# Patient Record
Sex: FEMALE | Race: WHITE | NOT HISPANIC OR LATINO | Employment: OTHER | URBAN - METROPOLITAN AREA
[De-identification: names, ages, dates, MRNs, and addresses within clinical notes are randomized per-mention and may not be internally consistent; named-entity substitution may affect disease eponyms.]

---

## 2019-02-15 ENCOUNTER — TELEPHONE (OUTPATIENT)
Dept: SURGERY | Facility: CLINIC | Age: 64
End: 2019-02-15

## 2019-02-18 ENCOUNTER — TELEPHONE (OUTPATIENT)
Dept: SURGERY | Facility: CLINIC | Age: 64
End: 2019-02-18

## 2019-02-18 NOTE — TELEPHONE ENCOUNTER
----- Message from Aleida Pryor sent at 2/18/2019  9:17 AM CST -----  Contact: pt   Patient Returning Call from [facility]    Who Left Message for Patient: Alethea   Communication Preference: can you please call pt at [phone]  Additional Information: none    AMBROSIO

## 2019-02-20 ENCOUNTER — TELEPHONE (OUTPATIENT)
Dept: HEMATOLOGY/ONCOLOGY | Facility: HOSPITAL | Age: 64
End: 2019-02-20
Payer: MEDICARE

## 2019-02-20 DIAGNOSIS — R19.00 ABDOMINAL MASS, UNSPECIFIED ABDOMINAL LOCATION: Primary | ICD-10-CM

## 2019-02-20 PROCEDURE — 88341 IMHCHEM/IMCYTCHM EA ADD ANTB: CPT | Mod: 26,59,, | Performed by: PATHOLOGY

## 2019-02-20 PROCEDURE — 88342 IMHCHEM/IMCYTCHM 1ST ANTB: CPT | Mod: 26,59,, | Performed by: PATHOLOGY

## 2019-02-20 PROCEDURE — 88341 PR IHC OR ICC EACH ADD'L SINGLE ANTIBODY  STAINPR: ICD-10-PCS | Mod: 26,59,, | Performed by: PATHOLOGY

## 2019-02-20 PROCEDURE — 88342 TISSUE SPECIMEN TO PATHOLOGY: ICD-10-PCS | Mod: 26,59,, | Performed by: PATHOLOGY

## 2019-02-20 PROCEDURE — 88323 TISSUE SPECIMEN TO PATHOLOGY: ICD-10-PCS | Mod: 26,,, | Performed by: PATHOLOGY

## 2019-02-20 PROCEDURE — 88342 IMHCHEM/IMCYTCHM 1ST ANTB: CPT | Mod: 59 | Performed by: PATHOLOGY

## 2019-02-20 PROCEDURE — 88323 CONSLTJ&REPRT MATRL PREP SLD: CPT | Mod: 26,,, | Performed by: PATHOLOGY

## 2019-02-20 PROCEDURE — 88323 CONSLTJ&REPRT MATRL PREP SLD: CPT | Performed by: PATHOLOGY

## 2019-02-25 ENCOUNTER — TELEPHONE (OUTPATIENT)
Dept: SURGERY | Facility: CLINIC | Age: 64
End: 2019-02-25

## 2019-02-25 ENCOUNTER — TELEPHONE (OUTPATIENT)
Dept: HEMATOLOGY/ONCOLOGY | Facility: CLINIC | Age: 64
End: 2019-02-25

## 2019-02-25 NOTE — TELEPHONE ENCOUNTER
----- Message from Ramone Mueller sent at 2/25/2019  9:36 AM CST -----  Contact: Pt  Rescheduling Apt    Who called: Pt  Original Apt: 2/25  Contact preference: [phone]  Additional Information: N/A

## 2019-02-25 NOTE — TELEPHONE ENCOUNTER
Spoke with patient about rescheduling her appointment that she missed in December. Pt would only like to see Dr. Caruso. Explained to patient that the time she was looking for was in March. Patient agreed to date and time. Sending out reminder letter.

## 2019-03-11 ENCOUNTER — INITIAL CONSULT (OUTPATIENT)
Dept: HEMATOLOGY/ONCOLOGY | Facility: CLINIC | Age: 64
End: 2019-03-11
Payer: MEDICARE

## 2019-03-11 ENCOUNTER — INITIAL CONSULT (OUTPATIENT)
Dept: SURGERY | Facility: CLINIC | Age: 64
End: 2019-03-11
Payer: MEDICARE

## 2019-03-11 VITALS
BODY MASS INDEX: 25.82 KG/M2 | TEMPERATURE: 98 F | HEIGHT: 64 IN | DIASTOLIC BLOOD PRESSURE: 90 MMHG | SYSTOLIC BLOOD PRESSURE: 170 MMHG | RESPIRATION RATE: 20 BRPM | WEIGHT: 151.25 LBS | HEART RATE: 107 BPM

## 2019-03-11 DIAGNOSIS — L98.9 SKIN LESION OF BACK: Primary | ICD-10-CM

## 2019-03-11 DIAGNOSIS — C49.4 PRIMARY INTRA-ABDOMINAL SARCOMA: ICD-10-CM

## 2019-03-11 DIAGNOSIS — D49.89 NEOPLASM OF ABDOMEN: ICD-10-CM

## 2019-03-11 DIAGNOSIS — D89.89 OTHER SPECIFIED DISORDERS INVOLVING THE IMMUNE MECHANISM, NOT ELSEWHERE CLASSIFIED: ICD-10-CM

## 2019-03-11 DIAGNOSIS — C76.2 MALIGNANT NEOPLASM OF ABDOMEN: ICD-10-CM

## 2019-03-11 DIAGNOSIS — N18.9 CHRONIC KIDNEY DISEASE: ICD-10-CM

## 2019-03-11 DIAGNOSIS — R93.5 ABNORMAL FINDINGS ON DIAGNOSTIC IMAGING OF OTHER ABDOMINAL REGIONS, INCLUDING RETROPERITONEUM: ICD-10-CM

## 2019-03-11 PROCEDURE — 99999 PR PBB SHADOW E&M-EST. PATIENT-LVL I: CPT | Mod: PBBFAC,,, | Performed by: SURGERY

## 2019-03-11 PROCEDURE — 99999 PR PBB SHADOW E&M-EST. PATIENT-LVL I: ICD-10-PCS | Mod: PBBFAC,,, | Performed by: SURGERY

## 2019-03-11 PROCEDURE — 99205 OFFICE O/P NEW HI 60 MIN: CPT | Mod: GC,S$GLB,, | Performed by: STUDENT IN AN ORGANIZED HEALTH CARE EDUCATION/TRAINING PROGRAM

## 2019-03-11 PROCEDURE — 99203 PR OFFICE/OUTPT VISIT, NEW, LEVL III, 30-44 MIN: ICD-10-PCS | Mod: S$GLB,,, | Performed by: SURGERY

## 2019-03-11 PROCEDURE — 1101F PR PT FALLS ASSESS DOC 0-1 FALLS W/OUT INJ PAST YR: ICD-10-PCS | Mod: CPTII,S$GLB,, | Performed by: SURGERY

## 2019-03-11 PROCEDURE — 99999 PR PBB SHADOW E&M-EST. PATIENT-LVL IV: CPT | Mod: PBBFAC,GC,, | Performed by: STUDENT IN AN ORGANIZED HEALTH CARE EDUCATION/TRAINING PROGRAM

## 2019-03-11 PROCEDURE — 1101F PR PT FALLS ASSESS DOC 0-1 FALLS W/OUT INJ PAST YR: ICD-10-PCS | Mod: CPTII,GC,S$GLB, | Performed by: STUDENT IN AN ORGANIZED HEALTH CARE EDUCATION/TRAINING PROGRAM

## 2019-03-11 PROCEDURE — 3008F PR BODY MASS INDEX (BMI) DOCUMENTED: ICD-10-PCS | Mod: CPTII,GC,S$GLB, | Performed by: STUDENT IN AN ORGANIZED HEALTH CARE EDUCATION/TRAINING PROGRAM

## 2019-03-11 PROCEDURE — 99205 PR OFFICE/OUTPT VISIT, NEW, LEVL V, 60-74 MIN: ICD-10-PCS | Mod: GC,S$GLB,, | Performed by: STUDENT IN AN ORGANIZED HEALTH CARE EDUCATION/TRAINING PROGRAM

## 2019-03-11 PROCEDURE — 99999 PR PBB SHADOW E&M-EST. PATIENT-LVL IV: ICD-10-PCS | Mod: PBBFAC,GC,, | Performed by: STUDENT IN AN ORGANIZED HEALTH CARE EDUCATION/TRAINING PROGRAM

## 2019-03-11 PROCEDURE — 99203 OFFICE O/P NEW LOW 30 MIN: CPT | Mod: S$GLB,,, | Performed by: SURGERY

## 2019-03-11 PROCEDURE — 1101F PT FALLS ASSESS-DOCD LE1/YR: CPT | Mod: CPTII,S$GLB,, | Performed by: SURGERY

## 2019-03-11 PROCEDURE — 1101F PT FALLS ASSESS-DOCD LE1/YR: CPT | Mod: CPTII,GC,S$GLB, | Performed by: STUDENT IN AN ORGANIZED HEALTH CARE EDUCATION/TRAINING PROGRAM

## 2019-03-11 PROCEDURE — 3008F BODY MASS INDEX DOCD: CPT | Mod: CPTII,GC,S$GLB, | Performed by: STUDENT IN AN ORGANIZED HEALTH CARE EDUCATION/TRAINING PROGRAM

## 2019-03-11 RX ORDER — AMLODIPINE BESYLATE 5 MG/1
TABLET ORAL
COMMUNITY
Start: 2019-01-04 | End: 2019-03-27

## 2019-03-11 RX ORDER — ZOLPIDEM TARTRATE 5 MG/1
TABLET ORAL
COMMUNITY
Start: 2019-01-04 | End: 2019-11-11 | Stop reason: SDUPTHER

## 2019-03-11 RX ORDER — HYDROCODONE BITARTRATE AND ACETAMINOPHEN 10; 325 MG/1; MG/1
TABLET ORAL
COMMUNITY
Start: 2019-02-28 | End: 2019-03-12 | Stop reason: SDUPTHER

## 2019-03-11 NOTE — PROGRESS NOTES
Patient seen with Dr Norton; history obtained, patient examined, outside CT personally reviewed, and case discussed with Dr Zachariah Shelton.   She has a massive, centrally-located tumor of the lower abdomen/pelvis which encases the aortic bifurcation and the left common iliac artery and to a lesser extent the right iliac artery. It densely abuts the lower lumbar vertebral bodies. I do not think it is safely resectable. It is highly vascular on CT and debulking would also be hazardous. We probably could obtain more tissue is this would help in decisions about treatment options.   Discussed with patient and family. Also discussed with Dr Caruso. CT sent to be loaded onto PACS    Copy Dr Shelton

## 2019-03-11 NOTE — Clinical Note
Check a CBC, CMP, Mg, Phos, TSH, fT4, lipid panel early morning on day of next visit (withn 2 weeks), followed by PET-CT after this point.  Also will check an EKG and Echo later in the day if possible or the next morning, if not possible..I'm not here between 3/20-3/26 and so can ask Dr. Caruso to help follow-up or I can plan to see on 3/18 or 3/19 if rest of approval for PET-CT can be worked out by that point..Will also see if Dr. Mota's team would like to have a formal appointment or drop by during our appointment on that day.

## 2019-03-11 NOTE — PROGRESS NOTES
.  PATIENT: Tiffany Kaur  MRN: [id number]  DATE: 3/11/2019      Diagnosis:   1. Neoplasm of abdomen    2. 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 She is referred by a Dr. Rosa, who is a general surgeon in the [place] area.  She had not been able to get healthcare for a while as she was not enrolled in her 's plan through the  but has been able to get enrolled in Medicare since December. The patient notes that she started to have abdominal bloating in mid-December. Initially, she had attributed this problem to IBS and was treated at that time with senna, miralax,  Suppositories, and enema. In addition, she notes that she had gone from a weight of 174 pounds to now weighing about 149 pounds. In between, the patient states that the pain was less controlled when she would eat and so had dropped all the way down to 130 pounds two weeks ago. When she ate at that point in time, she would mostly eat jellos and broth. However, over the past two weeks, she has been able to eat bland foods and has picked up the wt to 149 pounds.  She had previously been on tramadol but due to getting somnolent on this medication, she is currently taking a Norco 10 mg pill. Currently, he abdominal pain is around a 4/10 on pain meds. Also, with current laxative regimen, the pt has had BMs every other day this week.      Her workup in mid-December started with an ultrasound which revealed cholelithiasis and large pelvic mass, therefore, follow-up of CT abdomen/pelvis was completed on 1/6/19. Origin of her mass has been unlcear but she was found to have  a 20 cm abdominal mass and a picture of chronic severe left hydronephrosis. The pt had a follow-up CT guided core biopsy on 2/6/19 of abdominal mass which revealed a low grade sarcoma, which has been verified by our pathologists as well.     In addition, the pt has a 3-4 cm x 2 cm lesion on the posterior R shoulder and R lateral neck region with some vascularity, bullous with crusting which the pt has not had follow-up for as of this visit. She notes that this lesion had been present for eight years and endorses some pruritus and periods of resolution, denies any pain or bleeding with the lesion.      Pt had cologuard completed for colorectal cancer screening on December 2018, which was negative for malignancy.     She is able to do daily activities by herself but cannot do any intense or prolonged activity with abdominal mass.       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, etc  Has a brother who lives locally and two daughters, two grand-daughters as well      Allergies:  Review of patient's allergies indicates:  Allergies not on file    Medications:  Current Outpatient Medications   Medication Sig Dispense Refill    amLODIPine (NORVASC) 5 MG tablet       HYDROcodone-acetaminophen (NORCO)  mg per tablet       zolpidem (AMBIEN) 5 MG Tab        No current facility-administered medications for this visit.        Review of Systems   Constitutional: Positive for appetite change and unexpected weight change. Negative for chills and fever.   HENT: Negative for sore throat.    Eyes: Negative for visual disturbance.   Respiratory: Negative for cough, chest tightness, shortness of breath and  "wheezing.    Cardiovascular: Negative for chest pain.   Gastrointestinal: Positive for abdominal distention, abdominal pain and constipation. Negative for blood in stool, diarrhea, nausea, rectal pain and vomiting.   Genitourinary: Negative for dysuria.   Musculoskeletal: Negative for gait problem.   Skin:        + skin lesion   Neurological: Negative for syncope and headaches.   Hematological: Negative for adenopathy. Does not bruise/bleed easily.       ECOG Performance Status: 1   Objective:      Vitals:   Vitals:    03/11/19 1358   BP: (!) 170/90   BP Location: Right arm   Patient Position: Sitting   Pulse: 107   Resp: 20   Temp: 97.9 °F (36.6 °C)   TempSrc: Oral   Weight: 68.6 kg (151 lb 3.8 oz)   Height: 5' 4" (1.626 m)     BMI: Body mass index is 25.96 kg/m².    Physical Exam   Constitutional: She is oriented to person, place, and time. She appears well-developed. No distress.   HENT:   Head: Normocephalic and atraumatic.   Mouth/Throat: No oropharyngeal exudate.   Eyes: EOM are normal. Pupils are equal, round, and reactive to light. No scleral icterus.   Neck: Normal range of motion. Neck supple.   Cardiovascular: Regular rhythm and normal heart sounds. Exam reveals no gallop and no friction rub.   No murmur heard.  tachycardic   Pulmonary/Chest: Effort normal and breath sounds normal. No respiratory distress. She has no wheezes. She has no rales.   Abdominal: She exhibits distension and mass. There is no guarding.   Firm, hypoactive bowel sounds; diffuse tenderness of abdomen; central abdominal bruit   Musculoskeletal: Normal range of motion. She exhibits no edema.   Lymphadenopathy:     She has no cervical adenopathy.   Neurological: She is alert and oriented to person, place, and time. No cranial nerve deficit.   Skin: Skin is warm and dry. She is not diaphoretic.   3-4 cm lesion in R posterior neck, R medial shoulder area; vascular and bullous   Psychiatric: She has a normal mood and affect. "       Laboratory Data:  No visits with results within 1 Week(s) from this visit.   Latest known visit with results is:   No results found for any previous visit.       Imaging:                Imaging:      Assessment:       1. Neoplasm of abdomen    2. Malignant neoplasm of abdomen            Plan:     Low Grade Sarcoma  - 20 cm mass noted in CT abdomen/pelvis; L ureter noted to be have left sided hydronephresis and noted to have some mild inguinal adneopathy  - path reviewed at our institution also consistent with sarcoma as well  - check a CBC, CMP, Mg, Phos, TSH, fT4, lipid panel on morning prior to PET-CT on same day  - Reviewed images and discussed with Dr. Mota, pt is not a surgical candidate at this time as significant amount of central vasculature (aorta, iliac arteries/veins) involved and significant amount of vascularity to mass  - per surgery, plan is to possibly repeat a biopsy of abdominal mass to clarify pathology diagnosis further; no debulking of mass at this time    HTN  - continue norvasc 5 for now  - can titrate up to 10 mg  and/or add second agent if BP continues to remain elevated    SVT  - will need to monitor for AFib but otherwise, will check EKG and Echo at next visit    IBS  - continue laxatives PRN - can use senna + miralax, while on pain meds    Pt prefers to be emailed at [email or web address]    Discussed with Dr. Caruso    Distress Screening Results: Psychosocial Distress screening score of Distress Score: 0 noted and reviewed. No intervention indicated.    Fernando Silvestre MD  Hematology and Oncology Fellow, PGY IV  [facility]

## 2019-03-11 NOTE — PROGRESS NOTES
[facility]  Surgical Oncology  History & Physical    Patient Name: Tiffany Kaur  MRN: [id number]   Attending Physician: Jose Mota MD  Primary Care Provider: Primary Doctor No    Subjective:     Chief Complaint/Reason for Admission: intra-abdominal mass    History of Present Illness: 62 yo W with a history of HTN, IBS - constipation, SVT (occurred over 10 years) and insomnia who presents with increasing abdominal bloating and post-prandial pain that began in 2018. She states that she has always had issues with being bloated and constipated and figured that her symptoms in December were related to that. However, she noticed that every time she ate food, she would get severe abdominal pain to the point where she was writhing on the floor. The pain would occur shortly after eating her meals. It got to the point where she was only able to keep down jello, clear broth, water and other clear, non-fatty liquids. Her bloating certainly worsened and now she has noticed her abdomen is enlarging and has a palpable mass. She lost approximately 40 pounds since that time and has regained ~15-20 lbs. This past week she just started being able to tolerate a regular diet. She has not had any early satiety. She was seen by an outside physician who initially US and a CT scan of the abdomen which revealed a very large intra-abdominal mass that is very vascular and involving the distal aorta at the bifurcation. Core needle biopsy of the mass was performed and revealed round cell neoplasm which favored a low-grade sarcoma.    She has a history of 3  and she had a partial hysterectomy with last . She had an episode of SVT over 10 years ago when she was very stressed and at the time she did not require further work up and has not had any other episodes. She is a non-smoker and does not drink alcohol or do any recreational drugs.    Current Outpatient Medications on File Prior to Visit    Medication Sig    amLODIPine (NORVASC) 5 MG tablet     HYDROcodone-acetaminophen (NORCO)  mg per tablet     zolpidem (AMBIEN) 5 MG Tab      No current facility-administered medications on file prior to visit.        Review of patient's allergies indicates:  No Known Allergies    Past Medical History:   Diagnosis Date    Gallstones     GERD (gastroesophageal reflux disease)     Hypertension     SVT (supraventricular tachycardia)      Past Surgical History:   Procedure Laterality Date     SECTION      X3    HYSTERECTOMY      Partial    TUBAL LIGATION       Family History     Problem Relation (Age of Onset)    Hypertension Brother    Lung disease Mother    No Known Problems Sister, Maternal Aunt, Maternal Uncle, Paternal Aunt, Paternal Uncle, Maternal Grandmother, Maternal Grandfather, Paternal Grandmother, Paternal Grandfather, Daughter, Daughter, Son    Pancreatic cancer Father        Tobacco Use    Smoking status: Never Smoker    Smokeless tobacco: Never Used   Substance and Sexual Activity    Alcohol use: No     Frequency: Never    Drug use: No    Sexual activity: No     Review of Systems   Constitutional: Positive for unexpected weight change. Negative for activity change, appetite change, diaphoresis, fatigue and fever.   HENT: Negative.    Respiratory: Negative.  Negative for apnea, wheezing and stridor.    Cardiovascular: Negative for chest pain and palpitations.   Gastrointestinal: Positive for abdominal distention, abdominal pain and constipation. Negative for anal bleeding, diarrhea, nausea, rectal pain and vomiting.   Genitourinary: Negative.  Negative for difficulty urinating and dysuria.   Musculoskeletal: Negative.  Negative for arthralgias and back pain.   Skin: Negative.  Negative for color change and pallor.   Hematological: Negative.  Negative for adenopathy. Does not bruise/bleed easily.     Objective:     Vital Signs (Most Recent):    Vital Signs (24h  Range):  [unfilled]        There is no height or weight on file to calculate BMI.    Physical Exam   Constitutional: She is oriented to person, place, and time. She appears well-developed and well-nourished. No distress.   HENT:   Head: Normocephalic and atraumatic.   Neck: Normal range of motion. Neck supple.   Cardiovascular: Normal rate and regular rhythm.   Pulmonary/Chest: Effort normal and breath sounds normal.   Abdominal: Soft. Bowel sounds are normal. She exhibits mass. There is tenderness. There is no guarding. No hernia.       Very large, palpable, firm, intra-abdominal mass that is tender to palpation beginning inferior to the umbilicus. No overlying skin changes.    Neurological: She is alert and oriented to person, place, and time.   Skin: Skin is warm and dry.   Psychiatric: She has a normal mood and affect. Her behavior is normal.       Significant Labs:  None    Significant Diagnostics:  CT abd/pelvis 1/16/19: Reviewed with Dr. Mota    Impression:  1. Large enhancing soft tissue mass within the lower abdomen and upper pelvis measuring greater than 20cm in diameter. Due to the large size, it is difficult to determine whether this mass is arising from the left ovary, the GI tract, or the retroperitoneum.  2. Severe left hydronephrosis. Left renal cortical atrophy would suggest that this is a chronic process due to the mass obstructing the left ureter.   3. Cholelithiasis    CT-guided biopsy 2/6/19:  Outside slide labeled . Core biopsy x3 of large pelvo-abdominal mass:  CORE BIOPSY OF A PELVIC/ABDOMINAL MASS SHOWS A ROUND CELL NEOPLASM IN WHICH I FAVOR A  LOW-GRADE SARCOMA. SEE OUTSIDE REPORT FOR RESULTS OF IMMUNOSTAIN PANEL. IN ADDITION  TO THOSE IMMUNOSTAINS A CD45 STAIN PERFORMED HERE IS NEGATIVE. ALSO, KI-67 STAINS LESS  THAN 1% OF TUMOR CELL NUCLEI. THE CONTROLS STAIN APPROPRIATELY.  NOTE: WE ADVISE EXCISION, IF INDICATED, FOR FURTHER ANALYSIS. THIS CASE WAS REVIEWED BY  DR. CHAVARRIA WHO  CONCURS WITH THE DIAGNOSIS.    Assessment/Plan:   64 yo W with large intra-abdominal sarcoma    -Discussed with patient and her family members that given the size, location and vascularity of the mass, it does not appear to be resectable. We also do not believe that debulking would be of benefit. There is potential to obtain more tissue if that would help with better pathologic delineation in order to assist with chemoradiation therapy options.   -We also discussed with Dr. Caruso who will obtain a PET CT scan and following that we will determine if more tissue diagnosis is needed.  -The patient will follow up after her staging PET CT scan if needed after she is seen by heme/onc.    Shyam Jovel MD  General Surgery  PGY-5  [phone] (pager)

## 2019-03-11 NOTE — Clinical Note
Can we also see if we can get a dermatology referral (for skin lesion in back) for same day or day after next appointment with me in clinic? Thanks

## 2019-03-11 NOTE — PROGRESS NOTES
"History & Physical    SUBJECTIVE:     History of Present Illness:  Patient is a 63 y.o. female presents with ***. Onset of symptoms was {desc; hpi onset:44125} with {Desc; clinical condition:17::"unchanged"} course since that time. Patient denies ***. Symptoms are aggravated by ***. Symptoms improve with ***. ***     No chief complaint on file.  j    Review of patient's allergies indicates:  No Known Allergies    Current Outpatient Medications   Medication Sig Dispense Refill    amLODIPine (NORVASC) 5 MG tablet       HYDROcodone-acetaminophen (NORCO)  mg per tablet       zolpidem (AMBIEN) 5 MG Tab        No current facility-administered medications for this visit.        Past Medical History:   Diagnosis Date    Gallstones     GERD (gastroesophageal reflux disease)     Hypertension     SVT (supraventricular tachycardia)      Past Surgical History:   Procedure Laterality Date     SECTION      X3    HYSTERECTOMY      Partial    TUBAL LIGATION       Family History   Problem Relation Age of Onset    Lung disease Mother     Pancreatic cancer Father     No Known Problems Sister     Hypertension Brother     No Known Problems Maternal Aunt     No Known Problems Maternal Uncle     No Known Problems Paternal Aunt     No Known Problems Paternal Uncle     No Known Problems Maternal Grandmother     No Known Problems Maternal Grandfather     No Known Problems Paternal Grandmother     No Known Problems Paternal Grandfather     No Known Problems Daughter     No Known Problems Daughter     No Known Problems Son      Social History     Tobacco Use    Smoking status: Never Smoker    Smokeless tobacco: Never Used   Substance Use Topics    Alcohol use: No     Frequency: Never    Drug use: No        Review of Systems:  Review of Systems    OBJECTIVE:     Vital Signs (Most Recent)              Physical Exam:  Physical Exam    Laboratory  {Labs Reviewed:95092::"***"}    Diagnostic " "Results:  {Results; Diagnostics:32608487::"***"}    ASSESSMENT/PLAN:     ***    PLAN:Plan     {Plan; Diagnostics:20505::"***"}       "

## 2019-03-11 NOTE — LETTER
March 11, 2019      Rachid Camarena MD  [address]  [address]  [facility] [address]           [facility]  [address]  [facility] [address]  Phone: [phone]          Patient: Tiffany Kaur   MR Number: [id number]   YOB: 1955   Date of Visit: 3/11/2019       Dear Rachid Camarena:    Thank you for referring Tiffany Kaur to me for evaluation. Attached you will find relevant portions of my assessment and plan of care.    If you have questions, please do not hesitate to call me. I look forward to following Tiffany Kaur along with you.    Sincerely,    Jose Mota MD    Enclosure  CC:  No Recipients    If you would like to receive this communication electronically, please contact [email or web address] or [phone] to request more information on [employer] Link access.    For providers and/or their staff who would like to refer a patient to [facility], please contact us through our one-stop-shop provider referral line, [facility], at [phone].    If you feel you have received this communication in error or would no longer like to receive these types of communications, please e-mail [email or web address]

## 2019-03-12 ENCOUNTER — TELEPHONE (OUTPATIENT)
Dept: HEMATOLOGY/ONCOLOGY | Facility: CLINIC | Age: 64
End: 2019-03-12

## 2019-03-12 DIAGNOSIS — C76.2 MALIGNANT NEOPLASM OF ABDOMEN: ICD-10-CM

## 2019-03-12 RX ORDER — HYDROCODONE BITARTRATE AND ACETAMINOPHEN 10; 325 MG/1; MG/1
1 TABLET ORAL EVERY 4 HOURS PRN
Qty: 120 TABLET | Refills: 0 | Status: SHIPPED | OUTPATIENT
Start: 2019-03-12 | End: 2019-03-13 | Stop reason: SDUPTHER

## 2019-03-12 NOTE — TELEPHONE ENCOUNTER
----- Message from Mery Joseph sent at 3/12/2019 12:09 PM CDT -----  Type:  Pharmacy Calling to Clarify an RX    Name of Caller:  Marie   Pharmacy Name: [facility] [address]   Prescription Name:    What do they need to clarify?: states that they need Dr Nirmala PAUL #  Best Call Back Number:  [phone]  Additional Information:   Thank You  HALINA Joseph

## 2019-03-12 NOTE — PROGRESS NOTES
Distress Screening Results: Psychosocial Distress screening score of Distress Score: 0 {AMB ONC DISTRESS SCORE:31517}

## 2019-03-13 RX ORDER — HYDROCODONE BITARTRATE AND ACETAMINOPHEN 10; 325 MG/1; MG/1
1 TABLET ORAL EVERY 6 HOURS PRN
Qty: 120 TABLET | Refills: 0 | Status: SHIPPED | OUTPATIENT
Start: 2019-03-13 | End: 2019-06-10 | Stop reason: SDUPTHER

## 2019-03-18 ENCOUNTER — TELEPHONE (OUTPATIENT)
Dept: HEMATOLOGY/ONCOLOGY | Facility: CLINIC | Age: 64
End: 2019-03-18

## 2019-03-18 NOTE — TELEPHONE ENCOUNTER
----- Message from Mayte Camp sent at 3/18/2019  9:38 AM CDT -----  Contact: Pt [phone]  Pt requesting to be called about appts that she expected to be scheduled for 3/25, Please call her at [phone]

## 2019-03-18 NOTE — TELEPHONE ENCOUNTER
Called patient to discuss the appointments. There was no answer or voice mail to leave a message.        ----- Message from Fernando Silvestre MD sent at 3/15/2019  3:47 PM CDT -----  Dr. York has agreed to help see her once on 3/23 if we can move all of the scanning (PET-CT, echo) to that day or day prior as per patient preference and schedules. If this does not work, I am also glad to see her on the 27th when I get back with scans closer to that time, thanks.     ----- Message -----  From: Fannie Brand  Sent: 3/15/2019   1:46 PM  To: Trudy Markham, MD Dr. Shady Kaminski can not see on 3/20 Dr. Silvestre    ----- Message -----  From: Trudy Markham  Sent: 3/15/2019   1:43 PM  To: Fernando Silvestre MD, Shady NOGUEIRA Staff    I can not get the scan on Monday morning. Don't have time on Dr Caruso on Wednesday.    ----- Message -----  From: Fernando Silvestre MD  Sent: 3/15/2019  10:29 AM  To: Aviva Caruso MD, Trudy Markham    I can see her Monday at 3 pm, if there is any way to get that PET-CT completed on Monday morning....otherwise, would move labs and Echo to Wednesday, 3/20 and have her follow-up with PRE[facility] or Dr. Caruso.     I think waiting till 4/1 would be a delay in care, since we still have to determine best biopsy site  and then plan for treatment soon after..    Dr. Mota is willing to drop by at our future visit as well to assist with biopsy, without a formal appointment, per my communication with him.     ----- Message -----  From: Trudy Markham  Sent: 3/15/2019   9:09 AM  To: Fernando Silvestre MD, Shady NOGUEIRA Staff    Dr Silvestre,    Do you want to see pt on 4/1 when you return? Or do you want me to try to get time on Dr Caruso next week?    Fannie,  Can I get time on Dr Caruso next week. Patient needs pet scan & Echo early that same day if possible. Lives in [facility].       ----- Message -----  From: Fernando Silvestre MD  Sent: 3/11/2019   8:53 PM  To: Kathi Mcelroy    Check a CBC,  CMP, Mg, Phos, TSH, fT4, lipid panel early morning on day of next visit (withn 2 weeks), followed by PET-CT after this point.  Also will check an EKG and Echo later in the day if possible or the next morning, if not possible..    I'm not here between 3/20-3/26 and so can ask Dr. Caruso to help follow-up or I can plan to see on 3/18 or 3/19 if rest of approval for PET-CT can be worked out by that point..    Will also see if Dr. Mota's team would like to have a formal appointment or drop by during our appointment on that day.

## 2019-03-18 NOTE — TELEPHONE ENCOUNTER
----- Message from Mery Joseph sent at 3/18/2019 11:31 AM CDT -----  Contact: pt   Pt called to speak with nurse desean have some questions and states can you just leave a message have some phone issues   Callback#[phone]  Thank You  HALINA Joseph

## 2019-03-18 NOTE — TELEPHONE ENCOUNTER
Spoke to patient we scheduled all the appointment for 3/27 gave her times and directions to the location of the appt. Mailed appt slip.    ----- Message from Fernando Silvestre MD sent at 3/18/2019 10:55 AM CDT -----  I guess 3/27 earliest at  3 pm.so that would give enough time for labs too and for some of those results to come back..    ----- Message -----  From: Trudy Markham  Sent: 3/18/2019   9:36 AM  To: Aviva Caruso MD, Fannie Brand, #    Dr Silvestre,    I tried calling patient there was no answer. I can not get all the test done on 3/22 before the appt with Dr Hensley before 9:00 am.(That's what he has available on 3/22).    I can get the Pet on 3/27 at 8:30 and the Echo at 1:00. What Time would you see the patient on 3/27?      ----- Message -----  From: Fernando Silvestre MD  Sent: 3/15/2019   3:47 PM  To: Aviva Caruso MD, Trudy Markham, #    Dr. York has agreed to help see her once on 3/23 if we can move all of the scanning (PET-CT, echo) to that day or day prior as per patient preference and schedules. If this does not work, I am also glad to see her on the 27th when I get back with scans closer to that time, thanks.     ----- Message -----  From: Fannie Brand  Sent: 3/15/2019   1:46 PM  To: Fernando Hewitt MD Dr. Larned can not see on 3/20 Dr. Silvestre    ----- Message -----  From: Trudy Markham  Sent: 3/15/2019   1:43 PM  To: Fernando Silvestre MD, Larned Zoe L Staff    I can not get the scan on Monday morning. Don't have time on Dr Caruso on Wednesday.    ----- Message -----  From: Fernando Silvestre MD  Sent: 3/15/2019  10:29 AM  To: Aviva Caruso MD, [facility]    I can see her Monday at 3 pm, if there is any way to get that PET-CT completed on Monday morning....otherwise, would move labs and Echo to Wednesday, 3/20 and have her follow-up with PREM or Dr. Caruso.     I think waiting till 4/1 would be a delay in care, since we still have to determine best  biopsy site  and then plan for treatment soon after..    Dr. Mota is willing to drop by at our future visit as well to assist with biopsy, without a formal appointment, per my communication with him.     ----- Message -----  From: Trudy Markham  Sent: 3/15/2019   9:09 AM  To: Fernando Silvestre MD, Shady NOGUEIRA Staff    Dr Silvestre,    Do you want to see pt on 4/1 when you return? Or do you want me to try to get time on Dr Caruso next week?    Fannie,  Can I get time on Dr Caruso next week. Patient needs pet scan & Echo early that same day if possible. Lives in [facility].       ----- Message -----  From: Fernando Silvestre MD  Sent: 3/11/2019   8:53 PM  To: Kathi Mcelroy    Check a CBC, CMP, Mg, Phos, TSH, fT4, lipid panel early morning on day of next visit (withn 2 weeks), followed by PET-CT after this point.  Also will check an EKG and Echo later in the day if possible or the next morning, if not possible..    I'm not here between 3/20-3/26 and so can ask Dr. Caruso to help follow-up or I can plan to see on 3/18 or 3/19 if rest of approval for PET-CT can be worked out by that point..    Will also see if Dr. Mota's team would like to have a formal appointment or drop by during our appointment on that day.

## 2019-03-27 ENCOUNTER — TELEPHONE (OUTPATIENT)
Dept: HEMATOLOGY/ONCOLOGY | Facility: CLINIC | Age: 64
End: 2019-03-27

## 2019-03-27 ENCOUNTER — OFFICE VISIT (OUTPATIENT)
Dept: HEMATOLOGY/ONCOLOGY | Facility: CLINIC | Age: 64
End: 2019-03-27
Payer: MEDICARE

## 2019-03-27 ENCOUNTER — HOSPITAL ENCOUNTER (OUTPATIENT)
Dept: RADIOLOGY | Facility: HOSPITAL | Age: 64
Discharge: HOME | End: 2019-03-27
Attending: STUDENT IN AN ORGANIZED HEALTH CARE EDUCATION/TRAINING PROGRAM
Payer: MEDICARE

## 2019-03-27 ENCOUNTER — INFUSION (OUTPATIENT)
Dept: INFUSION THERAPY | Facility: HOSPITAL | Age: 64
End: 2019-03-27
Attending: STUDENT IN AN ORGANIZED HEALTH CARE EDUCATION/TRAINING PROGRAM
Payer: MEDICARE

## 2019-03-27 ENCOUNTER — HOSPITAL ENCOUNTER (OUTPATIENT)
Dept: CARDIOLOGY | Facility: CLINIC | Age: 64
Discharge: HOME | End: 2019-03-27
Attending: STUDENT IN AN ORGANIZED HEALTH CARE EDUCATION/TRAINING PROGRAM
Payer: MEDICARE

## 2019-03-27 VITALS
RESPIRATION RATE: 20 BRPM | WEIGHT: 151 LBS | BODY MASS INDEX: 25.78 KG/M2 | SYSTOLIC BLOOD PRESSURE: 168 MMHG | DIASTOLIC BLOOD PRESSURE: 81 MMHG | HEART RATE: 90 BPM | BODY MASS INDEX: 26.26 KG/M2 | HEIGHT: 64 IN | WEIGHT: 153 LBS | TEMPERATURE: 98 F | HEART RATE: 100 BPM

## 2019-03-27 VITALS — RESPIRATION RATE: 20 BRPM | SYSTOLIC BLOOD PRESSURE: 167 MMHG | HEART RATE: 89 BPM | DIASTOLIC BLOOD PRESSURE: 77 MMHG

## 2019-03-27 DIAGNOSIS — R93.5 ABNORMAL FINDINGS ON DIAGNOSTIC IMAGING OF OTHER ABDOMINAL REGIONS, INCLUDING RETROPERITONEUM: ICD-10-CM

## 2019-03-27 DIAGNOSIS — C49.4 PRIMARY INTRA-ABDOMINAL SARCOMA: Primary | ICD-10-CM

## 2019-03-27 DIAGNOSIS — I47.10 SVT (SUPRAVENTRICULAR TACHYCARDIA): Primary | ICD-10-CM

## 2019-03-27 DIAGNOSIS — D49.89 NEOPLASM OF ABDOMEN: ICD-10-CM

## 2019-03-27 DIAGNOSIS — E83.52 HYPERCALCEMIA OF MALIGNANCY: ICD-10-CM

## 2019-03-27 DIAGNOSIS — I10 HYPERTENSION, UNSPECIFIED TYPE: ICD-10-CM

## 2019-03-27 DIAGNOSIS — K58.9 IRRITABLE BOWEL SYNDROME, UNSPECIFIED TYPE: ICD-10-CM

## 2019-03-27 DIAGNOSIS — G62.9 NEUROPATHY: ICD-10-CM

## 2019-03-27 DIAGNOSIS — C76.2 MALIGNANT NEOPLASM OF ABDOMEN: ICD-10-CM

## 2019-03-27 LAB
ASCENDING AORTA: 3.36 CM
AV INDEX (PROSTH): 0.67
AV MEAN GRADIENT: 6.78 MMHG
AV PEAK GRADIENT: 12.96 MMHG
AV VALVE AREA: 2.28 CM2
AV VELOCITY RATIO: 0.64
BSA FOR ECHO PROCEDURE: 1.76 M2
CV ECHO LV RWT: 0.33 CM
DOP CALC AO PEAK VEL: 1.8 M/S
DOP CALC AO VTI: 32.75 CM
DOP CALC LVOT AREA: 3.43 CM2
DOP CALC LVOT DIAMETER: 2.09 CM
DOP CALC LVOT PEAK VEL: 1.15 M/S
DOP CALC LVOT STROKE VOLUME: 74.82 CM3
DOP CALCLVOT PEAK VEL VTI: 21.82 CM
E WAVE DECELERATION TIME: 196.24 MSEC
E/A RATIO: 0.93
ECHO LV POSTERIOR WALL: 0.86 CM (ref 0.6–1.1)
FRACTIONAL SHORTENING: 30 % (ref 28–44)
INTERVENTRICULAR SEPTUM: 0.88 CM (ref 0.6–1.1)
IVRT: 0.08 MSEC
LA MAJOR: 5.5 CM
LA MINOR: 5.7 CM
LA WIDTH: 4.56 CM
LEFT ATRIUM SIZE: 4.65 CM
LEFT ATRIUM VOLUME INDEX: 57.8 ML/M2
LEFT ATRIUM VOLUME: 100.9 CM3
LEFT INTERNAL DIMENSION IN SYSTOLE: 3.66 CM (ref 2.1–4)
LEFT VENTRICLE DIASTOLIC VOLUME INDEX: 74.46 ML/M2
LEFT VENTRICLE DIASTOLIC VOLUME: 129.97 ML
LEFT VENTRICLE MASS INDEX: 92.9 G/M2
LEFT VENTRICLE SYSTOLIC VOLUME INDEX: 32.4 ML/M2
LEFT VENTRICLE SYSTOLIC VOLUME: 56.63 ML
LEFT VENTRICULAR INTERNAL DIMENSION IN DIASTOLE: 5.21 CM (ref 3.5–6)
LEFT VENTRICULAR MASS: 162.24 G
LV LATERAL E/E' RATIO: 10
MV PEAK A VEL: 0.97 M/S
MV PEAK E VEL: 0.9 M/S
PISA TR MAX VEL: 2.33 M/S
POCT GLUCOSE: 94 MG/DL (ref 70–110)
PULM VEIN S/D RATIO: 1.48
PV PEAK D VEL: 0.44 M/S
PV PEAK S VEL: 0.65 M/S
RA MAJOR: 4.63 CM
RA PRESSURE: 3 MMHG
RA WIDTH: 4.27 CM
RIGHT VENTRICULAR END-DIASTOLIC DIMENSION: 3.78 CM
RV TISSUE DOPPLER FREE WALL SYSTOLIC VELOCITY 1 (APICAL 4 CHAMBER VIEW): 18.62 M/S
SINUS: 2.91 CM
STJ: 2.75 CM
TDI LATERAL: 0.09
TR MAX PG: 21.72 MMHG
TRICUSPID ANNULAR PLANE SYSTOLIC EXCURSION: 2.91 CM
TV REST PULMONARY ARTERY PRESSURE: 25 MMHG

## 2019-03-27 PROCEDURE — 99999 PR PBB SHADOW E&M-EST. PATIENT-LVL III: CPT | Mod: PBBFAC,GC,, | Performed by: STUDENT IN AN ORGANIZED HEALTH CARE EDUCATION/TRAINING PROGRAM

## 2019-03-27 PROCEDURE — 99999 PR PBB SHADOW E&M-EST. PATIENT-LVL III: ICD-10-PCS | Mod: PBBFAC,GC,, | Performed by: STUDENT IN AN ORGANIZED HEALTH CARE EDUCATION/TRAINING PROGRAM

## 2019-03-27 PROCEDURE — 78815 NM PET CT ROUTINE: ICD-10-PCS | Mod: 26,PI,, | Performed by: RADIOLOGY

## 2019-03-27 PROCEDURE — 1101F PR PT FALLS ASSESS DOC 0-1 FALLS W/OUT INJ PAST YR: ICD-10-PCS | Mod: CPTII,GC,S$GLB, | Performed by: STUDENT IN AN ORGANIZED HEALTH CARE EDUCATION/TRAINING PROGRAM

## 2019-03-27 PROCEDURE — 1101F PT FALLS ASSESS-DOCD LE1/YR: CPT | Mod: CPTII,GC,S$GLB, | Performed by: STUDENT IN AN ORGANIZED HEALTH CARE EDUCATION/TRAINING PROGRAM

## 2019-03-27 PROCEDURE — 93306 TRANSTHORACIC ECHO (TTE) COMPLETE (CUPID ONLY): ICD-10-PCS | Mod: S$GLB,,, | Performed by: INTERNAL MEDICINE

## 2019-03-27 PROCEDURE — 25000003 PHARM REV CODE 250: Performed by: STUDENT IN AN ORGANIZED HEALTH CARE EDUCATION/TRAINING PROGRAM

## 2019-03-27 PROCEDURE — 99214 PR OFFICE/OUTPT VISIT, EST, LEVL IV, 30-39 MIN: ICD-10-PCS | Mod: GC,S$GLB,, | Performed by: STUDENT IN AN ORGANIZED HEALTH CARE EDUCATION/TRAINING PROGRAM

## 2019-03-27 PROCEDURE — 3008F BODY MASS INDEX DOCD: CPT | Mod: CPTII,GC,S$GLB, | Performed by: STUDENT IN AN ORGANIZED HEALTH CARE EDUCATION/TRAINING PROGRAM

## 2019-03-27 PROCEDURE — 99214 OFFICE O/P EST MOD 30 MIN: CPT | Mod: GC,S$GLB,, | Performed by: STUDENT IN AN ORGANIZED HEALTH CARE EDUCATION/TRAINING PROGRAM

## 2019-03-27 PROCEDURE — 3077F SYST BP >= 140 MM HG: CPT | Mod: CPTII,GC,S$GLB, | Performed by: STUDENT IN AN ORGANIZED HEALTH CARE EDUCATION/TRAINING PROGRAM

## 2019-03-27 PROCEDURE — 96361 HYDRATE IV INFUSION ADD-ON: CPT

## 2019-03-27 PROCEDURE — 3008F PR BODY MASS INDEX (BMI) DOCUMENTED: ICD-10-PCS | Mod: CPTII,GC,S$GLB, | Performed by: STUDENT IN AN ORGANIZED HEALTH CARE EDUCATION/TRAINING PROGRAM

## 2019-03-27 PROCEDURE — 96360 HYDRATION IV INFUSION INIT: CPT | Mod: 59

## 2019-03-27 PROCEDURE — 78815 PET IMAGE W/CT SKULL-THIGH: CPT | Mod: TC

## 2019-03-27 PROCEDURE — 3079F PR MOST RECENT DIASTOLIC BLOOD PRESSURE 80-89 MM HG: ICD-10-PCS | Mod: CPTII,GC,S$GLB, | Performed by: STUDENT IN AN ORGANIZED HEALTH CARE EDUCATION/TRAINING PROGRAM

## 2019-03-27 PROCEDURE — 93306 TTE W/DOPPLER COMPLETE: CPT | Mod: S$GLB,,, | Performed by: INTERNAL MEDICINE

## 2019-03-27 PROCEDURE — 78815 PET IMAGE W/CT SKULL-THIGH: CPT | Mod: 26,PI,, | Performed by: RADIOLOGY

## 2019-03-27 PROCEDURE — A9552 F18 FDG: HCPCS

## 2019-03-27 PROCEDURE — 3079F DIAST BP 80-89 MM HG: CPT | Mod: CPTII,GC,S$GLB, | Performed by: STUDENT IN AN ORGANIZED HEALTH CARE EDUCATION/TRAINING PROGRAM

## 2019-03-27 PROCEDURE — 3077F PR MOST RECENT SYSTOLIC BLOOD PRESSURE >= 140 MM HG: ICD-10-PCS | Mod: CPTII,GC,S$GLB, | Performed by: STUDENT IN AN ORGANIZED HEALTH CARE EDUCATION/TRAINING PROGRAM

## 2019-03-27 RX ORDER — GABAPENTIN 300 MG/1
300 CAPSULE ORAL NIGHTLY
Qty: 60 CAPSULE | Refills: 2 | Status: SHIPPED | OUTPATIENT
Start: 2019-03-27 | End: 2019-04-22 | Stop reason: SDUPTHER

## 2019-03-27 RX ADMIN — SODIUM CHLORIDE: 0.9 INJECTION, SOLUTION INTRAVENOUS at 05:03

## 2019-03-27 RX ADMIN — SODIUM CHLORIDE: 0.9 INJECTION, SOLUTION INTRAVENOUS at 04:03

## 2019-03-27 NOTE — PROGRESS NOTES
PATIENT: Tifafny Kaur  MRN: [id number]  DATE: 3/27/2019      Diagnosis:   1. Primary intra-abdominal sarcoma    2. Hypercalcemia of malignancy    3. Neuropathy    4. Hypertension, unspecified type    5. Irritable bowel syndrome, unspecified type        Chief Complaint: Follow-up      Oncologic History:   Pt is a 64 yo  female with PMhx of SVT (possible AFib), chronic IBS (describes a history of suffering from constipation as a child), HTN who presents to the hospital to discuss further options at treating recently found low grade sarcoma in her abdomen, workup for this which was started in [facility]. She is referred by a Dr. Rosa, who is a general surgeon in the [place] area.  She had not been able to get healthcare for a while as she was not enrolled in her 's plan through the  but has been able to get enrolled in Medicare since December. The patient notes that she started to have abdominal bloating in mid-December. Initially, she had attributed this problem to IBS and was treated at that time with senna, miralax,  Suppositories, and enema. In addition, she notes that she had gone from a weight of 174 pounds to now weighing about 149 pounds. In between, the patient states that the pain was less controlled when she would eat and so had dropped all the way down to 130 pounds two weeks ago. When she ate at that point in time, she would mostly eat jellos and broth. However, over the past two weeks, she has been able to eat bland foods and has picked up the wt to 149 pounds.  She had previously been on tramadol but due to getting somnolent on this medication, she is currently taking a Norco 10 mg pill. Currently, he abdominal pain is around a 4/10 on pain meds. Also, with current laxative regimen, the pt has had BMs every other day this week.       Her workup in mid-December started with an ultrasound which revealed cholelithiasis and large pelvic mass, therefore,  follow-up of CT abdomen/pelvis was completed on 19. Origin of her mass has been unlcear but she was found to have a 20 cm abdominal mass and a picture of chronic severe left hydronephrosis. The pt had a follow-up CT guided core biopsy on 19 of abdominal mass which revealed a low grade sarcoma, which has been verified by our pathologists as well.      In addition, the pt has a 3-4 cm x 2 cm lesion on the posterior R shoulder and R lateral neck region with some vascularity, bullous with crusting which the pt has not had follow-up for as of this visit. She notes that this lesion had been present for eight years and endorses some pruritus and periods of resolution, denies any pain or bleeding with the lesion.       Pt had cologuard completed for colorectal cancer screening on 2018, which was negative for malignancy.      She is able to do daily activities by herself but cannot do any intense or prolonged activity with abdominal mass.         Subjective:   She is taking Norco q6 hours but this is controlling her current pain. Her weight has been stable, currently has gained about a kilogram since a few weeks ago and weighs about 153 pounds. She stated that a few days ago there was increased urinary frequency but otherwise had been having regular urination prior to this, denies dysuria.    The pt notes shooting pains in L lateral thigh, which are worse at night. Other than norco, she has not tried any other medication for this pain.     Past Medical History:   Past Medical History:   Diagnosis Date    Gallstones     GERD (gastroesophageal reflux disease)     Hypertension     SVT (supraventricular tachycardia)        Past Surgical HIstory:   Past Surgical History:   Procedure Laterality Date     SECTION      X3    HYSTERECTOMY      Partial    TUBAL LIGATION         Family History:   Family History   Problem Relation Age of Onset    Lung disease Mother     Pancreatic cancer Father     No  Known Problems Sister     Hypertension Brother     No Known Problems Maternal Aunt     No Known Problems Maternal Uncle     No Known Problems Paternal Aunt     No Known Problems Paternal Uncle     No Known Problems Maternal Grandmother     No Known Problems Maternal Grandfather     No Known Problems Paternal Grandmother     No Known Problems Paternal Grandfather     No Known Problems Daughter     No Known Problems Daughter     No Known Problems Son        Social History:  reports that she has never smoked. She has never used smokeless tobacco. She reports that she does not drink alcohol or use drugs.    Allergies:  Review of patient's allergies indicates:  No Known Allergies    Medications:  Current Outpatient Medications   Medication Sig Dispense Refill    HYDROcodone-acetaminophen (NORCO)  mg per tablet Take 1 tablet by mouth every 6 (six) hours as needed for Pain. 120 tablet 0    zolpidem (AMBIEN) 5 MG Tab       gabapentin (NEURONTIN) 300 MG capsule Take 1 capsule (300 mg total) by mouth every evening. Take one pill (300 mg at night), can take up to two a night (600 mg) if tolerated. 60 capsule 2     No current facility-administered medications for this visit.        Review of Systems   Constitutional: Positive for appetite change and unexpected weight change. Negative for chills and fever.   HENT: Negative for sore throat.    Eyes: Negative for visual disturbance.   Respiratory: Negative for cough, chest tightness, shortness of breath and wheezing.    Cardiovascular: Negative for chest pain.   Gastrointestinal: Positive for abdominal distention, abdominal pain and constipation. Negative for blood in stool, diarrhea, nausea, rectal pain and vomiting.   Genitourinary: Negative for dysuria.   Musculoskeletal: Negative for gait problem.   Skin:        + skin lesion   Neurological: Negative for syncope and headaches.   Hematological: Negative for adenopathy. Does not bruise/bleed easily.        ECOG Performance Status: 1   Objective:      Vitals:   Vitals:    03/27/19 1349   BP: (!) 168/81   BP Location: Right arm   Patient Position: Sitting   Pulse: 100   Resp: 20   Temp: 97.9 °F (36.6 °C)   TempSrc: Oral   Weight: 69.4 kg (153 lb)     BMI: Body mass index is 26.26 kg/m².    Physical Exam   Constitutional: She is oriented to person, place, and time. She appears well-developed. No distress.   HENT:   Head: Normocephalic and atraumatic.   Mouth/Throat: No oropharyngeal exudate.   Eyes: Pupils are equal, round, and reactive to light. EOM are normal. No scleral icterus.   Neck: Normal range of motion. Neck supple.   Cardiovascular: Regular rhythm and normal heart sounds. Exam reveals no gallop and no friction rub.   No murmur heard.  tachycardic   Pulmonary/Chest: Effort normal and breath sounds normal. No respiratory distress. She has no wheezes. She has no rales.   Abdominal: She exhibits distension and mass. There is no guarding.   Firm, hypoactive bowel sounds; diffuse tenderness of abdomen; central abdominal bruit   Musculoskeletal: Normal range of motion. She exhibits no edema.   Lymphadenopathy:     She has no cervical adenopathy.   Neurological: She is alert and oriented to person, place, and time. No cranial nerve deficit.   Skin: Skin is warm and dry. She is not diaphoretic.   3-4 cm lesion in R posterior neck, R medial shoulder area; vascular and bullous   Psychiatric: She has a normal mood and affect.       Laboratory Data:  Lab Visit on 03/27/2019   Component Date Value Ref Range Status    WBC 03/27/2019 9.70  3.90 - 12.70 K/uL Final    RBC 03/27/2019 4.26  4.00 - 5.40 M/uL Final    Hemoglobin 03/27/2019 9.2* 12.0 - 16.0 g/dL Final    Hematocrit 03/27/2019 31.3* 37.0 - 48.5 % Final    MCV 03/27/2019 74* 82 - 98 fL Final    MCH 03/27/2019 21.6* 27.0 - 31.0 pg Final    MCHC 03/27/2019 29.4* 32.0 - 36.0 g/dL Final    RDW 03/27/2019 19.9* 11.5 - 14.5 % Final    Platelets 03/27/2019  434* 150 - 350 K/uL Final    MPV 03/27/2019 10.9  9.2 - 12.9 fL Final    Immature Granulocytes 03/27/2019 0.5  0.0 - 0.5 % Final    Gran # (ANC) 03/27/2019 7.1  1.8 - 7.7 K/uL Final    Immature Grans (Abs) 03/27/2019 0.05* 0.00 - 0.04 K/uL Final    Comment: Mild elevation in immature granulocytes is non specific and   can be seen in a variety of conditions including stress response,   acute inflammation, trauma and pregnancy. Correlation with other   laboratory and clinical findings is essential.      Lymph # 03/27/2019 1.8  1.0 - 4.8 K/uL Final    Mono # 03/27/2019 0.6  0.3 - 1.0 K/uL Final    Eos # 03/27/2019 0.1  0.0 - 0.5 K/uL Final    Baso # 03/27/2019 0.12  0.00 - 0.20 K/uL Final    nRBC 03/27/2019 0  0 /100 WBC Final    Gran% 03/27/2019 72.9  38.0 - 73.0 % Final    Lymph% 03/27/2019 18.4  18.0 - 48.0 % Final    Mono% 03/27/2019 6.2  4.0 - 15.0 % Final    Eosinophil% 03/27/2019 0.8  0.0 - 8.0 % Final    Basophil% 03/27/2019 1.2  0.0 - 1.9 % Final    Differential Method 03/27/2019 Automated   Final    Sodium 03/27/2019 136  136 - 145 mmol/L Final    Potassium 03/27/2019 4.3  3.5 - 5.1 mmol/L Final    Chloride 03/27/2019 106  95 - 110 mmol/L Final    CO2 03/27/2019 21* 23 - 29 mmol/L Final    Glucose 03/27/2019 93  70 - 110 mg/dL Final    BUN, Bld 03/27/2019 11  8 - 23 mg/dL Final    Creatinine 03/27/2019 0.7  0.5 - 1.4 mg/dL Final    Calcium 03/27/2019 10.8* 8.7 - 10.5 mg/dL Final    Total Protein 03/27/2019 7.4  6.0 - 8.4 g/dL Final    Albumin 03/27/2019 3.3* 3.5 - 5.2 g/dL Final    Total Bilirubin 03/27/2019 1.1* 0.1 - 1.0 mg/dL Final    Comment: For infants and newborns, interpretation of results should be based  on gestational age, weight and in agreement with clinical  observations.  Premature Infant recommended reference ranges:  Up to 24 hours.............<8.0 mg/dL  Up to 48 hours............<12.0 mg/dL  3-5 days..................<15.0 mg/dL  6-29 days.................<15.0  mg/dL      Alkaline Phosphatase 03/27/2019 117  55 - 135 U/L Final    AST 03/27/2019 11  10 - 40 U/L Final    ALT 03/27/2019 5* 10 - 44 U/L Final    Anion Gap 03/27/2019 9  8 - 16 mmol/L Final    eGFR if African American 03/27/2019 >60.0  >60 mL/min/1.73 m^2 Final    eGFR if non African American 03/27/2019 >60.0  >60 mL/min/1.73 m^2 Final    Comment: Calculation used to obtain the estimated glomerular filtration  rate (eGFR) is the CKD-EPI equation.       Magnesium 03/27/2019 2.1  1.6 - 2.6 mg/dL Final    Phosphorus 03/27/2019 3.3  2.7 - 4.5 mg/dL Final    TSH 03/27/2019 1.425  0.400 - 4.000 uIU/mL Final    Free T4 03/27/2019 1.03  0.71 - 1.51 ng/dL Final    Cholesterol 03/27/2019 204* 120 - 199 mg/dL Final    Comment: The National Cholesterol Education Program (NCEP) has set the  following guidelines (reference ranges) for Cholesterol:  Optimal.....................<200 mg/dL  Borderline High.............200-239 mg/dL  High........................> or = 240 mg/dL      Triglycerides 03/27/2019 130  30 - 150 mg/dL Final    Comment: The National Cholesterol Education Program (NCEP) has set the  following guidelines (reference values) for triglycerides:  Normal......................<150 mg/dL  Borderline High.............150-199 mg/dL  High........................200-499 mg/dL      HDL 03/27/2019 38* 40 - 75 mg/dL Final    Comment: The National Cholesterol Education Program (NCEP) has set the  following guidelines (reference values) for HDL Cholesterol:  Low...............<40 mg/dL  Optimal...........>60 mg/dL      LDL Cholesterol 03/27/2019 140.0  63.0 - 159.0 mg/dL Final    Comment: The National Cholesterol Education Program (NCEP) has set the  following guidelines (reference values) for LDL Cholesterol:  Optimal.......................<130 mg/dL  Borderline High...............130-159 mg/dL  High..........................160-189 mg/dL  Very High.....................>190 mg/dL      HDL/Chol Ratio  03/27/2019 18.6* 20.0 - 50.0 % Final    Total Cholesterol/HDL Ratio 03/27/2019 5.4* 2.0 - 5.0 Final    Non-HDL Cholesterol 03/27/2019 166  mg/dL Final    Comment: Risk category and Non-HDL cholesterol goals:  Coronary heart disease (CHD)or equivalent (10-year risk of CHD >20%):  Non-HDL cholesterol goal     <130 mg/dL  Two or more CHD risk factors and 10-year risk of CHD <= 20%:  Non-HDL cholesterol goal     <160 mg/dL  0 to 1 CHD risk factor:  Non-HDL cholesterol goal     <190 mg/dL     Hospital Outpatient Visit on 03/27/2019   Component Date Value Ref Range Status    Ascending aorta 03/27/2019 3.36  cm Final-Edited    STJ 03/27/2019 2.75  cm Final-Edited    AV mean gradient 03/27/2019 6.78  mmHg Final-Edited    Ao peak bhupendra 03/27/2019 1.80  m/s Final-Edited    Ao VTI 03/27/2019 32.75  cm Final-Edited    IVRT 03/27/2019 0.08  msec Final-Edited    IVS 03/27/2019 0.88  0.6 - 1.1 cm Final-Edited    LA size 03/27/2019 4.65  cm Final-Edited    Left Atrium Major Axis 03/27/2019 5.50  cm Final-Edited    Left Atrium Minor Axis 03/27/2019 5.70  cm Final-Edited    LVIDD 03/27/2019 5.21  3.5 - 6.0 cm Final-Edited    LVIDS 03/27/2019 3.66  2.1 - 4.0 cm Final-Edited    LVOT diameter 03/27/2019 2.09  cm Final-Edited    LVOT peak VTI 03/27/2019 21.82  cm Final-Edited    PW 03/27/2019 0.86  0.6 - 1.1 cm Final-Edited    MV Peak A Bhupendra 03/27/2019 0.97  m/s Final-Edited    E wave decelartion time 03/27/2019 196.24  msec Final-Edited    MV Peak E Bhupendra 03/27/2019 0.90  m/s Final-Edited    PV Peak D Bhupendra 03/27/2019 0.44  m/s Final-Edited    PV Peak S Bhupendra 03/27/2019 0.65  m/s Final-Edited    RA Major Axis 03/27/2019 4.63  cm Final-Edited    RA Width 03/27/2019 4.27  cm Final-Edited    RVDD 03/27/2019 3.78  cm Final-Edited    Sinus 03/27/2019 2.91  cm Final-Edited    TAPSE 03/27/2019 2.91  cm Final-Edited    TR Max Bhupendra 03/27/2019 2.33  m/s Final-Edited    TDI LATERAL 03/27/2019 0.09   Final-Edited    LA WIDTH  03/27/2019 4.56  cm Final-Edited    LV Diastolic Volume 03/27/2019 129.97  mL Final-Edited    LV Systolic Volume 03/27/2019 56.63  mL Final-Edited    RV S' 03/27/2019 18.62  m/s Final-Edited    LVOT peak anthony 03/27/2019 [id number]  m/s Final-Edited    LV LATERAL E/E' RATIO 03/27/2019 10.00   Final-Edited    FS 03/27/2019 30  % Final-Edited    LA volume 03/27/2019 100.90  cm3 Final-Edited    LV mass 03/27/2019 162.24  g Final-Edited    Left Ventricle Relative Wall Thick* 03/27/2019 0.33  cm Final-Edited    AV valve area 03/27/2019 2.28  cm2 Final-Edited    AV Velocity Ratio 03/27/2019 0.64   Final-Edited    AV index (prosthetic) 03/27/2019 0.67   Final-Edited    E/A ratio 03/27/2019 0.93   Final-Edited    Pulm vein S/D ratio 03/27/2019 1.48   Final-Edited    LVOT area 03/27/2019 3.43  cm2 Final-Edited    LVOT stroke volume 03/27/2019 74.82  cm3 Final-Edited    AV peak gradient 03/27/2019 12.96  mmHg Final-Edited    Triscuspid Valve Regurgitation Pea* 03/27/2019 21.72  mmHg Final-Edited    BSA 03/27/2019 1.76  m2 Final-Edited    LV Systolic Volume Index 03/27/2019 32.4  mL/m2 Corrected    LV Diastolic Volume Index 03/27/2019 74.46  mL/m2 Corrected    LA Volume Index 03/27/2019 57.8  mL/m2 Corrected    LV Mass Index 03/27/2019 92.9  g/m2 Corrected    Right Atrial Pressure (from IVC) 03/27/2019 3  mmHg Final-Edited    TV rest pulmonary artery pressure 03/27/2019 25  mmHg Final-Edited   Hospital Outpatient Visit on 03/27/2019   Component Date Value Ref Range Status    POCT Glucose 03/27/2019 94  70 - 110 mg/dL Final         Pathology:                   Imaging:     EXAMINATION:  NM PET CT ROUTINE    CLINICAL HISTORY:  Neoplasm: abdomen, other primary, staging Abnormal findings on diagnostic imaging of other abdominal regions, including retroperitoneum    TECHNIQUE:  11.2 mCi of F18-FDG was administered intravenously in the left antecubital fossa.  After an approximately 60 min distribution  time, PET/CT images were acquired from the skull base to the mid thigh. Transmission images were acquired to correct for attenuation using a whole body low-dose CT scan without contrast with the arms positioned above the head. Glycemia at the time of injection was 94 mg/dL.    COMPARISON:  No relevant prior imaging comparison.    FINDINGS:  Quality of the study: Adequate.    Mild uptake in large anterior abdominal mass.  Given this primary lesion only shows mild uptake, overall sensitivity of PET imaging is decreased.  Moderate area of focal uptake in the subcutaneous tissues of the neck, which could be seen in a sebaceous cyst.    Index lesions:    Large anterior abdominal mass: SUV max of 4.7    Physiologic uptake of the tracer is present within the brain, salivary glands, myocardium, GI and  tracts.    Incidental CT findings: Severe left renal atrophy with hydronephrosis, as well as nonobstructing right renal calculus measuring 9 mm.      Impression       Mild uptake in large anterior abdominal mass with no other concerning areas of increased uptake, noting that sensitivity of PET is decreased given mild uptake of primary lesion.    Left-sided hydronephrosis, as well as nonobstructing right renal calculus measuring 9 mm.    Electronically signed by resident: Derrick Rain  Date: 03/27/2019  Time: 10:23    Electronically signed by: Montserrat Ledezma MD  Date: 03/27/2019  Time: 14:05             Assessment:       1. Primary intra-abdominal sarcoma    2. Hypercalcemia of malignancy    3. Neuropathy    4. Hypertension, unspecified type    5. Irritable bowel syndrome, unspecified type           Plan:       Low Grade Sarcoma  - 20 cm mass noted in CT abdomen/pelvis; L ureter noted to be have left sided hydronephresis and noted to have some mild inguinal adneopathy  - path reviewed at our institution also consistent with sarcoma as well  - Reviewed images and discussed with Dr. Mota previously, pt is not a surgical  candidate at this time as significant amount of central vasculature (aorta, iliac arteries/veins) involved and significant amount of vascularity to mass  - PET-CT reveals a large anterior abdominal mass, low activity sarcoma without significant metastatic spread, SUV max of 4.7   - Echo with EF of 60%, normal LV systolic function and indeterminate diastolic function  - tumor causing pain in central abdomen as well as compression of L ureter and causing hydronephrosis  - plan to consult Urology with setup during this week for possible stent versus nephrostomy tube prior to chemotherapy  - will plan for inpatient AIM  (adrimaycin, ifosfomide, mesna) next week given symptoms above, discussed palliative intent of therapy  - went over risks/benefits/ side effects of chemotherapy and pt wishes to proceed with therapy, consent form signed and form submitted to be scanned into system  - plan to repeat PET-CT after first cycle of chemotherapy    Hypercalcemia of malignancy  -  Corrected calcium to 11.4 today  -  Will give 2 L of IV fluids today, any additional tx to be considered as inpatient     Severe L sided hydronephrosis   - from external compression of large sarcoma  - procedure while inpatient to be discussed with Urology    Neuropathy, possible L sided sciatica  - pt with lower back pain around site of tumor  - start with gabapentin 300 mg at night for pain      HTN  - continue norvasc 5 for now  - can titrate up to 10 mg  and/or add second agent if BP continues to remain elevated     SVT  - will need to monitor for AFib but otherwise  - check EKG as inpatient    IBS  - continue laxatives PRN - can use senna + miralax, while on pain meds       Follow-up:     Admit to inpatient for admission on Monday, 4/1 to initially have urological management of L sided hydronephrosis and then begin AIM chemotherapy for sarcoma given symptoms/obstruction, eventual plan may be for an oral chemotherapy such as pazopanib  Plan to  check PET-CT after first cycle of chemotherapy, message to be sent after inpatient stay depending on when she finishes her chemotherapy cycle     Will need to set her up locally with an Oncologist and also Urologist in the [facility]    Pt prefers to be emailed at [email or web address]    Discussed with Dr. Shady Silvestre MD   Hematology and Oncology Fellow, PGY IV  [facility]    Distress Screening Results: Psychosocial Distress screening score of Distress Score: 0 noted and reviewed. No intervention indicated.

## 2019-03-27 NOTE — PLAN OF CARE
Problem: Adult Inpatient Plan of Care  Goal: Plan of Care Review  Outcome: Ongoing (interventions implemented as appropriate)  Pt received 2 L of IVF's; tolerated well. VSS and NAD. Pt instructed to call MD with any concerns. Pt discharged home independently.

## 2019-04-01 ENCOUNTER — HOSPITAL ENCOUNTER (INPATIENT)
Facility: HOSPITAL | Age: 64
LOS: 4 days | Discharge: HOME | DRG: 847 | End: 2019-04-05
Attending: INTERNAL MEDICINE | Admitting: INTERNAL MEDICINE
Payer: MEDICARE

## 2019-04-01 ENCOUNTER — TELEPHONE (OUTPATIENT)
Dept: HEMATOLOGY/ONCOLOGY | Facility: CLINIC | Age: 64
End: 2019-04-01

## 2019-04-01 DIAGNOSIS — D50.9 MICROCYTIC ANEMIA: ICD-10-CM

## 2019-04-01 DIAGNOSIS — M79.605 LOW BACK PAIN RADIATING TO LEFT LEG: ICD-10-CM

## 2019-04-01 DIAGNOSIS — I15.8 OTHER SECONDARY HYPERTENSION: ICD-10-CM

## 2019-04-01 DIAGNOSIS — M54.50 LOW BACK PAIN RADIATING TO LEFT LEG: ICD-10-CM

## 2019-04-01 DIAGNOSIS — R29.90 NEUROTOXICITY: ICD-10-CM

## 2019-04-01 DIAGNOSIS — E83.52 HYPERCALCEMIA OF MALIGNANCY: ICD-10-CM

## 2019-04-01 DIAGNOSIS — I47.10 SVT (SUPRAVENTRICULAR TACHYCARDIA): ICD-10-CM

## 2019-04-01 DIAGNOSIS — R07.9 CHEST PAIN: ICD-10-CM

## 2019-04-01 DIAGNOSIS — R51.9 NONINTRACTABLE HEADACHE, UNSPECIFIED CHRONICITY PATTERN, UNSPECIFIED HEADACHE TYPE: ICD-10-CM

## 2019-04-01 DIAGNOSIS — N13.30 HYDRONEPHROSIS OF LEFT KIDNEY: ICD-10-CM

## 2019-04-01 DIAGNOSIS — C49.4 PRIMARY INTRA-ABDOMINAL SARCOMA: Primary | ICD-10-CM

## 2019-04-01 DIAGNOSIS — C49.9 SARCOMA: ICD-10-CM

## 2019-04-01 DIAGNOSIS — E83.39 HYPOPHOSPHATEMIA: ICD-10-CM

## 2019-04-01 LAB
ALBUMIN SERPL BCP-MCNC: 3.1 G/DL (ref 3.5–5.2)
ALP SERPL-CCNC: 102 U/L (ref 55–135)
ALT SERPL W/O P-5'-P-CCNC: 5 U/L (ref 10–44)
ANION GAP SERPL CALC-SCNC: 6 MMOL/L (ref 8–16)
AST SERPL-CCNC: 10 U/L (ref 10–40)
BASOPHILS # BLD AUTO: 0.12 K/UL (ref 0–0.2)
BASOPHILS NFR BLD: 1.3 % (ref 0–1.9)
BILIRUB SERPL-MCNC: 0.6 MG/DL (ref 0.1–1)
BUN SERPL-MCNC: 10 MG/DL (ref 8–23)
CALCIUM SERPL-MCNC: 10.1 MG/DL (ref 8.7–10.5)
CHLORIDE SERPL-SCNC: 109 MMOL/L (ref 95–110)
CO2 SERPL-SCNC: 21 MMOL/L (ref 23–29)
CREAT SERPL-MCNC: 0.6 MG/DL (ref 0.5–1.4)
DIFFERENTIAL METHOD: ABNORMAL
EOSINOPHIL # BLD AUTO: 0.2 K/UL (ref 0–0.5)
EOSINOPHIL NFR BLD: 2 % (ref 0–8)
ERYTHROCYTE [DISTWIDTH] IN BLOOD BY AUTOMATED COUNT: 20.1 % (ref 11.5–14.5)
EST. GFR  (AFRICAN AMERICAN): >60 ML/MIN/1.73 M^2
EST. GFR  (NON AFRICAN AMERICAN): >60 ML/MIN/1.73 M^2
FERRITIN SERPL-MCNC: 10 NG/ML (ref 20–300)
GLUCOSE SERPL-MCNC: 100 MG/DL (ref 70–110)
HCT VFR BLD AUTO: 29.7 % (ref 37–48.5)
HGB BLD-MCNC: 8.8 G/DL (ref 12–16)
IMM GRANULOCYTES # BLD AUTO: 0.05 K/UL (ref 0–0.04)
IMM GRANULOCYTES NFR BLD AUTO: 0.5 % (ref 0–0.5)
INR PPP: 1 (ref 0.8–1.2)
IRON SERPL-MCNC: 16 UG/DL (ref 30–160)
LDH SERPL L TO P-CCNC: 139 U/L (ref 110–260)
LYMPHOCYTES # BLD AUTO: 1.9 K/UL (ref 1–4.8)
LYMPHOCYTES NFR BLD: 20.3 % (ref 18–48)
MCH RBC QN AUTO: 21.9 PG (ref 27–31)
MCHC RBC AUTO-ENTMCNC: 29.6 G/DL (ref 32–36)
MCV RBC AUTO: 74 FL (ref 82–98)
MONOCYTES # BLD AUTO: 0.6 K/UL (ref 0.3–1)
MONOCYTES NFR BLD: 6.4 % (ref 4–15)
NEUTROPHILS # BLD AUTO: 6.4 K/UL (ref 1.8–7.7)
NEUTROPHILS NFR BLD: 69.5 % (ref 38–73)
NRBC BLD-RTO: 0 /100 WBC
PLATELET # BLD AUTO: 398 K/UL (ref 150–350)
PMV BLD AUTO: 11.1 FL (ref 9.2–12.9)
POTASSIUM SERPL-SCNC: 3.8 MMOL/L (ref 3.5–5.1)
PROT SERPL-MCNC: 7 G/DL (ref 6–8.4)
PROTHROMBIN TIME: 10 SEC (ref 9–12.5)
RBC # BLD AUTO: 4.01 M/UL (ref 4–5.4)
SATURATED IRON: 6 % (ref 20–50)
SODIUM SERPL-SCNC: 136 MMOL/L (ref 136–145)
TOTAL IRON BINDING CAPACITY: 284 UG/DL (ref 250–450)
TRANSFERRIN SERPL-MCNC: 192 MG/DL (ref 200–375)
URATE SERPL-MCNC: 3.2 MG/DL (ref 2.4–5.7)
WBC # BLD AUTO: 9.22 K/UL (ref 3.9–12.7)

## 2019-04-01 PROCEDURE — C1751 CATH, INF, PER/CENT/MIDLINE: HCPCS

## 2019-04-01 PROCEDURE — 85025 COMPLETE CBC W/AUTO DIFF WBC: CPT

## 2019-04-01 PROCEDURE — 99223 PR INITIAL HOSPITAL CARE,LEVL III: ICD-10-PCS | Mod: AI,GC,, | Performed by: INTERNAL MEDICINE

## 2019-04-01 PROCEDURE — 93010 EKG 12-LEAD: ICD-10-PCS | Mod: ,,, | Performed by: INTERNAL MEDICINE

## 2019-04-01 PROCEDURE — 80053 COMPREHEN METABOLIC PANEL: CPT

## 2019-04-01 PROCEDURE — 99223 1ST HOSP IP/OBS HIGH 75: CPT | Mod: AI,GC,, | Performed by: INTERNAL MEDICINE

## 2019-04-01 PROCEDURE — 76937 US GUIDE VASCULAR ACCESS: CPT

## 2019-04-01 PROCEDURE — 83540 ASSAY OF IRON: CPT

## 2019-04-01 PROCEDURE — 85610 PROTHROMBIN TIME: CPT

## 2019-04-01 PROCEDURE — 84550 ASSAY OF BLOOD/URIC ACID: CPT

## 2019-04-01 PROCEDURE — 20600001 HC STEP DOWN PRIVATE ROOM

## 2019-04-01 PROCEDURE — 82728 ASSAY OF FERRITIN: CPT

## 2019-04-01 PROCEDURE — 93005 ELECTROCARDIOGRAM TRACING: CPT

## 2019-04-01 PROCEDURE — 36569 INSJ PICC 5 YR+ W/O IMAGING: CPT

## 2019-04-01 PROCEDURE — 25000003 PHARM REV CODE 250: Performed by: STUDENT IN AN ORGANIZED HEALTH CARE EDUCATION/TRAINING PROGRAM

## 2019-04-01 PROCEDURE — 36415 COLL VENOUS BLD VENIPUNCTURE: CPT

## 2019-04-01 PROCEDURE — 83615 LACTATE (LD) (LDH) ENZYME: CPT

## 2019-04-01 PROCEDURE — 93010 ELECTROCARDIOGRAM REPORT: CPT | Mod: ,,, | Performed by: INTERNAL MEDICINE

## 2019-04-01 RX ORDER — IBUPROFEN 200 MG
24 TABLET ORAL
Status: DISCONTINUED | OUTPATIENT
Start: 2019-04-01 | End: 2019-04-05 | Stop reason: HOSPADM

## 2019-04-01 RX ORDER — OXYCODONE HYDROCHLORIDE 5 MG/1
5 TABLET ORAL EVERY 6 HOURS PRN
Status: DISCONTINUED | OUTPATIENT
Start: 2019-04-01 | End: 2019-04-05 | Stop reason: HOSPADM

## 2019-04-01 RX ORDER — GABAPENTIN 300 MG/1
300 CAPSULE ORAL NIGHTLY
Status: DISCONTINUED | OUTPATIENT
Start: 2019-04-01 | End: 2019-04-04

## 2019-04-01 RX ORDER — ACETAMINOPHEN 325 MG/1
650 TABLET ORAL 2 TIMES DAILY PRN
Status: DISCONTINUED | OUTPATIENT
Start: 2019-04-01 | End: 2019-04-03

## 2019-04-01 RX ORDER — SODIUM CHLORIDE 9 MG/ML
INJECTION, SOLUTION INTRAVENOUS CONTINUOUS
Status: DISCONTINUED | OUTPATIENT
Start: 2019-04-01 | End: 2019-04-02

## 2019-04-01 RX ORDER — SODIUM CHLORIDE 0.9 % (FLUSH) 0.9 %
10 SYRINGE (ML) INJECTION EVERY 6 HOURS
Status: DISCONTINUED | OUTPATIENT
Start: 2019-04-01 | End: 2019-04-05 | Stop reason: HOSPADM

## 2019-04-01 RX ORDER — IBUPROFEN 200 MG
16 TABLET ORAL
Status: DISCONTINUED | OUTPATIENT
Start: 2019-04-01 | End: 2019-04-05 | Stop reason: HOSPADM

## 2019-04-01 RX ORDER — ONDANSETRON 8 MG/1
8 TABLET, ORALLY DISINTEGRATING ORAL EVERY 8 HOURS PRN
Status: DISCONTINUED | OUTPATIENT
Start: 2019-04-01 | End: 2019-04-02

## 2019-04-01 RX ORDER — GLUCAGON 1 MG
1 KIT INJECTION
Status: DISCONTINUED | OUTPATIENT
Start: 2019-04-01 | End: 2019-04-05 | Stop reason: HOSPADM

## 2019-04-01 RX ORDER — OXYCODONE HYDROCHLORIDE 5 MG/1
10 TABLET ORAL EVERY 6 HOURS PRN
Status: DISCONTINUED | OUTPATIENT
Start: 2019-04-01 | End: 2019-04-05 | Stop reason: HOSPADM

## 2019-04-01 RX ORDER — SODIUM CHLORIDE 0.9 % (FLUSH) 0.9 %
10 SYRINGE (ML) INJECTION
Status: DISCONTINUED | OUTPATIENT
Start: 2019-04-01 | End: 2019-04-05 | Stop reason: HOSPADM

## 2019-04-01 RX ORDER — POLYETHYLENE GLYCOL 3350 17 G/17G
17 POWDER, FOR SOLUTION ORAL DAILY PRN
Status: DISCONTINUED | OUTPATIENT
Start: 2019-04-01 | End: 2019-04-05 | Stop reason: HOSPADM

## 2019-04-01 RX ORDER — SENNOSIDES 8.6 MG/1
8.6 TABLET ORAL DAILY PRN
Status: DISCONTINUED | OUTPATIENT
Start: 2019-04-01 | End: 2019-04-05 | Stop reason: HOSPADM

## 2019-04-01 RX ORDER — AMLODIPINE BESYLATE 5 MG/1
5 TABLET ORAL DAILY
Status: DISCONTINUED | OUTPATIENT
Start: 2019-04-01 | End: 2019-04-05 | Stop reason: HOSPADM

## 2019-04-01 RX ADMIN — POLYETHYLENE GLYCOL 3350 17 G: 17 POWDER, FOR SOLUTION ORAL at 05:04

## 2019-04-01 RX ADMIN — GABAPENTIN 300 MG: 300 CAPSULE ORAL at 08:04

## 2019-04-01 RX ADMIN — ACETAMINOPHEN 650 MG: 325 TABLET ORAL at 08:04

## 2019-04-01 RX ADMIN — SODIUM CHLORIDE 1000 ML: 0.9 INJECTION, SOLUTION INTRAVENOUS at 08:04

## 2019-04-01 RX ADMIN — SODIUM CHLORIDE: 0.9 INJECTION, SOLUTION INTRAVENOUS at 08:04

## 2019-04-01 NOTE — ASSESSMENT & PLAN NOTE
We recommend no intervention on her left kidney, she has minimal parenchyma and likely has little to no renal differential renal function in her kidney. We believe that the risks of intervention (nephrostomy tube) outweigh the possible minimal at best benefits of drainage.    I would observe her right lower pole renal stone for now, as it is non obstructing.     Please call with questions.

## 2019-04-01 NOTE — HPI
Ms. 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 She has additional PMH of arrhythmia (SVT vs Afib), HTN, and suggested history of IBS.     Oncology History:   She started to have abdominal bloating in mid-December 2018. Initially, she had attributed this problem to baseline IBS and was treated at that time with various combinations of Senna, Miralax, suppositories, and enema. Additionally, she lost ~45lbs over the course of one month (174lb to 130lb) which she reports was primarily due to diffuse prandial-associated abdominal pain described mostly as cramping sensation. During this time she could only tolerated clear liquids, such as Jello and broth.      Her workup in mid-December started with an ultrasound which revealed cholelithiasis and large pelvic mass, therefore, follow-up of CT abdomen/pelvis was completed on 01/06/19. Origin of her mass has been unlcear, but she was found to have a 20 cm abdominal mass and a picture of chronic severe left hydronephrosis. She had a follow-up CT guided core biopsy on 02/06/19 of abdominal mass which revealed a low grade sarcoma, which has been verified by our pathologists as well. *See clinic oncology note for uploaded report.*     In addition, she has a 3-4 cm x 2 cm lesion on the posterior right shoulder and right lateral neck region with some vascularity, bullous with crusting. She notes that this lesion had been present for eight years and endorses some pruritus and periods of resolution, denies any pain or bleeding with the lesion.      She presented to outpatient clinic here on 03/27/2019 as a referral from [facility] to discuss further treatment options. Plan made for in-patient admission for urology evaluation and possible initiation of chemotherapy.     She presented to [facility] 4/1/19 for planned in-patient management of her L-sided  hydronephrosis and initiation of AIM chemotherapy. Following better control of her pain, her appetite improved and she was able to increase her food intake and regain weight (up to 149lbs). She had previously been on Tramadol but due to getting somnolent on this medication, she is currently taking Norco 10 mg PRN prescribed to her as last clinic visit. Currently, her abdominal pain is around a 4/10 on pain medications. Also, with current laxative regimen, she reports bowel movements approximately every other day.She reports good urine output without issues and her last BM was this morning.

## 2019-04-01 NOTE — H&P
[facility]  Hematology/Oncology  H&P    Patient Name: Tiffany Kaur  MRN: [id number]  Admission Date: 4/1/2019  Code Status: Full Code   Attending Provider: Aviva Caruso MD  Primary Care Physician: Ethel Good MD  Principal Problem:Primary intra-abdominal sarcoma    Subjective:     HPI: Ms. 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 She has additional PMH of arrhythmia (SVT vs Afib), HTN, and suggested history of IBS.     Oncology History:   She started to have abdominal bloating in mid-December 2018. Initially, she had attributed this problem to IBS and was treated at that time with Senna, Miralax, suppositories, and enema. Additionally, she lost ~45lbs over the course of a month (174lb to 130lb) which pt reports was primarily due to prandial-associated pain. During this time she could only eat jello and broth. Following better control of her pain, she was able to increase her food intake and gain weight (up to 149lbs). She had previously been on tramadol but due to getting somnolent on this medication, she is currently taking a Norco 10 mg pill. Currently, he abdominal pain is around a 4/10 on pain meds. Also, with current laxative regimen, the pt has had BMs every other day this week.      Her workup in mid-December started with an ultrasound which revealed cholelithiasis and large pelvic mass, therefore, follow-up of CT abdomen/pelvis was completed on 1/6/19. Origin of her mass has been unlcear but she was found to have a 20 cm abdominal mass and a picture of chronic severe left hydronephrosis. The pt had a follow-up CT guided core biopsy on 2/6/19 of abdominal mass which revealed a low grade sarcoma, which has been verified by our pathologists as well.      In addition, the pt has a 3-4 cm x 2 cm lesion on the posterior R shoulder and R lateral neck region with some vascularity,  bullous with crusting which the pt has not had follow-up for as of this visit. She notes that this lesion had been present for eight years and endorses some pruritus and periods of resolution, denies any pain or bleeding with the lesion.      She presented to outpatient clinic here on 2019 as a referral from [facility] to discuss further treatment options. Plan made for in-patient admission for urology evaluation and possible initiation of chemotherapy.     Pt presented to [facility] 19 for planned in-patient management of her L-sided hydronephrosis and initiation of AIM chemotherapy. Pt reports notable symptom improvement over the last few days. Her pain is well controlled and her appetite is improved. She reports good urine output without issues and her last BM was this morning.      Oncology Treatment Plan:   IP SARCOMA DOXORUBICIN IFOSFAMIDE MESNA (4 DAYS)    Medications:  Continuous Infusions:  Scheduled Meds:  PRN Meds:dextrose 50%, dextrose 50%, glucagon (human recombinant), glucose, glucose, ondansetron, oxyCODONE, oxyCODONE, sodium chloride 0.9%     Review of patient's allergies indicates:  No Known Allergies     Past Medical History:   Diagnosis Date    Gallstones     GERD (gastroesophageal reflux disease)     Hypertension     SVT (supraventricular tachycardia)      Past Surgical History:   Procedure Laterality Date     SECTION      X3    HYSTERECTOMY      Partial    TUBAL LIGATION       Family History     Problem Relation (Age of Onset)    Hypertension Brother    Lung disease Mother    No Known Problems Sister, Maternal Aunt, Maternal Uncle, Paternal Aunt, Paternal Uncle, Maternal Grandmother, Maternal Grandfather, Paternal Grandmother, Paternal Grandfather, Daughter, Daughter, Son    Pancreatic cancer Father        Tobacco Use    Smoking status: Never Smoker    Smokeless tobacco: Never Used   Substance and Sexual Activity    Alcohol use: No     Frequency: Never     Drug use: No    Sexual activity: Never       Review of Systems   Constitutional: Positive for appetite change and unexpected weight change. Negative for chills and fever.        Interval improvement of appetite, but decreased from baseline.    HENT: Negative for congestion, nosebleeds, rhinorrhea and sore throat.    Eyes: Negative for pain and visual disturbance.   Respiratory: Negative for cough and shortness of breath.    Cardiovascular: Positive for palpitations. Negative for chest pain.        Intermittent episodes of palpitations.    Gastrointestinal: Positive for abdominal distention and abdominal pain. Negative for nausea and vomiting.        Adequate current bowel regimen. Last BM this morning. Current pain 3-4/10.    Genitourinary: Negative for difficulty urinating, dysuria and hematuria.   Musculoskeletal: Positive for back pain.        Bilateral lumbar back pain markedly improved since starting Gabapentin.    Skin: Negative for rash and wound.   Neurological: Positive for dizziness. Negative for syncope and headaches.        Intermittent episodes of dizziness while seated.      Objective:     Vital Signs (Most Recent):  Temp: 97.8 °F (36.6 °C) (04/01/19 1345)  Pulse: 83 (04/01/19 1345)  Resp: 20 (04/01/19 1345)  BP: (!) 160/74 (04/01/19 1345)  SpO2: (!) 94 % (04/01/19 1345) Vital Signs (24h Range):  Temp:  [97.8 °F (36.6 °C)] 97.8 °F (36.6 °C)  Pulse:  [83] 83  Resp:  [20] 20  SpO2:  [94 %] 94 %  BP: (160)/(74) 160/74     Weight: 69.4 kg (153 lb 1.8 oz)  Body mass index is 26.28 kg/m².  Body surface area is 1.77 meters squared.    No intake or output data in the 24 hours ending 04/01/19 1417    Physical Exam   Constitutional: She is oriented to person, place, and time. She appears well-developed and well-nourished. No distress.   HENT:   Head: Normocephalic and atraumatic.   Eyes: Pupils are equal, round, and reactive to light. Conjunctivae and EOM are normal.   Neck: Normal range of motion. Neck supple.    3-4cm lesion in R posterior neck, vascular & bullous    Cardiovascular: Normal rate, regular rhythm and normal heart sounds.   Pulmonary/Chest: Effort normal and breath sounds normal.   Abdominal: Bowel sounds are normal. She exhibits distension and mass. There is tenderness. There is no rebound.   Firm to palpation   Musculoskeletal: Normal range of motion. She exhibits no deformity.   Neurological: She is alert and oriented to person, place, and time.   Skin: Skin is warm and dry.   Psychiatric: She has a normal mood and affect.       Significant Labs:    Ref. Range 3/27/2019 13:40   WBC Latest Ref Range: 3.90 - 12.70 K/uL 9.70   RBC Latest Ref Range: 4.00 - 5.40 M/uL 4.26   Hemoglobin Latest Ref Range: 12.0 - 16.0 g/dL 9.2 (L)   Hematocrit Latest Ref Range: 37.0 - 48.5 % 31.3 (L)   MCV Latest Ref Range: 82 - 98 fL 74 (L)   MCH Latest Ref Range: 27.0 - 31.0 pg 21.6 (L)   MCHC Latest Ref Range: 32.0 - 36.0 g/dL 29.4 (L)   RDW Latest Ref Range: 11.5 - 14.5 % 19.9 (H)   Platelets Latest Ref Range: 150 - 350 K/uL 434 (H)      Ref. Range 3/27/2019 13:40   Sodium Latest Ref Range: 136 - 145 mmol/L 136   Potassium Latest Ref Range: 3.5 - 5.1 mmol/L 4.3   Chloride Latest Ref Range: 95 - 110 mmol/L 106   CO2 Latest Ref Range: 23 - 29 mmol/L 21 (L)   Anion Gap Latest Ref Range: 8 - 16 mmol/L 9   BUN, Bld Latest Ref Range: 8 - 23 mg/dL 11   Creatinine Latest Ref Range: 0.5 - 1.4 mg/dL 0.7   eGFR if non African American Latest Ref Range: >60 mL/min/1.73 m^2 >60.0   eGFR if African American Latest Ref Range: >60 mL/min/1.73 m^2 >60.0   Glucose Latest Ref Range: 70 - 110 mg/dL 93   Calcium Latest Ref Range: 8.7 - 10.5 mg/dL 10.8 (H)   Phosphorus Latest Ref Range: 2.7 - 4.5 mg/dL 3.3   Magnesium Latest Ref Range: 1.6 - 2.6 mg/dL 2.1   Alkaline Phosphatase Latest Ref Range: 55 - 135 U/L 117   Total Protein Latest Ref Range: 6.0 - 8.4 g/dL 7.4   Albumin Latest Ref Range: 3.5 - 5.2 g/dL 3.3 (L)   Total Bilirubin Latest Ref Range:  0.1 - 1.0 mg/dL 1.1 (H)   AST Latest Ref Range: 10 - 40 U/L 11   ALT Latest Ref Range: 10 - 44 U/L 5 (L)   Triglycerides Latest Ref Range: 30 - 150 mg/dL 130       Diagnostic Results:  PET Scan (3/27/19): Mild uptake in large anterior abdominal mass with no other concerning areas of increased uptake, noting that sensitivity of PET is decreased given mild uptake of primary lesion. Left-sided hydronephrosis, as well as nonobstructing right renal calculus measuring 9 mm.    Assessment/Plan:     * Primary intra-abdominal sarcoma  Presented in Dec '18 w/ chronic abdominal bloating, pain and significant weight loss. U/S and follow-up CT 1/6/19 showed a 20cm abdominal mass w/ L hydronephrosis. 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    - Plan to initiate in-patient AIM chemo regimen, following evaluation by urology.    - Repeat PET CT after cycle 1.   - Continue pain control Norco 10mg q6hr. Continue bowel regimen of miralax + senna PRN.     SVT (supraventricular tachycardia)  PMHx of SVT (w/ concern for a-fib). Pt notes intermittent palpitations.   - Inpatient EKG ordered.   - Consider telemetry if necessary.     Microcytic anemia  - Repeat CBC with iron studies.     Hydronephrosis of left kidney  CT revealed left-sided hydronephrosis, as well as nonobstructing right renal calculus measuring 9 mm. Continues to make good urine without dysruria or hematuria. No CVA tenderness. Cr/BUN normal on 3/27.  - Consult urology. Consideration for stent vs nephrostomy tube.     Hypertension   - Continue home amlodipine 5mg. Consider titrating to 10mg or adding second medication if necessary.     Neuropathy  - Continue home gabapentin 300mg nightly.     Hypercalcemia of malignancy  Corrected Ca 11.4 on 3/27. Given 2L IVF.   - Repeat labs. Administer IVF if needed.         Kalpesh Lewis MD  Hematology/Oncology  [facility]

## 2019-04-01 NOTE — ASSESSMENT & PLAN NOTE
- Continue home amlodipine 5mg. Consider titrating to 10mg or adding second medication if necessary.

## 2019-04-01 NOTE — SUBJECTIVE & OBJECTIVE
Past Medical History:   Diagnosis Date    Cancer     Gallstones     GERD (gastroesophageal reflux disease)     Hypertension     SVT (supraventricular tachycardia)        Past Surgical History:   Procedure Laterality Date     SECTION      X3    HYSTERECTOMY      Partial    TUBAL LIGATION         Review of patient's allergies indicates:  No Known Allergies    Family History     Problem Relation (Age of Onset)    Hypertension Brother    Lung disease Mother    No Known Problems Sister, Maternal Aunt, Maternal Uncle, Paternal Aunt, Paternal Uncle, Maternal Grandmother, Maternal Grandfather, Paternal Grandmother, Paternal Grandfather, Daughter, Daughter, Son    Pancreatic cancer Father          Tobacco Use    Smoking status: Never Smoker    Smokeless tobacco: Never Used   Substance and Sexual Activity    Alcohol use: No     Frequency: Never    Drug use: No    Sexual activity: Never       Review of Systems   Constitutional: Positive for appetite change. Negative for chills and fever.   HENT: Negative for facial swelling.    Eyes: Negative for discharge.   Respiratory: Negative for apnea and chest tightness.    Cardiovascular: Negative for chest pain.   Gastrointestinal: Positive for abdominal distention and abdominal pain.   Genitourinary: Negative for decreased urine volume, difficulty urinating, dysuria, flank pain, hematuria and urgency.   Neurological: Negative for dizziness.   Hematological: Negative for adenopathy.   Psychiatric/Behavioral: Negative for agitation.       Objective:     Temp:  [97.8 °F (36.6 °C)] 97.8 °F (36.6 °C)  Pulse:  [83] 83  Resp:  [20] 20  SpO2:  [94 %] 94 %  BP: (160)/(74) 160/74     Body mass index is 26.28 kg/m².           Drains          None          Physical Exam   Nursing note and vitals reviewed.  Constitutional: She is oriented to person, place, and time. She appears well-developed and well-nourished. No distress.   HENT:   Head: Normocephalic and atraumatic.    Eyes: Pupils are equal, round, and reactive to light. Right eye exhibits no discharge. Left eye exhibits no discharge.   Neck: Normal range of motion.   Cardiovascular: Normal rate and intact distal pulses.    Pulmonary/Chest: Effort normal. No respiratory distress.   Abdominal: Soft. She exhibits distension and mass. There is tenderness. There is no rebound and no guarding.   Large hard palpable mass in lower abdomen  No CVA tenderness   Musculoskeletal: Normal range of motion.   Neurological: She is alert and oriented to person, place, and time. No cranial nerve deficit.   Skin: Skin is warm and dry. She is not diaphoretic. No erythema.     Psychiatric: She has a normal mood and affect. Her behavior is normal. Judgment and thought content normal.       Significant Labs:    BMP:  Recent Labs   Lab 03/27/19  1340      K 4.3      CO2 21*   BUN 11   CREATININE 0.7   CALCIUM 10.8*       CBC:  Recent Labs   Lab 03/27/19  1340   WBC 9.70   HGB 9.2*   HCT 31.3*   *       Urine Culture: No results for input(s): LABURIN in the last 168 hours.  Urine Studies: No results for input(s): COLORU, APPEARANCEUA, PHUR, SPECGRAV, PROTEINUA, GLUCUA, KETONESU, BILIRUBINUA, OCCULTUA, NITRITE, UROBILINOGEN, LEUKOCYTESUR, RBCUA, WBCUA, BACTERIA, SQUAMEPITHEL, HYALINECASTS in the last 168 hours.    Invalid input(s): WRIGHTSUR  All pertinent labs results from the past 24 hours have been reviewed.    Significant Imaging:  All pertinent imaging results/findings from the past 24 hours have been reviewed.

## 2019-04-01 NOTE — PLAN OF CARE
Problem: Adult Inpatient Plan of Care  Goal: Plan of Care Review  Outcome: Ongoing (interventions implemented as appropriate)  Plan of care reviewed with patient and family.  FAll precautions maintained, side rails upx2, call light in reach, bed in low positon and locked, nonskid socks on,   Urology consult, picc consult.  Had ekg done, and labs done.  Ambulating, voiding and awaiting a diet.  No complaints voiced.

## 2019-04-01 NOTE — CONSULTS
[facility]  Urology  Consult Note    Patient Name: Tiffany Kaur  MRN: [id number]  Admission Date: 2019  Hospital Length of Stay: 0   Code Status: Full Code   Attending Provider: Aviva Caruso MD   Consulting Provider: Jorge Kemp MD  Primary Care Physician: Ethel Good MD  Principal Problem:Primary intra-abdominal sarcoma    Inpatient consult to Urology  Consult performed by: Jorge Kemp MD  Consult ordered by: Fernando Silvestre MD          Subjective:     HPI:  62 yo F who presents with a recently discovered large pelvic mass, guided core biopsy on 19 the mass  revealed a low grade sarcoma. PET CT also revealed significant left sided hydronephrosis, and an atrophic left kidney with minimal parenchyma. Of note her right kidney had no hydronephrosis and a lower pole non obstructing renal stone.      She presented to [facility] 19 for initiation of AIM chemotherapy. Pt reports notable symptom improvement over the last few days. Her pain is well controlled and her appetite is improved. She reports good urine output without issues and her last BM was this morning. She denies left flank pain or recent UTIs.    Her most recent Cr from several days ago is 0.7    Past Medical History:   Diagnosis Date    Cancer     Gallstones     GERD (gastroesophageal reflux disease)     Hypertension     SVT (supraventricular tachycardia)        Past Surgical History:   Procedure Laterality Date     SECTION      X3    HYSTERECTOMY      Partial    TUBAL LIGATION         Review of patient's allergies indicates:  No Known Allergies    Family History     Problem Relation (Age of Onset)    Hypertension Brother    Lung disease Mother    No Known Problems Sister, Maternal Aunt, Maternal Uncle, Paternal Aunt, Paternal Uncle, Maternal Grandmother, Maternal Grandfather, Paternal Grandmother, Paternal Grandfather, Daughter, Daughter, Son    Pancreatic cancer Father          Tobacco Use     Smoking status: Never Smoker    Smokeless tobacco: Never Used   Substance and Sexual Activity    Alcohol use: No     Frequency: Never    Drug use: No    Sexual activity: Never       Review of Systems   Constitutional: Positive for appetite change. Negative for chills and fever.   HENT: Negative for facial swelling.    Eyes: Negative for discharge.   Respiratory: Negative for apnea and chest tightness.    Cardiovascular: Negative for chest pain.   Gastrointestinal: Positive for abdominal distention and abdominal pain.   Genitourinary: Negative for decreased urine volume, difficulty urinating, dysuria, flank pain, hematuria and urgency.   Neurological: Negative for dizziness.   Hematological: Negative for adenopathy.   Psychiatric/Behavioral: Negative for agitation.       Objective:     Temp:  [97.8 °F (36.6 °C)] 97.8 °F (36.6 °C)  Pulse:  [83] 83  Resp:  [20] 20  SpO2:  [94 %] 94 %  BP: (160)/(74) 160/74     Body mass index is 26.28 kg/m².           Drains          None          Physical Exam   Nursing note and vitals reviewed.  Constitutional: She is oriented to person, place, and time. She appears well-developed and well-nourished. No distress.   HENT:   Head: Normocephalic and atraumatic.   Eyes: Pupils are equal, round, and reactive to light. Right eye exhibits no discharge. Left eye exhibits no discharge.   Neck: Normal range of motion.   Cardiovascular: Normal rate and intact distal pulses.    Pulmonary/Chest: Effort normal. No respiratory distress.   Abdominal: Soft. She exhibits distension and mass. There is tenderness. There is no rebound and no guarding.   Large hard palpable mass in lower abdomen  No CVA tenderness   Musculoskeletal: Normal range of motion.   Neurological: She is alert and oriented to person, place, and time. No cranial nerve deficit.   Skin: Skin is warm and dry. She is not diaphoretic. No erythema.     Psychiatric: She has a normal mood and affect. Her behavior is normal. Judgment  and thought content normal.       Significant Labs:    BMP:  Recent Labs   Lab 03/27/19  1340      K 4.3      CO2 21*   BUN 11   CREATININE 0.7   CALCIUM 10.8*       CBC:  Recent Labs   Lab 03/27/19  1340   WBC 9.70   HGB 9.2*   HCT 31.3*   *       Urine Culture: No results for input(s): LABURIN in the last 168 hours.  Urine Studies: No results for input(s): COLORU, APPEARANCEUA, PHUR, SPECGRAV, PROTEINUA, GLUCUA, KETONESU, BILIRUBINUA, OCCULTUA, NITRITE, UROBILINOGEN, LEUKOCYTESUR, RBCUA, WBCUA, BACTERIA, SQUAMEPITHEL, HYALINECASTS in the last 168 hours.    Invalid input(s): WRIGHTSUR  All pertinent labs results from the past 24 hours have been reviewed.    Significant Imaging:  All pertinent imaging results/findings from the past 24 hours have been reviewed.                    Assessment and Plan:     Hydronephrosis of left kidney  We recommend no intervention on her left kidney, she has minimal parenchyma and likely has little to no renal differential renal function in her kidney. We believe that the risks of intervention (nephrostomy tube) outweigh the possible minimal at best benefits of drainage.    I would observe her right lower pole renal stone for now, as it is non obstructing.     Please call with questions.         VTE Risk Mitigation (From admission, onward)        Ordered     IP VTE LOW RISK PATIENT  Once      04/01/19 1415     Place sequential compression device  Until discontinued      04/01/19 1415          Thank you for your consult. I will sign off. Please contact us if you have any additional questions.    Jorge Kemp MD  Urology  [facility]

## 2019-04-01 NOTE — ASSESSMENT & PLAN NOTE
Presented in Dec '18 w/ chronic abdominal bloating, pain and significant weight loss. U/S and follow-up CT 1/6/19 showed a 20cm abdominal mass w/ L hydronephrosis. 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    - Plan to initiate in-patient AIM chemo regimen, following evaluation by urology.    - Repeat PET CT after cycle 1.   - Continue pain control Norco 10mg q6hr. Continue bowel regimen of miralax + senna PRN.

## 2019-04-01 NOTE — SUBJECTIVE & OBJECTIVE
Oncology Treatment Plan:   IP SARCOMA DOXORUBICIN IFOSFAMIDE MESNA (4 DAYS)    Medications:  Continuous Infusions:  Scheduled Meds:  PRN Meds:dextrose 50%, dextrose 50%, glucagon (human recombinant), glucose, glucose, ondansetron, oxyCODONE, oxyCODONE, sodium chloride 0.9%     Review of patient's allergies indicates:  No Known Allergies     Past Medical History:   Diagnosis Date    Gallstones     GERD (gastroesophageal reflux disease)     Hypertension     SVT (supraventricular tachycardia)      Past Surgical History:   Procedure Laterality Date     SECTION      X3    HYSTERECTOMY      Partial    TUBAL LIGATION       Family History     Problem Relation (Age of Onset)    Hypertension Brother    Lung disease Mother    No Known Problems Sister, Maternal Aunt, Maternal Uncle, Paternal Aunt, Paternal Uncle, Maternal Grandmother, Maternal Grandfather, Paternal Grandmother, Paternal Grandfather, Daughter, Daughter, Son    Pancreatic cancer Father        Tobacco Use    Smoking status: Never Smoker    Smokeless tobacco: Never Used   Substance and Sexual Activity    Alcohol use: No     Frequency: Never    Drug use: No    Sexual activity: Never       Review of Systems   Constitutional: Positive for appetite change and unexpected weight change. Negative for chills and fever.        Interval improvement of appetite, but decreased from baseline.    HENT: Negative for congestion, nosebleeds, rhinorrhea and sore throat.    Eyes: Negative for pain and visual disturbance.   Respiratory: Negative for cough and shortness of breath.    Cardiovascular: Positive for palpitations. Negative for chest pain.        Intermittent episodes of palpitations.    Gastrointestinal: Positive for abdominal distention and abdominal pain. Negative for nausea and vomiting.        Adequate current bowel regimen. Last BM this morning. Current pain 3-4/10.    Genitourinary: Negative for difficulty urinating, dysuria and hematuria.    Musculoskeletal: Positive for back pain.        Bilateral lumbar back pain markedly improved since starting Gabapentin.    Skin: Negative for rash and wound.   Neurological: Positive for dizziness. Negative for syncope and headaches.        Intermittent episodes of dizziness while seated.      Objective:     Vital Signs (Most Recent):  Temp: 97.8 °F (36.6 °C) (04/01/19 1345)  Pulse: 83 (04/01/19 1345)  Resp: 20 (04/01/19 1345)  BP: (!) 160/74 (04/01/19 1345)  SpO2: (!) 94 % (04/01/19 1345) Vital Signs (24h Range):  Temp:  [97.8 °F (36.6 °C)] 97.8 °F (36.6 °C)  Pulse:  [83] 83  Resp:  [20] 20  SpO2:  [94 %] 94 %  BP: (160)/(74) 160/74     Weight: 69.4 kg (153 lb 1.8 oz)  Body mass index is 26.28 kg/m².  Body surface area is 1.77 meters squared.    No intake or output data in the 24 hours ending 04/01/19 1417    Physical Exam   Constitutional: She is oriented to person, place, and time. She appears well-developed and well-nourished. No distress.   HENT:   Head: Normocephalic and atraumatic.   Eyes: Pupils are equal, round, and reactive to light. Conjunctivae and EOM are normal.   Neck: Normal range of motion. Neck supple.   3-4cm lesion in R posterior neck, vascular & bullous    Cardiovascular: Normal rate, regular rhythm and normal heart sounds.   Pulmonary/Chest: Effort normal and breath sounds normal.   Abdominal: Bowel sounds are normal. She exhibits distension and mass. There is tenderness. There is no rebound.   Firm to palpation   Musculoskeletal: Normal range of motion. She exhibits no deformity.   Neurological: She is alert and oriented to person, place, and time.   Skin: Skin is warm and dry.   Psychiatric: She has a normal mood and affect.       Significant Labs:    Ref. Range 3/27/2019 13:40   WBC Latest Ref Range: 3.90 - 12.70 K/uL 9.70   RBC Latest Ref Range: 4.00 - 5.40 M/uL 4.26   Hemoglobin Latest Ref Range: 12.0 - 16.0 g/dL 9.2 (L)   Hematocrit Latest Ref Range: 37.0 - 48.5 % 31.3 (L)   MCV Latest  Ref Range: 82 - 98 fL 74 (L)   MCH Latest Ref Range: 27.0 - 31.0 pg 21.6 (L)   MCHC Latest Ref Range: 32.0 - 36.0 g/dL 29.4 (L)   RDW Latest Ref Range: 11.5 - 14.5 % 19.9 (H)   Platelets Latest Ref Range: 150 - 350 K/uL 434 (H)      Ref. Range 3/27/2019 13:40   Sodium Latest Ref Range: 136 - 145 mmol/L 136   Potassium Latest Ref Range: 3.5 - 5.1 mmol/L 4.3   Chloride Latest Ref Range: 95 - 110 mmol/L 106   CO2 Latest Ref Range: 23 - 29 mmol/L 21 (L)   Anion Gap Latest Ref Range: 8 - 16 mmol/L 9   BUN, Bld Latest Ref Range: 8 - 23 mg/dL 11   Creatinine Latest Ref Range: 0.5 - 1.4 mg/dL 0.7   eGFR if non African American Latest Ref Range: >60 mL/min/1.73 m^2 >60.0   eGFR if African American Latest Ref Range: >60 mL/min/1.73 m^2 >60.0   Glucose Latest Ref Range: 70 - 110 mg/dL 93   Calcium Latest Ref Range: 8.7 - 10.5 mg/dL 10.8 (H)   Phosphorus Latest Ref Range: 2.7 - 4.5 mg/dL 3.3   Magnesium Latest Ref Range: 1.6 - 2.6 mg/dL 2.1   Alkaline Phosphatase Latest Ref Range: 55 - 135 U/L 117   Total Protein Latest Ref Range: 6.0 - 8.4 g/dL 7.4   Albumin Latest Ref Range: 3.5 - 5.2 g/dL 3.3 (L)   Total Bilirubin Latest Ref Range: 0.1 - 1.0 mg/dL 1.1 (H)   AST Latest Ref Range: 10 - 40 U/L 11   ALT Latest Ref Range: 10 - 44 U/L 5 (L)   Triglycerides Latest Ref Range: 30 - 150 mg/dL 130       Diagnostic Results:  PET Scan (3/27/19): Mild uptake in large anterior abdominal mass with no other concerning areas of increased uptake, noting that sensitivity of PET is decreased given mild uptake of primary lesion. Left-sided hydronephrosis, as well as nonobstructing right renal calculus measuring 9 mm.

## 2019-04-01 NOTE — ASSESSMENT & PLAN NOTE
PMHx of SVT (w/ concern for a-fib). Pt notes intermittent palpitations.   - Inpatient EKG ordered.   - Consider telemetry if necessary.

## 2019-04-01 NOTE — ASSESSMENT & PLAN NOTE
CT revealed left-sided hydronephrosis, as well as nonobstructing right renal calculus measuring 9 mm. Continues to make good urine without dysruria or hematuria. No CVA tenderness. Cr/BUN normal on 3/27.  - Consult urology. Consideration for stent vs nephrostomy tube.

## 2019-04-01 NOTE — PROCEDURES
"Tifafny Kaur is a 63 y.o. female patient.    Temp: 97.8 °F (36.6 °C) (04/01/19 1345)  Pulse: 83 (04/01/19 1345)  Resp: 20 (04/01/19 1345)  BP: (!) 160/74 (04/01/19 1345)  SpO2: (!) 94 % (04/01/19 1345)  Weight: 69.4 kg (153 lb 1.8 oz) (04/01/19 1345)  Height: 5' 4" (162.6 cm) (04/01/19 1345)    PICC  Date/Time: 4/1/2019 3:55 PM  Performed by: Jovanny Roque RN  Consent Done: Yes  Time out: Immediately prior to procedure a time out was called to verify the correct patient, procedure, equipment, support staff and site/side marked as required  Indications: med administration and vascular access  Anesthesia: local infiltration  Local anesthetic: lidocaine 1% without epinephrine  Anesthetic Total (mL): 3  Preparation: skin prepped with ChloraPrep  Skin prep agent dried: skin prep agent completely dried prior to procedure  Sterile barriers: all five maximum sterile barriers used - cap, mask, sterile gown, sterile gloves, and large sterile sheet  Hand hygiene: hand hygiene performed prior to central venous catheter insertion  Location details: right basilic  Catheter type: double lumen  Catheter size: 5 Fr  Catheter Length: 37cm    Ultrasound guidance: yes  Vessel Caliber: medium and patent, compressibility normal  Vascular Doppler: not done  Needle advanced into vessel with real time Ultrasound guidance.  Guidewire confirmed in vessel.  Image recorded and saved.  Sterile sheath used.  Number of attempts: 1  Post-procedure: blood return through all ports and chlorhexidine patch  Technical procedures used: 3cg  Specimens: No  Implants: No  Assessment: placement verified by x-ray          Tawana Barnett  4/1/2019  "

## 2019-04-01 NOTE — HPI
64 yo F who presents with a recently discovered large pelvic mass, guided core biopsy on 2/6/19 the mass  revealed a low grade sarcoma. PET CT also revealed significant left sided hydronephrosis, and an atrophic left kidney with minimal parenchyma. Of note her right kidney had no hydronephrosis and a lower pole non obstructing renal stone.      She presented to [facility] 4/1/19 for initiation of AIM chemotherapy. Pt reports notable symptom improvement over the last few days. Her pain is well controlled and her appetite is improved. She reports good urine output without issues and her last BM was this morning. She denies left flank pain or recent UTIs.    Her most recent Cr from several days ago is 0.7

## 2019-04-01 NOTE — CONSULTS
Double lumen PICC placed in right basilic vein of RUE, 37cm in length with 0cm exposed and 28cm arm circumference. Lot#[id number].

## 2019-04-02 PROBLEM — E83.39 HYPOPHOSPHATEMIA: Status: ACTIVE | Noted: 2019-04-02

## 2019-04-02 LAB
ALBUMIN SERPL BCP-MCNC: 2.6 G/DL (ref 3.5–5.2)
ALP SERPL-CCNC: 89 U/L (ref 55–135)
ALT SERPL W/O P-5'-P-CCNC: 5 U/L (ref 10–44)
ANION GAP SERPL CALC-SCNC: 4 MMOL/L (ref 8–16)
AST SERPL-CCNC: 9 U/L (ref 10–40)
BASOPHILS # BLD AUTO: 0.11 K/UL (ref 0–0.2)
BASOPHILS NFR BLD: 1.6 % (ref 0–1.9)
BILIRUB SERPL-MCNC: 0.5 MG/DL (ref 0.1–1)
BUN SERPL-MCNC: 9 MG/DL (ref 8–23)
CALCIUM SERPL-MCNC: 9.7 MG/DL (ref 8.7–10.5)
CHLORIDE SERPL-SCNC: 111 MMOL/L (ref 95–110)
CO2 SERPL-SCNC: 22 MMOL/L (ref 23–29)
CREAT SERPL-MCNC: 0.6 MG/DL (ref 0.5–1.4)
DIFFERENTIAL METHOD: ABNORMAL
EOSINOPHIL # BLD AUTO: 0.2 K/UL (ref 0–0.5)
EOSINOPHIL NFR BLD: 2.8 % (ref 0–8)
ERYTHROCYTE [DISTWIDTH] IN BLOOD BY AUTOMATED COUNT: 20.1 % (ref 11.5–14.5)
EST. GFR  (AFRICAN AMERICAN): >60 ML/MIN/1.73 M^2
EST. GFR  (NON AFRICAN AMERICAN): >60 ML/MIN/1.73 M^2
GLUCOSE SERPL-MCNC: 80 MG/DL (ref 70–110)
HCT VFR BLD AUTO: 27.4 % (ref 37–48.5)
HGB BLD-MCNC: 8 G/DL (ref 12–16)
IMM GRANULOCYTES # BLD AUTO: 0.03 K/UL (ref 0–0.04)
IMM GRANULOCYTES NFR BLD AUTO: 0.4 % (ref 0–0.5)
LYMPHOCYTES # BLD AUTO: 1.8 K/UL (ref 1–4.8)
LYMPHOCYTES NFR BLD: 25.7 % (ref 18–48)
MAGNESIUM SERPL-MCNC: 1.9 MG/DL (ref 1.6–2.6)
MCH RBC QN AUTO: 21.7 PG (ref 27–31)
MCHC RBC AUTO-ENTMCNC: 29.2 G/DL (ref 32–36)
MCV RBC AUTO: 74 FL (ref 82–98)
MONOCYTES # BLD AUTO: 0.7 K/UL (ref 0.3–1)
MONOCYTES NFR BLD: 9.5 % (ref 4–15)
NEUTROPHILS # BLD AUTO: 4.1 K/UL (ref 1.8–7.7)
NEUTROPHILS NFR BLD: 60 % (ref 38–73)
NRBC BLD-RTO: 0 /100 WBC
PHOSPHATE SERPL-MCNC: 2.5 MG/DL (ref 2.7–4.5)
PLATELET # BLD AUTO: 355 K/UL (ref 150–350)
PMV BLD AUTO: 11.2 FL (ref 9.2–12.9)
POTASSIUM SERPL-SCNC: 4.1 MMOL/L (ref 3.5–5.1)
PROT SERPL-MCNC: 6 G/DL (ref 6–8.4)
RBC # BLD AUTO: 3.69 M/UL (ref 4–5.4)
SODIUM SERPL-SCNC: 137 MMOL/L (ref 136–145)
WBC # BLD AUTO: 6.84 K/UL (ref 3.9–12.7)

## 2019-04-02 PROCEDURE — 20600001 HC STEP DOWN PRIVATE ROOM

## 2019-04-02 PROCEDURE — 25000003 PHARM REV CODE 250: Performed by: INTERNAL MEDICINE

## 2019-04-02 PROCEDURE — 25000003 PHARM REV CODE 250: Performed by: STUDENT IN AN ORGANIZED HEALTH CARE EDUCATION/TRAINING PROGRAM

## 2019-04-02 PROCEDURE — 85025 COMPLETE CBC W/AUTO DIFF WBC: CPT

## 2019-04-02 PROCEDURE — 99233 PR SUBSEQUENT HOSPITAL CARE,LEVL III: ICD-10-PCS | Mod: GC,,, | Performed by: INTERNAL MEDICINE

## 2019-04-02 PROCEDURE — 36415 COLL VENOUS BLD VENIPUNCTURE: CPT

## 2019-04-02 PROCEDURE — A4216 STERILE WATER/SALINE, 10 ML: HCPCS | Performed by: INTERNAL MEDICINE

## 2019-04-02 PROCEDURE — 63600175 PHARM REV CODE 636 W HCPCS: Performed by: INTERNAL MEDICINE

## 2019-04-02 PROCEDURE — 83735 ASSAY OF MAGNESIUM: CPT

## 2019-04-02 PROCEDURE — 84100 ASSAY OF PHOSPHORUS: CPT

## 2019-04-02 PROCEDURE — 99233 SBSQ HOSP IP/OBS HIGH 50: CPT | Mod: GC,,, | Performed by: INTERNAL MEDICINE

## 2019-04-02 PROCEDURE — 80053 COMPREHEN METABOLIC PANEL: CPT

## 2019-04-02 RX ORDER — ONDANSETRON 2 MG/ML
8 INJECTION INTRAMUSCULAR; INTRAVENOUS
Status: DISCONTINUED | OUTPATIENT
Start: 2019-04-02 | End: 2019-04-04

## 2019-04-02 RX ORDER — ENOXAPARIN SODIUM 100 MG/ML
40 INJECTION SUBCUTANEOUS EVERY 24 HOURS
Status: DISCONTINUED | OUTPATIENT
Start: 2019-04-02 | End: 2019-04-05 | Stop reason: HOSPADM

## 2019-04-02 RX ORDER — HEPARIN 100 UNIT/ML
300 SYRINGE INTRAVENOUS
Status: DISCONTINUED | OUTPATIENT
Start: 2019-04-02 | End: 2019-04-05 | Stop reason: HOSPADM

## 2019-04-02 RX ORDER — SODIUM CHLORIDE 0.9 % (FLUSH) 0.9 %
10 SYRINGE (ML) INJECTION
Status: DISCONTINUED | OUTPATIENT
Start: 2019-04-02 | End: 2019-04-05 | Stop reason: HOSPADM

## 2019-04-02 RX ORDER — SODIUM,POTASSIUM PHOSPHATES 280-250MG
2 POWDER IN PACKET (EA) ORAL EVERY 4 HOURS
Status: COMPLETED | OUTPATIENT
Start: 2019-04-02 | End: 2019-04-02

## 2019-04-02 RX ORDER — PROMETHAZINE HYDROCHLORIDE 25 MG/1
25 TABLET ORAL EVERY 6 HOURS PRN
Status: DISCONTINUED | OUTPATIENT
Start: 2019-04-02 | End: 2019-04-05 | Stop reason: HOSPADM

## 2019-04-02 RX ORDER — SODIUM CHLORIDE 9 MG/ML
INJECTION, SOLUTION INTRAVENOUS CONTINUOUS
Status: DISCONTINUED | OUTPATIENT
Start: 2019-04-02 | End: 2019-04-04

## 2019-04-02 RX ADMIN — DOXORUBICIN HYDROCHLORIDE 44 MG: 2 INJECTION, SOLUTION INTRAVENOUS at 02:04

## 2019-04-02 RX ADMIN — SODIUM CHLORIDE: 0.9 INJECTION, SOLUTION INTRAVENOUS at 01:04

## 2019-04-02 RX ADMIN — ACETAMINOPHEN 650 MG: 325 TABLET ORAL at 07:04

## 2019-04-02 RX ADMIN — ONDANSETRON 8 MG: 2 INJECTION INTRAMUSCULAR; INTRAVENOUS at 12:04

## 2019-04-02 RX ADMIN — Medication 10 ML: at 12:04

## 2019-04-02 RX ADMIN — OXYCODONE HYDROCHLORIDE 10 MG: 5 TABLET ORAL at 07:04

## 2019-04-02 RX ADMIN — GABAPENTIN 300 MG: 300 CAPSULE ORAL at 08:04

## 2019-04-02 RX ADMIN — OXYCODONE HYDROCHLORIDE 5 MG: 5 TABLET ORAL at 01:04

## 2019-04-02 RX ADMIN — MESNA 885 MG: 100 INJECTION, SOLUTION INTRAVENOUS at 11:04

## 2019-04-02 RX ADMIN — IFOSFAMIDE 4450 MG: 1 INJECTION, POWDER, LYOPHILIZED, FOR SOLUTION INTRAVENOUS at 03:04

## 2019-04-02 RX ADMIN — APREPITANT 130 MG: 130 INJECTION, EMULSION INTRAVENOUS at 01:04

## 2019-04-02 RX ADMIN — MESNA 885 MG: 100 INJECTION, SOLUTION INTRAVENOUS at 07:04

## 2019-04-02 RX ADMIN — Medication: at 12:04

## 2019-04-02 RX ADMIN — MESNA 885 MG: 100 INJECTION, SOLUTION INTRAVENOUS at 02:04

## 2019-04-02 RX ADMIN — POTASSIUM & SODIUM PHOSPHATES POWDER PACK 280-160-250 MG 2 PACKET: 280-160-250 PACK at 11:04

## 2019-04-02 RX ADMIN — AMLODIPINE BESYLATE 5 MG: 5 TABLET ORAL at 08:04

## 2019-04-02 RX ADMIN — POTASSIUM & SODIUM PHOSPHATES POWDER PACK 280-160-250 MG 2 PACKET: 280-160-250 PACK at 02:04

## 2019-04-02 RX ADMIN — DEXAMETHASONE SODIUM PHOSPHATE 12 MG: 4 INJECTION, SOLUTION INTRA-ARTICULAR; INTRALESIONAL; INTRAMUSCULAR; INTRAVENOUS; SOFT TISSUE at 12:04

## 2019-04-02 RX ADMIN — ENOXAPARIN SODIUM 40 MG: 100 INJECTION SUBCUTANEOUS at 04:04

## 2019-04-02 NOTE — PLAN OF CARE
Problem: Adult Inpatient Plan of Care  Goal: Plan of Care Review  Outcome: Ongoing (interventions implemented as appropriate)  Pt involved in plan of care and communicating needs throughout shift.  Up in room independently and ambulates with steady gait.  Pt c/o abdominal pain after eating; prn oxycodone admin with good effect.  Tolerating regular diet, voiding without difficulty.  Started day #1 of AIM chemo regimen; doxorubicin and ifosfamide infusing as ordered this afternoon; IVF/prehydration fluids infusing as ordered.  Pt tolerating well.  All VSS; no acute events so far this shift.  Pt remaining free from falls or injury throughout shift; bed in lowest position; call light within reach.  Pt instructed to call for assistance as needed.  Q1H rounding done on pt.

## 2019-04-02 NOTE — PROGRESS NOTES
Doxorubicin infusing as ordered at this time. Chemo consent and CAR in chart; dose and BSA independently verified by 2 chemo-certified RNs per protocol.  Pt premedicated with Cinvanti, zofran and dexamethasone prior to start of chemo infusion.  IVF and pre-hydration infusing as ordered.  SARITHA PICC line flushed with normal saline prior to start of chemo infusion with good blood return noted.  Doxorubicin checked against order and patient at bedside by 2 RNs per protocol.  Pt tolerating well; no distress noted.  Will continue to monitor; chemo precautions maintained.

## 2019-04-02 NOTE — PROGRESS NOTES
[facility]  Hematology/Oncology  Progress Note    Patient Name: Tiffany Kaur  Admission Date: 4/1/2019  Hospital Length of Stay: 1 days  Code Status: Full Code     Subjective:     HPI:  Ms. 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 She has additional PMH of arrhythmia (SVT vs Afib), HTN, and suggested history of IBS.     Oncology History:   She started to have abdominal bloating in mid-December 2018. Initially, she had attributed this problem to IBS and was treated at that time with Senna, Miralax, suppositories, and enema. Additionally, she lost ~45lbs over the course of a month (174lb to 130lb) which pt reports was primarily due to prandial-associated pain. During this time she could only eat jello and broth. Following better control of her pain, she was able to increase her food intake and gain weight (up to 149lbs). She had previously been on tramadol but due to getting somnolent on this medication, she is currently taking a Norco 10 mg pill. Currently, he abdominal pain is around a 4/10 on pain meds. Also, with current laxative regimen, the pt has had BMs every other day this week.      Her workup in mid-December started with an ultrasound which revealed cholelithiasis and large pelvic mass, therefore, follow-up of CT abdomen/pelvis was completed on 1/6/19. Origin of her mass has been unlcear but she was found to have a 20 cm abdominal mass and a picture of chronic severe left hydronephrosis. The pt had a follow-up CT guided core biopsy on 2/6/19 of abdominal mass which revealed a low grade sarcoma, which has been verified by our pathologists as well.      In addition, the pt has a 3-4 cm x 2 cm lesion on the posterior R shoulder and R lateral neck region with some vascularity, bullous with crusting which the pt has not had follow-up for as of this visit. She notes that this lesion had been  present for eight years and endorses some pruritus and periods of resolution, denies any pain or bleeding with the lesion.      She presented to outpatient clinic here on 03/27/2019 as a referral from [facility] to discuss further treatment options. Plan made for in-patient admission for urology evaluation and possible initiation of chemotherapy.     Pt presented to [facility] 4/1/19 for planned in-patient management of her L-sided hydronephrosis and initiation of AIM chemotherapy. Pt reports notable symptom improvement over the last few days. Her pain is well controlled and her appetite is improved. She reports good urine output without issues and her last BM was this morning.     Interval History:   Pt is feeling well. She had a headache yesterday and BP were around 160s - 170s systolic. Abdominal pain was well controlled. She is looking to start AIM chemotherapy today.    Oncology Treatment Plan:   IP SARCOMA DOXORUBICIN IFOSFAMIDE MESNA (4 DAYS)    Medications:  Continuous Infusions:   sodium chloride 0.9%       Scheduled Meds:   amLODIPine  5 mg Oral Daily    aprepitant  130 mg Intravenous Q24H    dexamethasone (DECADRON) IVPB  12 mg Intravenous Q24H    DOXOrubicin (ADRIAMYCIN) chemo infusion  25 mg/m2 (Treatment Plan Recorded) Intravenous Q24H    enoxaparin  40 mg Subcutaneous Daily    gabapentin  300 mg Oral QHS    ifosfamide (IFEX) chemo infusion  2,500 mg/m2 (Treatment Plan Recorded) Intravenous Q24H    mesna (MESNEX) infusion  500 mg/m2 (Treatment Plan Recorded) Intravenous Q24H    mesna (MESNEX) infusion  500 mg/m2 (Treatment Plan Recorded) Intravenous Q24H    mesna (MESNEX) infusion  500 mg/m2 (Treatment Plan Recorded) Intravenous Q24H    ondansetron  8 mg Intravenous Q12H    potassium, sodium phosphates  2 packet Oral Q4H    sodium chloride 0.45 % + Additives   Intravenous Q24H    sodium chloride 0.9%  10 mL Intravenous Q6H     PRN Meds:acetaminophen, alteplase, dextrose 50%,  dextrose 50%, glucagon (human recombinant), glucose, glucose, heparin, porcine (PF), oxyCODONE, oxyCODONE, polyethylene glycol, senna, sodium chloride 0.9%, Flushing PICC Protocol **AND** sodium chloride 0.9% **AND** sodium chloride 0.9%, sodium chloride 0.9%     Review of Systems   Constitutional: Positive for unexpected weight change. Negative for appetite change, chills and fever.   HENT: Negative for congestion, nosebleeds, rhinorrhea and sore throat.    Eyes: Negative for pain and visual disturbance.   Respiratory: Negative for cough and shortness of breath.    Cardiovascular: Positive for palpitations. Negative for chest pain.        Intermittent episodes of palpitations.    Gastrointestinal: Positive for abdominal pain. Negative for abdominal distention, nausea and vomiting.   Genitourinary: Negative for difficulty urinating, dysuria and hematuria.   Musculoskeletal: Positive for back pain.        Bilateral lumbar back pain markedly improved since starting Gabapentin.    Skin: Negative for rash and wound.   Neurological: Negative for dizziness, syncope and headaches.     Objective:     Vital Signs (Most Recent):  Temp: 98 °F (36.7 °C) (04/02/19 1145)  Pulse: 83 (04/02/19 1145)  Resp: 17 (04/02/19 1145)  BP: (!) 153/81 (04/02/19 1145)  SpO2: 96 % (04/02/19 1145) Vital Signs (24h Range):  Temp:  [97.8 °F (36.6 °C)-98.1 °F (36.7 °C)] 98 °F (36.7 °C)  Pulse:  [82-86] 83  Resp:  [17-20] 17  SpO2:  [94 %-98 %] 96 %  BP: (153-174)/(74-84) 153/81     Weight: 69.4 kg (153 lb 1.8 oz)  Body mass index is 26.28 kg/m².  Body surface area is 1.77 meters squared.      Intake/Output Summary (Last 24 hours) at 4/2/2019 1251  Last data filed at 4/2/2019 1100  Gross per 24 hour   Intake 4386.67 ml   Output --   Net 4386.67 ml       Physical Exam   Constitutional: She is oriented to person, place, and time. She appears well-developed and well-nourished. No distress.   HENT:   Head: Normocephalic and atraumatic.   Eyes: Pupils  are equal, round, and reactive to light. EOM are normal. No scleral icterus.   Neck: Normal range of motion.   Cardiovascular: Normal rate and normal heart sounds. Exam reveals no gallop and no friction rub.   No murmur heard.  Pulmonary/Chest: Effort normal and breath sounds normal. No respiratory distress. She has no wheezes. She has no rales.   Abdominal: Soft. Bowel sounds are normal. She exhibits distension and mass. There is tenderness. There is no guarding.   Musculoskeletal: Normal range of motion. She exhibits no edema.   Neurological: She is alert and oriented to person, place, and time. No cranial nerve deficit or sensory deficit.   Skin: Skin is warm and dry. No rash noted.       Significant Labs:   BMP:   Recent Labs   Lab 04/01/19 1834 04/02/19 0457    80    137   K 3.8 4.1    111*   CO2 21* 22*   BUN 10 9   CREATININE 0.6 0.6   CALCIUM 10.1 9.7   MG  --  1.9   , CBC:   Recent Labs   Lab 04/01/19 1834 04/02/19 0457   WBC 9.22 6.84   HGB 8.8* 8.0*   HCT 29.7* 27.4*   * 355*   , CMP:   Recent Labs   Lab 04/01/19 1834 04/02/19 0457    137   K 3.8 4.1    111*   CO2 21* 22*    80   BUN 10 9   CREATININE 0.6 0.6   CALCIUM 10.1 9.7   PROT 7.0 6.0   ALBUMIN 3.1* 2.6*   BILITOT 0.6 0.5   ALKPHOS 102 89   AST 10 9*   ALT 5* 5*   ANIONGAP 6* 4*   EGFRNONAA >60.0 >60.0   , Coagulation:   Recent Labs   Lab 04/01/19  1513   INR 1.0   , Haptoglobin: No results for input(s): HAPTOGLOBIN in the last 48 hours., Immunology: No results for input(s): SPEP, JOJO, LINDSEY, FREELAMBDALI in the last 48 hours., LDH: No results for input(s): LDHCSF, BFSOURCE in the last 48 hours., LFTs:   Recent Labs   Lab 04/01/19 1834 04/02/19 0457   ALT 5* 5*   AST 10 9*   ALKPHOS 102 89   BILITOT 0.6 0.5   PROT 7.0 6.0   ALBUMIN 3.1* 2.6*   , Reticulocytes: No results for input(s): RETIC in the last 48 hours., Tumor Markers: No results for input(s): PSA, CEA, , AFPTM, [id number],  in the  last 48 hours.    Invalid input(s): ALGTM, Uric Acid   Recent Labs   Lab 04/01/19  1513   URICACID 3.2   , Urine Studies: No results for input(s): COLORU, APPEARANCEUA, PHUR, SPECGRAV, PROTEINUA, GLUCUA, KETONESU, BILIRUBINUA, OCCULTUA, NITRITE, UROBILINOGEN, LEUKOCYTESUR, RBCUA, WBCUA, BACTERIA, SQUAMEPITHEL, HYALINECASTS in the last 48 hours.    Invalid input(s): WRIGHTSUR,   Recent Lab Results       04/02/19  0457   04/01/19  1834   04/01/19  1513        Immature Grans (Abs) 0.03  Comment:  Mild elevation in immature granulocytes is non specific and   can be seen in a variety of conditions including stress response,   acute inflammation, trauma and pregnancy. Correlation with other   laboratory and clinical findings is essential.   0.05  Comment:  Mild elevation in immature granulocytes is non specific and   can be seen in a variety of conditions including stress response,   acute inflammation, trauma and pregnancy. Correlation with other   laboratory and clinical findings is essential.         Albumin 2.6 3.1       Alkaline Phosphatase 89 102       ALT 5 5       Anion Gap 4 6       AST 9 10       Baso # 0.11 0.12       Basophil% 1.6 1.3       Total Bilirubin 0.5  Comment:  For infants and newborns, interpretation of results should be based  on gestational age, weight and in agreement with clinical  observations.  Premature Infant recommended reference ranges:  Up to 24 hours.............<8.0 mg/dL  Up to 48 hours............<12.0 mg/dL  3-5 days..................<15.0 mg/dL  6-29 days.................<15.0 mg/dL   0.6  Comment:  For infants and newborns, interpretation of results should be based  on gestational age, weight and in agreement with clinical  observations.  Premature Infant recommended reference ranges:  Up to 24 hours.............<8.0 mg/dL  Up to 48 hours............<12.0 mg/dL  3-5 days..................<15.0 mg/dL  6-29 days.................<15.0 mg/dL         BUN, Bld 9 10       Calcium 9.7 10.1        Chloride 111 109       CO2 22 21       Creatinine 0.6 0.6       Differential Method Automated Automated       eGFR if  >60.0 >60.0       eGFR if non  >60.0  Comment:  Calculation used to obtain the estimated glomerular filtration  rate (eGFR) is the CKD-EPI equation.    >60.0  Comment:  Calculation used to obtain the estimated glomerular filtration  rate (eGFR) is the CKD-EPI equation.          Eos # 0.2 0.2       Eosinophil% 2.8 2.0       Ferritin     10     Glucose 80 100       Gran # (ANC) 4.1 6.4       Gran% 60.0 69.5       Hematocrit 27.4 29.7       Hemoglobin 8.0 8.8       Immature Granulocytes 0.4 0.5       Coumadin Monitoring INR     1.0  Comment:  Coumadin Therapy:  2.0 - 3.0 for INR for all indicators except mechanical heart valves  and antiphospholipid syndromes which should use 2.5 - 3.5.       Iron     16     LD     139  Comment:  Results are increased in hemolyzed samples.     Lymph # 1.8 1.9       Lymph% 25.7 20.3       Magnesium 1.9         MCH 21.7 21.9       MCHC 29.2 29.6       MCV 74 74       Mono # 0.7 0.6       Mono% 9.5 6.4       MPV 11.2 11.1       nRBC 0 0       Phosphorus 2.5         Platelets 355 398       Potassium 4.1 3.8       Total Protein 6.0 7.0       Protime     10.0     RBC 3.69 4.01       RDW 20.1 20.1       Saturated Iron     6     Sodium 137 136       TIBC     284     Transferrin     192     Uric Acid     3.2     WBC 6.84 9.22          and All pertinent labs from the last 24 hours have been reviewed.    Diagnostic Results:  I have reviewed all pertinent imaging results/findings within the past 24 hours.    Assessment/Plan:     * Primary intra-abdominal sarcoma  Presented in Dec '18 w/ chronic abdominal bloating, pain and significant weight loss. U/S and follow-up CT 1/6/19 showed a 20cm abdominal mass w/ L hydronephrosis. 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    - Plan to initiate in-patient AIM chemo regimen, following  evaluation by urology.    - Repeat PET CT after cycle 1.   - Continue pain control [id number] q6hr. Continue bowel regimen of miralax + senna PRN.     Hypophosphatemia  - phos of 2.5  - will do 2 phos packets QID today    SVT (supraventricular tachycardia)  PMHx of SVT (w/ concern for a-fib). Pt notes intermittent palpitations.   - Inpatient EKG ordered, no afib noted currently  - consider further telemetry monitoring if needed    Microcytic anemia  - Repeat CBC with iron studies.   - ferritin of 10, iron sat of 6%  - will have to discuss giving IV iron eventually     Hydronephrosis of left kidney  CT revealed left-sided hydronephrosis, as well as nonobstructing right renal calculus measuring 9 mm. Continues to make good urine without dysruria or hematuria. No CVA tenderness. Cr/BUN normal on 3/27.  - Urology recommended no intervention due to severe atrophy of left kidney, therefore no intervention at this time    Hypertension   - Continue home amlodipine 5mg. Consider titrating to 10mg or adding second medication if necessary.    Neuropathy  - Continue home gabapentin 300mg nightly.     Hypercalcemia of malignancy  Corrected Ca 11.4 on 3/27. Given 2L IVF.   - Repeat labs. Administer IVF if needed. Current calcium of 10.8 today          Discussed with Dr. Shady Silvestre MD  Hematology/Oncology  [facility]    Attending Note  I have personally taken the history and examined this patient and agree with the resident's note as stated above.  Proceed with chemotherapy

## 2019-04-02 NOTE — ASSESSMENT & PLAN NOTE
Corrected Ca 11.4 on 3/27. Given 2L IVF.   - Repeat labs. Administer IVF if needed. Current calcium of 10.8 today

## 2019-04-02 NOTE — SUBJECTIVE & OBJECTIVE
Interval History:   Pt is feeling well. She had a headache yesterday and BP were around 160s - 170s systolic. Abdominal pain was well controlled. She is looking to start AIM chemotherapy today.    Oncology Treatment Plan:   IP SARCOMA DOXORUBICIN IFOSFAMIDE MESNA (4 DAYS)    Medications:  Continuous Infusions:   sodium chloride 0.9%       Scheduled Meds:   amLODIPine  5 mg Oral Daily    aprepitant  130 mg Intravenous Q24H    dexamethasone (DECADRON) IVPB  12 mg Intravenous Q24H    DOXOrubicin (ADRIAMYCIN) chemo infusion  25 mg/m2 (Treatment Plan Recorded) Intravenous Q24H    enoxaparin  40 mg Subcutaneous Daily    gabapentin  300 mg Oral QHS    ifosfamide (IFEX) chemo infusion  2,500 mg/m2 (Treatment Plan Recorded) Intravenous Q24H    mesna (MESNEX) infusion  500 mg/m2 (Treatment Plan Recorded) Intravenous Q24H    mesna (MESNEX) infusion  500 mg/m2 (Treatment Plan Recorded) Intravenous Q24H    mesna (MESNEX) infusion  500 mg/m2 (Treatment Plan Recorded) Intravenous Q24H    ondansetron  8 mg Intravenous Q12H    potassium, sodium phosphates  2 packet Oral Q4H    sodium chloride 0.45 % + Additives   Intravenous Q24H    sodium chloride 0.9%  10 mL Intravenous Q6H     PRN Meds:acetaminophen, alteplase, dextrose 50%, dextrose 50%, glucagon (human recombinant), glucose, glucose, heparin, porcine (PF), oxyCODONE, oxyCODONE, polyethylene glycol, senna, sodium chloride 0.9%, Flushing PICC Protocol **AND** sodium chloride 0.9% **AND** sodium chloride 0.9%, sodium chloride 0.9%     Review of Systems   Constitutional: Positive for unexpected weight change. Negative for appetite change, chills and fever.   HENT: Negative for congestion, nosebleeds, rhinorrhea and sore throat.    Eyes: Negative for pain and visual disturbance.   Respiratory: Negative for cough and shortness of breath.    Cardiovascular: Positive for palpitations. Negative for chest pain.        Intermittent episodes of palpitations.     Gastrointestinal: Positive for abdominal pain. Negative for abdominal distention, nausea and vomiting.   Genitourinary: Negative for difficulty urinating, dysuria and hematuria.   Musculoskeletal: Positive for back pain.        Bilateral lumbar back pain markedly improved since starting Gabapentin.    Skin: Negative for rash and wound.   Neurological: Negative for dizziness, syncope and headaches.     Objective:     Vital Signs (Most Recent):  Temp: 98 °F (36.7 °C) (04/02/19 1145)  Pulse: 83 (04/02/19 1145)  Resp: 17 (04/02/19 1145)  BP: (!) 153/81 (04/02/19 1145)  SpO2: 96 % (04/02/19 1145) Vital Signs (24h Range):  Temp:  [97.8 °F (36.6 °C)-98.1 °F (36.7 °C)] 98 °F (36.7 °C)  Pulse:  [82-86] 83  Resp:  [17-20] 17  SpO2:  [94 %-98 %] 96 %  BP: (153-174)/(74-84) 153/81     Weight: 69.4 kg (153 lb 1.8 oz)  Body mass index is 26.28 kg/m².  Body surface area is 1.77 meters squared.      Intake/Output Summary (Last 24 hours) at 4/2/2019 1251  Last data filed at 4/2/2019 1100  Gross per 24 hour   Intake 4386.67 ml   Output --   Net 4386.67 ml       Physical Exam   Constitutional: She is oriented to person, place, and time. She appears well-developed and well-nourished. No distress.   HENT:   Head: Normocephalic and atraumatic.   Eyes: Pupils are equal, round, and reactive to light. EOM are normal. No scleral icterus.   Neck: Normal range of motion.   Cardiovascular: Normal rate and normal heart sounds. Exam reveals no gallop and no friction rub.   No murmur heard.  Pulmonary/Chest: Effort normal and breath sounds normal. No respiratory distress. She has no wheezes. She has no rales.   Abdominal: Soft. Bowel sounds are normal. She exhibits distension and mass. There is tenderness. There is no guarding.   Musculoskeletal: Normal range of motion. She exhibits no edema.   Neurological: She is alert and oriented to person, place, and time. No cranial nerve deficit or sensory deficit.   Skin: Skin is warm and dry. No rash  noted.       Significant Labs:   BMP:   Recent Labs   Lab 04/01/19 1834 04/02/19 0457    80    137   K 3.8 4.1    111*   CO2 21* 22*   BUN 10 9   CREATININE 0.6 0.6   CALCIUM 10.1 9.7   MG  --  1.9   , CBC:   Recent Labs   Lab 04/01/19 1834 04/02/19 0457   WBC 9.22 6.84   HGB 8.8* 8.0*   HCT 29.7* 27.4*   * 355*   , CMP:   Recent Labs   Lab 04/01/19 1834 04/02/19 0457    137   K 3.8 4.1    111*   CO2 21* 22*    80   BUN 10 9   CREATININE 0.6 0.6   CALCIUM 10.1 9.7   PROT 7.0 6.0   ALBUMIN 3.1* 2.6*   BILITOT 0.6 0.5   ALKPHOS 102 89   AST 10 9*   ALT 5* 5*   ANIONGAP 6* 4*   EGFRNONAA >60.0 >60.0   , Coagulation:   Recent Labs   Lab 04/01/19  1513   INR 1.0   , Haptoglobin: No results for input(s): HAPTOGLOBIN in the last 48 hours., Immunology: No results for input(s): SPEP, JOJO, LINDSEY, FREELAMBDALI in the last 48 hours., LDH: No results for input(s): LDHCSF, BFSOURCE in the last 48 hours., LFTs:   Recent Labs   Lab 04/01/19 1834 04/02/19 0457   ALT 5* 5*   AST 10 9*   ALKPHOS 102 89   BILITOT 0.6 0.5   PROT 7.0 6.0   ALBUMIN 3.1* 2.6*   , Reticulocytes: No results for input(s): RETIC in the last 48 hours., Tumor Markers: No results for input(s): PSA, CEA, , AFPTM, [id number],  in the last 48 hours.    Invalid input(s): ALGTM, Uric Acid   Recent Labs   Lab 04/01/19  1513   URICACID 3.2   , Urine Studies: No results for input(s): COLORU, APPEARANCEUA, PHUR, SPECGRAV, PROTEINUA, GLUCUA, KETONESU, BILIRUBINUA, OCCULTUA, NITRITE, UROBILINOGEN, LEUKOCYTESUR, RBCUA, WBCUA, BACTERIA, SQUAMEPITHEL, HYALINECASTS in the last 48 hours.    Invalid input(s): KARLIE,   Recent Lab Results       04/02/19  0457   04/01/19  1834   04/01/19  1513        Immature Grans (Abs) 0.03  Comment:  Mild elevation in immature granulocytes is non specific and   can be seen in a variety of conditions including stress response,   acute inflammation, trauma and pregnancy. Correlation with  other   laboratory and clinical findings is essential.   0.05  Comment:  Mild elevation in immature granulocytes is non specific and   can be seen in a variety of conditions including stress response,   acute inflammation, trauma and pregnancy. Correlation with other   laboratory and clinical findings is essential.         Albumin 2.6 3.1       Alkaline Phosphatase 89 102       ALT 5 5       Anion Gap 4 6       AST 9 10       Baso # 0.11 0.12       Basophil% 1.6 1.3       Total Bilirubin 0.5  Comment:  For infants and newborns, interpretation of results should be based  on gestational age, weight and in agreement with clinical  observations.  Premature Infant recommended reference ranges:  Up to 24 hours.............<8.0 mg/dL  Up to 48 hours............<12.0 mg/dL  3-5 days..................<15.0 mg/dL  6-29 days.................<15.0 mg/dL   0.6  Comment:  For infants and newborns, interpretation of results should be based  on gestational age, weight and in agreement with clinical  observations.  Premature Infant recommended reference ranges:  Up to 24 hours.............<8.0 mg/dL  Up to 48 hours............<12.0 mg/dL  3-5 days..................<15.0 mg/dL  6-29 days.................<15.0 mg/dL         BUN, Bld 9 10       Calcium 9.7 10.1       Chloride 111 109       CO2 22 21       Creatinine 0.6 0.6       Differential Method Automated Automated       eGFR if  >60.0 >60.0       eGFR if non  >60.0  Comment:  Calculation used to obtain the estimated glomerular filtration  rate (eGFR) is the CKD-EPI equation.    >60.0  Comment:  Calculation used to obtain the estimated glomerular filtration  rate (eGFR) is the CKD-EPI equation.          Eos # 0.2 0.2       Eosinophil% 2.8 2.0       Ferritin     10     Glucose 80 100       Gran # (ANC) 4.1 6.4       Gran% 60.0 69.5       Hematocrit 27.4 29.7       Hemoglobin 8.0 8.8       Immature Granulocytes 0.4 0.5       Coumadin Monitoring INR      1.0  Comment:  Coumadin Therapy:  2.0 - 3.0 for INR for all indicators except mechanical heart valves  and antiphospholipid syndromes which should use 2.5 - 3.5.       Iron     16     LD     139  Comment:  Results are increased in hemolyzed samples.     Lymph # 1.8 1.9       Lymph% 25.7 20.3       Magnesium 1.9         MCH 21.7 21.9       MCHC 29.2 29.6       MCV 74 74       Mono # 0.7 0.6       Mono% 9.5 6.4       MPV 11.2 11.1       nRBC 0 0       Phosphorus 2.5         Platelets 355 398       Potassium 4.1 3.8       Total Protein 6.0 7.0       Protime     10.0     RBC 3.69 4.01       RDW 20.1 20.1       Saturated Iron     6     Sodium 137 136       TIBC     284     Transferrin     192     Uric Acid     3.2     WBC 6.84 9.22          and All pertinent labs from the last 24 hours have been reviewed.    Diagnostic Results:  I have reviewed all pertinent imaging results/findings within the past 24 hours.

## 2019-04-02 NOTE — PLAN OF CARE
Problem: Adult Inpatient Plan of Care  Goal: Plan of Care Review  Outcome: Ongoing (interventions implemented as appropriate)  Plan of care reviewed with patient and family.  FAll precautions maintained, side rails upx2, call light in reach, bed in low positon and locked, nonskid socks on.PICC line placed yesterday ok to use. IVF infusing..  Ambulating, voiding Reported HA pain x 1 resolved w acetamenophen. Possibly to begin AIM chemotherapy today.

## 2019-04-02 NOTE — ASSESSMENT & PLAN NOTE
CT revealed left-sided hydronephrosis, as well as nonobstructing right renal calculus measuring 9 mm. Continues to make good urine without dysruria or hematuria. No CVA tenderness. Cr/BUN normal on 3/27.  - Urology recommended no intervention due to severe atrophy of left kidney, therefore no intervention at this time

## 2019-04-02 NOTE — ASSESSMENT & PLAN NOTE
PMHx of SVT (w/ concern for a-fib). Pt notes intermittent palpitations.   - Inpatient EKG ordered, no afib noted currently  - consider further telemetry monitoring if needed

## 2019-04-02 NOTE — ASSESSMENT & PLAN NOTE
- Repeat CBC with iron studies.   - ferritin of 10, iron sat of 6%  - will have to discuss giving IV iron eventually

## 2019-04-02 NOTE — ASSESSMENT & PLAN NOTE
Presented in Dec '18 w/ chronic abdominal bloating, pain and significant weight loss. U/S and follow-up CT 1/6/19 showed a 20cm abdominal mass w/ L hydronephrosis. 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    - Plan to initiate in-patient AIM chemo regimen, following evaluation by urology.    - Repeat PET CT after cycle 1.   - Continue pain control [id number] q6hr. Continue bowel regimen of miralax + senna PRN.

## 2019-04-02 NOTE — PLAN OF CARE
MDRs completed with Dr. Caruso and the team. Patient of Dr. Caruso and Dr. Silvestre with a new diagnosis of intra-abdominal sarcoma. Patient admitted for management of left hydronephrosis secondary to intra-abdominal sarcoma. Plans for patient to start in-patient AIM chemo regimen this admission. Urology consulted and following. VSS. Patient is afebrile. Labs stable. Plans for patient to start AIM therapy today. MD discussed plans with the patient and the patient's family, both verbalized understanding. CM to continue to follow with the team.    Anais Becerra, RN, BSN, CM  [facility]  Nurse - Med Onc/Gyn Onc  [phone]

## 2019-04-03 ENCOUNTER — TELEPHONE (OUTPATIENT)
Dept: HEMATOLOGY/ONCOLOGY | Facility: CLINIC | Age: 64
End: 2019-04-03

## 2019-04-03 PROBLEM — E83.39 HYPOPHOSPHATEMIA: Status: RESOLVED | Noted: 2019-04-02 | Resolved: 2019-04-03

## 2019-04-03 PROBLEM — E87.20 NORMAL ANION GAP METABOLIC ACIDOSIS: Status: ACTIVE | Noted: 2019-04-03

## 2019-04-03 LAB
ALBUMIN SERPL BCP-MCNC: 2.7 G/DL (ref 3.5–5.2)
ALP SERPL-CCNC: 88 U/L (ref 55–135)
ALT SERPL W/O P-5'-P-CCNC: <5 U/L (ref 10–44)
ANION GAP SERPL CALC-SCNC: 7 MMOL/L (ref 8–16)
APTT BLDCRRT: 27.1 SEC (ref 21–32)
AST SERPL-CCNC: 7 U/L (ref 10–40)
BASOPHILS # BLD AUTO: 0.02 K/UL (ref 0–0.2)
BASOPHILS NFR BLD: 0.3 % (ref 0–1.9)
BILIRUB SERPL-MCNC: 0.5 MG/DL (ref 0.1–1)
BUN SERPL-MCNC: 11 MG/DL (ref 8–23)
CALCIUM SERPL-MCNC: 10 MG/DL (ref 8.7–10.5)
CHLORIDE SERPL-SCNC: 111 MMOL/L (ref 95–110)
CO2 SERPL-SCNC: 18 MMOL/L (ref 23–29)
CREAT SERPL-MCNC: 0.7 MG/DL (ref 0.5–1.4)
DIFFERENTIAL METHOD: ABNORMAL
EOSINOPHIL # BLD AUTO: 0 K/UL (ref 0–0.5)
EOSINOPHIL NFR BLD: 0 % (ref 0–8)
ERYTHROCYTE [DISTWIDTH] IN BLOOD BY AUTOMATED COUNT: 20.1 % (ref 11.5–14.5)
EST. GFR  (AFRICAN AMERICAN): >60 ML/MIN/1.73 M^2
EST. GFR  (NON AFRICAN AMERICAN): >60 ML/MIN/1.73 M^2
GLUCOSE SERPL-MCNC: 137 MG/DL (ref 70–110)
HCT VFR BLD AUTO: 28.3 % (ref 37–48.5)
HGB BLD-MCNC: 8.3 G/DL (ref 12–16)
IMM GRANULOCYTES # BLD AUTO: 0.09 K/UL (ref 0–0.04)
IMM GRANULOCYTES NFR BLD AUTO: 1.1 % (ref 0–0.5)
INR PPP: 1 (ref 0.8–1.2)
LYMPHOCYTES # BLD AUTO: 1.1 K/UL (ref 1–4.8)
LYMPHOCYTES NFR BLD: 13.5 % (ref 18–48)
MCH RBC QN AUTO: 21.7 PG (ref 27–31)
MCHC RBC AUTO-ENTMCNC: 29.3 G/DL (ref 32–36)
MCV RBC AUTO: 74 FL (ref 82–98)
MONOCYTES # BLD AUTO: 0.2 K/UL (ref 0.3–1)
MONOCYTES NFR BLD: 2.4 % (ref 4–15)
NEUTROPHILS # BLD AUTO: 6.5 K/UL (ref 1.8–7.7)
NEUTROPHILS NFR BLD: 82.7 % (ref 38–73)
NRBC BLD-RTO: 0 /100 WBC
PHOSPHATE SERPL-MCNC: 3.2 MG/DL (ref 2.7–4.5)
PLATELET # BLD AUTO: 399 K/UL (ref 150–350)
PMV BLD AUTO: 11 FL (ref 9.2–12.9)
POTASSIUM SERPL-SCNC: 4.6 MMOL/L (ref 3.5–5.1)
PROT SERPL-MCNC: 6.3 G/DL (ref 6–8.4)
PROTHROMBIN TIME: 10 SEC (ref 9–12.5)
RBC # BLD AUTO: 3.82 M/UL (ref 4–5.4)
SODIUM SERPL-SCNC: 136 MMOL/L (ref 136–145)
WBC # BLD AUTO: 7.87 K/UL (ref 3.9–12.7)

## 2019-04-03 PROCEDURE — 97161 PT EVAL LOW COMPLEX 20 MIN: CPT

## 2019-04-03 PROCEDURE — 20600001 HC STEP DOWN PRIVATE ROOM

## 2019-04-03 PROCEDURE — 25000003 PHARM REV CODE 250: Performed by: INTERNAL MEDICINE

## 2019-04-03 PROCEDURE — 84100 ASSAY OF PHOSPHORUS: CPT

## 2019-04-03 PROCEDURE — 85730 THROMBOPLASTIN TIME PARTIAL: CPT

## 2019-04-03 PROCEDURE — 99233 SBSQ HOSP IP/OBS HIGH 50: CPT | Mod: GC,,, | Performed by: INTERNAL MEDICINE

## 2019-04-03 PROCEDURE — 63600175 PHARM REV CODE 636 W HCPCS: Performed by: INTERNAL MEDICINE

## 2019-04-03 PROCEDURE — 25000003 PHARM REV CODE 250: Performed by: STUDENT IN AN ORGANIZED HEALTH CARE EDUCATION/TRAINING PROGRAM

## 2019-04-03 PROCEDURE — A4216 STERILE WATER/SALINE, 10 ML: HCPCS | Performed by: INTERNAL MEDICINE

## 2019-04-03 PROCEDURE — 80053 COMPREHEN METABOLIC PANEL: CPT

## 2019-04-03 PROCEDURE — 85025 COMPLETE CBC W/AUTO DIFF WBC: CPT

## 2019-04-03 PROCEDURE — 99233 PR SUBSEQUENT HOSPITAL CARE,LEVL III: ICD-10-PCS | Mod: GC,,, | Performed by: INTERNAL MEDICINE

## 2019-04-03 PROCEDURE — 97165 OT EVAL LOW COMPLEX 30 MIN: CPT

## 2019-04-03 PROCEDURE — 85610 PROTHROMBIN TIME: CPT

## 2019-04-03 RX ORDER — ACETAMINOPHEN 325 MG/1
650 TABLET ORAL EVERY 8 HOURS PRN
Status: DISCONTINUED | OUTPATIENT
Start: 2019-04-03 | End: 2019-04-05 | Stop reason: HOSPADM

## 2019-04-03 RX ADMIN — AMLODIPINE BESYLATE 5 MG: 5 TABLET ORAL at 08:04

## 2019-04-03 RX ADMIN — OXYCODONE HYDROCHLORIDE 10 MG: 5 TABLET ORAL at 10:04

## 2019-04-03 RX ADMIN — Medication: at 11:04

## 2019-04-03 RX ADMIN — DOXORUBICIN HYDROCHLORIDE 44 MG: 2 INJECTION, SOLUTION INTRAVENOUS at 03:04

## 2019-04-03 RX ADMIN — OXYCODONE HYDROCHLORIDE 10 MG: 5 TABLET ORAL at 06:04

## 2019-04-03 RX ADMIN — IFOSFAMIDE 4450 MG: 1 INJECTION, POWDER, LYOPHILIZED, FOR SOLUTION INTRAVENOUS at 02:04

## 2019-04-03 RX ADMIN — MESNA 885 MG: 100 INJECTION, SOLUTION INTRAVENOUS at 11:04

## 2019-04-03 RX ADMIN — ONDANSETRON 8 MG: 2 INJECTION INTRAMUSCULAR; INTRAVENOUS at 01:04

## 2019-04-03 RX ADMIN — ACETAMINOPHEN 650 MG: 325 TABLET ORAL at 12:04

## 2019-04-03 RX ADMIN — GABAPENTIN 300 MG: 300 CAPSULE ORAL at 08:04

## 2019-04-03 RX ADMIN — Medication 10 ML: at 06:04

## 2019-04-03 RX ADMIN — OXYCODONE HYDROCHLORIDE 10 MG: 5 TABLET ORAL at 02:04

## 2019-04-03 RX ADMIN — PROMETHAZINE HYDROCHLORIDE 25 MG: 25 TABLET ORAL at 02:04

## 2019-04-03 RX ADMIN — MESNA 885 MG: 100 INJECTION, SOLUTION INTRAVENOUS at 06:04

## 2019-04-03 RX ADMIN — Medication 10 ML: at 12:04

## 2019-04-03 RX ADMIN — ENOXAPARIN SODIUM 40 MG: 100 INJECTION SUBCUTANEOUS at 04:04

## 2019-04-03 RX ADMIN — Medication 10 ML: at 11:04

## 2019-04-03 RX ADMIN — MESNA 885 MG: 100 INJECTION, SOLUTION INTRAVENOUS at 02:04

## 2019-04-03 RX ADMIN — PROMETHAZINE HYDROCHLORIDE 25 MG: 25 TABLET ORAL at 08:04

## 2019-04-03 RX ADMIN — DEXAMETHASONE SODIUM PHOSPHATE 12 MG: 4 INJECTION, SOLUTION INTRA-ARTICULAR; INTRALESIONAL; INTRAMUSCULAR; INTRAVENOUS; SOFT TISSUE at 01:04

## 2019-04-03 RX ADMIN — SODIUM CHLORIDE: 0.9 INJECTION, SOLUTION INTRAVENOUS at 04:04

## 2019-04-03 NOTE — ASSESSMENT & PLAN NOTE
-Previously had corrected calcium of 11.4 on 3/27, S/P 2L IVF.   -Corrected calcium down to 10.8 on admission, but trending up to 11.7 today with bicarbonate down to 18 with normal anion gap.   -Asymptomatic.     Plan:   - Continue IVF at maintenance.   - Trend CMP daily.

## 2019-04-03 NOTE — ASSESSMENT & PLAN NOTE
-Secondary effect of mass compression from abdominal sarcoma.   -CT revealed left-sided hydronephrosis, as well as nonobstructing right renal calculus measuring 9 mm. -Continues to make good urine without dysruria or hematuria. No CVA tenderness. Renal function normal.   -Evaluated by urology on admission. Recommended no intervention due to severe atrophy of left kidney, therefore no intervention at this time.

## 2019-04-03 NOTE — PT/OT/SLP EVAL
Occupational Therapy   Co-Evaluation and Discharge Note    Name: Tiffany Kaur  MRN: [id number]  Admitting Diagnosis:  Primary intra-abdominal sarcoma      Recommendations:     Discharge Recommendations: home  Discharge Equipment Recommendations:  none  Barriers to discharge:  None    Assessment:     Tiffany Kaur is a 63 y.o. female with a medical diagnosis of Primary intra-abdominal sarcoma. At this time, patient is functioning at their prior level of function and does not require further acute OT services.     Plan:     During this hospitalization, patient does not require further acute OT services.  Please re-consult if situation changes.    · Plan of Care Reviewed with: patient    Subjective     Chief Complaint: none   Patient/Family Comments/goals: return home     Occupational Profile:  Living Environment: Pt lives alone in a SSH with 4STE and R handrails. Bathroom has a tub/shower combo.  Previous level of function: PTA, pt was independent with functional mobility and ADLs. She was driving and is retired from the .   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   Equipment Used at home:  none  Assistance upon Discharge: Pt reports she has friends that are able to assist at d/c.     Pain/Comfort:  · Pain Rating 1: 0/10  · Pain Rating Post-Intervention 1: 0/10    Patients cultural, spiritual, Hoahaoism conflicts given the current situation: no    Objective:     Communicated with: RN prior to session.  Patient found supine with PICC line upon OT entry to room.    General Precautions: Standard, fall   Orthopedic Precautions:N/A   Braces: N/A     Occupational Performance:    Bed Mobility:   · Supine>Sit: independent    · Scooting to EOB: independent     · Sit>Supine: independent     · Scooting to HOB: independent       Transfers:   · Sit<>Stand Transfer: independent from/onto EOB without any AD      Functional Mobility: Pt engaging in functional mobility to simulate community  distances approx 150ft with independence and utilizing no AD in order to assess functional activity tolerance and standing balance required for engagement in occupations of choice.   · No instability or LOB noted      Activities of Daily Living:  · LB Dressing: independence    · Via anterior trunk flexion and 4 point position to access BLE's to don shoes    · No assist for standing balance    Cognitive/Visual Perceptual:  · Pt alert and oriented x4  · Pt with goodinsight into condition and with good motivation to engage in therapy session  · Pt able to follow multi-step commands 100% of the time  · Effective verbal communication    · Intact short and long term memory  · Intact safety awareness  · No visual deficits present    Physical Exam:  Balance:  · Sitting: independence    · Standing: independence   Postural Examination: no deviations noted   Skin Integrity: intact   Edema:  None noted   Sensation: intact to light touch in BUE's  Dominant Hand: R handed   UE ROM:  · Right: WFL  · Left: WFL  UE Strength:  · Right: WFL  · Left: WFL   Strength:  · Right: WFL  · Left: WFL  Fine Motor Control: WFL b/l finger opposition   Gross Motor Control: WFL    Therapeutic Intervention & Education:  · Communicated OT POC  · Updated communication board with level of assist required (independence  x 0 person assistance & no AD) & educated RN/patient that pt is appropriate for all mobility and ADLs with RN/PCT   · Educated on importance of EOB/OOB mobility, maintaining routine, sitting up in chair, and maximizing independence with ADLs during admission   · No family  present during session    AMPAC 6 Click ADL:  AMPAC Total Score: 24    Patient left HOB elevated with all lines intact, call button in reach and RN notified    GOALS:   Multidisciplinary Problems     Occupational Therapy Goals     Not on file          Multidisciplinary Problems (Resolved)        Problem: Occupational Therapy Goal    Goal Priority Disciplines  Outcome Interventions   Occupational Therapy Goal   (Resolved)     OT, PT/OT Outcome(s) achieved                    History:     Past Medical History:   Diagnosis Date    Cancer     Gallstones     GERD (gastroesophageal reflux disease)     Hypertension     SVT (supraventricular tachycardia)        Past Surgical History:   Procedure Laterality Date     SECTION      X3    HYSTERECTOMY      Partial    TUBAL LIGATION         Time Tracking:     OT Date of Treatment: 19  OT Start Time: 912  OT Stop Time: 921  OT Total Time (min): 9 min    Billable Minutes:Evaluation 9 minutes    Cherry Nelson OT  4/3/2019

## 2019-04-03 NOTE — HOSPITAL COURSE
Patient admitted to medical oncology. Admission labs notable for microcytic anemia with low iron studies and slight hypercalcemia (10.8 corrected). Evaluated by urology on day of admission. Based on assessment of prior imaging, it was determined that, based on hydronephrosis and degree of atrophy, the function of the left kidney was not salvageable and that risk of nephrostomy tube or stent outweighed benefit. She had a PICC line placed on day of admission. Chemotherapy was initiated on 04/02/19 with Doxorubucin, Ifosfamide, and Mesnex. Symptoms during chemotherapy initiation included intermittent abdominal pain and nausea that were controlled with PRN Zofran and Oxycodone. Patient noted to develop new onset issues with fine motor movement of fingers on 04/04; out of concern for neurological toxicity, Ifosfamide was discontinued with symptom resolution. On 04/05, she developed increased urinary frequency with lower back pain with radiation around the left hip; UA ordered, but negative for infection. Pain resolved after dose of Gabapentin and applying heating pads, suspect could be secondary to radiculopathy from tumor burden. Will be discharged after completion of chemotherapy with follow-up tomorrow to obtain Neulasta injection. Has appointment pending for clinic follow-up.

## 2019-04-03 NOTE — PROGRESS NOTES
[facility]  Hematology/Oncology  Progress Note    Patient Name: Tiffany Kaur  Admission Date: 4/1/2019  Hospital Length of Stay: 2 days  Code Status: Full Code     Subjective:     HPI:  Ms. 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 She has additional PMH of arrhythmia (SVT vs Afib), HTN, and suggested history of IBS.     Oncology History:   She started to have abdominal bloating in mid-December 2018. Initially, she had attributed this problem to baseline IBS and was treated at that time with various combinations of Senna, Miralax, suppositories, and enema. Additionally, she lost ~45lbs over the course of one month (174lb to 130lb) which she reports was primarily due to diffuse prandial-associated abdominal pain described mostly as cramping sensation. During this time she could only tolerated clear liquids, such as Jello and broth.      Her workup in mid-December started with an ultrasound which revealed cholelithiasis and large pelvic mass, therefore, follow-up of CT abdomen/pelvis was completed on 01/06/19. Origin of her mass has been unlcear, but she was found to have a 20 cm abdominal mass and a picture of chronic severe left hydronephrosis. She had a follow-up CT guided core biopsy on 02/06/19 of abdominal mass which revealed a low grade sarcoma, which has been verified by our pathologists as well. *See clinic oncology note for uploaded report.*     In addition, she has a 3-4 cm x 2 cm lesion on the posterior right shoulder and right lateral neck region with some vascularity, bullous with crusting. She notes that this lesion had been present for eight years and endorses some pruritus and periods of resolution, denies any pain or bleeding with the lesion.      She presented to outpatient clinic here on 03/27/2019 as a referral from [facility] to discuss  further treatment options. Plan made for in-patient admission for urology evaluation and possible initiation of chemotherapy.     She presented to [facility] 4/1/19 for planned in-patient management of her L-sided hydronephrosis and initiation of AIM chemotherapy. Following better control of her pain, her appetite improved and she was able to increase her food intake and regain weight (up to 149lbs). She had previously been on Tramadol but due to getting somnolent on this medication, she is currently taking Norco 10 mg PRN prescribed to her as last clinic visit. Currently, her abdominal pain is around a 4/10 on pain medications. Also, with current laxative regimen, she reports bowel movements approximately every other day.She reports good urine output without issues and her last BM was this morning.     Interval History: Patient seen at bedside this AM. Since started chemotherapy yesterday, she reports intermittent nausea that is relieved with PRN anti-emetics given thus far. However, she is tolerating PO and continues to report improved abdominal pain with Oxycodone PRN. She is urinating well. Ambulating without difficulty. She has no other complaints.     Oncology Treatment Plan:   IP SARCOMA DOXORUBICIN IFOSFAMIDE MESNA (4 DAYS)    Medications:  Continuous Infusions:   sodium chloride 0.9% 50 mL/hr at 04/02/19 1308     Scheduled Meds:   amLODIPine  5 mg Oral Daily    dexamethasone (DECADRON) IVPB  12 mg Intravenous Q24H    DOXOrubicin (ADRIAMYCIN) chemo infusion  25 mg/m2 (Treatment Plan Recorded) Intravenous Q24H    enoxaparin  40 mg Subcutaneous Daily    gabapentin  300 mg Oral QHS    ifosfamide (IFEX) chemo infusion  2,500 mg/m2 (Treatment Plan Recorded) Intravenous Q24H    mesna (MESNEX) infusion  500 mg/m2 (Treatment Plan Recorded) Intravenous Q24H    mesna (MESNEX) infusion  500 mg/m2 (Treatment Plan Recorded) Intravenous Q24H    mesna (MESNEX) infusion  500 mg/m2 (Treatment Plan Recorded) Intravenous  Q24H    ondansetron  8 mg Intravenous Q12H    sodium chloride 0.45 % + Additives   Intravenous Q24H    sodium chloride 0.9%  10 mL Intravenous Q6H     PRN Meds:acetaminophen, alteplase, dextrose 50%, dextrose 50%, glucagon (human recombinant), glucose, glucose, heparin, porcine (PF), oxyCODONE, oxyCODONE, polyethylene glycol, promethazine, senna, sodium chloride 0.9%, Flushing PICC Protocol **AND** sodium chloride 0.9% **AND** sodium chloride 0.9%, sodium chloride 0.9%     Review of Systems   Constitutional: Positive for unexpected weight change. Negative for appetite change, chills, diaphoresis, fatigue and fever.   HENT: Negative for congestion, nosebleeds, rhinorrhea and sore throat.    Eyes: Negative for pain and visual disturbance.   Respiratory: Negative for cough, chest tightness and shortness of breath.    Cardiovascular: Negative for chest pain, palpitations and leg swelling.   Gastrointestinal: Positive for abdominal pain and nausea. Negative for abdominal distention, constipation, diarrhea and vomiting.   Genitourinary: Negative for difficulty urinating, dysuria and hematuria.   Musculoskeletal: Positive for back pain.   Skin: Negative for pallor, rash and wound.   Neurological: Negative for dizziness, syncope, light-headedness, numbness and headaches.     Objective:     Vital Signs (Most Recent):  Temp: 98.3 °F (36.8 °C) (04/03/19 0815)  Pulse: 81 (04/03/19 0815)  Resp: 18 (04/03/19 0815)  BP: 138/63 (04/03/19 0815)  SpO2: (!) 92 % (04/03/19 0815) Vital Signs (24h Range):  Temp:  [97.9 °F (36.6 °C)-98.3 °F (36.8 °C)] 98.3 °F (36.8 °C)  Pulse:  [70-94] 81  Resp:  [16-18] 18  SpO2:  [92 %-96 %] 92 %  BP: (120-153)/(61-81) 138/63     Weight: 69.4 kg (153 lb 1.8 oz)  Body mass index is 26.28 kg/m².  Body surface area is 1.77 meters squared.      Intake/Output Summary (Last 24 hours) at 4/3/2019 1124  Last data filed at 4/3/2019 0548  Gross per 24 hour   Intake 2809.12 ml   Output 1300 ml   Net 1509.12 ml        Physical Exam   Constitutional: She is oriented to person, place, and time. She appears well-developed and well-nourished. No distress.   HENT:   Head: Normocephalic and atraumatic.   Eyes: Pupils are equal, round, and reactive to light. EOM are normal. No scleral icterus.   Neck: Normal range of motion.   Cardiovascular: Normal rate and normal heart sounds. Exam reveals no gallop and no friction rub.   No murmur heard.  Pulmonary/Chest: Effort normal and breath sounds normal. No respiratory distress. She has no wheezes. She has no rales.   Abdominal: Soft. Bowel sounds are normal. She exhibits distension and mass. There is tenderness. There is no guarding.   Musculoskeletal: Normal range of motion. She exhibits no edema.   Neurological: She is alert and oriented to person, place, and time. No cranial nerve deficit or sensory deficit.   Skin: Skin is warm and dry. No rash noted. No pallor.       Significant Labs:   BMP:   Recent Labs   Lab 04/01/19 1834 04/02/19 0457 04/03/19  0400    80 137*    137 136   K 3.8 4.1 4.6    111* 111*   CO2 21* 22* 18*   BUN 10 9 11   CREATININE 0.6 0.6 0.7   CALCIUM 10.1 9.7 10.0   MG  --  1.9  --      CBC:   Recent Labs   Lab 04/01/19 1834 04/02/19 0457 04/03/19  0400   WBC 9.22 6.84 7.87   HGB 8.8* 8.0* 8.3*   HCT 29.7* 27.4* 28.3*   * 355* 399*     CMP:   Recent Labs   Lab 04/01/19 1834 04/02/19 0457 04/03/19  0400    137 136   K 3.8 4.1 4.6    111* 111*   CO2 21* 22* 18*    80 137*   BUN 10 9 11   CREATININE 0.6 0.6 0.7   CALCIUM 10.1 9.7 10.0   PROT 7.0 6.0 6.3   ALBUMIN 3.1* 2.6* 2.7*   BILITOT 0.6 0.5 0.5   ALKPHOS 102 89 88   AST 10 9* 7*   ALT 5* 5* <5*   ANIONGAP 6* 4* 7*   EGFRNONAA >60.0 >60.0 >60.0     Coagulation:   Recent Labs   Lab 04/01/19  1513 04/03/19  0400   INR 1.0 1.0   APTT  --  27.1     Haptoglobin: No results for input(s): HAPTOGLOBIN in the last 48 hours., Immunology: No results for input(s): SPEP,  JOJO, LINDSEY, FREELAMBDALI in the last 48 hours., LDH: No results for input(s): LDHCSF, BFSOURCE in the last 48 hours., LFTs:   Recent Labs   Lab 04/01/19  1834 04/02/19  0457 04/03/19  0400   ALT 5* 5* <5*   AST 10 9* 7*   ALKPHOS 102 89 88   BILITOT 0.6 0.5 0.5   PROT 7.0 6.0 6.3   ALBUMIN 3.1* 2.6* 2.7*   , Reticulocytes: No results for input(s): RETIC in the last 48 hours., Tumor Markers: No results for input(s): PSA, CEA, , AFPTM, [id number],  in the last 48 hours.    Invalid input(s): ALGTM, Uric Acid   Recent Labs   Lab 04/01/19  1513   URICACID 3.2   , Urine Studies: No results for input(s): COLORU, APPEARANCEUA, PHUR, SPECGRAV, PROTEINUA, GLUCUA, KETONESU, BILIRUBINUA, OCCULTUA, NITRITE, UROBILINOGEN, LEUKOCYTESUR, RBCUA, WBCUA, BACTERIA, SQUAMEPITHEL, HYALINECASTS in the last 48 hours.    Invalid input(s): KARLIE,   Recent Lab Results       04/03/19  0400        Albumin 2.7     Alkaline Phosphatase 88     ALT <5     Anion Gap 7     aPTT 27.1  Comment:  aPTT therapeutic range = 39-69 seconds     AST 7     Baso # 0.02     Basophil% 0.3     Total Bilirubin 0.5  Comment:  For infants and newborns, interpretation of results should be based  on gestational age, weight and in agreement with clinical  observations.  Premature Infant recommended reference ranges:  Up to 24 hours.............<8.0 mg/dL  Up to 48 hours............<12.0 mg/dL  3-5 days..................<15.0 mg/dL  6-29 days.................<15.0 mg/dL       BUN, Bld 11     Calcium 10.0     Chloride 111     CO2 18     Creatinine 0.7     Differential Method Automated     eGFR if African American >60.0     eGFR if non  >60.0  Comment:  Calculation used to obtain the estimated glomerular filtration  rate (eGFR) is the CKD-EPI equation.        Eos # 0.0     Eosinophil% 0.0     Glucose 137     Gran # (ANC) 6.5     Gran% 82.7     Hematocrit 28.3     Hemoglobin 8.3     Immature Grans (Abs) 0.09  Comment:  Mild elevation in immature  granulocytes is non specific and   can be seen in a variety of conditions including stress response,   acute inflammation, trauma and pregnancy. Correlation with other   laboratory and clinical findings is essential.       Immature Granulocytes 1.1     Coumadin Monitoring INR 1.0  Comment:  Coumadin Therapy:  2.0 - 3.0 for INR for all indicators except mechanical heart valves  and antiphospholipid syndromes which should use 2.5 - 3.5.       Lymph # 1.1     Lymph% 13.5     MCH 21.7     MCHC 29.3     MCV 74     Mono # 0.2     Mono% 2.4     MPV 11.0     nRBC 0     Phosphorus 3.2     Platelets 399     Potassium 4.6     Total Protein 6.3     Protime 10.0     RBC 3.82     RDW 20.1     Sodium 136     WBC 7.87        and All pertinent labs from the last 24 hours have been reviewed.    Diagnostic Results:  I have reviewed all pertinent imaging results/findings within the past 24 hours.    Assessment/Plan:     * Primary intra-abdominal sarcoma  -Initially presented in 12/2018 with chronic abdominal bloating, pain and significant weight loss. U/S and follow-up CT in 01/2019 showed a 20 cm abdominal mass with resultant left hydronephrosis. 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 See uploaded pathology in outpatient clinic note.   -S/P initiation with chemotherapy with Doxorubicin, Ifosfamide, and Mesna on 04/02; tolerating well.     Plan:   -Completion of chemotherapy by 04/05 with outpatient follow-up for Neulasta injection.   -Eventually repeat PET CT after completion of cycle 1.  -Continue pain control [id number] mg Q6H PRN.   -Continue bowel regimen of Miralax, Senna PRN.     Normal anion gap metabolic acidosis  See assessment for hypercalcemia of malignancy.     SVT (supraventricular tachycardia)  -Mentioned PMH of SVT vs Afib with intermittent episodes of palpitations.   -EKG on admission NSR. Asymptomatic currently.     Plan:   -Will consider telemetry if re-occurring with chemotherapy initiation.        Iron deficiency anemia  -Likely secondary to dietary deficiency from post-prandial abdominal pain related to mass.   -Iron studies on admission with low iron, transferrin, and ferritin.   -No signs of bleeding.   -Hemodynamically stable.     Plan:   -Will consider Fe supplementation eventually.   -Trending CBC with chemotherapy initiation.       Hydronephrosis of left kidney  -Secondary effect of mass compression from abdominal sarcoma.   -CT revealed left-sided hydronephrosis, as well as nonobstructing right renal calculus measuring 9 mm. -Continues to make good urine without dysruria or hematuria. No CVA tenderness. Renal function normal.   -Evaluated by urology on admission. Recommended no intervention due to severe atrophy of left kidney, therefore no intervention at this time.     Hypertension  -Continue home Amlodipine; will consider titrating to 10 mg or adding second medication if necessary.    Neuropathy  Continue home Gabapentin.     Hypercalcemia of malignancy  -Previously had corrected calcium of 11.4 on 3/27, S/P 2L IVF.   -Corrected calcium down to 10.8 on admission, but trending up to 11.7 today with bicarbonate down to 18 with normal anion gap.   -Asymptomatic.     Plan:   - Continue IVF at maintenance.   - Trend CMP daily.              Kalpesh Lewis MD  Hematology/Oncology  [facility]    Attending Note  I have personally taken the history and examined this patient and agree with the resident's note as stated above.  Proceed with chemotherapy

## 2019-04-03 NOTE — ASSESSMENT & PLAN NOTE
-Likely secondary to dietary deficiency from post-prandial abdominal pain related to mass.   -Iron studies on admission with low iron, transferrin, and ferritin.   -No signs of bleeding.   -Hemodynamically stable.     Plan:   -Will consider Fe supplementation eventually.   -Trending CBC with chemotherapy initiation.

## 2019-04-03 NOTE — ASSESSMENT & PLAN NOTE
-Initially presented in 12/2018 with chronic abdominal bloating, pain and significant weight loss. U/S and follow-up CT in 01/2019 showed a 20 cm abdominal mass with resultant left hydronephrosis. 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 See uploaded pathology in outpatient clinic note.   -S/P initiation with chemotherapy with Doxorubicin, Ifosfamide, and Mesna on 04/02; tolerating well.     Plan:   -Completion of chemotherapy by 04/05 with outpatient follow-up for Neulasta injection.   -Eventually repeat PET CT after completion of cycle 1.  -Continue pain control [id number] mg Q6H PRN.   -Continue bowel regimen of Miralax, Senna PRN.

## 2019-04-03 NOTE — ASSESSMENT & PLAN NOTE
-Mentioned PMH of SVT vs Afib with intermittent episodes of palpitations.   -EKG on admission NSR. Asymptomatic currently.     Plan:   -Will consider telemetry if re-occurring with chemotherapy initiation.

## 2019-04-03 NOTE — PLAN OF CARE
Problem: Adult Inpatient Plan of Care  Goal: Plan of Care Review  Outcome: Ongoing (interventions implemented as appropriate)  POC discussed at beginning of shift and PRN. Involved in plan of care and communicating needs throughout shift.  Up in room independently and ambulates with steady gait. Reports abdominal pain; prn oxycodone given x2 with good effect.  Nausea w/o vomiting x1 resolved w PO phenergan. Monitored day #1 of AIM chemo regimen; doxorubicin  infusing as ordered ; IVF fluids infusing as ordered.  Pt tolerating well.  All VSS; no acute events so far this shift.  Remains free from falls or injury throughout shift; bed in lowest position; call light within reach.  Pt instructed to call for assistance as needed.

## 2019-04-03 NOTE — ASSESSMENT & PLAN NOTE
-Continue home Amlodipine; will consider titrating to 10 mg or adding second medication if necessary.

## 2019-04-03 NOTE — PLAN OF CARE
Problem: Physical Therapy Goal  Goal: Physical Therapy Goal  Outcome: Outcome(s) achieved Date Met: 04/03/19  Patient at this time is at their functional baseline and does not require skilled acute PT services at this time. Please re consult PT if pt has change in functional status.   Ming Proctor PT, DPT  4/3/2019  Pager: [phone]

## 2019-04-03 NOTE — SUBJECTIVE & OBJECTIVE
Interval History: Patient seen at bedside this AM. Since started chemotherapy yesterday, she reports intermittent nausea that is relieved with PRN anti-emetics given thus far. However, she is tolerating PO and continues to report improved abdominal pain with Oxycodone PRN. She is urinating well. Ambulating without difficulty. She has no other complaints.     Oncology Treatment Plan:   IP SARCOMA DOXORUBICIN IFOSFAMIDE MESNA (4 DAYS)    Medications:  Continuous Infusions:   sodium chloride 0.9% 50 mL/hr at 04/02/19 1308     Scheduled Meds:   amLODIPine  5 mg Oral Daily    dexamethasone (DECADRON) IVPB  12 mg Intravenous Q24H    DOXOrubicin (ADRIAMYCIN) chemo infusion  25 mg/m2 (Treatment Plan Recorded) Intravenous Q24H    enoxaparin  40 mg Subcutaneous Daily    gabapentin  300 mg Oral QHS    ifosfamide (IFEX) chemo infusion  2,500 mg/m2 (Treatment Plan Recorded) Intravenous Q24H    mesna (MESNEX) infusion  500 mg/m2 (Treatment Plan Recorded) Intravenous Q24H    mesna (MESNEX) infusion  500 mg/m2 (Treatment Plan Recorded) Intravenous Q24H    mesna (MESNEX) infusion  500 mg/m2 (Treatment Plan Recorded) Intravenous Q24H    ondansetron  8 mg Intravenous Q12H    sodium chloride 0.45 % + Additives   Intravenous Q24H    sodium chloride 0.9%  10 mL Intravenous Q6H     PRN Meds:acetaminophen, alteplase, dextrose 50%, dextrose 50%, glucagon (human recombinant), glucose, glucose, heparin, porcine (PF), oxyCODONE, oxyCODONE, polyethylene glycol, promethazine, senna, sodium chloride 0.9%, Flushing PICC Protocol **AND** sodium chloride 0.9% **AND** sodium chloride 0.9%, sodium chloride 0.9%     Review of Systems   Constitutional: Positive for unexpected weight change. Negative for appetite change, chills, diaphoresis, fatigue and fever.   HENT: Negative for congestion, nosebleeds, rhinorrhea and sore throat.    Eyes: Negative for pain and visual disturbance.   Respiratory: Negative for cough, chest tightness and  shortness of breath.    Cardiovascular: Negative for chest pain, palpitations and leg swelling.   Gastrointestinal: Positive for abdominal pain and nausea. Negative for abdominal distention, constipation, diarrhea and vomiting.   Genitourinary: Negative for difficulty urinating, dysuria and hematuria.   Musculoskeletal: Positive for back pain.   Skin: Negative for pallor, rash and wound.   Neurological: Negative for dizziness, syncope, light-headedness, numbness and headaches.     Objective:     Vital Signs (Most Recent):  Temp: 98.3 °F (36.8 °C) (04/03/19 0815)  Pulse: 81 (04/03/19 0815)  Resp: 18 (04/03/19 0815)  BP: 138/63 (04/03/19 0815)  SpO2: (!) 92 % (04/03/19 0815) Vital Signs (24h Range):  Temp:  [97.9 °F (36.6 °C)-98.3 °F (36.8 °C)] 98.3 °F (36.8 °C)  Pulse:  [70-94] 81  Resp:  [16-18] 18  SpO2:  [92 %-96 %] 92 %  BP: (120-153)/(61-81) 138/63     Weight: 69.4 kg (153 lb 1.8 oz)  Body mass index is 26.28 kg/m².  Body surface area is 1.77 meters squared.      Intake/Output Summary (Last 24 hours) at 4/3/2019 1124  Last data filed at 4/3/2019 0548  Gross per 24 hour   Intake 2809.12 ml   Output 1300 ml   Net 1509.12 ml       Physical Exam   Constitutional: She is oriented to person, place, and time. She appears well-developed and well-nourished. No distress.   HENT:   Head: Normocephalic and atraumatic.   Eyes: Pupils are equal, round, and reactive to light. EOM are normal. No scleral icterus.   Neck: Normal range of motion.   Cardiovascular: Normal rate and normal heart sounds. Exam reveals no gallop and no friction rub.   No murmur heard.  Pulmonary/Chest: Effort normal and breath sounds normal. No respiratory distress. She has no wheezes. She has no rales.   Abdominal: Soft. Bowel sounds are normal. She exhibits distension and mass. There is tenderness. There is no guarding.   Musculoskeletal: Normal range of motion. She exhibits no edema.   Neurological: She is alert and oriented to person, place, and  time. No cranial nerve deficit or sensory deficit.   Skin: Skin is warm and dry. No rash noted. No pallor.       Significant Labs:   BMP:   Recent Labs   Lab 04/01/19 1834 04/02/19 0457 04/03/19  0400    80 137*    137 136   K 3.8 4.1 4.6    111* 111*   CO2 21* 22* 18*   BUN 10 9 11   CREATININE 0.6 0.6 0.7   CALCIUM 10.1 9.7 10.0   MG  --  1.9  --      CBC:   Recent Labs   Lab 04/01/19 1834 04/02/19 0457 04/03/19  0400   WBC 9.22 6.84 7.87   HGB 8.8* 8.0* 8.3*   HCT 29.7* 27.4* 28.3*   * 355* 399*     CMP:   Recent Labs   Lab 04/01/19 1834 04/02/19 0457 04/03/19  0400    137 136   K 3.8 4.1 4.6    111* 111*   CO2 21* 22* 18*    80 137*   BUN 10 9 11   CREATININE 0.6 0.6 0.7   CALCIUM 10.1 9.7 10.0   PROT 7.0 6.0 6.3   ALBUMIN 3.1* 2.6* 2.7*   BILITOT 0.6 0.5 0.5   ALKPHOS 102 89 88   AST 10 9* 7*   ALT 5* 5* <5*   ANIONGAP 6* 4* 7*   EGFRNONAA >60.0 >60.0 >60.0     Coagulation:   Recent Labs   Lab 04/01/19  1513 04/03/19  0400   INR 1.0 1.0   APTT  --  27.1     Haptoglobin: No results for input(s): HAPTOGLOBIN in the last 48 hours., Immunology: No results for input(s): SPEP, JOJO, LINDSEY, FREELAMBDALI in the last 48 hours., LDH: No results for input(s): LDHCSF, BFSOURCE in the last 48 hours., LFTs:   Recent Labs   Lab 04/01/19 1834 04/02/19 0457 04/03/19  0400   ALT 5* 5* <5*   AST 10 9* 7*   ALKPHOS 102 89 88   BILITOT 0.6 0.5 0.5   PROT 7.0 6.0 6.3   ALBUMIN 3.1* 2.6* 2.7*   , Reticulocytes: No results for input(s): RETIC in the last 48 hours., Tumor Markers: No results for input(s): PSA, CEA, , AFPTM, [id number],  in the last 48 hours.    Invalid input(s): ALGTM, Uric Acid   Recent Labs   Lab 04/01/19  1513   URICACID 3.2   , Urine Studies: No results for input(s): COLORU, APPEARANCEUA, PHUR, SPECGRAV, PROTEINUA, GLUCUA, KETONESU, BILIRUBINUA, OCCULTUA, NITRITE, UROBILINOGEN, LEUKOCYTESUR, RBCUA, WBCUA, BACTERIA, SQUAMEPITHEL, HYALINECASTS in the last 48  hours.    Invalid input(s): KARLIE,   Recent Lab Results       04/03/19  0400        Albumin 2.7     Alkaline Phosphatase 88     ALT <5     Anion Gap 7     aPTT 27.1  Comment:  aPTT therapeutic range = 39-69 seconds     AST 7     Baso # 0.02     Basophil% 0.3     Total Bilirubin 0.5  Comment:  For infants and newborns, interpretation of results should be based  on gestational age, weight and in agreement with clinical  observations.  Premature Infant recommended reference ranges:  Up to 24 hours.............<8.0 mg/dL  Up to 48 hours............<12.0 mg/dL  3-5 days..................<15.0 mg/dL  6-29 days.................<15.0 mg/dL       BUN, Bld 11     Calcium 10.0     Chloride 111     CO2 18     Creatinine 0.7     Differential Method Automated     eGFR if African American >60.0     eGFR if non  >60.0  Comment:  Calculation used to obtain the estimated glomerular filtration  rate (eGFR) is the CKD-EPI equation.        Eos # 0.0     Eosinophil% 0.0     Glucose 137     Gran # (ANC) 6.5     Gran% 82.7     Hematocrit 28.3     Hemoglobin 8.3     Immature Grans (Abs) 0.09  Comment:  Mild elevation in immature granulocytes is non specific and   can be seen in a variety of conditions including stress response,   acute inflammation, trauma and pregnancy. Correlation with other   laboratory and clinical findings is essential.       Immature Granulocytes 1.1     Coumadin Monitoring INR 1.0  Comment:  Coumadin Therapy:  2.0 - 3.0 for INR for all indicators except mechanical heart valves  and antiphospholipid syndromes which should use 2.5 - 3.5.       Lymph # 1.1     Lymph% 13.5     MCH 21.7     MCHC 29.3     MCV 74     Mono # 0.2     Mono% 2.4     MPV 11.0     nRBC 0     Phosphorus 3.2     Platelets 399     Potassium 4.6     Total Protein 6.3     Protime 10.0     RBC 3.82     RDW 20.1     Sodium 136     WBC 7.87        and All pertinent labs from the last 24 hours have been reviewed.    Diagnostic  Results:  I have reviewed all pertinent imaging results/findings within the past 24 hours.

## 2019-04-03 NOTE — PT/OT/SLP EVAL
Physical Therapy Evaluation and Discharge Note    Patient Name:  Tiffany Kaur   MRN:  [id number]    Recommendations:     Discharge Recommendations:  home   Discharge Equipment Recommendations: none   Barriers to discharge: None    Assessment:     Tiffany Kaur is a 63 y.o. female admitted with a medical diagnosis of Primary intra-abdominal sarcoma. .  At this time, patient is functioning at their prior level of function and does not require further acute PT services.     Recent Surgery: * No surgery found *      Plan:     During this hospitalization, patient does not require further acute PT services.  Please re-consult if situation changes.      Subjective     Chief Complaint: none. Pt very pleasant   Patient/Family Comments/goals: to return home. Pt states she no longer runs marathons and that 5k runs are more safe.   Pain/Comfort:  · Pain Rating 1: 0/10  · Pain Rating Post-Intervention 1: 0/10    Patients cultural, spiritual, Bahai conflicts given the current situation: no    Living Environment:  Pt lives in a [facility] with 3 PAULA with R HR.   Prior to admission, patients level of function was ( I) with all gait and ADLs. Pt was driving and a community ambulator.  Equipment used at home: none.  DME owned (not currently used): none.  Upon discharge, patient will have assistance from her family.    Objective:     Communicated with RN prior to session.  Patient found HOB elevated with PICC line upon PT entry to room.    General Precautions: Standard,     Orthopedic Precautions:N/A   Braces: N/A     Exams:  · Cognitive Exam:  Patient is oriented to Person, Place, Time and Situation  · Fine Motor Coordination: -       Intact  · Gross Motor Coordination:  WFL  · Postural Exam:  Patient presented with the following abnormalities: -       Rounded shoulders  · -       Forward head  · Sensation: -       Intact  · Skin Integrity/Edema:  -       Skin integrity: Visible skin intact  · RLE ROM: WFL  · RLE Strength:  WFL  · LLE ROM: WFL  · LLE Strength: WFL    Functional Mobility:  · Bed Mobility:  Rolling Right: independence  · Supine to Sit: independence  · Sit to Supine: independence  · Transfers:  Sit to Stand:  modified independence with no AD  · Gait: x ~ 160 feet with ( I) while pushing IV pole  · Balance: ( I) for gait  · Stairs:  Pt ascended/descended 3 stair(s) with No Assistive Device with right handrail with Supervision or Set-up Assistance.     AM-PAC 6 CLICK MOBILITY  Total Score:24       Therapeutic Activities and Exercises:     Patient education  · Patient educated on the role of PT and POC  · Patient educated on importance  activity while in the hosptial per tolerance for improved endurance and to limit deconditioning   · Patient educated on safe transfers with nursing as appropriate  · Patient educated on proper transfer mechanics and safety  · All of patients questions were answered within the scope of PT        AM-PAC 6 CLICK MOBILITY  Total Score:24     Patient left HOB elevated with all lines intact, call button in reach and RN  notified.    GOALS:   Multidisciplinary Problems     Physical Therapy Goals     Not on file          Multidisciplinary Problems (Resolved)        Problem: Physical Therapy Goal    Goal Priority Disciplines Outcome Goal Variances Interventions   Physical Therapy Goal   (Resolved)     PT, PT/OT Outcome(s) achieved                     History:     Past Medical History:   Diagnosis Date    Cancer     Gallstones     GERD (gastroesophageal reflux disease)     Hypertension     SVT (supraventricular tachycardia)        Past Surgical History:   Procedure Laterality Date     SECTION      X3    HYSTERECTOMY      Partial    TUBAL LIGATION         Time Tracking:     PT Received On: 19  PT Start Time: 912     PT Stop Time: 921  PT Total Time (min): 9 min     Billable Minutes: Evaluation 9 min      Ming Proctor, PT  2019

## 2019-04-04 LAB
ALBUMIN SERPL BCP-MCNC: 2.8 G/DL (ref 3.5–5.2)
ALP SERPL-CCNC: 84 U/L (ref 55–135)
ALT SERPL W/O P-5'-P-CCNC: 5 U/L (ref 10–44)
ANION GAP SERPL CALC-SCNC: 6 MMOL/L (ref 8–16)
AST SERPL-CCNC: 9 U/L (ref 10–40)
BASOPHILS # BLD AUTO: 0.02 K/UL (ref 0–0.2)
BASOPHILS NFR BLD: 0.2 % (ref 0–1.9)
BILIRUB SERPL-MCNC: 0.4 MG/DL (ref 0.1–1)
BUN SERPL-MCNC: 16 MG/DL (ref 8–23)
CALCIUM SERPL-MCNC: 9.9 MG/DL (ref 8.7–10.5)
CHLORIDE SERPL-SCNC: 110 MMOL/L (ref 95–110)
CO2 SERPL-SCNC: 20 MMOL/L (ref 23–29)
CREAT SERPL-MCNC: 0.6 MG/DL (ref 0.5–1.4)
DIFFERENTIAL METHOD: ABNORMAL
EOSINOPHIL # BLD AUTO: 0 K/UL (ref 0–0.5)
EOSINOPHIL NFR BLD: 0 % (ref 0–8)
ERYTHROCYTE [DISTWIDTH] IN BLOOD BY AUTOMATED COUNT: 20.4 % (ref 11.5–14.5)
EST. GFR  (AFRICAN AMERICAN): >60 ML/MIN/1.73 M^2
EST. GFR  (NON AFRICAN AMERICAN): >60 ML/MIN/1.73 M^2
GLUCOSE SERPL-MCNC: 97 MG/DL (ref 70–110)
HCT VFR BLD AUTO: 26.7 % (ref 37–48.5)
HGB BLD-MCNC: 7.9 G/DL (ref 12–16)
IMM GRANULOCYTES # BLD AUTO: 0.06 K/UL (ref 0–0.04)
IMM GRANULOCYTES NFR BLD AUTO: 0.6 % (ref 0–0.5)
LYMPHOCYTES # BLD AUTO: 1.4 K/UL (ref 1–4.8)
LYMPHOCYTES NFR BLD: 14.6 % (ref 18–48)
MAGNESIUM SERPL-MCNC: 2.5 MG/DL (ref 1.6–2.6)
MCH RBC QN AUTO: 21.6 PG (ref 27–31)
MCHC RBC AUTO-ENTMCNC: 29.6 G/DL (ref 32–36)
MCV RBC AUTO: 73 FL (ref 82–98)
MONOCYTES # BLD AUTO: 0.7 K/UL (ref 0.3–1)
MONOCYTES NFR BLD: 7.2 % (ref 4–15)
NEUTROPHILS # BLD AUTO: 7.2 K/UL (ref 1.8–7.7)
NEUTROPHILS NFR BLD: 77.4 % (ref 38–73)
NRBC BLD-RTO: 0 /100 WBC
PHOSPHATE SERPL-MCNC: 2.7 MG/DL (ref 2.7–4.5)
PLATELET # BLD AUTO: 382 K/UL (ref 150–350)
PMV BLD AUTO: 11.4 FL (ref 9.2–12.9)
POTASSIUM SERPL-SCNC: 4.2 MMOL/L (ref 3.5–5.1)
PROT SERPL-MCNC: 6.3 G/DL (ref 6–8.4)
RBC # BLD AUTO: 3.65 M/UL (ref 4–5.4)
SODIUM SERPL-SCNC: 136 MMOL/L (ref 136–145)
WBC # BLD AUTO: 9.29 K/UL (ref 3.9–12.7)

## 2019-04-04 PROCEDURE — 63600175 PHARM REV CODE 636 W HCPCS: Performed by: INTERNAL MEDICINE

## 2019-04-04 PROCEDURE — 80053 COMPREHEN METABOLIC PANEL: CPT

## 2019-04-04 PROCEDURE — 99233 SBSQ HOSP IP/OBS HIGH 50: CPT | Mod: GC,,, | Performed by: INTERNAL MEDICINE

## 2019-04-04 PROCEDURE — A4216 STERILE WATER/SALINE, 10 ML: HCPCS | Performed by: INTERNAL MEDICINE

## 2019-04-04 PROCEDURE — 83735 ASSAY OF MAGNESIUM: CPT

## 2019-04-04 PROCEDURE — 84100 ASSAY OF PHOSPHORUS: CPT

## 2019-04-04 PROCEDURE — 25000003 PHARM REV CODE 250: Performed by: INTERNAL MEDICINE

## 2019-04-04 PROCEDURE — 85025 COMPLETE CBC W/AUTO DIFF WBC: CPT

## 2019-04-04 PROCEDURE — 25000003 PHARM REV CODE 250: Performed by: STUDENT IN AN ORGANIZED HEALTH CARE EDUCATION/TRAINING PROGRAM

## 2019-04-04 PROCEDURE — 99233 PR SUBSEQUENT HOSPITAL CARE,LEVL III: ICD-10-PCS | Mod: GC,,, | Performed by: INTERNAL MEDICINE

## 2019-04-04 PROCEDURE — 20600001 HC STEP DOWN PRIVATE ROOM

## 2019-04-04 RX ORDER — FAMOTIDINE 20 MG/1
20 TABLET, FILM COATED ORAL 2 TIMES DAILY
Status: DISCONTINUED | OUTPATIENT
Start: 2019-04-04 | End: 2019-04-05 | Stop reason: HOSPADM

## 2019-04-04 RX ORDER — GABAPENTIN 300 MG/1
300 CAPSULE ORAL 2 TIMES DAILY
Status: DISCONTINUED | OUTPATIENT
Start: 2019-04-04 | End: 2019-04-05

## 2019-04-04 RX ORDER — ONDANSETRON 2 MG/ML
8 INJECTION INTRAMUSCULAR; INTRAVENOUS
Status: COMPLETED | OUTPATIENT
Start: 2019-04-04 | End: 2019-04-05

## 2019-04-04 RX ADMIN — ENOXAPARIN SODIUM 40 MG: 100 INJECTION SUBCUTANEOUS at 06:04

## 2019-04-04 RX ADMIN — Medication: at 11:04

## 2019-04-04 RX ADMIN — OXYCODONE HYDROCHLORIDE 10 MG: 5 TABLET ORAL at 12:04

## 2019-04-04 RX ADMIN — OXYCODONE HYDROCHLORIDE 10 MG: 5 TABLET ORAL at 06:04

## 2019-04-04 RX ADMIN — GABAPENTIN 300 MG: 300 CAPSULE ORAL at 11:04

## 2019-04-04 RX ADMIN — DOXORUBICIN HYDROCHLORIDE 44 MG: 2 INJECTION, SOLUTION INTRAVENOUS at 04:04

## 2019-04-04 RX ADMIN — Medication 10 ML: at 12:04

## 2019-04-04 RX ADMIN — GABAPENTIN 300 MG: 300 CAPSULE ORAL at 09:04

## 2019-04-04 RX ADMIN — OXYCODONE HYDROCHLORIDE 10 MG: 5 TABLET ORAL at 11:04

## 2019-04-04 RX ADMIN — MESNA 885 MG: 100 INJECTION, SOLUTION INTRAVENOUS at 01:04

## 2019-04-04 RX ADMIN — MESNA 885 MG: 100 INJECTION, SOLUTION INTRAVENOUS at 11:04

## 2019-04-04 RX ADMIN — ONDANSETRON 8 MG: 2 INJECTION INTRAMUSCULAR; INTRAVENOUS at 01:04

## 2019-04-04 RX ADMIN — ACETAMINOPHEN 650 MG: 325 TABLET ORAL at 11:04

## 2019-04-04 RX ADMIN — Medication 10 ML: at 11:04

## 2019-04-04 RX ADMIN — AMLODIPINE BESYLATE 5 MG: 5 TABLET ORAL at 09:04

## 2019-04-04 RX ADMIN — OXYCODONE HYDROCHLORIDE 10 MG: 5 TABLET ORAL at 02:04

## 2019-04-04 RX ADMIN — FAMOTIDINE 20 MG: 20 TABLET, FILM COATED ORAL at 11:04

## 2019-04-04 RX ADMIN — ONDANSETRON 8 MG: 2 INJECTION INTRAMUSCULAR; INTRAVENOUS at 12:04

## 2019-04-04 RX ADMIN — FAMOTIDINE 20 MG: 20 TABLET, FILM COATED ORAL at 09:04

## 2019-04-04 RX ADMIN — ACETAMINOPHEN 650 MG: 325 TABLET ORAL at 10:04

## 2019-04-04 RX ADMIN — ACETAMINOPHEN 650 MG: 325 TABLET ORAL at 03:04

## 2019-04-04 RX ADMIN — MESNA 885 MG: 100 INJECTION, SOLUTION INTRAVENOUS at 06:04

## 2019-04-04 RX ADMIN — Medication 10 ML: at 06:04

## 2019-04-04 NOTE — SUBJECTIVE & OBJECTIVE
Interval History: No events overnight. Patient reported onset of rash in malar distribution of face yesterday evening that resolved without intervention. She also reports new onset of difficulty with finger dexterity bilaterally, most noticeable when using phone and remote. She denies numbness of extremities, pins and needles sensation distally, tremors, difficulty ambulating, dizziness or lightheadedness, blurry vision, difficulty swallowing, changes in speech. She is tolerating PO. Intermittent nausea and abdominal pain remain controlled. She is urinating well and having BM daily. Ambulating without assistance or changes in gait.     Oncology Treatment Plan:   IP SARCOMA DOXORUBICIN IFOSFAMIDE MESNA (4 DAYS)    Medications:  Continuous Infusions:    Scheduled Meds:   amLODIPine  5 mg Oral Daily    DOXOrubicin (ADRIAMYCIN) chemo infusion  25 mg/m2 (Treatment Plan Recorded) Intravenous Q24H    enoxaparin  40 mg Subcutaneous Daily    famotidine  20 mg Oral BID    gabapentin  300 mg Oral BID    mesna (MESNEX) infusion  500 mg/m2 (Treatment Plan Recorded) Intravenous Q24H    mesna (MESNEX) infusion  500 mg/m2 (Treatment Plan Recorded) Intravenous Q24H    mesna (MESNEX) infusion  500 mg/m2 (Treatment Plan Recorded) Intravenous Q24H    ondansetron  8 mg Intravenous Q12H    sodium chloride 0.45 % + Additives   Intravenous Q24H    sodium chloride 0.9%  10 mL Intravenous Q6H     PRN Meds:acetaminophen, alteplase, dextrose 50%, dextrose 50%, glucagon (human recombinant), glucose, glucose, heparin, porcine (PF), oxyCODONE, oxyCODONE, polyethylene glycol, promethazine, senna, sodium chloride 0.9%, Flushing PICC Protocol **AND** sodium chloride 0.9% **AND** sodium chloride 0.9%, sodium chloride 0.9%     Review of Systems   Constitutional: Positive for unexpected weight change. Negative for appetite change, chills, diaphoresis, fatigue and fever.   HENT: Negative for congestion, nosebleeds, rhinorrhea and sore throat.     Eyes: Negative for pain and visual disturbance.   Respiratory: Negative for cough, chest tightness and shortness of breath.    Cardiovascular: Negative for chest pain, palpitations and leg swelling.   Gastrointestinal: Positive for abdominal pain and nausea. Negative for abdominal distention, constipation, diarrhea and vomiting.   Genitourinary: Negative for difficulty urinating, dysuria and hematuria.   Musculoskeletal: Positive for back pain.   Skin: Negative for pallor, rash and wound.   Neurological: Negative for dizziness, tremors, syncope, facial asymmetry, speech difficulty, weakness, light-headedness, numbness and headaches.     Objective:     Vital Signs (Most Recent):  Temp: 98.1 °F (36.7 °C) (04/04/19 0904)  Pulse: 80 (04/04/19 1045)  Resp: 18 (04/04/19 1045)  BP: (!) 147/73 (04/04/19 1045)  SpO2: 95 % (04/04/19 1045) Vital Signs (24h Range):  Temp:  [97.3 °F (36.3 °C)-98.1 °F (36.7 °C)] 98.1 °F (36.7 °C)  Pulse:  [80-89] 80  Resp:  [16-18] 18  SpO2:  [94 %-97 %] 95 %  BP: (120-163)/(55-77) 147/73     Weight: 69.4 kg (153 lb 1.8 oz)  Body mass index is 26.28 kg/m².  Body surface area is 1.77 meters squared.      Intake/Output Summary (Last 24 hours) at 4/4/2019 1105  Last data filed at 4/4/2019 0328  Gross per 24 hour   Intake 5293.92 ml   Output 2400 ml   Net 2893.92 ml       Physical Exam   Constitutional: She is oriented to person, place, and time. She appears well-developed and well-nourished. No distress.   HENT:   Head: Normocephalic and atraumatic.   Eyes: Pupils are equal, round, and reactive to light. EOM are normal. No scleral icterus.   Neck: Normal range of motion. No JVD present.   Cardiovascular: Normal rate and normal heart sounds. Exam reveals no gallop and no friction rub.   No murmur heard.  Pulmonary/Chest: Effort normal and breath sounds normal. No respiratory distress. She has no wheezes. She has no rales.   Abdominal: Soft. Bowel sounds are normal. She exhibits distension and  mass. There is tenderness. There is no guarding.   Musculoskeletal: Normal range of motion. She exhibits no edema.   There is minimal swelling of the bilateral lower extremities.    Neurological: She is alert and oriented to person, place, and time. She has normal strength. She displays no tremor. No cranial nerve deficit or sensory deficit. She exhibits normal muscle tone. Gait normal. She displays no Babinski's sign on the right side. She displays no Babinski's sign on the left side.   Skin: Skin is warm and dry. No rash noted. No pallor.       Significant Labs:   BMP:   Recent Labs   Lab 04/03/19 0400 04/04/19 0340   * 97    136   K 4.6 4.2   * 110   CO2 18* 20*   BUN 11 16   CREATININE 0.7 0.6   CALCIUM 10.0 9.9   MG  --  2.5     CBC:   Recent Labs   Lab 04/03/19 0400 04/04/19 0340   WBC 7.87 9.29   HGB 8.3* 7.9*   HCT 28.3* 26.7*   * 382*     CMP:   Recent Labs   Lab 04/03/19 0400 04/04/19 0340    136   K 4.6 4.2   * 110   CO2 18* 20*   * 97   BUN 11 16   CREATININE 0.7 0.6   CALCIUM 10.0 9.9   PROT 6.3 6.3   ALBUMIN 2.7* 2.8*   BILITOT 0.5 0.4   ALKPHOS 88 84   AST 7* 9*   ALT <5* 5*   ANIONGAP 7* 6*   EGFRNONAA >60.0 >60.0     Coagulation:   Recent Labs   Lab 04/03/19 0400   INR 1.0   APTT 27.1     Haptoglobin: No results for input(s): HAPTOGLOBIN in the last 48 hours., Immunology: No results for input(s): SPEP, JOJO, LINDSEY, FREELAMBDALI in the last 48 hours., LDH: No results for input(s): LDHCSF, BFSOURCE in the last 48 hours., LFTs:   Recent Labs   Lab 04/03/19 0400 04/04/19 0340   ALT <5* 5*   AST 7* 9*   ALKPHOS 88 84   BILITOT 0.5 0.4   PROT 6.3 6.3   ALBUMIN 2.7* 2.8*   , Reticulocytes: No results for input(s): RETIC in the last 48 hours., Tumor Markers: No results for input(s): PSA, CEA, , AFPTM, CG1988,  in the last 48 hours.    Invalid input(s): ALGTM, Uric Acid   No results for input(s): URICACID in the last 48 hours., Urine Studies: No  results for input(s): COLORU, APPEARANCEUA, PHUR, SPECGRAV, PROTEINUA, GLUCUA, KETONESU, BILIRUBINUA, OCCULTUA, NITRITE, UROBILINOGEN, LEUKOCYTESUR, RBCUA, WBCUA, BACTERIA, SQUAMEPITHEL, HYALINECASTS in the last 48 hours.    Invalid input(s): KARLIE,   Recent Lab Results       04/04/19  0340        Albumin 2.8     Alkaline Phosphatase 84     ALT 5     Anion Gap 6     AST 9     Baso # 0.02     Basophil% 0.2     Total Bilirubin 0.4  Comment:  For infants and newborns, interpretation of results should be based  on gestational age, weight and in agreement with clinical  observations.  Premature Infant recommended reference ranges:  Up to 24 hours.............<8.0 mg/dL  Up to 48 hours............<12.0 mg/dL  3-5 days..................<15.0 mg/dL  6-29 days.................<15.0 mg/dL       BUN, Bld 16     Calcium 9.9     Chloride 110     CO2 20     Creatinine 0.6     Differential Method Automated     eGFR if African American >60.0     eGFR if non  >60.0  Comment:  Calculation used to obtain the estimated glomerular filtration  rate (eGFR) is the CKD-EPI equation.        Eos # 0.0     Eosinophil% 0.0     Glucose 97     Gran # (ANC) 7.2     Gran% 77.4     Hematocrit 26.7     Hemoglobin 7.9     Immature Grans (Abs) 0.06  Comment:  Mild elevation in immature granulocytes is non specific and   can be seen in a variety of conditions including stress response,   acute inflammation, trauma and pregnancy. Correlation with other   laboratory and clinical findings is essential.       Immature Granulocytes 0.6     Lymph # 1.4     Lymph% 14.6     Magnesium 2.5     MCH 21.6     MCHC 29.6     MCV 73     Mono # 0.7     Mono% 7.2     MPV 11.4     nRBC 0     Phosphorus 2.7     Platelets 382     Potassium 4.2     Total Protein 6.3     RBC 3.65     RDW 20.4     Sodium 136     WBC 9.29        and All pertinent labs from the last 24 hours have been reviewed.    Diagnostic Results:  I have reviewed all pertinent imaging  results/findings within the past 24 hours.

## 2019-04-04 NOTE — PLAN OF CARE
Problem: Adult Inpatient Plan of Care  Goal: Plan of Care Review  Outcome: Ongoing (interventions implemented as appropriate)  Plan of care reviewed with the patient at the beginning of the shift. Pt admitted for chemotherapy. She is day 3 of AIM regimen today. IVF infusing. She has complaints of abdominal pain and nausea. No emesis this shift. Nausea managed with Zofran. Abdominal pain managed with Oxycodone. Abdomen is rounded and distended. BM this morning. Pt reports a fair appetite. PO intake encouraged. Tylenol given for headache. Fall precautions maintained. Pt remained free from falls and injury this shift. Bed locked in lowest position, side rails up x2, call light within reach. Instructed pt to call for assistance as needed. Pt verbalized understanding. Vitals stable. Pt afebrile overnight. No acute issues overnight. Will continue to monitor.

## 2019-04-04 NOTE — ASSESSMENT & PLAN NOTE
-Initially presented in 12/2018 with chronic abdominal bloating, pain and significant weight loss. U/S and follow-up CT in 01/2019 showed a 20 cm abdominal mass with resultant left hydronephrosis. 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 See uploaded pathology in outpatient clinic note.   -S/P initiation with chemotherapy with Doxorubicin, Ifosfamide, and Mesna on 04/02.   -Noted to develop new issues with fine motor movement of fingers particular noticeable with intentional movements. Exam without neurological deficits.     Plan:   -Out of concern for neurological toxicity, will discontinue Ifosfamide and schedule neuro checks Q4H.   -If worsening signs of neurological toxicity, will consider trial of methylene blue.   -Continue with Doxorubicin and Mesna only.   -Eventually repeat PET CT after completion of cycle 1.  -Continue pain control [id number] mg Q6H PRN.   -Continue bowel regimen of Miralax, Senna PRN.

## 2019-04-04 NOTE — ASSESSMENT & PLAN NOTE
-Previously had corrected calcium of 11.4 on 3/27, S/P 2L IVF.   -Corrected calcium down to 10.9 today with bicarbonate down to 20 with normal anion gap.   -Exam notable for minimal B/L lower extremity swelling. No JVD or pulmonary edema.   -Asymptomatic.     Plan:   - Will hold off on continued IVF for now with concern for volume overload.   - Trend CMP daily.

## 2019-04-04 NOTE — PROGRESS NOTES
[facility]  Hematology/Oncology  Progress Note    Patient Name: Tiffany Kaur  Admission Date: 4/1/2019  Hospital Length of Stay: 3 days  Code Status: Full Code     Subjective:     HPI:  Ms. 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 She has additional PMH of arrhythmia (SVT vs Afib), HTN, and suggested history of IBS.     Oncology History:   She started to have abdominal bloating in mid-December 2018. Initially, she had attributed this problem to baseline IBS and was treated at that time with various combinations of Senna, Miralax, suppositories, and enema. Additionally, she lost ~45lbs over the course of one month (174lb to 130lb) which she reports was primarily due to diffuse prandial-associated abdominal pain described mostly as cramping sensation. During this time she could only tolerated clear liquids, such as Jello and broth.      Her workup in mid-December started with an ultrasound which revealed cholelithiasis and large pelvic mass, therefore, follow-up of CT abdomen/pelvis was completed on 01/06/19. Origin of her mass has been unlcear, but she was found to have a 20 cm abdominal mass and a picture of chronic severe left hydronephrosis. She had a follow-up CT guided core biopsy on 02/06/19 of abdominal mass which revealed a low grade sarcoma, which has been verified by our pathologists as well. *See clinic oncology note for uploaded report.*     In addition, she has a 3-4 cm x 2 cm lesion on the posterior right shoulder and right lateral neck region with some vascularity, bullous with crusting. She notes that this lesion had been present for eight years and endorses some pruritus and periods of resolution, denies any pain or bleeding with the lesion.      She presented to outpatient clinic here on 03/27/2019 as a referral from [facility] to discuss  further treatment options. Plan made for in-patient admission for urology evaluation and possible initiation of chemotherapy.     She presented to [facility]/1/19 for planned in-patient management of her L-sided hydronephrosis and initiation of AIM chemotherapy. Following better control of her pain, her appetite improved and she was able to increase her food intake and regain weight (up to 149lbs). She had previously been on Tramadol but due to getting somnolent on this medication, she is currently taking Norco 10 mg PRN prescribed to her as last clinic visit. Currently, her abdominal pain is around a 4/10 on pain medications. Also, with current laxative regimen, she reports bowel movements approximately every other day.She reports good urine output without issues and her last BM was this morning.     Interval History: No events overnight. Patient reported onset of rash in malar distribution of face yesterday evening that resolved without intervention. She also reports new onset of difficulty with finger dexterity bilaterally, most noticeable when using phone and remote. She denies numbness of extremities, pins and needles sensation distally, tremors, difficulty ambulating, dizziness or lightheadedness, blurry vision, difficulty swallowing, changes in speech. She is tolerating PO. Intermittent nausea and abdominal pain remain controlled. She is urinating well and having BM daily. Ambulating without assistance or changes in gait.     Oncology Treatment Plan:   IP SARCOMA DOXORUBICIN IFOSFAMIDE MESNA (4 DAYS)    Medications:  Continuous Infusions:    Scheduled Meds:   amLODIPine  5 mg Oral Daily    DOXOrubicin (ADRIAMYCIN) chemo infusion  25 mg/m2 (Treatment Plan Recorded) Intravenous Q24H    enoxaparin  40 mg Subcutaneous Daily    famotidine  20 mg Oral BID    gabapentin  300 mg Oral BID    mesna (MESNEX) infusion  500 mg/m2 (Treatment Plan Recorded) Intravenous Q24H    mesna (MESNEX) infusion  500 mg/m2 (Treatment  Plan Recorded) Intravenous Q24H    mesna (MESNEX) infusion  500 mg/m2 (Treatment Plan Recorded) Intravenous Q24H    ondansetron  8 mg Intravenous Q12H    sodium chloride 0.45 % + Additives   Intravenous Q24H    sodium chloride 0.9%  10 mL Intravenous Q6H     PRN Meds:acetaminophen, alteplase, dextrose 50%, dextrose 50%, glucagon (human recombinant), glucose, glucose, heparin, porcine (PF), oxyCODONE, oxyCODONE, polyethylene glycol, promethazine, senna, sodium chloride 0.9%, Flushing PICC Protocol **AND** sodium chloride 0.9% **AND** sodium chloride 0.9%, sodium chloride 0.9%     Review of Systems   Constitutional: Positive for unexpected weight change. Negative for appetite change, chills, diaphoresis, fatigue and fever.   HENT: Negative for congestion, nosebleeds, rhinorrhea and sore throat.    Eyes: Negative for pain and visual disturbance.   Respiratory: Negative for cough, chest tightness and shortness of breath.    Cardiovascular: Negative for chest pain, palpitations and leg swelling.   Gastrointestinal: Positive for abdominal pain and nausea. Negative for abdominal distention, constipation, diarrhea and vomiting.   Genitourinary: Negative for difficulty urinating, dysuria and hematuria.   Musculoskeletal: Positive for back pain.   Skin: Negative for pallor, rash and wound.   Neurological: Negative for dizziness, tremors, syncope, facial asymmetry, speech difficulty, weakness, light-headedness, numbness and headaches.     Objective:     Vital Signs (Most Recent):  Temp: 98.1 °F (36.7 °C) (04/04/19 0904)  Pulse: 80 (04/04/19 1045)  Resp: 18 (04/04/19 1045)  BP: (!) 147/73 (04/04/19 1045)  SpO2: 95 % (04/04/19 1045) Vital Signs (24h Range):  Temp:  [97.3 °F (36.3 °C)-98.1 °F (36.7 °C)] 98.1 °F (36.7 °C)  Pulse:  [80-89] 80  Resp:  [16-18] 18  SpO2:  [94 %-97 %] 95 %  BP: (120-163)/(55-77) 147/73     Weight: 69.4 kg (153 lb 1.8 oz)  Body mass index is 26.28 kg/m².  Body surface area is 1.77 meters  squared.      Intake/Output Summary (Last 24 hours) at 4/4/2019 1105  Last data filed at 4/4/2019 0328  Gross per 24 hour   Intake 5293.92 ml   Output 2400 ml   Net 2893.92 ml       Physical Exam   Constitutional: She is oriented to person, place, and time. She appears well-developed and well-nourished. No distress.   HENT:   Head: Normocephalic and atraumatic.   Eyes: Pupils are equal, round, and reactive to light. EOM are normal. No scleral icterus.   Neck: Normal range of motion. No JVD present.   Cardiovascular: Normal rate and normal heart sounds. Exam reveals no gallop and no friction rub.   No murmur heard.  Pulmonary/Chest: Effort normal and breath sounds normal. No respiratory distress. She has no wheezes. She has no rales.   Abdominal: Soft. Bowel sounds are normal. She exhibits distension and mass. There is tenderness. There is no guarding.   Musculoskeletal: Normal range of motion. She exhibits no edema.   There is minimal swelling of the bilateral lower extremities.    Neurological: She is alert and oriented to person, place, and time. She has normal strength. She displays no tremor. No cranial nerve deficit or sensory deficit. She exhibits normal muscle tone. Gait normal. She displays no Babinski's sign on the right side. She displays no Babinski's sign on the left side.   Skin: Skin is warm and dry. No rash noted. No pallor.       Significant Labs:   BMP:   Recent Labs   Lab 04/03/19  0400 04/04/19  0340   * 97    136   K 4.6 4.2   * 110   CO2 18* 20*   BUN 11 16   CREATININE 0.7 0.6   CALCIUM 10.0 9.9   MG  --  2.5     CBC:   Recent Labs   Lab 04/03/19  0400 04/04/19  0340   WBC 7.87 9.29   HGB 8.3* 7.9*   HCT 28.3* 26.7*   * 382*     CMP:   Recent Labs   Lab 04/03/19  0400 04/04/19  0340    136   K 4.6 4.2   * 110   CO2 18* 20*   * 97   BUN 11 16   CREATININE 0.7 0.6   CALCIUM 10.0 9.9   PROT 6.3 6.3   ALBUMIN 2.7* 2.8*   BILITOT 0.5 0.4   ALKPHOS 88 84    AST 7* 9*   ALT <5* 5*   ANIONGAP 7* 6*   EGFRNONAA >60.0 >60.0     Coagulation:   Recent Labs   Lab 04/03/19 0400   INR 1.0   APTT 27.1     Haptoglobin: No results for input(s): HAPTOGLOBIN in the last 48 hours., Immunology: No results for input(s): SPEP, JOJO, LINDSEY, FREELAMBDALI in the last 48 hours., LDH: No results for input(s): LDHCSF, BFSOURCE in the last 48 hours., LFTs:   Recent Labs   Lab 04/03/19 0400 04/04/19  0340   ALT <5* 5*   AST 7* 9*   ALKPHOS 88 84   BILITOT 0.5 0.4   PROT 6.3 6.3   ALBUMIN 2.7* 2.8*   , Reticulocytes: No results for input(s): RETIC in the last 48 hours., Tumor Markers: No results for input(s): PSA, CEA, , AFPTM, [id number],  in the last 48 hours.    Invalid input(s): ALGTM, Uric Acid   No results for input(s): URICACID in the last 48 hours., Urine Studies: No results for input(s): COLORU, APPEARANCEUA, PHUR, SPECGRAV, PROTEINUA, GLUCUA, KETONESU, BILIRUBINUA, OCCULTUA, NITRITE, UROBILINOGEN, LEUKOCYTESUR, RBCUA, WBCUA, BACTERIA, SQUAMEPITHEL, HYALINECASTS in the last 48 hours.    Invalid input(s): WRIGHTUMA,   Recent Lab Results       04/04/19 0340        Albumin 2.8     Alkaline Phosphatase 84     ALT 5     Anion Gap 6     AST 9     Baso # 0.02     Basophil% 0.2     Total Bilirubin 0.4  Comment:  For infants and newborns, interpretation of results should be based  on gestational age, weight and in agreement with clinical  observations.  Premature Infant recommended reference ranges:  Up to 24 hours.............<8.0 mg/dL  Up to 48 hours............<12.0 mg/dL  3-5 days..................<15.0 mg/dL  6-29 days.................<15.0 mg/dL       BUN, Bld 16     Calcium 9.9     Chloride 110     CO2 20     Creatinine 0.6     Differential Method Automated     eGFR if African American >60.0     eGFR if non  >60.0  Comment:  Calculation used to obtain the estimated glomerular filtration  rate (eGFR) is the CKD-EPI equation.        Eos # 0.0     Eosinophil% 0.0      Glucose 97     Gran # (ANC) 7.2     Gran% 77.4     Hematocrit 26.7     Hemoglobin 7.9     Immature Grans (Abs) 0.06  Comment:  Mild elevation in immature granulocytes is non specific and   can be seen in a variety of conditions including stress response,   acute inflammation, trauma and pregnancy. Correlation with other   laboratory and clinical findings is essential.       Immature Granulocytes 0.6     Lymph # 1.4     Lymph% 14.6     Magnesium 2.5     MCH 21.6     MCHC 29.6     MCV 73     Mono # 0.7     Mono% 7.2     MPV 11.4     nRBC 0     Phosphorus 2.7     Platelets 382     Potassium 4.2     Total Protein 6.3     RBC 3.65     RDW 20.4     Sodium 136     WBC 9.29        and All pertinent labs from the last 24 hours have been reviewed.    Diagnostic Results:  I have reviewed all pertinent imaging results/findings within the past 24 hours.    Assessment/Plan:     * Primary intra-abdominal sarcoma  -Initially presented in 12/2018 with chronic abdominal bloating, pain and significant weight loss. U/S and follow-up CT in 01/2019 showed a 20 cm abdominal mass with resultant left hydronephrosis. 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 See uploaded pathology in outpatient clinic note.   -S/P initiation with chemotherapy with Doxorubicin, Ifosfamide, and Mesna on 04/02.   -Noted to develop new issues with fine motor movement of fingers particular noticeable with intentional movements. Exam without neurological deficits.     Plan:   -Out of concern for neurological toxicity, will discontinue Ifosfamide and schedule neuro checks Q4H.   -If worsening signs of neurological toxicity, will consider trial of methylene blue.   -Continue with Doxorubicin and Mesna only.   -Eventually repeat PET CT after completion of cycle 1.  -Continue pain control [id number] mg Q6H PRN.   -Continue bowel regimen of Miralax, Senna PRN.     Normal anion gap metabolic acidosis  See assessment for hypercalcemia of malignancy.      SVT (supraventricular tachycardia)  -Mentioned PMH of SVT vs Afib with intermittent episodes of palpitations.   -EKG on admission NSR. Asymptomatic currently.     Plan:   -Will consider telemetry if re-occurring with chemotherapy initiation.       Iron deficiency anemia  -Likely secondary to dietary deficiency from post-prandial abdominal pain related to mass.   -Iron studies on admission with low iron, transferrin, and ferritin.   -No signs of bleeding.   -Hemodynamically stable.     Plan:   -Will consider Fe supplementation eventually.   -Trending CBC with chemotherapy initiation.       Hydronephrosis of left kidney  -Secondary effect of mass compression from abdominal sarcoma.   -CT revealed left-sided hydronephrosis, as well as nonobstructing right renal calculus measuring 9 mm. -Continues to make good urine without dysruria or hematuria. No CVA tenderness. Renal function normal.   -Evaluated by urology on admission. Recommended no intervention due to severe atrophy of left kidney, therefore no intervention at this time.     Hypertension  -Continue home Amlodipine; will consider titrating to 10 mg or adding second medication if necessary.    Neuropathy  Continue home Gabapentin.     Hypercalcemia of malignancy  -Previously had corrected calcium of 11.4 on 3/27, S/P 2L IVF.   -Corrected calcium down to 10.9 today with bicarbonate down to 20 with normal anion gap.   -Exam notable for minimal B/L lower extremity swelling. No JVD or pulmonary edema.   -Asymptomatic.     Plan:   - Will hold off on continued IVF for now with concern for volume overload.   - Trend CMP daily.       Kalpesh Lewis MD  Hematology/Oncology  [facility]    Attending Note  I have personally taken the history and examined this patient and agree with the resident's note as stated above.  Concern for neurotoxicity with complaints of visual changes and hand tremors and fine motor hand changes  Stop Ifosfamide and  continue Mesna x 3 doses  Neuro checks  Methylene blue if needed

## 2019-04-04 NOTE — PLAN OF CARE
Problem: Adult Inpatient Plan of Care  Goal: Plan of Care Review  Outcome: Ongoing (interventions implemented as appropriate)  Side rails up x2; call bell in place; bed in lowest, locked position; skid proof socks on; no evidence of skin breakdown; care plan explained to patient; pt remains free of injury. Pt tolerated po, void, ambulates to bathroom, no BM today, pt with c/o HA tylenol given and abdominal pain after eating, oxy IR given x 2. Premeds zofran and decadron ivpb given, mesna given before and after ifosfamide given. Doxorubicin continuous infusion at 21 cc/hr, NS 50cc/hr. Na bicarb with mg and k infusing at 125 cc/hr x 1 Liter. VSS and afebrile. Chemo precaution maintained. picc line with good blood return with all connections secured with chemo tape.

## 2019-04-05 VITALS
TEMPERATURE: 99 F | SYSTOLIC BLOOD PRESSURE: 154 MMHG | WEIGHT: 153.13 LBS | DIASTOLIC BLOOD PRESSURE: 76 MMHG | BODY MASS INDEX: 26.14 KG/M2 | HEIGHT: 64 IN | HEART RATE: 93 BPM | OXYGEN SATURATION: 99 % | RESPIRATION RATE: 18 BRPM

## 2019-04-05 PROBLEM — R30.0 DYSURIA: Status: ACTIVE | Noted: 2019-04-05

## 2019-04-05 PROBLEM — R35.0 INCREASED URINARY FREQUENCY: Status: ACTIVE | Noted: 2019-04-05

## 2019-04-05 LAB
ALBUMIN SERPL BCP-MCNC: 2.8 G/DL (ref 3.5–5.2)
ALP SERPL-CCNC: 84 U/L (ref 55–135)
ALT SERPL W/O P-5'-P-CCNC: 20 U/L (ref 10–44)
ANION GAP SERPL CALC-SCNC: 7 MMOL/L (ref 8–16)
AST SERPL-CCNC: 47 U/L (ref 10–40)
BASOPHILS # BLD AUTO: 0.04 K/UL (ref 0–0.2)
BASOPHILS NFR BLD: 0.5 % (ref 0–1.9)
BILIRUB SERPL-MCNC: 0.7 MG/DL (ref 0.1–1)
BILIRUB UR QL STRIP: NEGATIVE
BUN SERPL-MCNC: 12 MG/DL (ref 8–23)
CALCIUM SERPL-MCNC: 9.7 MG/DL (ref 8.7–10.5)
CHLORIDE SERPL-SCNC: 110 MMOL/L (ref 95–110)
CLARITY UR REFRACT.AUTO: CLEAR
CO2 SERPL-SCNC: 19 MMOL/L (ref 23–29)
COLOR UR AUTO: ABNORMAL
CREAT SERPL-MCNC: 0.6 MG/DL (ref 0.5–1.4)
DIFFERENTIAL METHOD: ABNORMAL
EOSINOPHIL # BLD AUTO: 0.1 K/UL (ref 0–0.5)
EOSINOPHIL NFR BLD: 1.3 % (ref 0–8)
ERYTHROCYTE [DISTWIDTH] IN BLOOD BY AUTOMATED COUNT: 20.4 % (ref 11.5–14.5)
EST. GFR  (AFRICAN AMERICAN): >60 ML/MIN/1.73 M^2
EST. GFR  (NON AFRICAN AMERICAN): >60 ML/MIN/1.73 M^2
GLUCOSE SERPL-MCNC: 70 MG/DL (ref 70–110)
GLUCOSE UR QL STRIP: NEGATIVE
HCT VFR BLD AUTO: 27.3 % (ref 37–48.5)
HGB BLD-MCNC: 8.2 G/DL (ref 12–16)
HGB UR QL STRIP: NEGATIVE
IMM GRANULOCYTES # BLD AUTO: 0.03 K/UL (ref 0–0.04)
IMM GRANULOCYTES NFR BLD AUTO: 0.4 % (ref 0–0.5)
KETONES UR QL STRIP: ABNORMAL
LEUKOCYTE ESTERASE UR QL STRIP: NEGATIVE
LYMPHOCYTES # BLD AUTO: 1.2 K/UL (ref 1–4.8)
LYMPHOCYTES NFR BLD: 16.7 % (ref 18–48)
MAGNESIUM SERPL-MCNC: 2.4 MG/DL (ref 1.6–2.6)
MCH RBC QN AUTO: 21.9 PG (ref 27–31)
MCHC RBC AUTO-ENTMCNC: 30 G/DL (ref 32–36)
MCV RBC AUTO: 73 FL (ref 82–98)
MONOCYTES # BLD AUTO: 0.5 K/UL (ref 0.3–1)
MONOCYTES NFR BLD: 7.1 % (ref 4–15)
NEUTROPHILS # BLD AUTO: 5.5 K/UL (ref 1.8–7.7)
NEUTROPHILS NFR BLD: 74 % (ref 38–73)
NITRITE UR QL STRIP: NEGATIVE
NRBC BLD-RTO: 0 /100 WBC
PH UR STRIP: 8 [PH] (ref 5–8)
PHOSPHATE SERPL-MCNC: 2.5 MG/DL (ref 2.7–4.5)
PLATELET # BLD AUTO: 369 K/UL (ref 150–350)
PMV BLD AUTO: 11.2 FL (ref 9.2–12.9)
POTASSIUM SERPL-SCNC: 4 MMOL/L (ref 3.5–5.1)
PROT SERPL-MCNC: 6.2 G/DL (ref 6–8.4)
PROT UR QL STRIP: NEGATIVE
RBC # BLD AUTO: 3.74 M/UL (ref 4–5.4)
SODIUM SERPL-SCNC: 136 MMOL/L (ref 136–145)
SP GR UR STRIP: 1 (ref 1–1.03)
URN SPEC COLLECT METH UR: ABNORMAL
WBC # BLD AUTO: 7.42 K/UL (ref 3.9–12.7)

## 2019-04-05 PROCEDURE — 99232 PR SUBSEQUENT HOSPITAL CARE,LEVL II: ICD-10-PCS | Mod: GC,,, | Performed by: INTERNAL MEDICINE

## 2019-04-05 PROCEDURE — 25000003 PHARM REV CODE 250: Performed by: STUDENT IN AN ORGANIZED HEALTH CARE EDUCATION/TRAINING PROGRAM

## 2019-04-05 PROCEDURE — 99232 SBSQ HOSP IP/OBS MODERATE 35: CPT | Mod: GC,,, | Performed by: INTERNAL MEDICINE

## 2019-04-05 PROCEDURE — 81003 URINALYSIS AUTO W/O SCOPE: CPT

## 2019-04-05 PROCEDURE — 25000003 PHARM REV CODE 250: Performed by: INTERNAL MEDICINE

## 2019-04-05 PROCEDURE — 85025 COMPLETE CBC W/AUTO DIFF WBC: CPT

## 2019-04-05 PROCEDURE — 80053 COMPREHEN METABOLIC PANEL: CPT

## 2019-04-05 PROCEDURE — 63600175 PHARM REV CODE 636 W HCPCS: Performed by: STUDENT IN AN ORGANIZED HEALTH CARE EDUCATION/TRAINING PROGRAM

## 2019-04-05 PROCEDURE — 84100 ASSAY OF PHOSPHORUS: CPT

## 2019-04-05 PROCEDURE — 63600175 PHARM REV CODE 636 W HCPCS: Performed by: INTERNAL MEDICINE

## 2019-04-05 PROCEDURE — 83735 ASSAY OF MAGNESIUM: CPT

## 2019-04-05 RX ORDER — MORPHINE SULFATE 30 MG/1
30 TABLET, FILM COATED, EXTENDED RELEASE ORAL EVERY 8 HOURS PRN
Qty: 60 TABLET | Refills: 0 | Status: ON HOLD | OUTPATIENT
Start: 2019-04-05 | End: 2019-05-10 | Stop reason: HOSPADM

## 2019-04-05 RX ORDER — AMLODIPINE BESYLATE 5 MG/1
5 TABLET ORAL DAILY
Qty: 30 TABLET | Refills: 11 | Status: SHIPPED | OUTPATIENT
Start: 2019-04-06 | End: 2019-11-15 | Stop reason: SDUPTHER

## 2019-04-05 RX ORDER — MORPHINE SULFATE 2 MG/ML
2 INJECTION, SOLUTION INTRAMUSCULAR; INTRAVENOUS ONCE
Status: COMPLETED | OUTPATIENT
Start: 2019-04-05 | End: 2019-04-05

## 2019-04-05 RX ORDER — ONDANSETRON 4 MG/1
8 TABLET, ORALLY DISINTEGRATING ORAL EVERY 6 HOURS PRN
Qty: 30 TABLET | Refills: 0 | Status: SHIPPED | OUTPATIENT
Start: 2019-04-05 | End: 2019-07-31 | Stop reason: SDUPTHER

## 2019-04-05 RX ORDER — SODIUM,POTASSIUM PHOSPHATES 280-250MG
2 POWDER IN PACKET (EA) ORAL EVERY 4 HOURS
Status: COMPLETED | OUTPATIENT
Start: 2019-04-05 | End: 2019-04-05

## 2019-04-05 RX ORDER — MORPHINE SULFATE 2 MG/ML
5 INJECTION, SOLUTION INTRAMUSCULAR; INTRAVENOUS ONCE
Status: COMPLETED | OUTPATIENT
Start: 2019-04-05 | End: 2019-04-05

## 2019-04-05 RX ORDER — MORPHINE SULFATE 30 MG/1
30 TABLET, FILM COATED, EXTENDED RELEASE ORAL EVERY 8 HOURS PRN
Qty: 60 TABLET | Refills: 0 | Status: SHIPPED | OUTPATIENT
Start: 2019-04-05 | End: 2019-04-05 | Stop reason: SDUPTHER

## 2019-04-05 RX ORDER — GABAPENTIN 300 MG/1
300 CAPSULE ORAL 3 TIMES DAILY
Status: DISCONTINUED | OUTPATIENT
Start: 2019-04-05 | End: 2019-04-05 | Stop reason: HOSPADM

## 2019-04-05 RX ORDER — POLYETHYLENE GLYCOL 3350 17 G/17G
17 POWDER, FOR SOLUTION ORAL DAILY PRN
Qty: 510 G | Refills: 0 | Status: SHIPPED | OUTPATIENT
Start: 2019-04-05

## 2019-04-05 RX ORDER — MORPHINE SULFATE 4 MG/ML
4 INJECTION, SOLUTION INTRAMUSCULAR; INTRAVENOUS ONCE
Status: DISCONTINUED | OUTPATIENT
Start: 2019-04-05 | End: 2019-04-05

## 2019-04-05 RX ADMIN — POTASSIUM & SODIUM PHOSPHATES POWDER PACK 280-160-250 MG 2 PACKET: 280-160-250 PACK at 01:04

## 2019-04-05 RX ADMIN — GABAPENTIN 300 MG: 300 CAPSULE ORAL at 01:04

## 2019-04-05 RX ADMIN — MORPHINE SULFATE 2 MG: 2 INJECTION, SOLUTION INTRAMUSCULAR; INTRAVENOUS at 08:04

## 2019-04-05 RX ADMIN — OXYCODONE HYDROCHLORIDE 10 MG: 5 TABLET ORAL at 05:04

## 2019-04-05 RX ADMIN — POTASSIUM & SODIUM PHOSPHATES POWDER PACK 280-160-250 MG 2 PACKET: 280-160-250 PACK at 10:04

## 2019-04-05 RX ADMIN — FAMOTIDINE 20 MG: 20 TABLET, FILM COATED ORAL at 08:04

## 2019-04-05 RX ADMIN — ONDANSETRON 8 MG: 2 INJECTION INTRAMUSCULAR; INTRAVENOUS at 01:04

## 2019-04-05 RX ADMIN — MORPHINE SULFATE 5 MG: 2 INJECTION, SOLUTION INTRAMUSCULAR; INTRAVENOUS at 10:04

## 2019-04-05 RX ADMIN — AMLODIPINE BESYLATE 5 MG: 5 TABLET ORAL at 08:04

## 2019-04-05 RX ADMIN — ACETAMINOPHEN 650 MG: 325 TABLET ORAL at 07:04

## 2019-04-05 RX ADMIN — GABAPENTIN 300 MG: 300 CAPSULE ORAL at 08:04

## 2019-04-05 RX ADMIN — Medication: at 11:04

## 2019-04-05 RX ADMIN — ENOXAPARIN SODIUM 40 MG: 100 INJECTION SUBCUTANEOUS at 04:04

## 2019-04-05 RX ADMIN — OXYCODONE HYDROCHLORIDE 10 MG: 5 TABLET ORAL at 12:04

## 2019-04-05 NOTE — ASSESSMENT & PLAN NOTE
-Patient overnight developing new onset increased urinary frequency, and lower back pain with radiation around the left hip. Denies dysuria. Patient reporting pain similar to previous UTI's.   -Afebrile. No leukocytosis.   -No suprapubic, spinal tenderness, or CVA on exam.     Plan:   -UA ordered.   -Will give additional dose of IV pain control.

## 2019-04-05 NOTE — PROGRESS NOTES
[facility]  Hematology/Oncology  Progress Note    Patient Name: Tiffany Kaur  Admission Date: 4/1/2019  Hospital Length of Stay: 4 days  Code Status: Full Code     Subjective:     HPI:  Ms. 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 She has additional PMH of arrhythmia (SVT vs Afib), HTN, and suggested history of IBS.     Oncology History:   She started to have abdominal bloating in mid-December 2018. Initially, she had attributed this problem to baseline IBS and was treated at that time with various combinations of Senna, Miralax, suppositories, and enema. Additionally, she lost ~45lbs over the course of one month (174lb to 130lb) which she reports was primarily due to diffuse prandial-associated abdominal pain described mostly as cramping sensation. During this time she could only tolerated clear liquids, such as Jello and broth.      Her workup in mid-December started with an ultrasound which revealed cholelithiasis and large pelvic mass, therefore, follow-up of CT abdomen/pelvis was completed on 01/06/19. Origin of her mass has been unlcear, but she was found to have a 20 cm abdominal mass and a picture of chronic severe left hydronephrosis. She had a follow-up CT guided core biopsy on 02/06/19 of abdominal mass which revealed a low grade sarcoma, which has been verified by our pathologists as well. *See clinic oncology note for uploaded report.*     In addition, she has a 3-4 cm x 2 cm lesion on the posterior right shoulder and right lateral neck region with some vascularity, bullous with crusting. She notes that this lesion had been present for eight years and endorses some pruritus and periods of resolution, denies any pain or bleeding with the lesion.      She presented to outpatient clinic here on 03/27/2019 as a referral from [facility] to discuss  further treatment options. Plan made for in-patient admission for urology evaluation and possible initiation of chemotherapy.     She presented to [facility] 4/1/19 for planned in-patient management of her L-sided hydronephrosis and initiation of AIM chemotherapy. Following better control of her pain, her appetite improved and she was able to increase her food intake and regain weight (up to 149lbs). She had previously been on Tramadol but due to getting somnolent on this medication, she is currently taking Norco 10 mg PRN prescribed to her as last clinic visit. Currently, her abdominal pain is around a 4/10 on pain medications. Also, with current laxative regimen, she reports bowel movements approximately every other day.She reports good urine output without issues and her last BM was this morning.     Interval History: Yesterday, out of concern for neurotoxicity, Ifosfamide was discontinued with resolution of blurry vision and difficulty with fine motor movement of fingers. However, patient reports new onset of increased urinary frequency with lower back pain radiating around her left hip. Pain non-responsive to PRN NORCO. Denies dysuria, hematuria, flank pain, fevers, chills. Ambulating without difficulty. Tolerating PO and having BM. Remains on Doxorubicin and Mesna only.     Oncology Treatment Plan:   IP SARCOMA DOXORUBICIN IFOSFAMIDE MESNA (4 DAYS)    Medications:  Continuous Infusions:    Scheduled Meds:   amLODIPine  5 mg Oral Daily    DOXOrubicin (ADRIAMYCIN) chemo infusion  25 mg/m2 (Treatment Plan Recorded) Intravenous Q24H    enoxaparin  40 mg Subcutaneous Daily    famotidine  20 mg Oral BID    gabapentin  300 mg Oral BID    mesna (MESNEX) infusion  500 mg/m2 (Treatment Plan Recorded) Intravenous Q24H    mesna (MESNEX) infusion  500 mg/m2 (Treatment Plan Recorded) Intravenous Q24H    mesna (MESNEX) infusion  500 mg/m2 (Treatment Plan Recorded) Intravenous Q24H    morphine  5 mg Intravenous Once     potassium, sodium phosphates  2 packet Oral Q4H    sodium chloride 0.45 % + Additives   Intravenous Q24H    sodium chloride 0.9%  10 mL Intravenous Q6H     PRN Meds:acetaminophen, alteplase, dextrose 50%, dextrose 50%, glucagon (human recombinant), glucose, glucose, heparin, porcine (PF), oxyCODONE, oxyCODONE, polyethylene glycol, promethazine, senna, sodium chloride 0.9%, Flushing PICC Protocol **AND** sodium chloride 0.9% **AND** sodium chloride 0.9%, sodium chloride 0.9%     Review of Systems   Constitutional: Positive for unexpected weight change. Negative for appetite change, chills, diaphoresis, fatigue and fever.   HENT: Negative for congestion, nosebleeds, rhinorrhea and sore throat.    Eyes: Negative for pain and visual disturbance.   Respiratory: Negative for cough, chest tightness and shortness of breath.    Cardiovascular: Negative for chest pain, palpitations and leg swelling.   Gastrointestinal: Positive for nausea. Negative for abdominal distention, abdominal pain, constipation, diarrhea and vomiting.   Genitourinary: Positive for dysuria and frequency. Negative for decreased urine volume, difficulty urinating, hematuria, urgency and vaginal bleeding.   Musculoskeletal: Positive for back pain.   Skin: Negative for pallor, rash and wound.   Neurological: Negative for dizziness, tremors, syncope, facial asymmetry, speech difficulty, weakness, light-headedness, numbness and headaches.     Objective:     Vital Signs (Most Recent):  Temp: 97.9 °F (36.6 °C) (04/05/19 0722)  Pulse: 91 (04/05/19 0722)  Resp: 19 (04/05/19 0722)  BP: (!) 152/84 (04/05/19 0722)  SpO2: 98 % (04/05/19 0722) Vital Signs (24h Range):  Temp:  [97.7 °F (36.5 °C)-98 °F (36.7 °C)] 97.9 °F (36.6 °C)  Pulse:  [72-91] 91  Resp:  [18-19] 19  SpO2:  [95 %-98 %] 98 %  BP: (137-164)/(65-84) 152/84     Weight: 69.4 kg (153 lb 1.8 oz)  Body mass index is 26.28 kg/m².  Body surface area is 1.77 meters squared.      Intake/Output Summary (Last  24 hours) at 4/5/2019 1006  Last data filed at 4/5/2019 0934  Gross per 24 hour   Intake 4047.04 ml   Output 6400 ml   Net -2352.96 ml       Physical Exam   Constitutional: She is oriented to person, place, and time. She appears well-developed and well-nourished. No distress.   HENT:   Head: Normocephalic and atraumatic.   Eyes: Pupils are equal, round, and reactive to light. EOM are normal. No scleral icterus.   Neck: Normal range of motion. No JVD present.   Cardiovascular: Normal rate and normal heart sounds. Exam reveals no gallop and no friction rub.   No murmur heard.  Pulmonary/Chest: Effort normal and breath sounds normal. No respiratory distress. She has no wheezes. She has no rales.   Abdominal: Soft. Bowel sounds are normal. She exhibits distension and mass. There is tenderness. There is no guarding and no CVA tenderness.   Musculoskeletal: Normal range of motion. She exhibits no edema.   There is no spinal tenderness. There is minimal swelling of the bilateral lower extremities.    Neurological: She is alert and oriented to person, place, and time. She has normal strength. She displays no tremor. No cranial nerve deficit or sensory deficit. She exhibits normal muscle tone. Gait normal. She displays no Babinski's sign on the right side. She displays no Babinski's sign on the left side.   Skin: Skin is warm and dry. No rash noted. No pallor.       Significant Labs:   BMP:   Recent Labs   Lab 04/04/19  0340 04/05/19  0400   GLU 97 70    136   K 4.2 4.0    110   CO2 20* 19*   BUN 16 12   CREATININE 0.6 0.6   CALCIUM 9.9 9.7   MG 2.5 2.4     CBC:   Recent Labs   Lab 04/04/19  0340 04/05/19  0400   WBC 9.29 7.42   HGB 7.9* 8.2*   HCT 26.7* 27.3*   * 369*     CMP:   Recent Labs   Lab 04/04/19  0340 04/05/19  0400    136   K 4.2 4.0    110   CO2 20* 19*   GLU 97 70   BUN 16 12   CREATININE 0.6 0.6   CALCIUM 9.9 9.7   PROT 6.3 6.2   ALBUMIN 2.8* 2.8*   BILITOT 0.4 0.7   ALKPHOS 84  84   AST 9* 47*   ALT 5* 20   ANIONGAP 6* 7*   EGFRNONAA >60.0 >60.0     Coagulation:   No results for input(s): PT, INR, APTT in the last 48 hours.  Haptoglobin: No results for input(s): HAPTOGLOBIN in the last 48 hours., Immunology: No results for input(s): SPEP, JOJO, LINDSEY, FREELAMBDALI in the last 48 hours., LDH: No results for input(s): LDHCSF, BFSOURCE in the last 48 hours., LFTs:   Recent Labs   Lab 04/04/19  0340 04/05/19  0400   ALT 5* 20   AST 9* 47*   ALKPHOS 84 84   BILITOT 0.4 0.7   PROT 6.3 6.2   ALBUMIN 2.8* 2.8*   , Reticulocytes: No results for input(s): RETIC in the last 48 hours., Tumor Markers: No results for input(s): PSA, CEA, , AFPTM, [id number],  in the last 48 hours.    Invalid input(s): ALGTM, Uric Acid   No results for input(s): URICACID in the last 48 hours., Urine Studies: No results for input(s): COLORU, APPEARANCEUA, PHUR, SPECGRAV, PROTEINUA, GLUCUA, KETONESU, BILIRUBINUA, OCCULTUA, NITRITE, UROBILINOGEN, LEUKOCYTESUR, RBCUA, WBCUA, BACTERIA, SQUAMEPITHEL, HYALINECASTS in the last 48 hours.    Invalid input(s): KARLIE,   Recent Lab Results       04/05/19  0400        Albumin 2.8     Alkaline Phosphatase 84     ALT 20     Anion Gap 7     AST 47     Baso # 0.04     Basophil% 0.5     Total Bilirubin 0.7  Comment:  For infants and newborns, interpretation of results should be based  on gestational age, weight and in agreement with clinical  observations.  Premature Infant recommended reference ranges:  Up to 24 hours.............<8.0 mg/dL  Up to 48 hours............<12.0 mg/dL  3-5 days..................<15.0 mg/dL  6-29 days.................<15.0 mg/dL       BUN, Bld 12     Calcium 9.7     Chloride 110     CO2 19     Creatinine 0.6     Differential Method Automated     eGFR if African American >60.0     eGFR if non  >60.0  Comment:  Calculation used to obtain the estimated glomerular filtration  rate (eGFR) is the CKD-EPI equation.        Eos # 0.1     Eosinophil%  1.3     Glucose 70     Gran # (ANC) 5.5     Gran% 74.0     Hematocrit 27.3     Hemoglobin 8.2     Immature Grans (Abs) 0.03  Comment:  Mild elevation in immature granulocytes is non specific and   can be seen in a variety of conditions including stress response,   acute inflammation, trauma and pregnancy. Correlation with other   laboratory and clinical findings is essential.       Immature Granulocytes 0.4     Lymph # 1.2     Lymph% 16.7     Magnesium 2.4     MCH 21.9     MCHC 30.0     MCV 73     Mono # 0.5     Mono% 7.1     MPV 11.2     nRBC 0     Phosphorus 2.5     Platelets 369     Potassium 4.0     Total Protein 6.2     RBC 3.74     RDW 20.4     Sodium 136     WBC 7.42        and All pertinent labs from the last 24 hours have been reviewed.    Diagnostic Results:  I have reviewed all pertinent imaging results/findings within the past 24 hours.    Assessment/Plan:     * Primary intra-abdominal sarcoma  -Initially presented in 12/2018 with chronic abdominal bloating, pain and significant weight loss. U/S and follow-up CT in 01/2019 showed a 20 cm abdominal mass with resultant left hydronephrosis. 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 See uploaded pathology in outpatient clinic note.   -S/P initiation with chemotherapy with Doxorubicin, Ifosfamide, and Mesna on 04/02.   -On 04/04; noted to develop new issues with fine motor movement of fingers particular noticeable with intentional movements. Exam without neurological deficits. Out of concern for neurological toxicity, Ifosfamide discontinued with symptom resolution.     Plan:   -Continue neuro checks; if worsening signs of neurological toxicity, will consider trial of methylene blue.   -Continue with Doxorubicin and Mesna only with anticipated completion later this evening and possible discharge without outpatient follow-up tomorrow for Neulasta.   -Eventually repeat PET CT after completion of cycle 1.  -Continue pain control [id number] mg Q6H  PRN and will given one time dose of IV Morphine for pain exacerbation likely from UTI.   -Continue bowel regimen of Miralax, Senna PRN.     Increased urinary frequency  -Patient overnight developing new onset increased urinary frequency, and lower back pain with radiation around the left hip. Denies dysuria. Patient reporting pain similar to previous UTI's.   -Afebrile. No leukocytosis.   -No suprapubic, spinal tenderness, or CVA on exam.     Plan:   -UA ordered.   -Will give additional dose of IV pain control.     Normal anion gap metabolic acidosis  See assessment for hypercalcemia of malignancy.     SVT (supraventricular tachycardia)  -Mentioned PMH of SVT vs Afib with intermittent episodes of palpitations.   -EKG on admission NSR. Asymptomatic currently.     Plan:   -Will consider telemetry if re-occurring with chemotherapy initiation.       Iron deficiency anemia  -Likely secondary to dietary deficiency from post-prandial abdominal pain related to mass.   -Iron studies on admission with low iron, transferrin, and ferritin.   -No signs of bleeding.   -Hemodynamically stable.     Plan:   -Will consider Fe supplementation eventually.   -Trending CBC with chemotherapy initiation.       Hydronephrosis of left kidney  -Secondary effect of mass compression from abdominal sarcoma.   -CT revealed left-sided hydronephrosis, as well as nonobstructing right renal calculus measuring 9 mm.  -Continues to make good urine without dysruria or hematuria. No CVA tenderness. Renal function normal.   -Evaluated by urology on admission. Recommended no intervention due to severe atrophy of left kidney, therefore no intervention at this time.     Hypertension  -Continue home Amlodipine; will consider titrating to 10 mg or adding second medication if necessary.    Neuropathy  Continue home Gabapentin.     Hypercalcemia of malignancy  -Previously had corrected calcium of 11.4 on 3/27, S/P 2L IVF.   -Corrected calcium down to 10.7 today  with bicarbonate down to 19 with normal anion gap.   -Exam notable for minimal B/L lower extremity swelling. No JVD or pulmonary edema.   -Asymptomatic.     Plan:   - Will hold off on continued IVF with concern for volume overload.   - Trend CMP daily.              Kalpesh Lewis MD  Hematology/Oncology  [facility]

## 2019-04-05 NOTE — DISCHARGE SUMMARY
[facility]  Hematology/Oncology  Discharge Summary      Patient Name: Tiffany Kaur  MRN: [id number]  Admission Date: 4/1/2019  Hospital Length of Stay: 4 days  Discharge Date and Time:  04/05/2019 3:18 PM  Attending Physician: Aviva Caruso MD   Discharging Provider: Kalpesh Lewis MD  Primary Care Provider: Ethel Good MD    HPI: Ms. 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 She has additional PMH of arrhythmia (SVT vs Afib), HTN, and suggested history of IBS.     Oncology History:   She started to have abdominal bloating in mid-December 2018. Initially, she had attributed this problem to baseline IBS and was treated at that time with various combinations of Senna, Miralax, suppositories, and enema. Additionally, she lost ~45lbs over the course of one month (174lb to 130lb) which she reports was primarily due to diffuse prandial-associated abdominal pain described mostly as cramping sensation. During this time she could only tolerated clear liquids, such as Jello and broth.      Her workup in mid-December started with an ultrasound which revealed cholelithiasis and large pelvic mass, therefore, follow-up of CT abdomen/pelvis was completed on 01/06/19. Origin of her mass has been unlcear, but she was found to have a 20 cm abdominal mass and a picture of chronic severe left hydronephrosis. She had a follow-up CT guided core biopsy on 02/06/19 of abdominal mass which revealed a low grade sarcoma, which has been verified by our pathologists as well. *See clinic oncology note for uploaded report.*     In addition, she has a 3-4 cm x 2 cm lesion on the posterior right shoulder and right lateral neck region with some vascularity, bullous with crusting. She notes that this lesion had been present for eight years and endorses some pruritus and periods of resolution, denies  any pain or bleeding with the lesion.      She presented to outpatient clinic here on 03/27/2019 as a referral from [facility] to discuss further treatment options. Plan made for in-patient admission for urology evaluation and possible initiation of chemotherapy.     She presented to [facility] 4/1/19 for planned in-patient management of her L-sided hydronephrosis and initiation of AIM chemotherapy. Following better control of her pain, her appetite improved and she was able to increase her food intake and regain weight (up to 149lbs). She had previously been on Tramadol but due to getting somnolent on this medication, she is currently taking Norco 10 mg PRN prescribed to her as last clinic visit. Currently, her abdominal pain is around a 4/10 on pain medications. Also, with current laxative regimen, she reports bowel movements approximately every other day.She reports good urine output without issues and her last BM was this morning.     * No surgery found *     Hospital Course: Patient admitted to medical oncology. Admission labs notable for microcytic anemia with low iron studies and slight hypercalcemia (10.8 corrected). Evaluated by urology on day of admission. Based on assessment of prior imaging, it was determined that, based on hydronephrosis and degree of atrophy, the function of the left kidney was not salvageable and that risk of nephrostomy tube or stent outweighed benefit. She had a PICC line placed on day of admission. Chemotherapy was initiated on 04/02/19 with Doxorubucin, Ifosfamide, and Mesnex. Symptoms during chemotherapy initiation included intermittent abdominal pain and nausea that were controlled with PRN Zofran and Oxycodone. Patient noted to develop new onset issues with fine motor movement of fingers on 04/04; out of concern for neurological toxicity, Ifosfamide was discontinued with symptom resolution. On 04/05, she developed increased urinary frequency with lower back pain with  radiation around the left hip; UA ordered, but negative for infection. Pain resolved after dose of Gabapentin and applying heating pads, suspect could be secondary to radiculopathy from tumor burden. Will be discharged after completion of chemotherapy with follow-up tomorrow to obtain Neulasta injection. Has appointment pending for clinic follow-up.     Consults:   Consults (From admission, onward)        Status Ordering Provider     Inpatient consult to PICC team (DENISE)  Once     Provider:  (Not yet assigned)    Completed SONIYA MEEKS     Inpatient consult to Urology  Once     Provider:  (Not yet assigned)    Completed SONIYA MEEKS          Significant Diagnostic Studies: Labs:   CMP   Recent Labs   Lab 04/04/19  0340 04/05/19  0400    136   K 4.2 4.0    110   CO2 20* 19*   GLU 97 70   BUN 16 12   CREATININE 0.6 0.6   CALCIUM 9.9 9.7   PROT 6.3 6.2   ALBUMIN 2.8* 2.8*   BILITOT 0.4 0.7   ALKPHOS 84 84   AST 9* 47*   ALT 5* 20   ANIONGAP 6* 7*   ESTGFRAFRICA >60.0 >60.0   EGFRNONAA >60.0 >60.0     CBC   Recent Labs   Lab 04/04/19  0340 04/05/19  0400   WBC 9.29 7.42   HGB 7.9* 8.2*   HCT 26.7* 27.3*   * 369*       Pending Diagnostic Studies:     None        Final Active Diagnoses:    Diagnosis Date Noted POA    PRINCIPAL PROBLEM:  Primary intra-abdominal sarcoma [C49.4] 03/11/2019 Yes    Increased urinary frequency [R35.0] 04/05/2019 No    Low back pain radiating to left leg [M54.5, M79.605]  No    Neurotoxicity [R29.90]  No    Normal anion gap metabolic acidosis [E87.2] 04/03/2019 Yes    Hydronephrosis of left kidney [N13.30] 04/01/2019 Yes    Iron deficiency anemia [D50.9] 04/01/2019 Yes    SVT (supraventricular tachycardia) [I47.1] 04/01/2019 Yes    Hypercalcemia of malignancy [E83.52] 03/27/2019 Yes    Neuropathy [G62.9] 03/27/2019 Yes    Hypertension [I10] 03/27/2019 Yes      Problems Resolved During this Admission:      Discharged Condition: good    Disposition: Home or  Self Care    Follow Up:  Follow-up Information     Fernando Silvestre MD.    Specialty:  Hematology and Oncology  Why:  As scheduled by the clinic  Contact information:  [address]  [place] [address]  [phone]             INJECTION, [facility] INF[facility]ION. Go on 4/6/2019.    Why:  Neulasta Injection at 3:00 pm               Patient Instructions:      Notify your health care provider if you experience any of the following:  temperature >100.4     Notify your health care provider if you experience any of the following:  persistent nausea and vomiting or diarrhea     Notify your health care provider if you experience any of the following:  severe uncontrolled pain     Notify your health care provider if you experience any of the following:  persistent dizziness, light-headedness, or visual disturbances     Activity as tolerated     Medications:  Reconciled Home Medications:      Medication List      START taking these medications    amLODIPine 5 MG tablet  Commonly known as:  NORVASC  Take 1 tablet (5 mg total) by mouth once daily.  Start taking on:  4/6/2019     ondansetron 4 MG Tbdl  Commonly known as:  ZOFRAN-ODT  Dissolve 2 tablets (8 mg total) by mouth every 6 (six) hours as needed.     polyethylene glycol 17 gram/dose powder  Commonly known as:  GLYCOLAX  Mix 1 capful (17 g) with liquid and take by mouth daily as needed.        CONTINUE taking these medications    gabapentin 300 MG capsule  Commonly known as:  NEURONTIN  Take 1 capsule (300 mg total) by mouth every evening. Take one pill (300 mg at night), can take up to two a night (600 mg) if tolerated.     HYDROcodone-acetaminophen  mg per tablet  Commonly known as:  NORCO  Take 1 tablet by mouth every 6 (six) hours as needed for Pain.     zolpidem 5 MG Tab  Commonly known as:  BRAD Lewis MD  Hematology/Oncology  [facility]    I approve this discharge summary and plan.

## 2019-04-05 NOTE — PLAN OF CARE
Plans for patient to discharge to the [facility] after completing chemotherapy later today. Neulasta injection scheduled for tomorrow 4/6/19 at 3:00 pm. CM added appt to patient's AVS. CM requested follow-up appt with the clinic; appt pending. Discharge and follow-up instructions to be completed by the bedside nurse.    Future Appointments   Date Time Provider Department Center   4/6/2019  1:30 PM INJECTION, [facility] Moon Eulalia      04/05/19 1322   Final Note   Assessment Type Final Discharge Note   Anticipated Discharge Disposition Home   What phone number can be called within the next 1-3 days to see how you are doing after discharge?   ([phone])   Hospital Follow Up  Appt(s) scheduled? Yes   Discharge plans and expectations educations in teach back method with documentation complete? Yes  (per bedside nurse)

## 2019-04-05 NOTE — ASSESSMENT & PLAN NOTE
-Previously had corrected calcium of 11.4 on 3/27, S/P 2L IVF.   -Corrected calcium down to 10.7 today with bicarbonate down to 19 with normal anion gap.   -Exam notable for minimal B/L lower extremity swelling. No JVD or pulmonary edema.   -Asymptomatic.     Plan:   - Will hold off on continued IVF with concern for volume overload.   - Trend CMP daily.

## 2019-04-05 NOTE — PLAN OF CARE
Future Appointments   Date Time Provider Department Center   4/6/2019  3:00 PM INJECTION, [facility] Moon Eulalia Becerra, RN, BSN, CM  [facility]  Nurse - Med Onc/Gyn Onc

## 2019-04-05 NOTE — PROGRESS NOTES
Pt stated her L hip and thigh had increased pain and that initially neurotin was prescribed. Dr Lewis notified of pt's complaints MD stated he would consult team to have dose increased. Pt also with c/o acid reflux and gas, MD stated he would consult team.

## 2019-04-05 NOTE — SUBJECTIVE & OBJECTIVE
Interval History: Yesterday, out of concern for neurotoxicity, Ifosfamide was discontinued with resolution of blurry vision and difficulty with fine motor movement of fingers. However, patient reports new onset of increased urinary frequency with lower back pain radiating around her left hip. Pain non-responsive to PRN NORCO. Denies dysuria, hematuria, flank pain, fevers, chills. Ambulating without difficulty. Tolerating PO and having BM. Remains on Doxorubicin and Mesna only.     Oncology Treatment Plan:   IP SARCOMA DOXORUBICIN IFOSFAMIDE MESNA (4 DAYS)    Medications:  Continuous Infusions:    Scheduled Meds:   amLODIPine  5 mg Oral Daily    DOXOrubicin (ADRIAMYCIN) chemo infusion  25 mg/m2 (Treatment Plan Recorded) Intravenous Q24H    enoxaparin  40 mg Subcutaneous Daily    famotidine  20 mg Oral BID    gabapentin  300 mg Oral BID    mesna (MESNEX) infusion  500 mg/m2 (Treatment Plan Recorded) Intravenous Q24H    mesna (MESNEX) infusion  500 mg/m2 (Treatment Plan Recorded) Intravenous Q24H    mesna (MESNEX) infusion  500 mg/m2 (Treatment Plan Recorded) Intravenous Q24H    morphine  5 mg Intravenous Once    potassium, sodium phosphates  2 packet Oral Q4H    sodium chloride 0.45 % + Additives   Intravenous Q24H    sodium chloride 0.9%  10 mL Intravenous Q6H     PRN Meds:acetaminophen, alteplase, dextrose 50%, dextrose 50%, glucagon (human recombinant), glucose, glucose, heparin, porcine (PF), oxyCODONE, oxyCODONE, polyethylene glycol, promethazine, senna, sodium chloride 0.9%, Flushing PICC Protocol **AND** sodium chloride 0.9% **AND** sodium chloride 0.9%, sodium chloride 0.9%     Review of Systems   Constitutional: Positive for unexpected weight change. Negative for appetite change, chills, diaphoresis, fatigue and fever.   HENT: Negative for congestion, nosebleeds, rhinorrhea and sore throat.    Eyes: Negative for pain and visual disturbance.   Respiratory: Negative for cough, chest tightness and  shortness of breath.    Cardiovascular: Negative for chest pain, palpitations and leg swelling.   Gastrointestinal: Positive for nausea. Negative for abdominal distention, abdominal pain, constipation, diarrhea and vomiting.   Genitourinary: Positive for dysuria and frequency. Negative for decreased urine volume, difficulty urinating, hematuria, urgency and vaginal bleeding.   Musculoskeletal: Positive for back pain.   Skin: Negative for pallor, rash and wound.   Neurological: Negative for dizziness, tremors, syncope, facial asymmetry, speech difficulty, weakness, light-headedness, numbness and headaches.     Objective:     Vital Signs (Most Recent):  Temp: 97.9 °F (36.6 °C) (04/05/19 0722)  Pulse: 91 (04/05/19 0722)  Resp: 19 (04/05/19 0722)  BP: (!) 152/84 (04/05/19 0722)  SpO2: 98 % (04/05/19 0722) Vital Signs (24h Range):  Temp:  [97.7 °F (36.5 °C)-98 °F (36.7 °C)] 97.9 °F (36.6 °C)  Pulse:  [72-91] 91  Resp:  [18-19] 19  SpO2:  [95 %-98 %] 98 %  BP: (137-164)/(65-84) 152/84     Weight: 69.4 kg (153 lb 1.8 oz)  Body mass index is 26.28 kg/m².  Body surface area is 1.77 meters squared.      Intake/Output Summary (Last 24 hours) at 4/5/2019 1006  Last data filed at 4/5/2019 0934  Gross per 24 hour   Intake 4047.04 ml   Output 6400 ml   Net -2352.96 ml       Physical Exam   Constitutional: She is oriented to person, place, and time. She appears well-developed and well-nourished. No distress.   HENT:   Head: Normocephalic and atraumatic.   Eyes: Pupils are equal, round, and reactive to light. EOM are normal. No scleral icterus.   Neck: Normal range of motion. No JVD present.   Cardiovascular: Normal rate and normal heart sounds. Exam reveals no gallop and no friction rub.   No murmur heard.  Pulmonary/Chest: Effort normal and breath sounds normal. No respiratory distress. She has no wheezes. She has no rales.   Abdominal: Soft. Bowel sounds are normal. She exhibits distension and mass. There is tenderness. There is  no guarding and no CVA tenderness.   Musculoskeletal: Normal range of motion. She exhibits no edema.   There is no spinal tenderness. There is minimal swelling of the bilateral lower extremities.    Neurological: She is alert and oriented to person, place, and time. She has normal strength. She displays no tremor. No cranial nerve deficit or sensory deficit. She exhibits normal muscle tone. Gait normal. She displays no Babinski's sign on the right side. She displays no Babinski's sign on the left side.   Skin: Skin is warm and dry. No rash noted. No pallor.       Significant Labs:   BMP:   Recent Labs   Lab 04/04/19 0340 04/05/19  0400   GLU 97 70    136   K 4.2 4.0    110   CO2 20* 19*   BUN 16 12   CREATININE 0.6 0.6   CALCIUM 9.9 9.7   MG 2.5 2.4     CBC:   Recent Labs   Lab 04/04/19 0340 04/05/19  0400   WBC 9.29 7.42   HGB 7.9* 8.2*   HCT 26.7* 27.3*   * 369*     CMP:   Recent Labs   Lab 04/04/19 0340 04/05/19  0400    136   K 4.2 4.0    110   CO2 20* 19*   GLU 97 70   BUN 16 12   CREATININE 0.6 0.6   CALCIUM 9.9 9.7   PROT 6.3 6.2   ALBUMIN 2.8* 2.8*   BILITOT 0.4 0.7   ALKPHOS 84 84   AST 9* 47*   ALT 5* 20   ANIONGAP 6* 7*   EGFRNONAA >60.0 >60.0     Coagulation:   No results for input(s): PT, INR, APTT in the last 48 hours.  Haptoglobin: No results for input(s): HAPTOGLOBIN in the last 48 hours., Immunology: No results for input(s): SPEP, JOJO, LINDSEY, FREELAMBDALI in the last 48 hours., LDH: No results for input(s): LDHCSF, BFSOURCE in the last 48 hours., LFTs:   Recent Labs   Lab 04/04/19 0340 04/05/19  0400   ALT 5* 20   AST 9* 47*   ALKPHOS 84 84   BILITOT 0.4 0.7   PROT 6.3 6.2   ALBUMIN 2.8* 2.8*   , Reticulocytes: No results for input(s): RETIC in the last 48 hours., Tumor Markers: No results for input(s): PSA, CEA, , AFPTM, QL5014,  in the last 48 hours.    Invalid input(s): ALGTM, Uric Acid   No results for input(s): URICACID in the last 48 hours., Urine  Studies: No results for input(s): COLORU, APPEARANCEUA, PHUR, SPECGRAV, PROTEINUA, GLUCUA, KETONESU, BILIRUBINUA, OCCULTUA, NITRITE, UROBILINOGEN, LEUKOCYTESUR, RBCUA, WBCUA, BACTERIA, SQUAMEPITHEL, HYALINECASTS in the last 48 hours.    Invalid input(s): BETHELGABRIELLEDRAKE,   Recent Lab Results       04/05/19  0400        Albumin 2.8     Alkaline Phosphatase 84     ALT 20     Anion Gap 7     AST 47     Baso # 0.04     Basophil% 0.5     Total Bilirubin 0.7  Comment:  For infants and newborns, interpretation of results should be based  on gestational age, weight and in agreement with clinical  observations.  Premature Infant recommended reference ranges:  Up to 24 hours.............<8.0 mg/dL  Up to 48 hours............<12.0 mg/dL  3-5 days..................<15.0 mg/dL  6-29 days.................<15.0 mg/dL       BUN, Bld 12     Calcium 9.7     Chloride 110     CO2 19     Creatinine 0.6     Differential Method Automated     eGFR if African American >60.0     eGFR if non  >60.0  Comment:  Calculation used to obtain the estimated glomerular filtration  rate (eGFR) is the CKD-EPI equation.        Eos # 0.1     Eosinophil% 1.3     Glucose 70     Gran # (ANC) 5.5     Gran% 74.0     Hematocrit 27.3     Hemoglobin 8.2     Immature Grans (Abs) 0.03  Comment:  Mild elevation in immature granulocytes is non specific and   can be seen in a variety of conditions including stress response,   acute inflammation, trauma and pregnancy. Correlation with other   laboratory and clinical findings is essential.       Immature Granulocytes 0.4     Lymph # 1.2     Lymph% 16.7     Magnesium 2.4     MCH 21.9     MCHC 30.0     MCV 73     Mono # 0.5     Mono% 7.1     MPV 11.2     nRBC 0     Phosphorus 2.5     Platelets 369     Potassium 4.0     Total Protein 6.2     RBC 3.74     RDW 20.4     Sodium 136     WBC 7.42        and All pertinent labs from the last 24 hours have been reviewed.    Diagnostic Results:  I have reviewed all  pertinent imaging results/findings within the past 24 hours.

## 2019-04-05 NOTE — ASSESSMENT & PLAN NOTE
-Initially presented in 12/2018 with chronic abdominal bloating, pain and significant weight loss. U/S and follow-up CT in 01/2019 showed a 20 cm abdominal mass with resultant left hydronephrosis. 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 See uploaded pathology in outpatient clinic note.   -S/P initiation with chemotherapy with Doxorubicin, Ifosfamide, and Mesna on 04/02.   -On 04/04; noted to develop new issues with fine motor movement of fingers particular noticeable with intentional movements. Exam without neurological deficits. Out of concern for neurological toxicity, Ifosfamide discontinued with symptom resolution.     Plan:   -Continue neuro checks; if worsening signs of neurological toxicity, will consider trial of methylene blue.   -Continue with Doxorubicin and Mesna only with anticipated completion later this evening and possible discharge without outpatient follow-up tomorrow for Neulasta.   -Eventually repeat PET CT after completion of cycle 1.  -Continue pain control [id number] mg Q6H PRN and will given one time dose of IV Morphine for pain exacerbation likely from UTI.   -Continue bowel regimen of Miralax, Senna PRN.

## 2019-04-05 NOTE — PLAN OF CARE
CM requested a hospital follow-up appt with Dr. Caruso/Dr. Silvestre's clinic. CM also requested the time for the Neulasta injection tomorrow to add to patient's AVS. CM to continue to follow.    Anais Becerra, RN, BSN, CM [facility]  Nurse - Med Onc/Gyn Onc

## 2019-04-05 NOTE — PLAN OF CARE
Problem: Adult Inpatient Plan of Care  Goal: Plan of Care Review  Outcome: Ongoing (interventions implemented as appropriate)  Plan of care reviewed with the patient at the beginning of the shift. Pt admitted for chemotherapy. She is day 4 of AIM regimen today. IVF completed yesterday. Doxorubicin infusing. Ifosfamide was discontinued yesterday for concern of ifosfamide toxicity. However Mesna was still given. Neuro checks q 4 and she has remained neurologically intact. She has complaints of abdominal pain and intermittent nausea. No emesis this shift. Nausea managed with Zofran and pt reports it is improving. Abdominal pain managed with Oxycodone. Abdomen is rounded and distended. Lower back and sciatic pain to the left side. BM yesterday. Pt reports a fair appetite. PO intake encouraged. Tylenol given for headache. Fall precautions maintained. Pt remained free from falls and injury this shift. Bedside commode in use. Pt has gotten up to the commode and back in bed independently without difficulty. Bed locked in lowest position, side rails up x2, call light within reach. Instructed pt to call for assistance as needed. Pt verbalized understanding. Vitals stable. Pt afebrile overnight. No acute issues overnight. Will continue to monitor.

## 2019-04-05 NOTE — PLAN OF CARE
MDRs completed with Dr. Fink and the team. Plans for urinalysis today to rule out UTI and increase pain medication regimen due to patient complaints of increased pain. Patient to finish chemotherapy later this evening and then discharge.    CM spoke with Flakitarhianna Wilder on 4/4/19 regarding Neulasta approval and scheduling for 4/6/19 (day after discharge). Appt still pending.     Plans for patient to discharge later this evening (after 8pm) with plans for the patient to stay at the [facility] and receive her Neulasta injection tomorrow at [facility].     CM discussed above with the patient at the bedside after rounds. Patient verbalized understanding.    Anais Becerra, RN, BSN, CM  [facility]  Nurse - Med Onc/Gyn Onc

## 2019-04-05 NOTE — PLAN OF CARE
Problem: Adult Inpatient Plan of Care  Goal: Plan of Care Review  Outcome: Ongoing (interventions implemented as appropriate)  Side rails up x2; call bell in place; bed in lowest, locked position; skid proof socks on; no evidence of skin breakdown; care plan explained to patient; pt remains free of injury. Pt tolerated po, void, ambulates to bathroom with stand by assist, no BM today, pt with c/o HA tylenol given and L thigh and hip pain oxy IR given x 2. Premeds zofran ivpb given, mesna given x 2 doses. Pt with c/o mild difficulty texting and using phone, pt also with c/o a faint cloudiness to vision. Ifosfamide held, neuro checks completed with no changes noted.  Doxorubicin day 3  infusion at 21 cc/hr, NS 50cc/hr. Na bicarb with mg and k infusing at 125 cc/hr x 1 Liter. VSS and afebrile. Chemo precaution maintained. picc line with good blood return with all connections secured with chemo tape.

## 2019-04-06 ENCOUNTER — INFUSION (OUTPATIENT)
Dept: INFUSION THERAPY | Facility: HOSPITAL | Age: 64
End: 2019-04-06
Attending: STUDENT IN AN ORGANIZED HEALTH CARE EDUCATION/TRAINING PROGRAM
Payer: MEDICARE

## 2019-04-06 DIAGNOSIS — C49.4 PRIMARY INTRA-ABDOMINAL SARCOMA: Primary | ICD-10-CM

## 2019-04-06 PROCEDURE — 96372 THER/PROPH/DIAG INJ SC/IM: CPT

## 2019-04-06 PROCEDURE — 63600175 PHARM REV CODE 636 W HCPCS: Mod: JG | Performed by: INTERNAL MEDICINE

## 2019-04-06 RX ADMIN — PEGFILGRASTIM 6 MG: 6 INJECTION SUBCUTANEOUS at 09:04

## 2019-04-06 NOTE — NURSING
Patient in clinic for Neulasta injection. Medication given SQ into abdomen with no complications. Patient has no c/o pain or discomfort. Reports no changes since last appointment. Patient educated on side effects of medication.

## 2019-04-06 NOTE — CLINICAL REVIEW
Clinical Review    Added MS Contin 30 BID. Pt knows not to crush pills or break them in half for administration. She still has Norco 10 PRN at home for breakthrough pain relief as well.   Pt will be getting Neulasta tomorrow at 10 am.     Fernando Silvestre MD  Hematology/Oncology Fellow, PGY IV  [facility]

## 2019-04-11 ENCOUNTER — TELEPHONE (OUTPATIENT)
Dept: HEMATOLOGY/ONCOLOGY | Facility: CLINIC | Age: 64
End: 2019-04-11

## 2019-04-11 DIAGNOSIS — C49.4 PRIMARY INTRA-ABDOMINAL SARCOMA: Primary | ICD-10-CM

## 2019-04-12 ENCOUNTER — TELEPHONE (OUTPATIENT)
Dept: HEMATOLOGY/ONCOLOGY | Facility: CLINIC | Age: 64
End: 2019-04-12

## 2019-04-12 DIAGNOSIS — C49.4 PRIMARY INTRA-ABDOMINAL SARCOMA: Primary | ICD-10-CM

## 2019-04-12 DIAGNOSIS — D49.89 NEOPLASM OF ABDOMEN: ICD-10-CM

## 2019-04-12 DIAGNOSIS — C49.4 MALIGNANT NEOPLASM OF CONNECTIVE AND SOFT TISSUE OF ABDOMEN: ICD-10-CM

## 2019-04-12 NOTE — TELEPHONE ENCOUNTER
Hello This message has been forwarded to Pre Service Radiology Supervisor Sarah Henderson.  Thank you

## 2019-04-12 NOTE — TELEPHONE ENCOUNTER
Called patient left voice mail about appointment scheduled for Monday with Dr Silvestre and lab.   hard copy, drawn during this pregnancy

## 2019-04-15 ENCOUNTER — LAB VISIT (OUTPATIENT)
Dept: LAB | Facility: HOSPITAL | Age: 64
End: 2019-04-15
Payer: MEDICARE

## 2019-04-15 ENCOUNTER — OFFICE VISIT (OUTPATIENT)
Dept: HEMATOLOGY/ONCOLOGY | Facility: CLINIC | Age: 64
End: 2019-04-15
Payer: MEDICARE

## 2019-04-15 VITALS
TEMPERATURE: 98 F | HEIGHT: 64 IN | HEART RATE: 94 BPM | WEIGHT: 149.06 LBS | SYSTOLIC BLOOD PRESSURE: 135 MMHG | RESPIRATION RATE: 18 BRPM | DIASTOLIC BLOOD PRESSURE: 63 MMHG | BODY MASS INDEX: 25.45 KG/M2

## 2019-04-15 DIAGNOSIS — I10 ESSENTIAL HYPERTENSION: ICD-10-CM

## 2019-04-15 DIAGNOSIS — G62.9 NEUROPATHY: ICD-10-CM

## 2019-04-15 DIAGNOSIS — D50.9 IRON DEFICIENCY ANEMIA, UNSPECIFIED IRON DEFICIENCY ANEMIA TYPE: ICD-10-CM

## 2019-04-15 DIAGNOSIS — C49.4 PRIMARY INTRA-ABDOMINAL SARCOMA: Primary | ICD-10-CM

## 2019-04-15 DIAGNOSIS — C49.4 PRIMARY INTRA-ABDOMINAL SARCOMA: ICD-10-CM

## 2019-04-15 LAB
ALBUMIN SERPL BCP-MCNC: 3.2 G/DL (ref 3.5–5.2)
ALP SERPL-CCNC: 173 U/L (ref 55–135)
ALT SERPL W/O P-5'-P-CCNC: 7 U/L (ref 10–44)
ANION GAP SERPL CALC-SCNC: 7 MMOL/L (ref 8–16)
ANISOCYTOSIS BLD QL SMEAR: SLIGHT
AST SERPL-CCNC: 11 U/L (ref 10–40)
BASOPHILS # BLD AUTO: ABNORMAL K/UL (ref 0–0.2)
BASOPHILS NFR BLD: 0 % (ref 0–1.9)
BILIRUB SERPL-MCNC: 0.4 MG/DL (ref 0.1–1)
BUN SERPL-MCNC: 9 MG/DL (ref 8–23)
CALCIUM SERPL-MCNC: 10.2 MG/DL (ref 8.7–10.5)
CHLORIDE SERPL-SCNC: 106 MMOL/L (ref 95–110)
CO2 SERPL-SCNC: 22 MMOL/L (ref 23–29)
CREAT SERPL-MCNC: 0.6 MG/DL (ref 0.5–1.4)
DIFFERENTIAL METHOD: ABNORMAL
EOSINOPHIL # BLD AUTO: ABNORMAL K/UL (ref 0–0.5)
EOSINOPHIL NFR BLD: 0 % (ref 0–8)
ERYTHROCYTE [DISTWIDTH] IN BLOOD BY AUTOMATED COUNT: 21 % (ref 11.5–14.5)
EST. GFR  (AFRICAN AMERICAN): >60 ML/MIN/1.73 M^2
EST. GFR  (NON AFRICAN AMERICAN): >60 ML/MIN/1.73 M^2
GLUCOSE SERPL-MCNC: 97 MG/DL (ref 70–110)
HCT VFR BLD AUTO: 28.9 % (ref 37–48.5)
HGB BLD-MCNC: 8.7 G/DL (ref 12–16)
HYPOCHROMIA BLD QL SMEAR: ABNORMAL
IMM GRANULOCYTES # BLD AUTO: ABNORMAL K/UL (ref 0–0.04)
IMM GRANULOCYTES NFR BLD AUTO: ABNORMAL % (ref 0–0.5)
LYMPHOCYTES # BLD AUTO: ABNORMAL K/UL (ref 1–4.8)
LYMPHOCYTES NFR BLD: 11 % (ref 18–48)
MAGNESIUM SERPL-MCNC: 2.1 MG/DL (ref 1.6–2.6)
MCH RBC QN AUTO: 22.4 PG (ref 27–31)
MCHC RBC AUTO-ENTMCNC: 30.1 G/DL (ref 32–36)
MCV RBC AUTO: 74 FL (ref 82–98)
MONOCYTES # BLD AUTO: ABNORMAL K/UL (ref 0.3–1)
MONOCYTES NFR BLD: 4 % (ref 4–15)
MYELOCYTES NFR BLD MANUAL: 1 %
NEUTROPHILS NFR BLD: 83 % (ref 38–73)
NEUTS BAND NFR BLD MANUAL: 1 %
NRBC BLD-RTO: 1 /100 WBC
OVALOCYTES BLD QL SMEAR: ABNORMAL
PHOSPHATE SERPL-MCNC: 1.9 MG/DL (ref 2.7–4.5)
PLATELET # BLD AUTO: 264 K/UL (ref 150–350)
PMV BLD AUTO: 11.2 FL (ref 9.2–12.9)
POIKILOCYTOSIS BLD QL SMEAR: SLIGHT
POLYCHROMASIA BLD QL SMEAR: ABNORMAL
POTASSIUM SERPL-SCNC: 3.5 MMOL/L (ref 3.5–5.1)
PROT SERPL-MCNC: 7.1 G/DL (ref 6–8.4)
RBC # BLD AUTO: 3.89 M/UL (ref 4–5.4)
SODIUM SERPL-SCNC: 135 MMOL/L (ref 136–145)
WBC # BLD AUTO: 12.44 K/UL (ref 3.9–12.7)

## 2019-04-15 PROCEDURE — 1101F PR PT FALLS ASSESS DOC 0-1 FALLS W/OUT INJ PAST YR: ICD-10-PCS | Mod: CPTII,GC,S$GLB, | Performed by: STUDENT IN AN ORGANIZED HEALTH CARE EDUCATION/TRAINING PROGRAM

## 2019-04-15 PROCEDURE — 83735 ASSAY OF MAGNESIUM: CPT

## 2019-04-15 PROCEDURE — 3075F PR MOST RECENT SYSTOLIC BLOOD PRESS GE 130-139MM HG: ICD-10-PCS | Mod: CPTII,GC,S$GLB, | Performed by: STUDENT IN AN ORGANIZED HEALTH CARE EDUCATION/TRAINING PROGRAM

## 2019-04-15 PROCEDURE — 36415 COLL VENOUS BLD VENIPUNCTURE: CPT

## 2019-04-15 PROCEDURE — 3078F DIAST BP <80 MM HG: CPT | Mod: CPTII,GC,S$GLB, | Performed by: STUDENT IN AN ORGANIZED HEALTH CARE EDUCATION/TRAINING PROGRAM

## 2019-04-15 PROCEDURE — 1101F PT FALLS ASSESS-DOCD LE1/YR: CPT | Mod: CPTII,GC,S$GLB, | Performed by: STUDENT IN AN ORGANIZED HEALTH CARE EDUCATION/TRAINING PROGRAM

## 2019-04-15 PROCEDURE — 99999 PR PBB SHADOW E&M-EST. PATIENT-LVL III: CPT | Mod: PBBFAC,GC,, | Performed by: STUDENT IN AN ORGANIZED HEALTH CARE EDUCATION/TRAINING PROGRAM

## 2019-04-15 PROCEDURE — 85027 COMPLETE CBC AUTOMATED: CPT

## 2019-04-15 PROCEDURE — 85007 BL SMEAR W/DIFF WBC COUNT: CPT

## 2019-04-15 PROCEDURE — 99214 OFFICE O/P EST MOD 30 MIN: CPT | Mod: GC,S$GLB,, | Performed by: STUDENT IN AN ORGANIZED HEALTH CARE EDUCATION/TRAINING PROGRAM

## 2019-04-15 PROCEDURE — 84100 ASSAY OF PHOSPHORUS: CPT

## 2019-04-15 PROCEDURE — 3008F PR BODY MASS INDEX (BMI) DOCUMENTED: ICD-10-PCS | Mod: CPTII,GC,S$GLB, | Performed by: STUDENT IN AN ORGANIZED HEALTH CARE EDUCATION/TRAINING PROGRAM

## 2019-04-15 PROCEDURE — 3008F BODY MASS INDEX DOCD: CPT | Mod: CPTII,GC,S$GLB, | Performed by: STUDENT IN AN ORGANIZED HEALTH CARE EDUCATION/TRAINING PROGRAM

## 2019-04-15 PROCEDURE — 3078F PR MOST RECENT DIASTOLIC BLOOD PRESSURE < 80 MM HG: ICD-10-PCS | Mod: CPTII,GC,S$GLB, | Performed by: STUDENT IN AN ORGANIZED HEALTH CARE EDUCATION/TRAINING PROGRAM

## 2019-04-15 PROCEDURE — 99214 PR OFFICE/OUTPT VISIT, EST, LEVL IV, 30-39 MIN: ICD-10-PCS | Mod: GC,S$GLB,, | Performed by: STUDENT IN AN ORGANIZED HEALTH CARE EDUCATION/TRAINING PROGRAM

## 2019-04-15 PROCEDURE — 80053 COMPREHEN METABOLIC PANEL: CPT

## 2019-04-15 PROCEDURE — 3075F SYST BP GE 130 - 139MM HG: CPT | Mod: CPTII,GC,S$GLB, | Performed by: STUDENT IN AN ORGANIZED HEALTH CARE EDUCATION/TRAINING PROGRAM

## 2019-04-15 PROCEDURE — 99999 PR PBB SHADOW E&M-EST. PATIENT-LVL III: ICD-10-PCS | Mod: PBBFAC,GC,, | Performed by: STUDENT IN AN ORGANIZED HEALTH CARE EDUCATION/TRAINING PROGRAM

## 2019-04-15 NOTE — PROGRESS NOTES
PATIENT: Tiffany Kaur  MRN: [id number]  DATE: 4/15/2019      Diagnosis:   1. Primary intra-abdominal sarcoma    2. Neuropathy    3. Essential hypertension    4. Iron deficiency anemia, unspecified iron deficiency anemia type        Chief Complaint: primary intra-abdominal sarcoma (follow up, labs)      Oncologic History:     Low Grade Sarcoma  - 20 cm mass noted in CT abdomen/pelvis; L ureter noted to be have left sided hydronephresis and noted to have some mild inguinal adneopathy  - PET-CT reveals a large anterior abdominal mass, low activity sarcoma without significant metastatic spread, SUV max of 4.7   - Inpatient AIM C1 (4/2/19 - 4/5/19) completed  (adrimaycin, ifosfomide, mesna) next week given symptoms above, discussed palliative intent of therapy  - Ifosfomide dropped on day 3 due to neurotoxicity  - follow-up PET-CT and see back in clinic in one week; repeat labs at that point as well      Pt is a 62 yo  female with PMhx of SVT (possible AFib), chronic IBS (describes a history of suffering from constipation as a child), HTN who presents to the hospital to discuss further options at treating recently found low grade sarcoma in her abdomen, workup for this which was started in [facility]. She was referred by a Dr. Rosa, who is a general surgeon in the [place] area.  She had not been able to get healthcare for a while as she was not enrolled in her 's plan through the  but has been able to get enrolled in Medicare since December.    She started to have abdominal bloating in mid-December 2018. Initially, she had attributed this problem to baseline IBS and was treated at that time with various combinations of Senna, Miralax, suppositories, and enema. Additionally, she lost ~45lbs over the course of one month (174lb to 130lb) which she reports was primarily due to diffuse prandial-associated abdominal pain described mostly as cramping sensation. During this time she  could only tolerated clear liquids, such as Jello and broth.      Her workup in mid-December started with an ultrasound which revealed cholelithiasis and large pelvic mass, therefore, follow-up of CT abdomen/pelvis was completed on 01/06/19. Origin of her mass has been unlcear, but she was found to have a 20 cm abdominal mass and a picture of chronic severe left hydronephrosis. She had a follow-up CT guided core biopsy on 02/06/19 of abdominal mass which revealed a low grade sarcoma, which has been verified by our pathologists as well. *See clinic oncology note for uploaded report.*     In addition, she has a 3-4 cm x 2 cm lesion on the posterior right shoulder and right lateral neck region with some vascularity, bullous with crusting. She notes that this lesion had been present for eight years and endorses some pruritus and periods of resolution, denies any pain or bleeding with the lesion.       She presented to outpatient clinic here on 03/27/2019 as a referral from [facility] to discuss further treatment options. Plan made for in-patient admission for urology evaluation and possible initiation of chemotherapy.        In addition, the pt has a 3-4 cm x 2 cm lesion on the posterior R shoulder and R lateral neck region with some vascularity, bullous with crusting which the pt has not had follow-up for as of this visit. She notes that this lesion had been present for eight years and endorses some pruritus and periods of resolution, denies any pain or bleeding with the lesion.       Pt had cologuard completed for colorectal cancer screening on December 2018, which was negative for malignancy.     She presented to [facility] 4/1/19 for planned in-patient management of her L-sided hydronephrosis and initiation of AIM chemotherapy. Following better control of her pain, her appetite improved and she was able to increase her food intake and regain weight (up to 149lbs).  Admission labs notable for microcytic  anemia with low iron studies and slight hypercalcemia (10.8 corrected). Evaluated by urology on day of admission. Based on assessment of prior imaging, it was determined that, based on hydronephrosis and degree of atrophy, the function of the left kidney was not salvageable and that risk of nephrostomy tube or stent outweighed benefit. She had a PICC line placed on day of admission. Chemotherapy was initiated on 04/02/19 with Doxorubucin, Ifosfamide, and Mesnex. Symptoms during chemotherapy initiation included intermittent abdominal pain and nausea that were controlled with PRN Zofran and Oxycodone. Patient noted to develop new onset issues with fine motor movement of fingers on 04/04; out of concern for neurological toxicity, Ifosfamide was discontinued with symptom resolution. On 04/05, she developed increased urinary frequency with lower back pain with radiation around the left hip; UA ordered, but negative for infection. Pain resolved after dose of Gabapentin and applying heating pads, suspect could be secondary to radiculopathy from tumor burden. Neulasta was administered on day after discharge.    Subjective:     She has not required nausea pills since Wednesday. Pt uses Ms Contin prescribed in the hospital about 1-2 x a day. She states that gabapentin has also helped the shooting pain in the leg. About once during the week, she had an episode of palpitations and reflux which resolved within minutes. The pt took aminta-seltzer for GERD like sensation. She has not had any fevers at home.     Hgb is 8.7/WBC of 12.44/  Plts of 264 K today. Her only persistent symptom is intermittent fine hand tremors when holding her phone. Otherwise, she feels much better, as well as she did prior to symptoms with large intra-abdominal tumor.        Past Medical History:   Past Medical History:   Diagnosis Date    Cancer     Gallstones     GERD (gastroesophageal reflux disease)     Hypertension     SVT (supraventricular  tachycardia)        Past Surgical HIstory:   Past Surgical History:   Procedure Laterality Date     SECTION      X3    HYSTERECTOMY      Partial    TUBAL LIGATION         Family History:   Family History   Problem Relation Age of Onset    Lung disease Mother     Pancreatic cancer Father     No Known Problems Sister     Hypertension Brother     No Known Problems Maternal Aunt     No Known Problems Maternal Uncle     No Known Problems Paternal Aunt     No Known Problems Paternal Uncle     No Known Problems Maternal Grandmother     No Known Problems Maternal Grandfather     No Known Problems Paternal Grandmother     No Known Problems Paternal Grandfather     No Known Problems Daughter     No Known Problems Daughter     No Known Problems Son        Social History:  reports that she has never smoked. She has never used smokeless tobacco. She reports that she does not drink alcohol or use drugs.    Allergies:  Review of patient's allergies indicates:  No Known Allergies    Medications:  Current Outpatient Medications   Medication Sig Dispense Refill    amLODIPine (NORVASC) 5 MG tablet Take 1 tablet (5 mg total) by mouth once daily. 30 tablet 11    diphenhydrAMINE-aluminum-magnesium hydroxide-simethicone-lidocaine HCl 2% Swish and spit 15 mLs every 4 (four) hours as needed. 300 mL 0    gabapentin (NEURONTIN) 300 MG capsule Take 1 capsule (300 mg total) by mouth every evening. Take one pill (300 mg at night), can take up to two a night (600 mg) if tolerated. 60 capsule 2    morphine (MS CONTIN) 30 MG 12 hr tablet Take 1 tablet (30 mg total) by mouth every 8 (eight) hours as needed for Pain. (Patient taking differently: Take 30 mg by mouth every 8 (eight) hours as needed for Pain (patient takes at least once a day). ) 60 tablet 0    polyethylene glycol (GLYCOLAX) 17 gram/dose powder Mix 1 capful (17 g) with liquid and take by mouth daily as needed. 510 g 0    zolpidem (AMBIEN) 5 MG Tab        "HYDROcodone-acetaminophen (NORCO)  mg per tablet Take 1 tablet by mouth every 6 (six) hours as needed for Pain. 120 tablet 0    magic mouthwash diphen/antac/lidoc Swish and spit 15 mLs every 4 (four) hours as needed. 300 mL 0    ondansetron (ZOFRAN-ODT) 4 MG TbDL Dissolve 2 tablets (8 mg total) by mouth every 6 (six) hours as needed. 30 tablet 0     No current facility-administered medications for this visit.        Review of Systems   Constitutional: Negative for appetite change, chills and fever.   HENT: Negative for sore throat.    Eyes: Negative for visual disturbance.   Respiratory: Negative for cough, chest tightness, shortness of breath and wheezing.    Cardiovascular: Positive for palpitations. Negative for chest pain.   Gastrointestinal: Positive for abdominal pain and constipation. Negative for blood in stool, diarrhea, nausea, rectal pain and vomiting.   Genitourinary: Negative for dysuria.   Musculoskeletal: Negative for gait problem.   Skin: Negative for rash.        + skin lesion   Neurological: Negative for syncope and headaches.        + fine hand tremors   Hematological: Negative for adenopathy. Does not bruise/bleed easily.       ECOG Performance Status: 1   Objective:      Vitals:   Vitals:    04/15/19 1128   BP: 135/63   BP Location: Right arm   Patient Position: Sitting   BP Method: Medium (Automatic)   Pulse: 94   Resp: 18   Temp: 97.8 °F (36.6 °C)   TempSrc: Oral   Weight: 67.6 kg (149 lb 0.5 oz)   Height: 5' 4" (1.626 m)     BMI: Body mass index is 25.58 kg/m².    Physical Exam   Constitutional: She is oriented to person, place, and time. She appears well-developed. No distress.   HENT:   Head: Normocephalic and atraumatic.   Mouth/Throat: No oropharyngeal exudate.   Eyes: Pupils are equal, round, and reactive to light. EOM are normal. No scleral icterus.   Neck: Normal range of motion. Neck supple.   Cardiovascular: Normal rate, regular rhythm and normal heart sounds. Exam reveals no " gallop and no friction rub.   No murmur heard.  Pulmonary/Chest: Effort normal and breath sounds normal. No respiratory distress. She has no wheezes. She has no rales.   Abdominal: She exhibits mass. There is tenderness. There is no guarding.   Firm, mid-abdomen ; central abdominal bruit   Musculoskeletal: Normal range of motion. She exhibits no edema.   Lymphadenopathy:     She has no cervical adenopathy.   Neurological: She is alert and oriented to person, place, and time. No cranial nerve deficit.   Skin: Skin is warm and dry. No rash noted. She is not diaphoretic.   3-4 cm lesion in R posterior neck, R medial shoulder area; vascular and bullous   Psychiatric: She has a normal mood and affect.       Laboratory Data:  Lab Visit on 04/15/2019   Component Date Value Ref Range Status    WBC 04/15/2019 12.44  3.90 - 12.70 K/uL Final    RBC 04/15/2019 3.89* 4.00 - 5.40 M/uL Final    Hemoglobin 04/15/2019 8.7* 12.0 - 16.0 g/dL Final    Hematocrit 04/15/2019 28.9* 37.0 - 48.5 % Final    MCV 04/15/2019 74* 82 - 98 fL Final    MCH 04/15/2019 22.4* 27.0 - 31.0 pg Final    MCHC 04/15/2019 30.1* 32.0 - 36.0 g/dL Final    RDW 04/15/2019 21.0* 11.5 - 14.5 % Final    Platelets 04/15/2019 264  150 - 350 K/uL Final    MPV 04/15/2019 11.2  9.2 - 12.9 fL Final    Immature Granulocytes 04/15/2019 CANCELED  0.0 - 0.5 % Final    Result canceled by the ancillary.    Immature Grans (Abs) 04/15/2019 CANCELED  0.00 - 0.04 K/uL Final    Comment: Mild elevation in immature granulocytes is non specific and   can be seen in a variety of conditions including stress response,   acute inflammation, trauma and pregnancy. Correlation with other   laboratory and clinical findings is essential.    Result canceled by the ancillary.      Lymph # 04/15/2019 CANCELED  1.0 - 4.8 K/uL Final    Result canceled by the ancillary.    Mono # 04/15/2019 CANCELED  0.3 - 1.0 K/uL Final    Result canceled by the ancillary.    Eos # 04/15/2019  CANCELED  0.0 - 0.5 K/uL Final    Result canceled by the ancillary.    Baso # 04/15/2019 CANCELED  0.00 - 0.20 K/uL Final    Result canceled by the ancillary.    nRBC 04/15/2019 1* 0 /100 WBC Final    Gran% 04/15/2019 83.0* 38.0 - 73.0 % Final    Lymph% 04/15/2019 11.0* 18.0 - 48.0 % Final    Mono% 04/15/2019 4.0  4.0 - 15.0 % Final    Eosinophil% 04/15/2019 0.0  0.0 - 8.0 % Final    Basophil% 04/15/2019 0.0  0.0 - 1.9 % Final    Bands 04/15/2019 1.0  % Final    Myelocytes 04/15/2019 1.0  % Final    Aniso 04/15/2019 Slight   Final    Poik 04/15/2019 Slight   Final    Poly 04/15/2019 Occasional   Final    Hypo 04/15/2019 Occasional   Final    Ovalocytes 04/15/2019 Occasional   Final    Differential Method 04/15/2019 Manual   Final    Sodium 04/15/2019 135* 136 - 145 mmol/L Final    Potassium 04/15/2019 3.5  3.5 - 5.1 mmol/L Final    Chloride 04/15/2019 106  95 - 110 mmol/L Final    CO2 04/15/2019 22* 23 - 29 mmol/L Final    Glucose 04/15/2019 97  70 - 110 mg/dL Final    BUN, Bld 04/15/2019 9  8 - 23 mg/dL Final    Creatinine 04/15/2019 0.6  0.5 - 1.4 mg/dL Final    Calcium 04/15/2019 10.2  8.7 - 10.5 mg/dL Final    Total Protein 04/15/2019 7.1  6.0 - 8.4 g/dL Final    Albumin 04/15/2019 3.2* 3.5 - 5.2 g/dL Final    Total Bilirubin 04/15/2019 0.4  0.1 - 1.0 mg/dL Final    Comment: For infants and newborns, interpretation of results should be based  on gestational age, weight and in agreement with clinical  observations.  Premature Infant recommended reference ranges:  Up to 24 hours.............<8.0 mg/dL  Up to 48 hours............<12.0 mg/dL  3-5 days..................<15.0 mg/dL  6-29 days.................<15.0 mg/dL      Alkaline Phosphatase 04/15/2019 173* 55 - 135 U/L Final    AST 04/15/2019 11  10 - 40 U/L Final    ALT 04/15/2019 7* 10 - 44 U/L Final    Anion Gap 04/15/2019 7* 8 - 16 mmol/L Final    eGFR if African American 04/15/2019 >60.0  >60 mL/min/1.73 m^2 Final    eGFR if  non  04/15/2019 >60.0  >60 mL/min/1.73 m^2 Final    Comment: Calculation used to obtain the estimated glomerular filtration  rate (eGFR) is the CKD-EPI equation.       Magnesium 04/15/2019 2.1  1.6 - 2.6 mg/dL Final    Phosphorus 04/15/2019 1.9* 2.7 - 4.5 mg/dL Final         Imaging:   Assessment:       1. Primary intra-abdominal sarcoma    2. Neuropathy    3. Essential hypertension    4. Iron deficiency anemia, unspecified iron deficiency anemia type           Plan:     Low Grade Sarcoma  - 20 cm mass noted in CT abdomen/pelvis; L ureter noted to be have left sided hydronephresis and noted to have some mild inguinal adneopathy  - path reviewed at our institution also consistent with sarcoma as well  - Reviewed images and discussed with Dr. Mota previously, pt is not a surgical candidate at this time as significant amount of central vasculature (aorta, iliac arteries/veins) involved and significant amount of vascularity to mass  - PET-CT reveals a large anterior abdominal mass, low activity sarcoma without significant metastatic spread, SUV max of 4.7   - Echo with EF of 60%, normal LV systolic function and indeterminate diastolic function  - tumor causing pain in central abdomen as well as compression of L ureter and causing hydronephrosis  - Inpatient AIM C1 completed  (adrimaycin, ifosfomide, mesna) next week given symptoms above, discussed palliative intent of therapy  - went over risks/benefits/ side effects of chemotherapy and pt wishes to proceed with therapy, consent form signed and form submitted to be scanned into system  - Ifosfomide dropped on day 3 due to neurotoxicity  - follow-up PET-CT and see back in clinic in one week; repeat labs at that point as well     Hypercalcemia of malignancy  - mild, currently 10.84  - noted to be 11 previously     Severe L sided hydronephrosis   - from external compression of large sarcoma  - left kidney very atrophic and not salvageable per  Nephrology     Neuropathy, possible L sided sciatica  - pt with lower back pain around site of tumor  - start with gabapentin 300 mg at night for pain      HTN  - continue norvasc 5 for now    SVT  - unclear of rhythm  - no AFib noted while pt was admitted into hospital     IBS  - continue laxatives PRN - can use senna + miralax, while on pain meds        Follow-up:   Plan to check PET-CT next week, repeat clinic follow-up  Check CBC, CMP, Mg, Phos prior to clinic visit  Eventual plan may be for an oral chemotherapy such as pazopanib vs. Doxorubicin vs. Doxorubicin + dacarbazine    Pt prefers to be emailed at [email or web address]    Discussed with Dr. Medina     Distress Score: 0 noted and reviewed. No intervention indicated.      Fernando Silvestre MD   Hematology and Oncology Fellow, PGY IV  [facility]      I have reviewed the notes, assessments, and/or procedures performed by the housestaff, as above.  I have personally interviewed and examined the patient at the beside, and rounded with the housestaff. I concur with her/his assessment and plan and the documentation of Tiffany Kaur.  I, Dr. Guicho Medina, personally spent more than  25 mins during this encounter, greater than 50% was spent in direct counseling and/or coordination of care.     Guicho Medina M.D., M.S., F.A.C.P.  Hematology/Oncology Attending  [facility]

## 2019-04-15 NOTE — PROGRESS NOTES
Distress Screening Results: Psychosocial Distress screening score of Distress Score: 0 {AMB ONC DISTRESS SCORE:30579}

## 2019-04-22 ENCOUNTER — OFFICE VISIT (OUTPATIENT)
Dept: HEMATOLOGY/ONCOLOGY | Facility: CLINIC | Age: 64
End: 2019-04-22
Payer: MEDICARE

## 2019-04-22 ENCOUNTER — HOSPITAL ENCOUNTER (OUTPATIENT)
Dept: RADIOLOGY | Facility: HOSPITAL | Age: 64
Discharge: HOME | End: 2019-04-22
Attending: STUDENT IN AN ORGANIZED HEALTH CARE EDUCATION/TRAINING PROGRAM
Payer: MEDICARE

## 2019-04-22 VITALS
SYSTOLIC BLOOD PRESSURE: 121 MMHG | OXYGEN SATURATION: 99 % | HEART RATE: 86 BPM | WEIGHT: 146.81 LBS | RESPIRATION RATE: 20 BRPM | BODY MASS INDEX: 25.2 KG/M2 | DIASTOLIC BLOOD PRESSURE: 60 MMHG

## 2019-04-22 DIAGNOSIS — D49.89 NEOPLASM OF ABDOMEN: ICD-10-CM

## 2019-04-22 DIAGNOSIS — D50.9 IRON DEFICIENCY ANEMIA, UNSPECIFIED IRON DEFICIENCY ANEMIA TYPE: ICD-10-CM

## 2019-04-22 DIAGNOSIS — C49.4 MALIGNANT NEOPLASM OF CONNECTIVE AND SOFT TISSUE OF ABDOMEN: ICD-10-CM

## 2019-04-22 DIAGNOSIS — G62.9 NEUROPATHY: ICD-10-CM

## 2019-04-22 DIAGNOSIS — C49.9 SARCOMA: Primary | ICD-10-CM

## 2019-04-22 LAB — POCT GLUCOSE: 95 MG/DL (ref 70–110)

## 2019-04-22 PROCEDURE — 99999 PR PBB SHADOW E&M-EST. PATIENT-LVL III: CPT | Mod: PBBFAC,GC,, | Performed by: STUDENT IN AN ORGANIZED HEALTH CARE EDUCATION/TRAINING PROGRAM

## 2019-04-22 PROCEDURE — 3074F PR MOST RECENT SYSTOLIC BLOOD PRESSURE < 130 MM HG: ICD-10-PCS | Mod: CPTII,GC,S$GLB, | Performed by: STUDENT IN AN ORGANIZED HEALTH CARE EDUCATION/TRAINING PROGRAM

## 2019-04-22 PROCEDURE — 99214 OFFICE O/P EST MOD 30 MIN: CPT | Mod: GC,S$GLB,, | Performed by: STUDENT IN AN ORGANIZED HEALTH CARE EDUCATION/TRAINING PROGRAM

## 2019-04-22 PROCEDURE — 3008F PR BODY MASS INDEX (BMI) DOCUMENTED: ICD-10-PCS | Mod: CPTII,GC,S$GLB, | Performed by: STUDENT IN AN ORGANIZED HEALTH CARE EDUCATION/TRAINING PROGRAM

## 2019-04-22 PROCEDURE — 1101F PR PT FALLS ASSESS DOC 0-1 FALLS W/OUT INJ PAST YR: ICD-10-PCS | Mod: CPTII,GC,S$GLB, | Performed by: STUDENT IN AN ORGANIZED HEALTH CARE EDUCATION/TRAINING PROGRAM

## 2019-04-22 PROCEDURE — 78816 NM PET CT WHOLE BODY: ICD-10-PCS | Mod: 26,PI,, | Performed by: RADIOLOGY

## 2019-04-22 PROCEDURE — 99214 PR OFFICE/OUTPT VISIT, EST, LEVL IV, 30-39 MIN: ICD-10-PCS | Mod: GC,S$GLB,, | Performed by: STUDENT IN AN ORGANIZED HEALTH CARE EDUCATION/TRAINING PROGRAM

## 2019-04-22 PROCEDURE — 99999 PR PBB SHADOW E&M-EST. PATIENT-LVL III: ICD-10-PCS | Mod: PBBFAC,GC,, | Performed by: STUDENT IN AN ORGANIZED HEALTH CARE EDUCATION/TRAINING PROGRAM

## 2019-04-22 PROCEDURE — 1101F PT FALLS ASSESS-DOCD LE1/YR: CPT | Mod: CPTII,GC,S$GLB, | Performed by: STUDENT IN AN ORGANIZED HEALTH CARE EDUCATION/TRAINING PROGRAM

## 2019-04-22 PROCEDURE — 78816 PET IMAGE W/CT FULL BODY: CPT | Mod: 26,PI,, | Performed by: RADIOLOGY

## 2019-04-22 PROCEDURE — 3078F PR MOST RECENT DIASTOLIC BLOOD PRESSURE < 80 MM HG: ICD-10-PCS | Mod: CPTII,GC,S$GLB, | Performed by: STUDENT IN AN ORGANIZED HEALTH CARE EDUCATION/TRAINING PROGRAM

## 2019-04-22 PROCEDURE — 3078F DIAST BP <80 MM HG: CPT | Mod: CPTII,GC,S$GLB, | Performed by: STUDENT IN AN ORGANIZED HEALTH CARE EDUCATION/TRAINING PROGRAM

## 2019-04-22 PROCEDURE — 78816 PET IMAGE W/CT FULL BODY: CPT | Mod: TC

## 2019-04-22 PROCEDURE — 3008F BODY MASS INDEX DOCD: CPT | Mod: CPTII,GC,S$GLB, | Performed by: STUDENT IN AN ORGANIZED HEALTH CARE EDUCATION/TRAINING PROGRAM

## 2019-04-22 PROCEDURE — 3074F SYST BP LT 130 MM HG: CPT | Mod: CPTII,GC,S$GLB, | Performed by: STUDENT IN AN ORGANIZED HEALTH CARE EDUCATION/TRAINING PROGRAM

## 2019-04-22 RX ORDER — GABAPENTIN 300 MG/1
300 CAPSULE ORAL NIGHTLY
Qty: 90 CAPSULE | Refills: 2 | Status: SHIPPED | OUTPATIENT
Start: 2019-04-22 | End: 2019-09-10 | Stop reason: SDUPTHER

## 2019-04-22 RX ORDER — AMOXICILLIN AND CLAVULANATE POTASSIUM 875; 125 MG/1; MG/1
1 TABLET, FILM COATED ORAL 2 TIMES DAILY
Qty: 14 TABLET | Refills: 0 | Status: SHIPPED | OUTPATIENT
Start: 2019-04-22 | End: 2019-04-29

## 2019-04-22 NOTE — PROGRESS NOTES
PATIENT: Tiffany Kaur  MRN: [id number]  DATE: 4/23/2019      Diagnosis:   1. Sarcoma    2. Neuropathy    3. Iron deficiency anemia, unspecified iron deficiency anemia type        Chief Complaint: Primary intra-abdominal sarcoma and Medication Refill (gabapentin)      Oncologic History:   Low Grade Sarcoma  - 20 cm mass noted in outside CT abdomen/pelvis; L ureter noted to be have left sided hydronephrosis and noted to have some mild inguinal adneopathy  - PET-CT on 3/27 reveals a large anterior abdominal mass, low activity sarcoma without significant metastatic spread, SUV max of 4.7   - Inpatient AIM C1 (4/2/19 - 4/5/19) completed  (adrimaycin, ifosfomide, mesna) next week given symptoms above, discussed palliative intent of therapy  - Ifosfomide dropped on day 3 due to neurotoxicity  - follow-up PET-CT today        Pt is a 64 yo  female with PMhx of SVT (possible AFib), chronic IBS (describes a history of suffering from constipation as a child), HTN who presents to the hospital to discuss further options at treating 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 She was referred by a Dr. Rosa, who is a general surgeon in the [place] area.  She had not been able to get healthcare for a while as she was not enrolled in her 's plan through the  but has been able to get enrolled in Medicare since December.     She started to have abdominal bloating in mid-December 2018. Initially, she had attributed this problem to baseline IBS and was treated at that time with various combinations of Senna, Miralax, suppositories, and enema. Additionally, she lost ~45lbs over the course of one month (174lb to 130lb) which she reports was primarily due to diffuse prandial-associated abdominal pain described mostly as cramping sensation. During this time she could only tolerated clear liquids, such as Jello and broth.      Her workup in  mid-December started with an ultrasound which revealed cholelithiasis and large pelvic mass, therefore, follow-up of CT abdomen/pelvis was completed on 01/06/19. Origin of her mass has been unlcear, but she was found to have a 20 cm abdominal mass and a picture of chronic severe left hydronephrosis. She had a follow-up CT guided core biopsy on 02/06/19 of abdominal mass which revealed a low grade sarcoma, which has been verified by our pathologists as well. *See clinic oncology note for uploaded report.*     In addition, she has a 3-4 cm x 2 cm lesion on the posterior right shoulder and right lateral neck region with some vascularity, bullous with crusting. She notes that this lesion had been present for eight years and endorses some pruritus and periods of resolution, denies any pain or bleeding with the lesion.       She presented to outpatient clinic here on 03/27/2019 as a referral from [facility] to discuss further treatment options. Plan made for in-patient admission for urology evaluation and possible initiation of chemotherapy.         In addition, the pt has a 3-4 cm x 2 cm lesion on the posterior R shoulder and R lateral neck region with some vascularity, bullous with crusting which the pt has not had follow-up for as of this visit. She notes that this lesion had been present for eight years and endorses some pruritus and periods of resolution, denies any pain or bleeding with the lesion.       Pt had cologuard completed for colorectal cancer screening on December 2018, which was negative for malignancy.      She presented to [facility] 4/1/19 for planned in-patient management of her L-sided hydronephrosis and initiation of AIM chemotherapy. Following better control of her pain, her appetite improved and she was able to increase her food intake and regain weight (up to 149lbs).  Admission labs notable for microcytic anemia with low iron studies and slight hypercalcemia (10.8 corrected). Evaluated  by urology on day of admission. Based on assessment of prior imaging, it was determined that, based on hydronephrosis and degree of atrophy, the function of the left kidney was not salvageable and that risk of nephrostomy tube or stent outweighed benefit. She had a PICC line placed on day of admission. Chemotherapy was initiated on 19 with Doxorubucin, Ifosfamide, and Mesnex. Symptoms during chemotherapy initiation included intermittent abdominal pain and nausea that were controlled with PRN Zofran and Oxycodone. Patient noted to develop new onset issues with fine motor movement of fingers on ; out of concern for neurological toxicity, Ifosfamide was discontinued with symptom resolution. On , she developed increased urinary frequency with lower back pain with radiation around the left hip; UA ordered, but negative for infection. Pain resolved after dose of Gabapentin and applying heating pads, suspect could be secondary to radiculopathy from tumor burden. Neulasta was administered on day after discharge.        Subjective:   The pt had shedding of hair last week but has completely lost the rest of it. Her abdominal pain has been well controlled at home. Pt has had pain and ringing in ears, worse on the R side; she suspects that she is having an ear infection. Denies any significant weight changes, nausea/vomitng, constipation, diarrhea. Her neuropathy has been stable on gabapentin and hand tremors have become much better/ resolved for most part.     We looked over images of new PET-CT together but official read was still pending. Her PCP is Dr. Valles at [facility].     Past Medical History:   Past Medical History:   Diagnosis Date    Cancer     Gallstones     GERD (gastroesophageal reflux disease)     Hypertension     SVT (supraventricular tachycardia)        Past Surgical HIstory:   Past Surgical History:   Procedure Laterality Date     SECTION      X3    HYSTERECTOMY       Partial    TUBAL LIGATION         Family History:   Family History   Problem Relation Age of Onset    Lung disease Mother     Pancreatic cancer Father     No Known Problems Sister     Hypertension Brother     No Known Problems Maternal Aunt     No Known Problems Maternal Uncle     No Known Problems Paternal Aunt     No Known Problems Paternal Uncle     No Known Problems Maternal Grandmother     No Known Problems Maternal Grandfather     No Known Problems Paternal Grandmother     No Known Problems Paternal Grandfather     No Known Problems Daughter     No Known Problems Daughter     No Known Problems Son        Social History:  reports that she has never smoked. She has never used smokeless tobacco. She reports that she does not drink alcohol or use drugs.    Allergies:  Review of patient's allergies indicates:  No Known Allergies    Medications:  Current Outpatient Medications   Medication Sig Dispense Refill    amLODIPine (NORVASC) 5 MG tablet Take 1 tablet (5 mg total) by mouth once daily. 30 tablet 11    amoxicillin-clavulanate 875-125mg (AUGMENTIN) 875-125 mg per tablet Take 1 tablet by mouth 2 (two) times daily. for 7 days 14 tablet 0    diphenhydrAMINE-aluminum-magnesium hydroxide-simethicone-lidocaine HCl 2% Swish and spit 15 mLs every 4 (four) hours as needed. 300 mL 0    gabapentin (NEURONTIN) 300 MG capsule Take 1 capsule (300 mg total) by mouth every evening. Take one pill (300 mg at night), can take up to two a night (600 mg) if tolerated. 90 capsule 2    HYDROcodone-acetaminophen (NORCO)  mg per tablet Take 1 tablet by mouth every 6 (six) hours as needed for Pain. 120 tablet 0    magic mouthwash diphen/antac/lidoc Swish and spit 15 mLs every 4 (four) hours as needed. 300 mL 0    morphine (MS CONTIN) 30 MG 12 hr tablet Take 1 tablet (30 mg total) by mouth every 8 (eight) hours as needed for Pain. (Patient taking differently: Take 30 mg by mouth every 8 (eight) hours as needed  "for Pain (patient takes at least once a day). ) 60 tablet 0    ondansetron (ZOFRAN-ODT) 4 MG TbDL Dissolve 2 tablets (8 mg total) by mouth every 6 (six) hours as needed. 30 tablet 0    polyethylene glycol (GLYCOLAX) 17 gram/dose powder Mix 1 capful (17 g) with liquid and take by mouth daily as needed. 510 g 0    zolpidem (AMBIEN) 5 MG Tab        No current facility-administered medications for this visit.        Review of Systems   Constitutional: Negative for appetite change, chills and fever.   HENT: Negative for sore throat.    Eyes: Negative for visual disturbance.   Respiratory: Negative for cough, chest tightness, shortness of breath and wheezing.    Cardiovascular: Positive for palpitations. Negative for chest pain.   Gastrointestinal: Negative for blood in stool, diarrhea, nausea, rectal pain and vomiting.   Genitourinary: Negative for dysuria.   Musculoskeletal: Negative for gait problem.   Skin: Negative for rash.        + skin lesion   Neurological: Negative for syncope and headaches.   Hematological: Negative for adenopathy. Does not bruise/bleed easily.       ECOG Performance Status: 1   Objective:      Vitals:   Vitals:    04/22/19 1556   BP: 121/60   BP Location: Left arm   Patient Position: Sitting   BP Method: Medium (Automatic)   Pulse: 86   Resp: 20   Temp: (P) 97.9 °F (36.6 °C)   TempSrc: (P) Oral   SpO2: 99%   Weight: 66.6 kg (146 lb 13.2 oz)   Height: (P) 5' 4" (1.626 m)     BMI: Body mass index is 25.2 kg/m² (pended).    Physical Exam   Constitutional: She is oriented to person, place, and time. She appears well-developed. No distress.   HENT:   Head: Normocephalic and atraumatic.   Mouth/Throat: No oropharyngeal exudate.   Eyes: Pupils are equal, round, and reactive to light. EOM are normal. No scleral icterus.   Neck: Normal range of motion. Neck supple.   Cardiovascular: Normal rate, regular rhythm and normal heart sounds. Exam reveals no gallop and no friction rub.   No murmur " heard.  Pulmonary/Chest: Effort normal and breath sounds normal. No respiratory distress. She has no wheezes. She has no rales.   Abdominal: She exhibits mass. There is tenderness. There is no guarding.   Firm, mid-abdomen ; central abdominal bruit   Musculoskeletal: Normal range of motion. She exhibits no edema.   Lymphadenopathy:     She has no cervical adenopathy.   Neurological: She is alert and oriented to person, place, and time. No cranial nerve deficit.   Skin: Skin is warm and dry. No rash noted. She is not diaphoretic.   3-4 cm lesion in R posterior neck, R medial shoulder area; vascular and bullous   Psychiatric: She has a normal mood and affect.       Laboratory Data:  Lab Visit on 04/22/2019   Component Date Value Ref Range Status    WBC 04/22/2019 7.88  3.90 - 12.70 K/uL Final    RBC 04/22/2019 3.66* 4.00 - 5.40 M/uL Final    Hemoglobin 04/22/2019 8.0* 12.0 - 16.0 g/dL Final    Hematocrit 04/22/2019 26.9* 37.0 - 48.5 % Final    MCV 04/22/2019 74* 82 - 98 fL Final    MCH 04/22/2019 21.9* 27.0 - 31.0 pg Final    MCHC 04/22/2019 29.7* 32.0 - 36.0 g/dL Final    RDW 04/22/2019 20.8* 11.5 - 14.5 % Final    Platelets 04/22/2019 660* 150 - 350 K/uL Final    MPV 04/22/2019 10.1  9.2 - 12.9 fL Final    Immature Granulocytes 04/22/2019 1.1* 0.0 - 0.5 % Final    Gran # (ANC) 04/22/2019 5.3  1.8 - 7.7 K/uL Final    Immature Grans (Abs) 04/22/2019 0.09* 0.00 - 0.04 K/uL Final    Comment: Mild elevation in immature granulocytes is non specific and   can be seen in a variety of conditions including stress response,   acute inflammation, trauma and pregnancy. Correlation with other   laboratory and clinical findings is essential.      Lymph # 04/22/2019 1.3  1.0 - 4.8 K/uL Final    Mono # 04/22/2019 1.0  0.3 - 1.0 K/uL Final    Eos # 04/22/2019 0.0  0.0 - 0.5 K/uL Final    Baso # 04/22/2019 0.16  0.00 - 0.20 K/uL Final    nRBC 04/22/2019 0  0 /100 WBC Final    Gran% 04/22/2019 67.7  38.0 - 73.0 %  Final    Lymph% 04/22/2019 16.9* 18.0 - 48.0 % Final    Mono% 04/22/2019 12.2  4.0 - 15.0 % Final    Eosinophil% 04/22/2019 0.1  0.0 - 8.0 % Final    Basophil% 04/22/2019 2.0* 0.0 - 1.9 % Final    Differential Method 04/22/2019 Automated   Final    Sodium 04/22/2019 134* 136 - 145 mmol/L Final    Potassium 04/22/2019 3.9  3.5 - 5.1 mmol/L Final    Chloride 04/22/2019 104  95 - 110 mmol/L Final    CO2 04/22/2019 22* 23 - 29 mmol/L Final    Glucose 04/22/2019 108  70 - 110 mg/dL Final    BUN, Bld 04/22/2019 11  8 - 23 mg/dL Final    Creatinine 04/22/2019 0.7  0.5 - 1.4 mg/dL Final    Calcium 04/22/2019 10.2  8.7 - 10.5 mg/dL Final    Total Protein 04/22/2019 7.1  6.0 - 8.4 g/dL Final    Albumin 04/22/2019 2.9* 3.5 - 5.2 g/dL Final    Total Bilirubin 04/22/2019 0.5  0.1 - 1.0 mg/dL Final    Comment: For infants and newborns, interpretation of results should be based  on gestational age, weight and in agreement with clinical  observations.  Premature Infant recommended reference ranges:  Up to 24 hours.............<8.0 mg/dL  Up to 48 hours............<12.0 mg/dL  3-5 days..................<15.0 mg/dL  6-29 days.................<15.0 mg/dL      Alkaline Phosphatase 04/22/2019 139* 55 - 135 U/L Final    AST 04/22/2019 23  10 - 40 U/L Final    ALT 04/22/2019 15  10 - 44 U/L Final    Anion Gap 04/22/2019 8  8 - 16 mmol/L Final    eGFR if African American 04/22/2019 >60.0  >60 mL/min/1.73 m^2 Final    eGFR if non African American 04/22/2019 >60.0  >60 mL/min/1.73 m^2 Final    Comment: Calculation used to obtain the estimated glomerular filtration  rate (eGFR) is the CKD-EPI equation.       Magnesium 04/22/2019 1.8  1.6 - 2.6 mg/dL Final    Phosphorus 04/22/2019 2.5* 2.7 - 4.5 mg/dL Final   Hospital Outpatient Visit on 04/22/2019   Component Date Value Ref Range Status    POCT Glucose 04/22/2019 95  70 - 110 mg/dL Final         Imaging:   Assessment:       1. Sarcoma    2. Neuropathy    3. Iron  deficiency anemia, unspecified iron deficiency anemia type           Plan:     Low Grade Sarcoma  - 20 cm mass noted in CT abdomen/pelvis; L ureter noted to be have left sided hydronephresis and noted to have some mild inguinal adneopathy  - path reviewed at our institution also consistent with sarcoma as well  - Reviewed images and discussed with Dr. Mota previously, pt is not a surgical candidate at this time as significant amount of central vasculature (aorta, iliac arteries/veins) involved and significant amount of vascularity to mass  - PET-CT reveals a large anterior abdominal mass, low activity sarcoma without significant metastatic spread, SUV max of 4.7   - Echo with EF of 60%, normal LV systolic function and indeterminate diastolic function  - tumor causing pain in central abdomen as well as compression of L ureter and causing hydronephrosis  - Inpatient AIM C1 completed  (adrimaycin, ifosfomide, mesna) next week given symptoms above, discussed palliative intent of therapy  - went over risks/benefits/ side effects of chemotherapy and pt wishes to proceed with therapy, consent form signed and form submitted to be scanned into system  - Ifosfomide dropped on day 3 due to neurotoxicity  - PET- CT results still pending, will discuss additional oral pazopanib vs. adramiacyin (vs. Adriamycin/dacarbazine, etc) after official read     Hypercalcemia of malignancy  - mild, around baseline of 11 today     Severe L sided hydronephrosis   - from external compression of large sarcoma  - left kidney very atrophic and not salvageable per Nephrology     Neuropathy, possible L sided sciatica  - pt with lower back pain around site of tumor  - continue gabapentin 300 mg at night for pain      HTN  - continue norvasc 5     SVT  - unclear of rhythm  - no AFib noted while pt was admitted into hospital     IBS  - continue laxatives PRN - can use senna + miralax, while on pain meds        Follow-up:   Follow up PET-CT official  read  Plan to follow-up in 2 weeks with CBC, CMP, Mg, Phos prior to clinic visit in two weeks  Will attempt to setup patient at [facility] in meantime    Pt prefers to be emailed at [email or web address]    Discussed with Dr. Marcelino Silvestre MD PGY IV  Hematology and Oncology Fellow  [facility]            ATTENDING NOTE, ONCOLOGY CLINIC    As above; events of last 24 hours noted.  Patient seen and examined, chart reviewed.  Appears comfortable, in NAD.  Lungs are clear to auscultation.  Abdomen has a large palpable mass.  There is an abdominal bruit noted on auscultation..  Labs reviewed.    PLAN  We will wait for the official reading of the PET scan tomorrow, and will call the patient with the results.  She is not sure whether she wants to pursue any further treatments at our facility.      Norman Cazares MD

## 2019-04-29 ENCOUNTER — TELEPHONE (OUTPATIENT)
Dept: HEMATOLOGY/ONCOLOGY | Facility: CLINIC | Age: 64
End: 2019-04-29

## 2019-04-29 DIAGNOSIS — C49.9 SARCOMA: Primary | ICD-10-CM

## 2019-05-03 ENCOUNTER — TELEPHONE (OUTPATIENT)
Dept: HEMATOLOGY/ONCOLOGY | Facility: CLINIC | Age: 64
End: 2019-05-03

## 2019-05-06 ENCOUNTER — HOSPITAL ENCOUNTER (INPATIENT)
Facility: HOSPITAL | Age: 64
LOS: 4 days | Discharge: HOME | DRG: 847 | End: 2019-05-10
Attending: INTERNAL MEDICINE | Admitting: INTERNAL MEDICINE
Payer: MEDICARE

## 2019-05-06 ENCOUNTER — HOSPITAL ENCOUNTER (OUTPATIENT)
Dept: CARDIOLOGY | Facility: CLINIC | Age: 64
Discharge: HOME | DRG: 847 | End: 2019-05-06
Attending: STUDENT IN AN ORGANIZED HEALTH CARE EDUCATION/TRAINING PROGRAM
Payer: MEDICARE

## 2019-05-06 ENCOUNTER — OFFICE VISIT (OUTPATIENT)
Dept: HEMATOLOGY/ONCOLOGY | Facility: CLINIC | Age: 64
DRG: 847 | End: 2019-05-06
Payer: MEDICARE

## 2019-05-06 VITALS
HEIGHT: 64 IN | OXYGEN SATURATION: 99 % | SYSTOLIC BLOOD PRESSURE: 132 MMHG | HEART RATE: 83 BPM | WEIGHT: 146.81 LBS | TEMPERATURE: 98 F | BODY MASS INDEX: 25.06 KG/M2 | DIASTOLIC BLOOD PRESSURE: 62 MMHG | RESPIRATION RATE: 16 BRPM

## 2019-05-06 VITALS
SYSTOLIC BLOOD PRESSURE: 121 MMHG | HEART RATE: 80 BPM | BODY MASS INDEX: 24.92 KG/M2 | WEIGHT: 146 LBS | DIASTOLIC BLOOD PRESSURE: 60 MMHG | HEIGHT: 64 IN

## 2019-05-06 DIAGNOSIS — I10 ESSENTIAL HYPERTENSION: ICD-10-CM

## 2019-05-06 DIAGNOSIS — K58.9 IRRITABLE BOWEL SYNDROME, UNSPECIFIED TYPE: ICD-10-CM

## 2019-05-06 DIAGNOSIS — C49.9 SARCOMA: ICD-10-CM

## 2019-05-06 DIAGNOSIS — G62.9 NEUROPATHY: ICD-10-CM

## 2019-05-06 DIAGNOSIS — R35.0 INCREASED URINARY FREQUENCY: ICD-10-CM

## 2019-05-06 DIAGNOSIS — C49.4 PRIMARY INTRA-ABDOMINAL SARCOMA: Primary | ICD-10-CM

## 2019-05-06 DIAGNOSIS — R07.9 CHEST PAIN: ICD-10-CM

## 2019-05-06 DIAGNOSIS — I47.10 SVT (SUPRAVENTRICULAR TACHYCARDIA): ICD-10-CM

## 2019-05-06 DIAGNOSIS — E83.52 HYPERCALCEMIA OF MALIGNANCY: ICD-10-CM

## 2019-05-06 LAB
ASCENDING AORTA: 3.44 CM
AV INDEX (PROSTH): 0.68
AV MEAN GRADIENT: 4.52 MMHG
AV PEAK GRADIENT: 9.24 MMHG
AV VALVE AREA: 2.59 CM2
AV VELOCITY RATIO: 0.72
BILIRUB UR QL STRIP: NEGATIVE
BSA FOR ECHO PROCEDURE: 1.73 M2
CLARITY UR REFRACT.AUTO: ABNORMAL
COLOR UR AUTO: YELLOW
CV ECHO LV RWT: 0.31 CM
DOP CALC AO PEAK VEL: 1.52 M/S
DOP CALC AO VTI: 30.73 CM
DOP CALC LVOT AREA: 3.8 CM2
DOP CALC LVOT DIAMETER: 2.2 CM
DOP CALC LVOT PEAK VEL: 1.1 M/S
DOP CALC LVOT STROKE VOLUME: 79.45 CM3
DOP CALCLVOT PEAK VEL VTI: 20.91 CM
E WAVE DECELERATION TIME: 167.26 MSEC
E/A RATIO: 1.15
E/E' RATIO: 11.78
ECHO LV POSTERIOR WALL: 0.83 CM (ref 0.6–1.1)
ESTIMATED AVG GLUCOSE: 105 MG/DL (ref 68–131)
FRACTIONAL SHORTENING: 34 % (ref 28–44)
GLUCOSE UR QL STRIP: NEGATIVE
HBA1C MFR BLD HPLC: 5.3 % (ref 4–5.6)
HGB UR QL STRIP: NEGATIVE
INR PPP: 1.1 (ref 0.8–1.2)
INTERVENTRICULAR SEPTUM: 0.72 CM (ref 0.6–1.1)
IVRT: 0.07 MSEC
KETONES UR QL STRIP: ABNORMAL
LA MAJOR: 5.5 CM
LA MINOR: 5.5 CM
LA WIDTH: 5.2 CM
LEFT ATRIUM SIZE: 4.51 CM
LEFT ATRIUM VOLUME INDEX: 64.1 ML/M2
LEFT ATRIUM VOLUME: 109.64 CM3
LEFT INTERNAL DIMENSION IN SYSTOLE: 3.57 CM (ref 2.1–4)
LEFT VENTRICLE DIASTOLIC VOLUME INDEX: 81.4 ML/M2
LEFT VENTRICLE DIASTOLIC VOLUME: 139.29 ML
LEFT VENTRICLE MASS INDEX: 86.2 G/M2
LEFT VENTRICLE SYSTOLIC VOLUME INDEX: 31.1 ML/M2
LEFT VENTRICLE SYSTOLIC VOLUME: 53.25 ML
LEFT VENTRICULAR INTERNAL DIMENSION IN DIASTOLE: 5.37 CM (ref 3.5–6)
LEFT VENTRICULAR MASS: 147.46 G
LEUKOCYTE ESTERASE UR QL STRIP: NEGATIVE
LV LATERAL E/E' RATIO: 10.6
LV SEPTAL E/E' RATIO: 13.25
MV PEAK A VEL: 0.92 M/S
MV PEAK E VEL: 1.06 M/S
NITRITE UR QL STRIP: NEGATIVE
PH UR STRIP: 8 [PH] (ref 5–8)
PISA TR MAX VEL: 2.72 M/S
PROT UR QL STRIP: NEGATIVE
PROTHROMBIN TIME: 11.1 SEC (ref 9–12.5)
RA MAJOR: 5.05 CM
RA PRESSURE: 3 MMHG
RA WIDTH: 3.86 CM
RIGHT VENTRICULAR END-DIASTOLIC DIMENSION: 4.02 CM
SINUS: 3.23 CM
SP GR UR STRIP: 1.01 (ref 1–1.03)
STJ: 2.98 CM
TDI LATERAL: 0.1
TDI SEPTAL: 0.08
TDI: 0.09
TR MAX PG: 29.59 MMHG
TRICUSPID ANNULAR PLANE SYSTOLIC EXCURSION: 3 CM
TROPONIN I SERPL DL<=0.01 NG/ML-MCNC: <0.006 NG/ML (ref 0–0.03)
TV REST PULMONARY ARTERY PRESSURE: 33 MMHG
URN SPEC COLLECT METH UR: ABNORMAL

## 2019-05-06 PROCEDURE — 36415 COLL VENOUS BLD VENIPUNCTURE: CPT

## 2019-05-06 PROCEDURE — 99223 PR INITIAL HOSPITAL CARE,LEVL III: ICD-10-PCS | Mod: AI,,, | Performed by: INTERNAL MEDICINE

## 2019-05-06 PROCEDURE — 3008F BODY MASS INDEX DOCD: CPT | Mod: CPTII,GC,S$GLB, | Performed by: STUDENT IN AN ORGANIZED HEALTH CARE EDUCATION/TRAINING PROGRAM

## 2019-05-06 PROCEDURE — 81003 URINALYSIS AUTO W/O SCOPE: CPT

## 2019-05-06 PROCEDURE — 3075F PR MOST RECENT SYSTOLIC BLOOD PRESS GE 130-139MM HG: ICD-10-PCS | Mod: CPTII,GC,S$GLB, | Performed by: STUDENT IN AN ORGANIZED HEALTH CARE EDUCATION/TRAINING PROGRAM

## 2019-05-06 PROCEDURE — 93306 TRANSTHORACIC ECHO (TTE) COMPLETE (CUPID ONLY): ICD-10-PCS | Mod: S$GLB,,, | Performed by: INTERNAL MEDICINE

## 2019-05-06 PROCEDURE — 99499 UNLISTED E&M SERVICE: CPT | Mod: GC,S$GLB,, | Performed by: STUDENT IN AN ORGANIZED HEALTH CARE EDUCATION/TRAINING PROGRAM

## 2019-05-06 PROCEDURE — 93010 EKG 12-LEAD: ICD-10-PCS | Mod: ,,, | Performed by: INTERNAL MEDICINE

## 2019-05-06 PROCEDURE — 83036 HEMOGLOBIN GLYCOSYLATED A1C: CPT

## 2019-05-06 PROCEDURE — 63600175 PHARM REV CODE 636 W HCPCS: Performed by: STUDENT IN AN ORGANIZED HEALTH CARE EDUCATION/TRAINING PROGRAM

## 2019-05-06 PROCEDURE — 99499 NO LOS: ICD-10-PCS | Mod: GC,S$GLB,, | Performed by: STUDENT IN AN ORGANIZED HEALTH CARE EDUCATION/TRAINING PROGRAM

## 2019-05-06 PROCEDURE — 3008F PR BODY MASS INDEX (BMI) DOCUMENTED: ICD-10-PCS | Mod: CPTII,GC,S$GLB, | Performed by: STUDENT IN AN ORGANIZED HEALTH CARE EDUCATION/TRAINING PROGRAM

## 2019-05-06 PROCEDURE — 84484 ASSAY OF TROPONIN QUANT: CPT

## 2019-05-06 PROCEDURE — 3078F DIAST BP <80 MM HG: CPT | Mod: CPTII,GC,S$GLB, | Performed by: STUDENT IN AN ORGANIZED HEALTH CARE EDUCATION/TRAINING PROGRAM

## 2019-05-06 PROCEDURE — 1101F PR PT FALLS ASSESS DOC 0-1 FALLS W/OUT INJ PAST YR: ICD-10-PCS | Mod: CPTII,GC,S$GLB, | Performed by: STUDENT IN AN ORGANIZED HEALTH CARE EDUCATION/TRAINING PROGRAM

## 2019-05-06 PROCEDURE — 99999 PR PBB SHADOW E&M-EST. PATIENT-LVL III: CPT | Mod: PBBFAC,GC,, | Performed by: STUDENT IN AN ORGANIZED HEALTH CARE EDUCATION/TRAINING PROGRAM

## 2019-05-06 PROCEDURE — 3078F PR MOST RECENT DIASTOLIC BLOOD PRESSURE < 80 MM HG: ICD-10-PCS | Mod: CPTII,GC,S$GLB, | Performed by: STUDENT IN AN ORGANIZED HEALTH CARE EDUCATION/TRAINING PROGRAM

## 2019-05-06 PROCEDURE — 1101F PT FALLS ASSESS-DOCD LE1/YR: CPT | Mod: CPTII,GC,S$GLB, | Performed by: STUDENT IN AN ORGANIZED HEALTH CARE EDUCATION/TRAINING PROGRAM

## 2019-05-06 PROCEDURE — 25000003 PHARM REV CODE 250: Performed by: STUDENT IN AN ORGANIZED HEALTH CARE EDUCATION/TRAINING PROGRAM

## 2019-05-06 PROCEDURE — 99999 PR PBB SHADOW E&M-EST. PATIENT-LVL III: ICD-10-PCS | Mod: PBBFAC,GC,, | Performed by: STUDENT IN AN ORGANIZED HEALTH CARE EDUCATION/TRAINING PROGRAM

## 2019-05-06 PROCEDURE — 93306 TTE W/DOPPLER COMPLETE: CPT | Mod: S$GLB,,, | Performed by: INTERNAL MEDICINE

## 2019-05-06 PROCEDURE — 3075F SYST BP GE 130 - 139MM HG: CPT | Mod: CPTII,GC,S$GLB, | Performed by: STUDENT IN AN ORGANIZED HEALTH CARE EDUCATION/TRAINING PROGRAM

## 2019-05-06 PROCEDURE — 99223 1ST HOSP IP/OBS HIGH 75: CPT | Mod: AI,,, | Performed by: INTERNAL MEDICINE

## 2019-05-06 PROCEDURE — 93005 ELECTROCARDIOGRAM TRACING: CPT

## 2019-05-06 PROCEDURE — 20600001 HC STEP DOWN PRIVATE ROOM

## 2019-05-06 PROCEDURE — 93010 ELECTROCARDIOGRAM REPORT: CPT | Mod: ,,, | Performed by: INTERNAL MEDICINE

## 2019-05-06 PROCEDURE — 85610 PROTHROMBIN TIME: CPT

## 2019-05-06 RX ORDER — ZOLPIDEM TARTRATE 5 MG/1
5 TABLET ORAL NIGHTLY PRN
Status: DISCONTINUED | OUTPATIENT
Start: 2019-05-06 | End: 2019-05-10 | Stop reason: HOSPADM

## 2019-05-06 RX ORDER — AMLODIPINE BESYLATE 5 MG/1
5 TABLET ORAL DAILY
Status: DISCONTINUED | OUTPATIENT
Start: 2019-05-07 | End: 2019-05-09

## 2019-05-06 RX ORDER — HYDROCODONE BITARTRATE AND ACETAMINOPHEN 10; 325 MG/1; MG/1
1 TABLET ORAL EVERY 6 HOURS PRN
Status: DISCONTINUED | OUTPATIENT
Start: 2019-05-06 | End: 2019-05-06

## 2019-05-06 RX ORDER — SODIUM CHLORIDE 0.9 % (FLUSH) 0.9 %
10 SYRINGE (ML) INJECTION
Status: DISCONTINUED | OUTPATIENT
Start: 2019-05-06 | End: 2019-05-10 | Stop reason: HOSPADM

## 2019-05-06 RX ORDER — ENOXAPARIN SODIUM 100 MG/ML
40 INJECTION SUBCUTANEOUS EVERY 24 HOURS
Status: DISCONTINUED | OUTPATIENT
Start: 2019-05-06 | End: 2019-05-07

## 2019-05-06 RX ORDER — OXYCODONE HYDROCHLORIDE 5 MG/1
5 TABLET ORAL EVERY 6 HOURS PRN
Status: DISCONTINUED | OUTPATIENT
Start: 2019-05-06 | End: 2019-05-10 | Stop reason: HOSPADM

## 2019-05-06 RX ORDER — ONDANSETRON 8 MG/1
8 TABLET, ORALLY DISINTEGRATING ORAL EVERY 6 HOURS PRN
Status: DISCONTINUED | OUTPATIENT
Start: 2019-05-06 | End: 2019-05-07

## 2019-05-06 RX ORDER — IBUPROFEN 200 MG
24 TABLET ORAL
Status: DISCONTINUED | OUTPATIENT
Start: 2019-05-06 | End: 2019-05-10 | Stop reason: HOSPADM

## 2019-05-06 RX ORDER — POLYETHYLENE GLYCOL 3350 17 G/17G
17 POWDER, FOR SOLUTION ORAL DAILY
Status: DISCONTINUED | OUTPATIENT
Start: 2019-05-06 | End: 2019-05-10 | Stop reason: HOSPADM

## 2019-05-06 RX ORDER — MORPHINE SULFATE 15 MG/1
30 TABLET, FILM COATED, EXTENDED RELEASE ORAL EVERY 8 HOURS PRN
Status: DISCONTINUED | OUTPATIENT
Start: 2019-05-06 | End: 2019-05-06

## 2019-05-06 RX ORDER — GLUCAGON 1 MG
1 KIT INJECTION
Status: DISCONTINUED | OUTPATIENT
Start: 2019-05-06 | End: 2019-05-10 | Stop reason: HOSPADM

## 2019-05-06 RX ORDER — PROCHLORPERAZINE MALEATE 5 MG
10 TABLET ORAL EVERY 6 HOURS PRN
Status: DISCONTINUED | OUTPATIENT
Start: 2019-05-06 | End: 2019-05-07

## 2019-05-06 RX ORDER — POLYETHYLENE GLYCOL 3350 17 G/17G
17 POWDER, FOR SOLUTION ORAL DAILY
Status: DISCONTINUED | OUTPATIENT
Start: 2019-05-07 | End: 2019-05-06

## 2019-05-06 RX ORDER — GABAPENTIN 300 MG/1
300 CAPSULE ORAL NIGHTLY
Status: DISCONTINUED | OUTPATIENT
Start: 2019-05-06 | End: 2019-05-10 | Stop reason: HOSPADM

## 2019-05-06 RX ORDER — SODIUM CHLORIDE 9 MG/ML
INJECTION, SOLUTION INTRAVENOUS CONTINUOUS
Status: DISCONTINUED | OUTPATIENT
Start: 2019-05-06 | End: 2019-05-08

## 2019-05-06 RX ORDER — AMOXICILLIN 250 MG
1 CAPSULE ORAL 2 TIMES DAILY
Status: DISCONTINUED | OUTPATIENT
Start: 2019-05-06 | End: 2019-05-06

## 2019-05-06 RX ORDER — IBUPROFEN 200 MG
16 TABLET ORAL
Status: DISCONTINUED | OUTPATIENT
Start: 2019-05-06 | End: 2019-05-10 | Stop reason: HOSPADM

## 2019-05-06 RX ADMIN — POLYETHYLENE GLYCOL 3350 17 G: 17 POWDER, FOR SOLUTION ORAL at 05:05

## 2019-05-06 RX ADMIN — ENOXAPARIN SODIUM 40 MG: 100 INJECTION SUBCUTANEOUS at 05:05

## 2019-05-06 RX ADMIN — ZOLPIDEM TARTRATE 5 MG: 5 TABLET ORAL at 09:05

## 2019-05-06 RX ADMIN — SODIUM CHLORIDE: 0.9 INJECTION, SOLUTION INTRAVENOUS at 06:05

## 2019-05-06 RX ADMIN — GABAPENTIN 300 MG: 300 CAPSULE ORAL at 09:05

## 2019-05-06 NOTE — HPI
63 Year old female with abdominal Low Grade Sarcoma,  left sided hydronephresis s/p inpatient cycle 1 of AIM chemotherapy on 4/2 now presented for cycle 2 with single agent adriamycin. She was initially seen in the clinic by Dr. Silvestre and admitted to Oncology service.     She denied of any fever, chills, abdominal pain, chest pain, SOB, leg pain, nausea, vomiting, diarrhea, dysuria or dizziness.     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 She was referred by a Dr. Rosa, who is a general surgeon in the [place] area.  She had not been able to get healthcare for a while as she was not enrolled in her 's plan through the  but has been able to get enrolled in Medicare since December.     She started to have abdominal bloating in mid-December 2018. Initially, she had attributed this problem to baseline IBS and was treated at that time with various combinations of Senna, Miralax, suppositories, and enema. Additionally, she lost ~45lbs over the course of one month (174lb to 130lb) which she reports was primarily due to diffuse prandial-associated abdominal pain described mostly as cramping sensation. During this time she could only tolerated clear liquids, such as Jello and broth.      Her workup in mid-December started with an ultrasound which revealed cholelithiasis and large pelvic mass, therefore, follow-up of CT abdomen/pelvis was completed on 01/06/19. Origin of her mass has been unlcear, but she was found to have a 20 cm abdominal mass and a picture of chronic severe left hydronephrosis. She had a follow-up CT guided core biopsy on 02/06/19 of abdominal mass which revealed a low grade sarcoma,  which has been verified by our pathologists as well. *See clinic oncology note for uploaded report.*     In addition, she has a 3-4 cm x 2 cm lesion on the posterior right shoulder and right lateral neck region with some vascularity, bullous with crusting. She notes that this lesion had been present for eight years and endorses some pruritus and periods of resolution, denies any pain or bleeding with the lesion.       She presented to outpatient clinic here on 03/27/2019 as a referral from [facility] to discuss further treatment options. Plan made for in-patient admission for urology evaluation and possible initiation of chemotherapy.         In addition, the pt has a 3-4 cm x 2 cm lesion on the posterior R shoulder and R lateral neck region with some vascularity, bullous with crusting which the pt has not had follow-up for as of this visit. She notes that this lesion had been present for eight years and endorses some pruritus and periods of resolution, denies any pain or bleeding with the lesion.       Pt had cologuard completed for colorectal cancer screening on December 2018, which was negative for malignancy.      She presented to [facility] 4/1/19 for planned in-patient management of her L-sided hydronephrosis and initiation of AIM chemotherapy. Following better control of her pain, her appetite improved and she was able to increase her food intake and regain weight (up to 149lbs).  Admission labs notable for microcytic anemia with low iron studies and slight hypercalcemia (10.8 corrected). Evaluated by urology on day of admission. Based on assessment of prior imaging, it was determined that, based on hydronephrosis and degree of atrophy, the function of the left kidney was not salvageable and that risk of nephrostomy tube or stent outweighed benefit. She had a PICC line placed on day of admission. Chemotherapy was initiated on 04/02/19 with Doxorubucin, Ifosfamide, and Mesnex. Symptoms during  chemotherapy initiation included intermittent abdominal pain and nausea that were controlled with PRN Zofran and Oxycodone. Patient noted to develop new onset issues with fine motor movement of fingers on 04/04; out of concern for neurological toxicity, Ifosfamide was discontinued with symptom resolution. On 04/05, she developed increased urinary frequency with lower back pain with radiation around the left hip; UA ordered, but negative for infection. Pain resolved after dose of Gabapentin and applying heating pads, suspect could be secondary to radiculopathy from tumor burden. Neulasta was administered on day after discharge.     The pt had shedding of hair last week but has completely lost the rest of it. Her abdominal pain has been well controlled at home. Pt has had pain and ringing in ears, worse on the R side; she suspects that she is having an ear infection. Denies any significant weight changes, nausea/vomitng, constipation, diarrhea. Her neuropathy has been stable on gabapentin and hand tremors have become much better/ resolved for most part.      Her PCP is Dr. Valles at [facility].

## 2019-05-06 NOTE — HOSPITAL COURSE
05/06/2019: Admitted for cycle 2 Adriamycin chemotherapy for Abdominal low grade sarcoma. PICC line consult placed    During her hospital course, Ms. Neely remained hemodynamically stable and had no issues with her 3 days of adriamycin infusion. She had nausea in the mornings which was controlled with Compro. She did not have vomiting and was able to keep up oral intake as well as remain ambulatory in the halls. Of note, she came in with a CorrCa of 10.9 which improved with fluids and came down to 10.3 on day of discharge. Her MS contin was reduced from 30mg to 15mg BID upon discharge as she did not require much pain control while in house.

## 2019-05-06 NOTE — PROGRESS NOTES
PATIENT: Tiffany Kaur  MRN: [id number]  DATE: 5/6/2019      Diagnosis:   1. Primary intra-abdominal sarcoma        Chief Complaint: Pre-admission visit for chemotherapy    Oncologic History:   Low Grade Sarcoma  - 20 cm mass noted in outside CT abdomen/pelvis; L ureter noted to be have left sided hydronephrosis and noted to have some mild inguinal adneopathy  - PET-CT on 3/27 reveals a large anterior abdominal mass, low activity sarcoma without significant metastatic spread, SUV max of 4.7   - Inpatient AIM C1 (4/2/19 - 4/5/19) completed  (adrimaycin, ifosfomide, mesna) next week given symptoms above, discussed palliative intent of therapy  - Ifosfomide dropped on day 3 due to neurotoxicity  - repeat PET-CT with large abdominal mass with SUV max of 3.9      Pt is a 64 yo  female with PMhx of SVT (possible AFib), chronic IBS (describes a history of suffering from constipation as a child), HTN who presents to the hospital to discuss further options at treating recently found low grade sarcoma in her abdomen, workup for this which was started in [facility]. She was referred by a Dr. Rosa, who is a general surgeon in the [place] area.  She had not been able to get healthcare for a while as she was not enrolled in her 's plan through the  but has been able to get enrolled in Medicare since December.     She started to have abdominal bloating in mid-December 2018. Initially, she had attributed this problem to baseline IBS and was treated at that time with various combinations of Senna, Miralax, suppositories, and enema. Additionally, she lost ~45lbs over the course of one month (174lb to 130lb) which she reports was primarily due to diffuse prandial-associated abdominal pain described mostly as cramping sensation. During this time she could only tolerated clear liquids, such as Jello and broth.      Her workup in mid-December started with an ultrasound which revealed  cholelithiasis and large pelvic mass, therefore, follow-up of CT abdomen/pelvis was completed on 01/06/19. Origin of her mass has been unlcear, but she was found to have a 20 cm abdominal mass and a picture of chronic severe left hydronephrosis. She had a follow-up CT guided core biopsy on 02/06/19 of abdominal mass which revealed a low grade sarcoma, which has been verified by our pathologists as well. *See clinic oncology note for uploaded report.*     In addition, she has a 3-4 cm x 2 cm lesion on the posterior right shoulder and right lateral neck region with some vascularity, bullous with crusting. She notes that this lesion had been present for eight years and endorses some pruritus and periods of resolution, denies any pain or bleeding with the lesion.       She presented to outpatient clinic here on 03/27/2019 as a referral from [facility] to discuss further treatment options. Plan made for in-patient admission for urology evaluation and possible initiation of chemotherapy.         In addition, the pt has a 3-4 cm x 2 cm lesion on the posterior R shoulder and R lateral neck region with some vascularity, bullous with crusting which the pt has not had follow-up for as of this visit. She notes that this lesion had been present for eight years and endorses some pruritus and periods of resolution, denies any pain or bleeding with the lesion.       Pt had cologuard completed for colorectal cancer screening on December 2018, which was negative for malignancy.      She presented to [facility] 4/1/19 for planned in-patient management of her L-sided hydronephrosis and initiation of AIM chemotherapy. Following better control of her pain, her appetite improved and she was able to increase her food intake and regain weight (up to 149lbs).  Admission labs notable for microcytic anemia with low iron studies and slight hypercalcemia (10.8 corrected). Evaluated by urology on day of admission. Based on assessment of  prior imaging, it was determined that, based on hydronephrosis and degree of atrophy, the function of the left kidney was not salvageable and that risk of nephrostomy tube or stent outweighed benefit. She had a PICC line placed on day of admission. Chemotherapy was initiated on 04/02/19 with Doxorubucin, Ifosfamide, and Mesnex. Symptoms during chemotherapy initiation included intermittent abdominal pain and nausea that were controlled with PRN Zofran and Oxycodone. Patient noted to develop new onset issues with fine motor movement of fingers on 04/04; out of concern for neurological toxicity, Ifosfamide was discontinued with symptom resolution. On 04/05, she developed increased urinary frequency with lower back pain with radiation around the left hip; UA ordered, but negative for infection. Pain resolved after dose of Gabapentin and applying heating pads, suspect could be secondary to radiculopathy from tumor burden. Neulasta was administered on day after discharge.     The pt had shedding of hair after first week of chemohterapy but then completely lost the rest of it. Her abdominal pain has been well controlled at home. Pt has had pain and ringing in ears, worse on the R side; she suspects that she is having an ear infection; she was prescribed a course of augmentin for this problem. . Her neuropathy has been stable on gabapentin and hand tremors have become much better/ resolved for most part.      Subjective:   She had been consented during last round for AIM, which includes the single agent adriamycin that she will be getting for this second cycle; ifosfomide dropped again due to neurotoxicity (hand tremors, blurry vision). She mentions that pain in stomach had returned last week, where she required morphine about once a day but pt has done better over last few days leading up to appointment. She denies any constipation, diarrhea, bleeding, bruising, fevers, sweats, chills.  Her EF is noted to be 60% on Echo  today. Discussed plans for fluids today and getting PICC line in and likely starting chemotherapy tomorrow.      Past Medical History:   Past Medical History:   Diagnosis Date    Cancer     Gallstones     GERD (gastroesophageal reflux disease)     Hypertension     SVT (supraventricular tachycardia)        Past Surgical HIstory:   Past Surgical History:   Procedure Laterality Date     SECTION      X3    HYSTERECTOMY      Partial    TUBAL LIGATION         Family History:   Family History   Problem Relation Age of Onset    Lung disease Mother     Pancreatic cancer Father     No Known Problems Sister     Hypertension Brother     No Known Problems Maternal Aunt     No Known Problems Maternal Uncle     No Known Problems Paternal Aunt     No Known Problems Paternal Uncle     No Known Problems Maternal Grandmother     No Known Problems Maternal Grandfather     No Known Problems Paternal Grandmother     No Known Problems Paternal Grandfather     No Known Problems Daughter     No Known Problems Daughter     No Known Problems Son        Social History:  reports that she has never smoked. She has never used smokeless tobacco. She reports that she does not drink alcohol or use drugs.    Allergies:  Review of patient's allergies indicates:  No Known Allergies    Medications:  No current facility-administered medications for this visit.      No current outpatient medications on file.     Facility-Administered Medications Ordered in Other Visits   Medication Dose Route Frequency Provider Last Rate Last Dose    0.9%  NaCl infusion   Intravenous Continuous Gokul Hensley  mL/hr at 19 1830      [START ON 2019] amLODIPine tablet 5 mg  5 mg Oral Daily Gokul Hensley MD        dextrose 50% injection 12.5 g  12.5 g Intravenous PRN Gokul Hensley MD        dextrose 50% injection 25 g  25 g Intravenous PRN Gokul Hensley MD        enoxaparin injection 40 mg  40 mg Subcutaneous  Daily Gokul Hensley MD   40 mg at 05/06/19 1739    gabapentin capsule 300 mg  300 mg Oral QHS Gokul Hensley MD        glucagon (human recombinant) injection 1 mg  1 mg Intramuscular PRN Gokul Hensley MD        glucose chewable tablet 16 g  16 g Oral PRN Gokul Hensley MD        glucose chewable tablet 24 g  24 g Oral PRN Gokul Hensley MD        magic mouthwash (diphenhydrAMINE 12.5 mg/5 mL 20 mL, aluminum & magnesium hydroxide-simethicone (MYLANTA) 20 mL, lidocaine HCl 2% (XYLOCAINE) 20 mL) solution  15 mL Swish & Spit Q4H PRN Gokul Hensley MD        ondansetron disintegrating tablet 8 mg  8 mg Oral Q6H PRN Gokul Hensley MD        oxyCODONE immediate release tablet 5 mg  5 mg Oral Q6H PRN Gokul Hensley MD        polyethylene glycol packet 17 g  17 g Oral Daily Gokul Hensley MD   17 g at 05/06/19 1739    prochlorperazine tablet 10 mg  10 mg Oral Q6H PRN Gokul Hensley MD        sodium chloride 0.9% flush 10 mL  10 mL Intravenous PRN Gokul Hensley MD        zolpidem tablet 5 mg  5 mg Oral Nightly PRN Gokul Hensley MD           Review of Systems   Constitutional: Negative for appetite change, chills and fever.   HENT: Negative for sore throat.    Eyes: Negative for visual disturbance.   Respiratory: Negative for cough, chest tightness, shortness of breath and wheezing.    Cardiovascular: Positive for palpitations. Negative for chest pain.   Gastrointestinal: Positive for abdominal pain. Negative for blood in stool, diarrhea, nausea, rectal pain and vomiting.   Genitourinary: Negative for dysuria.   Musculoskeletal: Negative for gait problem.   Skin: Negative for rash.        + skin lesion   Neurological: Negative for syncope and headaches.   Hematological: Negative for adenopathy. Does not bruise/bleed easily.       ECOG Performance Status: 1   Objective:      Vitals:   Vitals:    05/06/19 1239   BP: 132/62   BP Location: Right arm   Patient Position: Sitting  "  BP Method: Medium (Automatic)   Pulse: 83   Resp: 16   Temp: 98.3 °F (36.8 °C)   TempSrc: Oral   SpO2: 99%   Weight: 66.6 kg (146 lb 13.2 oz)   Height: 5' 4" (1.626 m)     BMI: Body mass index is 25.2 kg/m².    Physical Exam   Constitutional: She is oriented to person, place, and time. She appears well-developed. No distress.   HENT:   Head: Normocephalic and atraumatic.   Mouth/Throat: No oropharyngeal exudate.   Eyes: Pupils are equal, round, and reactive to light. EOM are normal. No scleral icterus.   Neck: Normal range of motion. Neck supple.   Cardiovascular: Normal rate, regular rhythm and normal heart sounds. Exam reveals no gallop and no friction rub.   No murmur heard.  Pulmonary/Chest: Effort normal and breath sounds normal. No respiratory distress. She has no wheezes. She has no rales.   Abdominal: She exhibits mass. There is tenderness. There is no guarding.   Firm, mid-abdomen ; central abdominal bruit   Musculoskeletal: Normal range of motion. She exhibits no edema.   Lymphadenopathy:     She has no cervical adenopathy.   Neurological: She is alert and oriented to person, place, and time. No cranial nerve deficit.   Skin: Skin is warm and dry. No rash noted. She is not diaphoretic.   3-4 cm lesion in R posterior neck, R medial shoulder area; vascular and bullous   Psychiatric: She has a normal mood and affect.       Laboratory Data:  Admission on 05/06/2019   Component Date Value Ref Range Status    Troponin I 05/06/2019 <0.006  0.000 - 0.026 ng/mL Final    Comment: The reference interval for Troponin I represents the 99th percentile   cutoff   for our facility and is consistent with 3rd generation assay   performance.      Hemoglobin A1C 05/06/2019 5.3  4.0 - 5.6 % Final    Comment: ADA Screening Guidelines:  5.7-6.4%  Consistent with prediabetes  >or=6.5%  Consistent with diabetes  High levels of fetal hemoglobin interfere with the HbA1C  assay. Heterozygous hemoglobin variants (HbS, HgC, " etc)do  not significantly interfere with this assay.   However, presence of multiple variants may affect accuracy.      Estimated Avg Glucose 05/06/2019 105  68 - 131 mg/dL Final    Prothrombin Time 05/06/2019 11.1  9.0 - 12.5 sec Final    INR 05/06/2019 1.1  0.8 - 1.2 Final    Comment: Coumadin Therapy:  2.0 - 3.0 for INR for all indicators except mechanical heart valves  and antiphospholipid syndromes which should use 2.5 - 3.5.     Lab Visit on 05/06/2019   Component Date Value Ref Range Status    WBC 05/06/2019 7.72  3.90 - 12.70 K/uL Final    RBC 05/06/2019 4.03  4.00 - 5.40 M/uL Final    Hemoglobin 05/06/2019 8.8* 12.0 - 16.0 g/dL Final    Hematocrit 05/06/2019 30.3* 37.0 - 48.5 % Final    Mean Corpuscular Volume 05/06/2019 75* 82 - 98 fL Final    Mean Corpuscular Hemoglobin 05/06/2019 21.8* 27.0 - 31.0 pg Final    Mean Corpuscular Hemoglobin Conc 05/06/2019 29.0* 32.0 - 36.0 g/dL Final    RDW 05/06/2019 21.4* 11.5 - 14.5 % Final    Platelets 05/06/2019 521* 150 - 350 K/uL Final    MPV 05/06/2019 11.1  9.2 - 12.9 fL Final    Immature Granulocytes 05/06/2019 0.4  0.0 - 0.5 % Final    Gran # (ANC) 05/06/2019 4.9  1.8 - 7.7 K/uL Final    Immature Grans (Abs) 05/06/2019 0.03  0.00 - 0.04 K/uL Final    Comment: Mild elevation in immature granulocytes is non specific and   can be seen in a variety of conditions including stress response,   acute inflammation, trauma and pregnancy. Correlation with other   laboratory and clinical findings is essential.      Lymph # 05/06/2019 1.5  1.0 - 4.8 K/uL Final    Mono # 05/06/2019 0.5  0.3 - 1.0 K/uL Final    Eos # 05/06/2019 0.6* 0.0 - 0.5 K/uL Final    Baso # 05/06/2019 0.19  0.00 - 0.20 K/uL Final    nRBC 05/06/2019 0  0 /100 WBC Final    Gran% 05/06/2019 63.8  38.0 - 73.0 % Final    Lymph% 05/06/2019 19.3  18.0 - 48.0 % Final    Mono% 05/06/2019 6.7  4.0 - 15.0 % Final    Eosinophil% 05/06/2019 7.3  0.0 - 8.0 % Final    Basophil% 05/06/2019 2.5*  0.0 - 1.9 % Final    Differential Method 05/06/2019 Automated   Final    Sodium 05/06/2019 138  136 - 145 mmol/L Final    Potassium 05/06/2019 4.0  3.5 - 5.1 mmol/L Final    Chloride 05/06/2019 109  95 - 110 mmol/L Final    CO2 05/06/2019 20* 23 - 29 mmol/L Final    Glucose 05/06/2019 83  70 - 110 mg/dL Final    BUN, Bld 05/06/2019 10  8 - 23 mg/dL Final    Creatinine 05/06/2019 0.6  0.5 - 1.4 mg/dL Final    Calcium 05/06/2019 10.2  8.7 - 10.5 mg/dL Final    Total Protein 05/06/2019 6.9  6.0 - 8.4 g/dL Final    Albumin 05/06/2019 3.1* 3.5 - 5.2 g/dL Final    Total Bilirubin 05/06/2019 0.7  0.1 - 1.0 mg/dL Final    Comment: For infants and newborns, interpretation of results should be based  on gestational age, weight and in agreement with clinical  observations.  Premature Infant recommended reference ranges:  Up to 24 hours.............<8.0 mg/dL  Up to 48 hours............<12.0 mg/dL  3-5 days..................<15.0 mg/dL  6-29 days.................<15.0 mg/dL      Alkaline Phosphatase 05/06/2019 107  55 - 135 U/L Final    AST 05/06/2019 9* 10 - 40 U/L Final    ALT 05/06/2019 7* 10 - 44 U/L Final    Anion Gap 05/06/2019 9  8 - 16 mmol/L Final    eGFR if African American 05/06/2019 >60.0  >60 mL/min/1.73 m^2 Final    eGFR if non African American 05/06/2019 >60.0  >60 mL/min/1.73 m^2 Final    Comment: Calculation used to obtain the estimated glomerular filtration  rate (eGFR) is the CKD-EPI equation.       Magnesium 05/06/2019 2.1  1.6 - 2.6 mg/dL Final    Phosphorus 05/06/2019 2.9  2.7 - 4.5 mg/dL Final   Hospital Outpatient Visit on 05/06/2019   Component Date Value Ref Range Status    Ascending aorta 05/06/2019 3.44  cm Final    STJ 05/06/2019 2.98  cm Final    AV mean gradient 05/06/2019 4.52  mmHg Final    Ao peak anthony 05/06/2019 1.52  m/s Final    Ao VTI 05/06/2019 30.73  cm Final    IVRT 05/06/2019 0.07  msec Final    IVS 05/06/2019 0.72  0.6 - 1.1 cm Final    LA size 05/06/2019  4.51  cm Final    Left Atrium Major Axis 05/06/2019 5.50  cm Final    Left Atrium Minor Axis 05/06/2019 5.50  cm Final    LVIDD 05/06/2019 5.37  3.5 - 6.0 cm Final    LVIDS 05/06/2019 3.57  2.1 - 4.0 cm Final    LVOT diameter 05/06/2019 2.20  cm Final    LVOT peak VTI 05/06/2019 20.91  cm Final    PW 05/06/2019 0.83  0.6 - 1.1 cm Final    MV Peak A Bhupendra 05/06/2019 0.92  m/s Final    E wave decelartion time 05/06/2019 167.26  msec Final    MV Peak E Bhupendra 05/06/2019 1.06  m/s Final    RA Major Axis 05/06/2019 5.05  cm Final    RA Width 05/06/2019 3.86  cm Final    RVDD 05/06/2019 4.02  cm Final    Sinus 05/06/2019 3.23  cm Final    TAPSE 05/06/2019 3.00  cm Final    TR Max Bhupendra 05/06/2019 2.72  m/s Final    TDI LATERAL 05/06/2019 0.10   Final    TDI SEPTAL 05/06/2019 0.08   Final    LA WIDTH 05/06/2019 5.20  cm Final    LV Diastolic Volume 05/06/2019 139.29  mL Final    LV Systolic Volume 05/06/2019 53.25  mL Final    LVOT peak bhupendra 05/06/2019 1.1  m/s Final    LV LATERAL E/E' RATIO 05/06/2019 10.60   Final    LV SEPTAL E/E' RATIO 05/06/2019 13.25   Final    FS 05/06/2019 34  % Final    LA volume 05/06/2019 109.64  cm3 Final    LV mass 05/06/2019 147.46  g Final    Left Ventricle Relative Wall Thick* 05/06/2019 0.31  cm Final    AV valve area 05/06/2019 2.59  cm2 Final    AV Velocity Ratio 05/06/2019 0.72   Final    AV index (prosthetic) 05/06/2019 0.68   Final    E/A ratio 05/06/2019 1.15   Final    Mean e' 05/06/2019 0.09   Final    LVOT area 05/06/2019 3.80  cm2 Final    LVOT stroke volume 05/06/2019 79.45  cm3 Final    AV peak gradient 05/06/2019 9.24  mmHg Final    E/E' ratio 05/06/2019 11.78   Final    Triscuspid Valve Regurgitation Pea* 05/06/2019 29.59  mmHg Final    BSA 05/06/2019 1.73  m2 Final    LV Systolic Volume Index 05/06/2019 31.1  mL/m2 Final    LV Diastolic Volume Index 05/06/2019 81.40  mL/m2 Final    LA Volume Index 05/06/2019 64.1  mL/m2 Final    LV Mass  Index 05/06/2019 86.2  g/m2 Final    Right Atrial Pressure (from IVC) 05/06/2019 3  mmHg Final    TV rest pulmonary artery pressure 05/06/2019 33  mmHg Final         Imaging:   EXAMINATION:  NM PET CT WHOLE BODY    CLINICAL HISTORY:  Neoplasm: abdomen, other primary, rx monitor or f/u 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  After an approximately 60 min distribution time, PET/CT images were acquired from the skull vertex through the feet. Transmission images were acquired to correct for attenuation using a whole body low-dose CT scan without contrast with the arms positioned along the side of the torso. Glycemia at the time of injection was 95 mg/dL.    COMPARISON:  Nuclear medicine PET-CT 03/27/2019    FINDINGS:  Quality of the study: Adequate.    Large abdominal mass with an SUV of 3.89, previously 4.7.  There is increased uptake diffusely throughout the osseous structures secondary to bone marrow hyperplasia.    Physiologic uptake of the tracer is present within the brain, salivary glands, myocardium, GI and  tracts.    Incidental CT findings: Scattered atherosclerotic calcification.  Atrophic left kidney with hydronephrosis.  At least 2 nonobstructive nephroliths within the right kidney, largest measuring 1.0 cm.      Impression       1.  Redemonstration of mildly increased radiotracer uptake within a large pelvic mass with an SUV of 3.89, previously 4.7.  No significant change.  No new hypermetabolic lesions identified.    2.  Bone marrow hyperplasia due to recent pegfilgrastim administration, which results in altered biodistribution/bioavailability of tracer for extraosseous disease and may obscure lesions in bone and spleen.  Recommend deferring future FDG PET scans until at least 4 weeks post-injection.    3.  Left kidney atrophy and hydronephrosis.  Right non-obstructing 1.0 cm  nephrolith.    Electronically signed by resident: Esthela Allred  Date: 04/23/2019  Time: 08:17    Electronically signed by: Mau Álvarez  Date: 04/24/2019             Assessment:       1. Primary intra-abdominal sarcoma           Plan:     Low Grade Sarcoma  - 20 cm mass noted in CT abdomen/pelvis; L ureter noted to be have left sided hydronephresis and noted to have some mild inguinal adneopathy  - path reviewed at our institution also consistent with sarcoma as well  - Reviewed images and discussed with Dr. Mota previously, pt is not a surgical candidate at this time as significant amount of central vasculature (aorta, iliac arteries/veins) involved and significant amount of vascularity to mass  - PET-CT reveals a large anterior abdominal mass, low activity sarcoma without significant metastatic spread, SUV max of 4.7   - Echo with EF of 60%, normal LV systolic function and indeterminate diastolic function  - tumor causing pain in central abdomen as well as compression of L ureter and causing hydronephrosis  - Inpatient AIM C1 completed  (adrimaycin, ifosfomide, mesna) next week given symptoms above, discussed palliative intent of therapy  - went over risks/benefits/ side effects of chemotherapy and pt wishes to proceed with therapy, consent form signed and form submitted to be scanned into system  - Ifosfomide dropped on day 3 due to neurotoxicity  - PET- CT results reviewed, suv max decreased to 3.9 for abdominal mass and necrosis of part of mass noted  - Plan for round 2 with single agent adriamycin, given prior neurotoxicity with ifosfomide    Hypercalcemia of malignancy  - mild, stable around baseline of 11 today     Severe L sided hydronephrosis   - from external compression of large sarcoma  - left kidney very atrophic and not salvageable per utology     Neuropathy, possible L sided sciatica  - pt with lower back pain around site of tumor  - continue gabapentin 300 mg at night for pain      HTN  - continue  norvasc 5     SVT  - unclear of rhythm  - no AFib noted while pt was admitted into hospital     IBS  - continue laxatives PRN - can use senna + miralax, while on pain meds        Follow-up:   Needs Neulasta on day after chemotherapy is completed  Would plan to see pt about a week after inpatient admission to assess for side effects and see if she needs any electrolyte/transfusion support  Could plan for repeat scans during visit two weeks after cycle #2 oc hemo with CT chest/abdomen/pelvis and then plan for PET-CT after third round, if one is completed    Pt prefers to be emailed at [email or web address]     Discussed with Dr. Shady Silvestre MD   Hematology and Oncology Fellow, PGY IV  [facility]

## 2019-05-06 NOTE — NURSING
"Skin note:  Has wound to behind the right  neck with scab on it.  Patient states"it's a skin cancer".  Patient has birth benjamín in the hairline red in color.  "

## 2019-05-06 NOTE — ASSESSMENT & PLAN NOTE
20 cm mass noted in CT abdomen/pelvis; L ureter noted to be have left sided hydronephresis and noted to have some mild inguinal adneopathy  - path reviewed at our institution consistent with sarcoma  - PET-CT reveals a large anterior abdominal mass, low activity sarcoma without significant metastatic spread, SUV max of 4.7   - Echo with EF of 60%, normal LV systolic function and indeterminate diastolic function  - tumor causing pain in central abdomen as well as compression of L ureter and causing hydronephrosis  - s/p cycle 1 Inpatient AIM completed  (adrimaycin, ifosfomide, mesna)   - Ifosfomide dropped on day 3 due to neurotoxicity  - PET- CT results reviewed, no significant change after one round  - will plan for cycle 2 chemo with single agent adriamycin  - PICC consult placed

## 2019-05-06 NOTE — ASSESSMENT & PLAN NOTE
- from external compression of large sarcoma  - left kidney very atrophic and not salvageable per Nephrology

## 2019-05-06 NOTE — SUBJECTIVE & OBJECTIVE
Oncology Treatment Plan:   IP SARCOMA DOXORUBICIN IFOSFAMIDE MESNA (4 DAYS)    Medications:  Continuous Infusions:  Scheduled Meds:   [START ON 2019] amLODIPine  5 mg Oral Daily    enoxaparin  40 mg Subcutaneous Daily    gabapentin  300 mg Oral QHS    polyethylene glycol  17 g Oral Daily     PRN Meds:dextrose 50%, dextrose 50%, glucagon (human recombinant), glucose, glucose, magic mouthwash (diphenhydrAMINE 12.5 mg/5 mL 20 mL, aluminum & magnesium hydroxide-simethicone (MYLANTA) 20 mL, lidocaine HCl 2% (XYLOCAINE) 20 mL) solution, ondansetron, oxyCODONE, prochlorperazine, sodium chloride 0.9%, zolpidem     Review of patient's allergies indicates:  No Known Allergies     Past Medical History:   Diagnosis Date    Cancer     Gallstones     GERD (gastroesophageal reflux disease)     Hypertension     SVT (supraventricular tachycardia)      Past Surgical History:   Procedure Laterality Date     SECTION      X3    HYSTERECTOMY      Partial    TUBAL LIGATION       Family History     Problem Relation (Age of Onset)    Hypertension Brother    Lung disease Mother    No Known Problems Sister, Maternal Aunt, Maternal Uncle, Paternal Aunt, Paternal Uncle, Maternal Grandmother, Maternal Grandfather, Paternal Grandmother, Paternal Grandfather, Daughter, Daughter, Son    Pancreatic cancer Father        Tobacco Use    Smoking status: Never Smoker    Smokeless tobacco: Never Used   Substance and Sexual Activity    Alcohol use: No     Frequency: Never    Drug use: No    Sexual activity: Never       Review of Systems   Constitutional: Negative for appetite change, chills, fatigue and fever.   HENT: Negative for mouth sores, nosebleeds and sore throat.    Respiratory: Negative for cough, chest tightness and shortness of breath.    Cardiovascular: Negative for chest pain and palpitations.   Gastrointestinal: Positive for constipation. Negative for abdominal pain, diarrhea, nausea and vomiting.   Genitourinary:  Negative for dysuria, frequency and hematuria.   Musculoskeletal: Negative for arthralgias and gait problem.   Skin: Negative for color change and pallor.   Neurological: Negative for seizures, syncope and headaches.   Hematological: Negative for adenopathy.     Objective:     Vital Signs (Most Recent):  Temp: 97.6 °F (36.4 °C) (05/06/19 1719)  Pulse: 88 (05/06/19 1719)  Resp: 18 (05/06/19 1719)  BP: (!) 168/83 (05/06/19 1719)  SpO2: 99 % (05/06/19 1719) Vital Signs (24h Range):  Temp:  [97.6 °F (36.4 °C)-98.3 °F (36.8 °C)] 97.6 °F (36.4 °C)  Pulse:  [80-88] 88  Resp:  [16-18] 18  SpO2:  [99 %] 99 %  BP: (121-168)/(60-83) 168/83     Weight: 65.6 kg (144 lb 10 oz)  Body mass index is 24.82 kg/m².  Body surface area is 1.72 meters squared.    No intake or output data in the 24 hours ending 05/06/19 1756    Physical Exam   Constitutional: She is oriented to person, place, and time. She appears well-developed and well-nourished.   HENT:   Head: Normocephalic and atraumatic.   Eyes: Pupils are equal, round, and reactive to light. EOM are normal.   Neck: Normal range of motion. Neck supple.   Cardiovascular: Normal rate and regular rhythm.   No murmur heard.  Pulmonary/Chest: Effort normal and breath sounds normal. No respiratory distress. She has no wheezes.   Abdominal: Soft. Bowel sounds are normal. She exhibits no distension. There is tenderness.   Tenderness on deep palpation   Musculoskeletal: Normal range of motion. She exhibits edema. She exhibits no tenderness.   Neurological: She is alert and oriented to person, place, and time.   Skin: Skin is warm and dry. Capillary refill takes less than 2 seconds. No pallor.   Psychiatric: She has a normal mood and affect.       Significant Labs:   CBC:   Recent Labs   Lab 05/06/19  1022   WBC 7.72   HGB 8.8*   HCT 30.3*   *   , CMP:   Recent Labs   Lab 05/06/19  1022      K 4.0      CO2 20*   GLU 83   BUN 10   CREATININE 0.6   CALCIUM 10.2   PROT 6.9    ALBUMIN 3.1*   BILITOT 0.7   ALKPHOS 107   AST 9*   ALT 7*   ANIONGAP 9   EGFRNONAA >60.0    and Coagulation:   Recent Labs   Lab 05/06/19  1724   INR 1.1       Diagnostic Results:

## 2019-05-06 NOTE — ASSESSMENT & PLAN NOTE
Neuropathy, possible L sided sciatica  - pt with lower back pain around site of tumor  - continue gabapentin 300 mg at night for pain

## 2019-05-07 LAB
ALBUMIN SERPL BCP-MCNC: 2.6 G/DL (ref 3.5–5.2)
ALP SERPL-CCNC: 93 U/L (ref 55–135)
ALT SERPL W/O P-5'-P-CCNC: 5 U/L (ref 10–44)
ANION GAP SERPL CALC-SCNC: 8 MMOL/L (ref 8–16)
AST SERPL-CCNC: 8 U/L (ref 10–40)
BASOPHILS # BLD AUTO: 0.16 K/UL (ref 0–0.2)
BASOPHILS NFR BLD: 2.2 % (ref 0–1.9)
BILIRUB SERPL-MCNC: 0.4 MG/DL (ref 0.1–1)
BUN SERPL-MCNC: 10 MG/DL (ref 8–23)
CALCIUM SERPL-MCNC: 9.5 MG/DL (ref 8.7–10.5)
CHLORIDE SERPL-SCNC: 111 MMOL/L (ref 95–110)
CO2 SERPL-SCNC: 19 MMOL/L (ref 23–29)
CREAT SERPL-MCNC: 0.6 MG/DL (ref 0.5–1.4)
DIFFERENTIAL METHOD: ABNORMAL
EOSINOPHIL # BLD AUTO: 0.8 K/UL (ref 0–0.5)
EOSINOPHIL NFR BLD: 11.6 % (ref 0–8)
ERYTHROCYTE [DISTWIDTH] IN BLOOD BY AUTOMATED COUNT: 21.3 % (ref 11.5–14.5)
EST. GFR  (AFRICAN AMERICAN): >60 ML/MIN/1.73 M^2
EST. GFR  (NON AFRICAN AMERICAN): >60 ML/MIN/1.73 M^2
GLUCOSE SERPL-MCNC: 81 MG/DL (ref 70–110)
HCT VFR BLD AUTO: 27.9 % (ref 37–48.5)
HGB BLD-MCNC: 7.9 G/DL (ref 12–16)
IMM GRANULOCYTES # BLD AUTO: 0.03 K/UL (ref 0–0.04)
IMM GRANULOCYTES NFR BLD AUTO: 0.4 % (ref 0–0.5)
LYMPHOCYTES # BLD AUTO: 1.4 K/UL (ref 1–4.8)
LYMPHOCYTES NFR BLD: 18.9 % (ref 18–48)
MAGNESIUM SERPL-MCNC: 2 MG/DL (ref 1.6–2.6)
MCH RBC QN AUTO: 21.7 PG (ref 27–31)
MCHC RBC AUTO-ENTMCNC: 28.3 G/DL (ref 32–36)
MCV RBC AUTO: 77 FL (ref 82–98)
MONOCYTES # BLD AUTO: 0.7 K/UL (ref 0.3–1)
MONOCYTES NFR BLD: 9.3 % (ref 4–15)
NEUTROPHILS # BLD AUTO: 4.1 K/UL (ref 1.8–7.7)
NEUTROPHILS NFR BLD: 57.6 % (ref 38–73)
NRBC BLD-RTO: 0 /100 WBC
PHOSPHATE SERPL-MCNC: 2.8 MG/DL (ref 2.7–4.5)
PLATELET # BLD AUTO: 445 K/UL (ref 150–350)
PMV BLD AUTO: 11.5 FL (ref 9.2–12.9)
POTASSIUM SERPL-SCNC: 4 MMOL/L (ref 3.5–5.1)
PROT SERPL-MCNC: 5.9 G/DL (ref 6–8.4)
RBC # BLD AUTO: 3.64 M/UL (ref 4–5.4)
SODIUM SERPL-SCNC: 138 MMOL/L (ref 136–145)
WBC # BLD AUTO: 7.18 K/UL (ref 3.9–12.7)

## 2019-05-07 PROCEDURE — 76937 US GUIDE VASCULAR ACCESS: CPT

## 2019-05-07 PROCEDURE — 20600001 HC STEP DOWN PRIVATE ROOM

## 2019-05-07 PROCEDURE — 84100 ASSAY OF PHOSPHORUS: CPT

## 2019-05-07 PROCEDURE — C1751 CATH, INF, PER/CENT/MIDLINE: HCPCS

## 2019-05-07 PROCEDURE — 63600175 PHARM REV CODE 636 W HCPCS: Performed by: INTERNAL MEDICINE

## 2019-05-07 PROCEDURE — 85025 COMPLETE CBC W/AUTO DIFF WBC: CPT

## 2019-05-07 PROCEDURE — 83735 ASSAY OF MAGNESIUM: CPT

## 2019-05-07 PROCEDURE — 80053 COMPREHEN METABOLIC PANEL: CPT

## 2019-05-07 PROCEDURE — 36569 INSJ PICC 5 YR+ W/O IMAGING: CPT

## 2019-05-07 PROCEDURE — 99233 PR SUBSEQUENT HOSPITAL CARE,LEVL III: ICD-10-PCS | Mod: ,,, | Performed by: INTERNAL MEDICINE

## 2019-05-07 PROCEDURE — 25000003 PHARM REV CODE 250: Performed by: STUDENT IN AN ORGANIZED HEALTH CARE EDUCATION/TRAINING PROGRAM

## 2019-05-07 PROCEDURE — 25000003 PHARM REV CODE 250: Performed by: INTERNAL MEDICINE

## 2019-05-07 PROCEDURE — A4216 STERILE WATER/SALINE, 10 ML: HCPCS | Performed by: INTERNAL MEDICINE

## 2019-05-07 PROCEDURE — 36415 COLL VENOUS BLD VENIPUNCTURE: CPT

## 2019-05-07 PROCEDURE — 99233 SBSQ HOSP IP/OBS HIGH 50: CPT | Mod: ,,, | Performed by: INTERNAL MEDICINE

## 2019-05-07 RX ORDER — SODIUM CHLORIDE 0.9 % (FLUSH) 0.9 %
10 SYRINGE (ML) INJECTION
Status: DISCONTINUED | OUTPATIENT
Start: 2019-05-07 | End: 2019-05-10 | Stop reason: HOSPADM

## 2019-05-07 RX ORDER — PROCHLORPERAZINE MALEATE 5 MG
10 TABLET ORAL EVERY 6 HOURS PRN
Status: DISCONTINUED | OUTPATIENT
Start: 2019-05-07 | End: 2019-05-10 | Stop reason: HOSPADM

## 2019-05-07 RX ORDER — SODIUM CHLORIDE 0.9 % (FLUSH) 0.9 %
10 SYRINGE (ML) INJECTION EVERY 6 HOURS
Status: DISCONTINUED | OUTPATIENT
Start: 2019-05-07 | End: 2019-05-10 | Stop reason: HOSPADM

## 2019-05-07 RX ORDER — HEPARIN 100 UNIT/ML
300 SYRINGE INTRAVENOUS
Status: DISCONTINUED | OUTPATIENT
Start: 2019-05-07 | End: 2019-05-10 | Stop reason: HOSPADM

## 2019-05-07 RX ORDER — ONDANSETRON 2 MG/ML
8 INJECTION INTRAMUSCULAR; INTRAVENOUS
Status: COMPLETED | OUTPATIENT
Start: 2019-05-07 | End: 2019-05-10

## 2019-05-07 RX ADMIN — GABAPENTIN 300 MG: 300 CAPSULE ORAL at 08:05

## 2019-05-07 RX ADMIN — ONDANSETRON 8 MG: 2 INJECTION INTRAMUSCULAR; INTRAVENOUS at 03:05

## 2019-05-07 RX ADMIN — Medication 10 ML: at 12:05

## 2019-05-07 RX ADMIN — AMLODIPINE BESYLATE 5 MG: 5 TABLET ORAL at 08:05

## 2019-05-07 RX ADMIN — POLYETHYLENE GLYCOL 3350 17 G: 17 POWDER, FOR SOLUTION ORAL at 08:05

## 2019-05-07 RX ADMIN — SODIUM CHLORIDE: 0.9 INJECTION, SOLUTION INTRAVENOUS at 02:05

## 2019-05-07 RX ADMIN — ZOLPIDEM TARTRATE 5 MG: 5 TABLET ORAL at 09:05

## 2019-05-07 RX ADMIN — DEXAMETHASONE SODIUM PHOSPHATE 12 MG: 4 INJECTION, SOLUTION INTRAMUSCULAR; INTRAVENOUS at 03:05

## 2019-05-07 RX ADMIN — SODIUM CHLORIDE 44 MG: 0.9 INJECTION, SOLUTION INTRAVENOUS at 03:05

## 2019-05-07 RX ADMIN — APREPITANT 130 MG: 130 INJECTION, EMULSION INTRAVENOUS at 03:05

## 2019-05-07 NOTE — CONSULTS
Double lumen PICC placed to rt basilic- vein.36 cm in length, 0 cm exposed, 37cm circumference.  Lot # [id number].

## 2019-05-07 NOTE — ASSESSMENT & PLAN NOTE
- mild, around baseline of 11 yesterday improved today 10.6   - Will start her on IV fluids at 100 cc/hr

## 2019-05-07 NOTE — PROGRESS NOTES
Chemotherapy Note:  Consent & CAR in chart. CAR & BSA verified by two chemo certified RNs, Arelis and Latoya. EKG done. Premedicated zofran, decadron, and cinvanti. Doxorubicin and patient verified at bedside by two chemo certified RNs, Arelis and Latoya. Positive blood noted to right arm PICC. Doxorubicin administered to right arm PICC over 24 hours at 20.8ml/hr. Chemotherapy precautions maintained & patient educated. Will continue to monitor.

## 2019-05-07 NOTE — SUBJECTIVE & OBJECTIVE
Interval Hx: NAEO awaiting CTX    Oncology Treatment Plan:   IP SARCOMA DOXORUBICIN IFOSFAMIDE MESNA (4 DAYS)    Medications:  Continuous Infusions:   sodium chloride 0.9% 100 mL/hr at 19 1835     Scheduled Meds:   amLODIPine  5 mg Oral Daily    enoxaparin  40 mg Subcutaneous Daily    gabapentin  300 mg Oral QHS    polyethylene glycol  17 g Oral Daily     PRN Meds:dextrose 50%, dextrose 50%, glucagon (human recombinant), glucose, glucose, magic mouthwash (diphenhydrAMINE 12.5 mg/5 mL 20 mL, aluminum & magnesium hydroxide-simethicone (MYLANTA) 20 mL, lidocaine HCl 2% (XYLOCAINE) 20 mL) solution, ondansetron, oxyCODONE, prochlorperazine, sodium chloride 0.9%, zolpidem     Review of patient's allergies indicates:  No Known Allergies     Past Medical History:   Diagnosis Date    Cancer     Gallstones     GERD (gastroesophageal reflux disease)     Hypertension     SVT (supraventricular tachycardia)      Past Surgical History:   Procedure Laterality Date     SECTION      X3    HYSTERECTOMY      Partial    TUBAL LIGATION       Family History     Problem Relation (Age of Onset)    Hypertension Brother    Lung disease Mother    No Known Problems Sister, Maternal Aunt, Maternal Uncle, Paternal Aunt, Paternal Uncle, Maternal Grandmother, Maternal Grandfather, Paternal Grandmother, Paternal Grandfather, Daughter, Daughter, Son    Pancreatic cancer Father        Tobacco Use    Smoking status: Never Smoker    Smokeless tobacco: Never Used   Substance and Sexual Activity    Alcohol use: No     Frequency: Never    Drug use: No    Sexual activity: Never       Review of Systems   Constitutional: Negative for appetite change, chills, fatigue and fever.   HENT: Negative for mouth sores, nosebleeds and sore throat.    Respiratory: Negative for cough, chest tightness and shortness of breath.    Cardiovascular: Negative for chest pain and palpitations.   Gastrointestinal: Positive for constipation. Negative  for abdominal pain, diarrhea, nausea and vomiting.   Genitourinary: Negative for dysuria, frequency and hematuria.   Musculoskeletal: Negative for arthralgias and gait problem.   Skin: Negative for color change and pallor.   Neurological: Negative for seizures, syncope and headaches.   Hematological: Negative for adenopathy.     Objective:     Vital Signs (Most Recent):  Temp: 97.6 °F (36.4 °C) (05/07/19 0731)  Pulse: 76 (05/07/19 0731)  Resp: 16 (05/07/19 0731)  BP: (!) 145/74 (05/07/19 0731)  SpO2: 96 % (05/07/19 0731) Vital Signs (24h Range):  Temp:  [97.6 °F (36.4 °C)-98.3 °F (36.8 °C)] 97.6 °F (36.4 °C)  Pulse:  [76-88] 76  Resp:  [16-18] 16  SpO2:  [96 %-99 %] 96 %  BP: (121-168)/(60-83) 145/74     Weight: 65.6 kg (144 lb 10 oz)  Body mass index is 24.82 kg/m².  Body surface area is 1.72 meters squared.      Intake/Output Summary (Last 24 hours) at 5/7/2019 0847  Last data filed at 5/7/2019 0648  Gross per 24 hour   Intake 180 ml   Output 1000 ml   Net -820 ml       Physical Exam   Constitutional: She is oriented to person, place, and time. She appears well-developed and well-nourished.   HENT:   Head: Normocephalic and atraumatic.   Eyes: Pupils are equal, round, and reactive to light. EOM are normal.   Neck: Normal range of motion. Neck supple.   Cardiovascular: Normal rate and regular rhythm.   No murmur heard.  Pulmonary/Chest: Effort normal and breath sounds normal. No respiratory distress. She has no wheezes.   Abdominal: Soft. Bowel sounds are normal. She exhibits no distension. There is tenderness.   Tenderness on deep palpation   Musculoskeletal: Normal range of motion. She exhibits edema. She exhibits no tenderness.   Neurological: She is alert and oriented to person, place, and time.   Skin: Skin is warm and dry. Capillary refill takes less than 2 seconds. No pallor.   Psychiatric: She has a normal mood and affect.       Significant Labs:   CBC:   Recent Labs   Lab 05/06/19  1022 05/07/19  0506    WBC 7.72 7.18   HGB 8.8* 7.9*   HCT 30.3* 27.9*   * 445*   , CMP:   Recent Labs   Lab 05/06/19  1022 05/07/19  0506    138   K 4.0 4.0    111*   CO2 20* 19*   GLU 83 81   BUN 10 10   CREATININE 0.6 0.6   CALCIUM 10.2 9.5   PROT 6.9 5.9*   ALBUMIN 3.1* 2.6*   BILITOT 0.7 0.4   ALKPHOS 107 93   AST 9* 8*   ALT 7* 5*   ANIONGAP 9 8   EGFRNONAA >60.0 >60.0    and Coagulation:   Recent Labs   Lab 05/06/19  1724   INR 1.1       Diagnostic Results:

## 2019-05-07 NOTE — PLAN OF CARE
Problem: Adult Inpatient Plan of Care  Goal: Plan of Care Review  Outcome: Ongoing (interventions implemented as appropriate)  Patient here for cycle 2; receiving doxorubicin today. AAOx4, VSS, afebrile, and without injury. Fall precautions maintained. Patient instructed on how to contact the nurse. Patient on room air without distress; on regular diet with good appetite. No complaints of pain or nausea. Up to ad channing to bedside commode. IVF continued. PICC line placed to R brachial this morning; chemo to begin this afternoon. Patient ambulating in [place]. Questions and concerns have been addressed; will continue to monitor.

## 2019-05-07 NOTE — PLAN OF CARE
Problem: Adult Inpatient Plan of Care  Goal: Plan of Care Review  Outcome: Ongoing (interventions implemented as appropriate)  Reviewed plan of care with pt at the beginning of the shift. Pt reported no pain or n/v throughout the shift. Vital signs were stable throughout the shift.Fall precautions continued for pt. Bed locked and at lowest position. Call light within reach. Pt knows to call if assistance is needed.

## 2019-05-07 NOTE — PROCEDURES
"Tiffany Kaur is a 63 y.o. female patient.    Temp: 97.6 °F (36.4 °C) (05/07/19 0731)  Pulse: 76 (05/07/19 0731)  Resp: 16 (05/07/19 0731)  BP: (!) 145/74 (05/07/19 0731)  SpO2: 96 % (05/07/19 0731)  Weight: 65.6 kg (144 lb 10 oz) (05/06/19 1744)  Height: 5' 4" (162.6 cm) (05/06/19 1744)    PICC  Date/Time: 5/7/2019 9:30 AM  Performed by: Josseline Hopson RN  Consent Done: Yes  Time out: Immediately prior to procedure a time out was called to verify the correct patient, procedure, equipment, support staff and site/side marked as required  Indications: med administration and vascular access  Local anesthetic: lidocaine 1% without epinephrine  Anesthetic Total (mL): 4  Preparation: skin prepped with ChloraPrep  Skin prep agent dried: skin prep agent completely dried prior to procedure  Sterile barriers: all five maximum sterile barriers used - cap, mask, sterile gown, sterile gloves, and large sterile sheet  Hand hygiene: hand hygiene performed prior to central venous catheter insertion  Location details: right basilic  Catheter type: double lumen  Catheter size: 5 Fr  Catheter Length: 36cm    Ultrasound guidance: yes  Vessel Caliber: medium and patent, compressibility normal  Vascular Doppler: not done  Needle advanced into vessel with real time Ultrasound guidance.  Guidewire confirmed in vessel.  Image recorded and saved.  Sterile sheath used.  no esophageal manometryNumber of attempts: 1  Post-procedure: blood return through all ports, chlorhexidine patch and sterile dressing applied  Specimens: No  Implants: No  Assessment: placement verified by x-ray  Complications: none          Jovanny Roque  5/7/2019  "

## 2019-05-07 NOTE — PROGRESS NOTES
[facility]  Hematology/Oncology  Progress Note    Patient Name: Tiffany Kaur  Admission Date: 5/6/2019  Hospital Length of Stay: 1 days  Code Status: Full Code     Subjective:     HPI:  63 Year old female with abdominal Low Grade Sarcoma,  left sided hydronephresis s/p inpatient cycle 1 of AIM chemotherapy on 4/2 now presented for cycle 2 with single agent adriamycin. She was initially seen in the clinic by Dr. Silvestre and admitted to Oncology service.     She denied of any fever, chills, abdominal pain, chest pain, SOB, leg pain, nausea, vomiting, diarrhea, dysuria or dizziness.     Oncologic History  Pt is a 62 yo  female with PMhx of SVT (possible AFib), chronic IBS (describes a history of suffering from constipation as a child), HTN who presents to the hospital to discuss further options at treating recently found low grade sarcoma in her abdomen, workup for this which was started in [facility]. She was referred by a Dr. Rosa, who is a general surgeon in the [place] area.  She had not been able to get healthcare for a while as she was not enrolled in her 's plan through the  but has been able to get enrolled in Medicare since December.     She started to have abdominal bloating in mid-December 2018. Initially, she had attributed this problem to baseline IBS and was treated at that time with various combinations of Senna, Miralax, suppositories, and enema. Additionally, she lost ~45lbs over the course of one month (174lb to 130lb) which she reports was primarily due to diffuse prandial-associated abdominal pain described mostly as cramping sensation. During this time she could only tolerated clear liquids, such as Jello and broth.      Her workup in mid-December started with an ultrasound which revealed cholelithiasis and large pelvic mass, therefore, follow-up of CT abdomen/pelvis was completed on 01/06/19. Origin of her mass has been unlcear,  but she was found to have a 20 cm abdominal mass and a picture of chronic severe left hydronephrosis. She had a follow-up CT guided core biopsy on 02/06/19 of abdominal mass which revealed a low grade sarcoma, which has been verified by our pathologists as well. *See clinic oncology note for uploaded report.*     In addition, she has a 3-4 cm x 2 cm lesion on the posterior right shoulder and right lateral neck region with some vascularity, bullous with crusting. She notes that this lesion had been present for eight years and endorses some pruritus and periods of resolution, denies any pain or bleeding with the lesion.       She presented to outpatient clinic here on 03/27/2019 as a referral from [facility] to discuss further treatment options. Plan made for in-patient admission for urology evaluation and possible initiation of chemotherapy.         In addition, the pt has a 3-4 cm x 2 cm lesion on the posterior R shoulder and R lateral neck region with some vascularity, bullous with crusting which the pt has not had follow-up for as of this visit. She notes that this lesion had been present for eight years and endorses some pruritus and periods of resolution, denies any pain or bleeding with the lesion.       Pt had cologuard completed for colorectal cancer screening on December 2018, which was negative for malignancy.      She presented to [facility] 4/1/19 for planned in-patient management of her L-sided hydronephrosis and initiation of AIM chemotherapy. Following better control of her pain, her appetite improved and she was able to increase her food intake and regain weight (up to 149lbs).  Admission labs notable for microcytic anemia with low iron studies and slight hypercalcemia (10.8 corrected). Evaluated by urology on day of admission. Based on assessment of prior imaging, it was determined that, based on hydronephrosis and degree of atrophy, the function of the left kidney was not salvageable and  that risk of nephrostomy tube or stent outweighed benefit. She had a PICC line placed on day of admission. Chemotherapy was initiated on 04/02/19 with Doxorubucin, Ifosfamide, and Mesnex. Symptoms during chemotherapy initiation included intermittent abdominal pain and nausea that were controlled with PRN Zofran and Oxycodone. Patient noted to develop new onset issues with fine motor movement of fingers on 04/04; out of concern for neurological toxicity, Ifosfamide was discontinued with symptom resolution. On 04/05, she developed increased urinary frequency with lower back pain with radiation around the left hip; UA ordered, but negative for infection. Pain resolved after dose of Gabapentin and applying heating pads, suspect could be secondary to radiculopathy from tumor burden. Neulasta was administered on day after discharge.     The pt had shedding of hair last week but has completely lost the rest of it. Her abdominal pain has been well controlled at home. Pt has had pain and ringing in ears, worse on the R side; she suspects that she is having an ear infection. Denies any significant weight changes, nausea/vomitng, constipation, diarrhea. Her neuropathy has been stable on gabapentin and hand tremors have become much better/ resolved for most part.      Her PCP is Dr. Valles at [facility].         Interval Hx: NAEO awaiting CTX    Oncology Treatment Plan:   IP SARCOMA DOXORUBICIN IFOSFAMIDE MESNA (4 DAYS)    Medications:  Continuous Infusions:   sodium chloride 0.9% 100 mL/hr at 05/06/19 2625     Scheduled Meds:   amLODIPine  5 mg Oral Daily    enoxaparin  40 mg Subcutaneous Daily    gabapentin  300 mg Oral QHS    polyethylene glycol  17 g Oral Daily     PRN Meds:dextrose 50%, dextrose 50%, glucagon (human recombinant), glucose, glucose, magic mouthwash (diphenhydrAMINE 12.5 mg/5 mL 20 mL, aluminum & magnesium hydroxide-simethicone (MYLANTA) 20 mL, lidocaine HCl 2% (XYLOCAINE) 20 mL) solution,  ondansetron, oxyCODONE, prochlorperazine, sodium chloride 0.9%, zolpidem     Review of patient's allergies indicates:  No Known Allergies     Past Medical History:   Diagnosis Date    Cancer     Gallstones     GERD (gastroesophageal reflux disease)     Hypertension     SVT (supraventricular tachycardia)      Past Surgical History:   Procedure Laterality Date     SECTION      X3    HYSTERECTOMY      Partial    TUBAL LIGATION       Family History     Problem Relation (Age of Onset)    Hypertension Brother    Lung disease Mother    No Known Problems Sister, Maternal Aunt, Maternal Uncle, Paternal Aunt, Paternal Uncle, Maternal Grandmother, Maternal Grandfather, Paternal Grandmother, Paternal Grandfather, Daughter, Daughter, Son    Pancreatic cancer Father        Tobacco Use    Smoking status: Never Smoker    Smokeless tobacco: Never Used   Substance and Sexual Activity    Alcohol use: No     Frequency: Never    Drug use: No    Sexual activity: Never       Review of Systems   Constitutional: Negative for appetite change, chills, fatigue and fever.   HENT: Negative for mouth sores, nosebleeds and sore throat.    Respiratory: Negative for cough, chest tightness and shortness of breath.    Cardiovascular: Negative for chest pain and palpitations.   Gastrointestinal: Positive for constipation. Negative for abdominal pain, diarrhea, nausea and vomiting.   Genitourinary: Negative for dysuria, frequency and hematuria.   Musculoskeletal: Negative for arthralgias and gait problem.   Skin: Negative for color change and pallor.   Neurological: Negative for seizures, syncope and headaches.   Hematological: Negative for adenopathy.     Objective:     Vital Signs (Most Recent):  Temp: 97.6 °F (36.4 °C) (19)  Pulse: 76 (19)  Resp: 16 (19)  BP: (!) 145/74 (19)  SpO2: 96 % (19) Vital Signs (24h Range):  Temp:  [97.6 °F (36.4 °C)-98.3 °F (36.8 °C)] 97.6 °F (36.4  °C)  Pulse:  [76-88] 76  Resp:  [16-18] 16  SpO2:  [96 %-99 %] 96 %  BP: (121-168)/(60-83) 145/74     Weight: 65.6 kg (144 lb 10 oz)  Body mass index is 24.82 kg/m².  Body surface area is 1.72 meters squared.      Intake/Output Summary (Last 24 hours) at 5/7/2019 0847  Last data filed at 5/7/2019 0648  Gross per 24 hour   Intake 180 ml   Output 1000 ml   Net -820 ml       Physical Exam   Constitutional: She is oriented to person, place, and time. She appears well-developed and well-nourished.   HENT:   Head: Normocephalic and atraumatic.   Eyes: Pupils are equal, round, and reactive to light. EOM are normal.   Neck: Normal range of motion. Neck supple.   Cardiovascular: Normal rate and regular rhythm.   No murmur heard.  Pulmonary/Chest: Effort normal and breath sounds normal. No respiratory distress. She has no wheezes.   Abdominal: Soft. Bowel sounds are normal. She exhibits no distension. There is tenderness.   Tenderness on deep palpation   Musculoskeletal: Normal range of motion. She exhibits edema. She exhibits no tenderness.   Neurological: She is alert and oriented to person, place, and time.   Skin: Skin is warm and dry. Capillary refill takes less than 2 seconds. No pallor.   Psychiatric: She has a normal mood and affect.       Significant Labs:   CBC:   Recent Labs   Lab 05/06/19  1022 05/07/19  0506   WBC 7.72 7.18   HGB 8.8* 7.9*   HCT 30.3* 27.9*   * 445*   , CMP:   Recent Labs   Lab 05/06/19  1022 05/07/19  0506    138   K 4.0 4.0    111*   CO2 20* 19*   GLU 83 81   BUN 10 10   CREATININE 0.6 0.6   CALCIUM 10.2 9.5   PROT 6.9 5.9*   ALBUMIN 3.1* 2.6*   BILITOT 0.7 0.4   ALKPHOS 107 93   AST 9* 8*   ALT 7* 5*   ANIONGAP 9 8   EGFRNONAA >60.0 >60.0    and Coagulation:   Recent Labs   Lab 05/06/19  1724   INR 1.1       Diagnostic Results:      Assessment/Plan:     SVT (supraventricular tachycardia)  - no AFib noted during last hospital admission  - Will get EKG  today        Hydronephrosis of left kidney  - from external compression of large sarcoma  - left kidney very atrophic and not salvageable per Nephrology           Sarcoma  20 cm mass noted in CT abdomen/pelvis; L ureter noted to be have left sided hydronephresis and noted to have some mild inguinal adneopathy  - path reviewed at our institution consistent with sarcoma  - PET-CT reveals a large anterior abdominal mass, low activity sarcoma without significant metastatic spread, SUV max of 4.7   - Echo with EF of 60%, normal LV systolic function and indeterminate diastolic function  - tumor causing pain in central abdomen as well as compression of L ureter and causing hydronephrosis  - s/p cycle 1 Inpatient AIM completed  (adrimaycin, ifosfomide, mesna)   - Ifosfomide dropped on day 3 due to neurotoxicity  - PET- CT results reviewed, no significant change after one round  - will plan for cycle 2 chemo with single agent adriamycin  - PICC consult placed        Irritable bowel syndrome  - continue laxatives PRN - On miralax for now, which helps her very well with constipation        Hypertension  - continue norvasc        Neuropathy  Neuropathy, possible L sided sciatica  - pt with lower back pain around site of tumor  - continue gabapentin 300 mg at night for pain         Hypercalcemia of malignancy  - mild, around baseline of 11 yesterday improved today 10.6   - Will start her on IV fluids at 100 cc/hr             Dolores Lemos MD  Hematology/Oncology  [facility]      ATTENDING NOTE, ONCOLOGY INPATIENT TEAM    As above; events of last 24 hours noted.  Patient seen and examined, chart reviewed.  She underwent placement of a PIC line last night  Appears comfortable, in NAD.  Lungs are clear to auscultation.  Abdomen is soft, nontender with a large abdominal mass..  Labs reviewed.    PLAN  Start her 72 hour adriamycin infusion today.  Repeat labs in am.  Zoffan and dexamethasone for nausea.  Patient is  and was advised to remain ambulatory.  No need for DVT prophylaxis.  We will follow.  Her multiple questions were answered to her satisfaction.      Norman Cazares MD

## 2019-05-08 LAB
ALBUMIN SERPL BCP-MCNC: 2.8 G/DL (ref 3.5–5.2)
ALP SERPL-CCNC: 90 U/L (ref 55–135)
ALT SERPL W/O P-5'-P-CCNC: <5 U/L (ref 10–44)
ANION GAP SERPL CALC-SCNC: 8 MMOL/L (ref 8–16)
AST SERPL-CCNC: 8 U/L (ref 10–40)
BASOPHILS # BLD AUTO: 0.05 K/UL (ref 0–0.2)
BASOPHILS NFR BLD: 0.8 % (ref 0–1.9)
BILIRUB SERPL-MCNC: 0.3 MG/DL (ref 0.1–1)
BUN SERPL-MCNC: 10 MG/DL (ref 8–23)
CALCIUM SERPL-MCNC: 9.7 MG/DL (ref 8.7–10.5)
CHLORIDE SERPL-SCNC: 112 MMOL/L (ref 95–110)
CO2 SERPL-SCNC: 17 MMOL/L (ref 23–29)
CREAT SERPL-MCNC: 0.6 MG/DL (ref 0.5–1.4)
DIFFERENTIAL METHOD: ABNORMAL
EOSINOPHIL # BLD AUTO: 0 K/UL (ref 0–0.5)
EOSINOPHIL NFR BLD: 0 % (ref 0–8)
ERYTHROCYTE [DISTWIDTH] IN BLOOD BY AUTOMATED COUNT: 21.2 % (ref 11.5–14.5)
EST. GFR  (AFRICAN AMERICAN): >60 ML/MIN/1.73 M^2
EST. GFR  (NON AFRICAN AMERICAN): >60 ML/MIN/1.73 M^2
GLUCOSE SERPL-MCNC: 146 MG/DL (ref 70–110)
HCT VFR BLD AUTO: 28.5 % (ref 37–48.5)
HGB BLD-MCNC: 8.3 G/DL (ref 12–16)
IMM GRANULOCYTES # BLD AUTO: 0.04 K/UL (ref 0–0.04)
IMM GRANULOCYTES NFR BLD AUTO: 0.6 % (ref 0–0.5)
LYMPHOCYTES # BLD AUTO: 0.9 K/UL (ref 1–4.8)
LYMPHOCYTES NFR BLD: 13.8 % (ref 18–48)
MAGNESIUM SERPL-MCNC: 1.8 MG/DL (ref 1.6–2.6)
MCH RBC QN AUTO: 21.7 PG (ref 27–31)
MCHC RBC AUTO-ENTMCNC: 29.1 G/DL (ref 32–36)
MCV RBC AUTO: 74 FL (ref 82–98)
MONOCYTES # BLD AUTO: 0.1 K/UL (ref 0.3–1)
MONOCYTES NFR BLD: 0.9 % (ref 4–15)
NEUTROPHILS # BLD AUTO: 5.5 K/UL (ref 1.8–7.7)
NEUTROPHILS NFR BLD: 83.9 % (ref 38–73)
NRBC BLD-RTO: 0 /100 WBC
PHOSPHATE SERPL-MCNC: 3.2 MG/DL (ref 2.7–4.5)
PLATELET # BLD AUTO: 426 K/UL (ref 150–350)
PMV BLD AUTO: 11.3 FL (ref 9.2–12.9)
POTASSIUM SERPL-SCNC: 4.1 MMOL/L (ref 3.5–5.1)
PROT SERPL-MCNC: 6.3 G/DL (ref 6–8.4)
RBC # BLD AUTO: 3.83 M/UL (ref 4–5.4)
SODIUM SERPL-SCNC: 137 MMOL/L (ref 136–145)
WBC # BLD AUTO: 6.54 K/UL (ref 3.9–12.7)

## 2019-05-08 PROCEDURE — 84100 ASSAY OF PHOSPHORUS: CPT

## 2019-05-08 PROCEDURE — A4216 STERILE WATER/SALINE, 10 ML: HCPCS | Performed by: INTERNAL MEDICINE

## 2019-05-08 PROCEDURE — 99233 SBSQ HOSP IP/OBS HIGH 50: CPT | Mod: ,,, | Performed by: INTERNAL MEDICINE

## 2019-05-08 PROCEDURE — 83735 ASSAY OF MAGNESIUM: CPT

## 2019-05-08 PROCEDURE — 20600001 HC STEP DOWN PRIVATE ROOM

## 2019-05-08 PROCEDURE — 25000003 PHARM REV CODE 250: Performed by: STUDENT IN AN ORGANIZED HEALTH CARE EDUCATION/TRAINING PROGRAM

## 2019-05-08 PROCEDURE — 99233 PR SUBSEQUENT HOSPITAL CARE,LEVL III: ICD-10-PCS | Mod: ,,, | Performed by: INTERNAL MEDICINE

## 2019-05-08 PROCEDURE — 63600175 PHARM REV CODE 636 W HCPCS: Performed by: INTERNAL MEDICINE

## 2019-05-08 PROCEDURE — 80053 COMPREHEN METABOLIC PANEL: CPT

## 2019-05-08 PROCEDURE — 25000003 PHARM REV CODE 250: Performed by: INTERNAL MEDICINE

## 2019-05-08 PROCEDURE — 85025 COMPLETE CBC W/AUTO DIFF WBC: CPT

## 2019-05-08 RX ORDER — SODIUM CHLORIDE 9 MG/ML
INJECTION, SOLUTION INTRAVENOUS CONTINUOUS
Status: ACTIVE | OUTPATIENT
Start: 2019-05-08 | End: 2019-05-09

## 2019-05-08 RX ADMIN — ONDANSETRON 8 MG: 2 INJECTION INTRAMUSCULAR; INTRAVENOUS at 04:05

## 2019-05-08 RX ADMIN — AMLODIPINE BESYLATE 5 MG: 5 TABLET ORAL at 09:05

## 2019-05-08 RX ADMIN — ZOLPIDEM TARTRATE 5 MG: 5 TABLET ORAL at 09:05

## 2019-05-08 RX ADMIN — SODIUM CHLORIDE 44 MG: 0.9 INJECTION, SOLUTION INTRAVENOUS at 04:05

## 2019-05-08 RX ADMIN — GABAPENTIN 300 MG: 300 CAPSULE ORAL at 08:05

## 2019-05-08 RX ADMIN — POLYETHYLENE GLYCOL 3350 17 G: 17 POWDER, FOR SOLUTION ORAL at 09:05

## 2019-05-08 RX ADMIN — PROCHLORPERAZINE MALEATE 10 MG: 5 TABLET, FILM COATED ORAL at 06:05

## 2019-05-08 RX ADMIN — Medication 10 ML: at 12:05

## 2019-05-08 RX ADMIN — DEXAMETHASONE SODIUM PHOSPHATE 12 MG: 4 INJECTION, SOLUTION INTRAMUSCULAR; INTRAVENOUS at 04:05

## 2019-05-08 RX ADMIN — SODIUM CHLORIDE: 0.9 INJECTION, SOLUTION INTRAVENOUS at 08:05

## 2019-05-08 NOTE — PLAN OF CARE
Problem: Adult Inpatient Plan of Care  Goal: Plan of Care Review  Outcome: Ongoing (interventions implemented as appropriate)  Pt receiving cycle 2 of chemo. Afebrile. Free from falls or injury. No complaints of pain. NS infusing at 75. Doxy infusing at 20.8. Zofran given as scheduled. Up to bedside commode independently. Bed locked in lowest position, non skid socks on, call light within reach. Pt instructed to call if any assistance is needed. Vitals stable.  Will cont to tavo pt.

## 2019-05-08 NOTE — SUBJECTIVE & OBJECTIVE
Interval Hx: NAEO; she had nausea without vomiting this AM but was well-controlled with zofran, reports walking around frequently    Oncology Treatment Plan:   IP SARCOMA DOXORUBICIN IFOSFAMIDE MESNA (4 DAYS)    Medications:  Continuous Infusions:   sodium chloride 0.9% 100 mL/hr at 19 1400     Scheduled Meds:   amLODIPine  5 mg Oral Daily    dexamethasone (DECADRON) IVPB  12 mg Intravenous Q24H    DOXOrubicin (ADRIAMYCIN) chemo infusion  25 mg/m2 (Treatment Plan Recorded) Intravenous Q24H    gabapentin  300 mg Oral QHS    ondansetron  8 mg Intravenous Q12H    polyethylene glycol  17 g Oral Daily    sodium chloride 0.9%  10 mL Intravenous Q6H     PRN Meds:alteplase, dextrose 50%, dextrose 50%, glucagon (human recombinant), glucose, glucose, heparin, porcine (PF), magic mouthwash (diphenhydrAMINE 12.5 mg/5 mL 20 mL, aluminum & magnesium hydroxide-simethicone (MYLANTA) 20 mL, lidocaine HCl 2% (XYLOCAINE) 20 mL) solution, oxyCODONE, prochlorperazine, sodium chloride 0.9%, Flushing PICC Protocol **AND** sodium chloride 0.9% **AND** sodium chloride 0.9%, sodium chloride 0.9%, zolpidem     Review of patient's allergies indicates:  No Known Allergies     Past Medical History:   Diagnosis Date    Cancer     Gallstones     GERD (gastroesophageal reflux disease)     Hypertension     SVT (supraventricular tachycardia)      Past Surgical History:   Procedure Laterality Date     SECTION      X3    HYSTERECTOMY      Partial    TUBAL LIGATION       Family History     Problem Relation (Age of Onset)    Hypertension Brother    Lung disease Mother    No Known Problems Sister, Maternal Aunt, Maternal Uncle, Paternal Aunt, Paternal Uncle, Maternal Grandmother, Maternal Grandfather, Paternal Grandmother, Paternal Grandfather, Daughter, Daughter, Son    Pancreatic cancer Father        Tobacco Use    Smoking status: Never Smoker    Smokeless tobacco: Never Used   Substance and Sexual Activity    Alcohol  use: No     Frequency: Never    Drug use: No    Sexual activity: Never       Review of Systems   Constitutional: Negative for appetite change, chills, fatigue and fever.   HENT: Negative for mouth sores, nosebleeds and sore throat.    Respiratory: Negative for cough, chest tightness and shortness of breath.    Cardiovascular: Negative for chest pain and palpitations.   Gastrointestinal: Positive for constipation. Negative for abdominal pain, diarrhea, nausea and vomiting.   Genitourinary: Negative for dysuria, frequency and hematuria.   Musculoskeletal: Negative for arthralgias and gait problem.   Skin: Negative for color change and pallor.   Neurological: Negative for seizures, syncope and headaches.   Hematological: Negative for adenopathy.     Objective:     Vital Signs (Most Recent):  Temp: 98 °F (36.7 °C) (05/08/19 0414)  Pulse: 84 (05/08/19 0414)  Resp: 18 (05/08/19 0414)  BP: (!) 144/72 (05/08/19 0414)  SpO2: 97 % (05/08/19 0414) Vital Signs (24h Range):  Temp:  [97.7 °F (36.5 °C)-98.2 °F (36.8 °C)] 98 °F (36.7 °C)  Pulse:  [78-84] 84  Resp:  [18] 18  SpO2:  [97 %-99 %] 97 %  BP: (119-169)/(60-82) 144/72     Weight: 65.6 kg (144 lb 10 oz)  Body mass index is 24.82 kg/m².  Body surface area is 1.72 meters squared.      Intake/Output Summary (Last 24 hours) at 5/8/2019 0755  Last data filed at 5/8/2019 0414  Gross per 24 hour   Intake 2139.1 ml   Output 1400 ml   Net 739.1 ml       Physical Exam   Constitutional: She is oriented to person, place, and time. She appears well-developed and well-nourished.   HENT:   Head: Normocephalic and atraumatic.   Eyes: Pupils are equal, round, and reactive to light. EOM are normal.   Neck: Normal range of motion. Neck supple.   Cardiovascular: Normal rate and regular rhythm.   No murmur heard.  Pulmonary/Chest: Effort normal and breath sounds normal. No respiratory distress. She has no wheezes.   Abdominal: Soft. Bowel sounds are normal. She exhibits no distension. There  is tenderness.   Tenderness on deep palpation   Musculoskeletal: Normal range of motion. She exhibits edema. She exhibits no tenderness.   Neurological: She is alert and oriented to person, place, and time.   Skin: Skin is warm and dry. Capillary refill takes less than 2 seconds. No pallor.   Psychiatric: She has a normal mood and affect.       Significant Labs:   CBC:   Recent Labs   Lab 05/06/19  1022 05/07/19  0506 05/08/19  0359   WBC 7.72 7.18 6.54   HGB 8.8* 7.9* 8.3*   HCT 30.3* 27.9* 28.5*   * 445* 426*   , CMP:   Recent Labs   Lab 05/06/19  1022 05/07/19  0506 05/08/19  0359    138 137   K 4.0 4.0 4.1    111* 112*   CO2 20* 19* 17*   GLU 83 81 146*   BUN 10 10 10   CREATININE 0.6 0.6 0.6   CALCIUM 10.2 9.5 9.7   PROT 6.9 5.9* 6.3   ALBUMIN 3.1* 2.6* 2.8*   BILITOT 0.7 0.4 0.3   ALKPHOS 107 93 90   AST 9* 8* 8*   ALT 7* 5* <5*   ANIONGAP 9 8 8   EGFRNONAA >60.0 >60.0 >60.0    and Coagulation:   Recent Labs   Lab 05/06/19  1724   INR 1.1       Diagnostic Results:

## 2019-05-08 NOTE — PLAN OF CARE
MDRs completed with Dr. Benson and the team. Patient of Dr. Silvestre with a history of low grade sarcoma admitted on 5/6/19 for cycle 2 therapy with single agent adriamycin. VSS. Patient is afebrile. O2 sats 96-99% on room air. Continuous IVF: normal saline at 100 mL/hr. Labs stable. Electrolytes replaced as needed. No discharge needs identified at this time. CM to continue to follow with the team.    PCP: Ethel Good MD    Pharmacy:   [id number] [place]  [address] [place] [address]   [facility]  [place] [address]  Phone: [phone] Fax: [phone]    Payor: [employer] MEDICARE / Plan: [employer] MEDICARE HMO / Product Type: Capitation /     Anais Becerra RN, BSN, CM  [facility]  Nurse - Med Onc/Gyn Onc

## 2019-05-08 NOTE — PROGRESS NOTES
[facility]  Hematology/Oncology  Progress Note    Patient Name: Tiffany Kaur  Admission Date: 5/6/2019  Hospital Length of Stay: 2 days  Code Status: Full Code     Subjective:     HPI:  63 Year old female with abdominal Low Grade Sarcoma,  left sided hydronephresis s/p inpatient cycle 1 of AIM chemotherapy on 4/2 now presented for cycle 2 with single agent adriamycin. She was initially seen in the clinic by Dr. Silvestre and admitted to Oncology service.     She denied of any fever, chills, abdominal pain, chest pain, SOB, leg pain, nausea, vomiting, diarrhea, dysuria or dizziness.     Oncologic History  Pt is a 62 yo  female with PMhx of SVT (possible AFib), chronic IBS (describes a history of suffering from constipation as a child), HTN who presents to the hospital to discuss further options at treating recently found low grade sarcoma in her abdomen, workup for this which was [place]. She was referred by a Dr. Rosa, who is a general surgeon in the [place] area.  She had not been able to get healthcare for a while as she was not enrolled in her 's plan through the  but has been able to get enrolled in Medicare since December.     She started to have abdominal bloating in mid-December 2018. Initially, she had attributed this problem to baseline IBS and was treated at that time with various combinations of Senna, Miralax, suppositories, and enema. Additionally, she lost ~45lbs over the course of one month (174lb to 130lb) which she reports was primarily due to diffuse prandial-associated abdominal pain described mostly as cramping sensation. During this time she could only tolerated clear liquids, such as Jello and broth.      Her workup in mid-December started with an ultrasound which revealed cholelithiasis and large pelvic mass, therefore, follow-up of CT abdomen/pelvis was completed on 01/06/19. Origin of her mass has been unlcear,  but she was found to have a 20 cm abdominal mass and a picture of chronic severe left hydronephrosis. She had a follow-up CT guided core biopsy on 02/06/19 of abdominal mass which revealed a low grade sarcoma, which has been verified by our pathologists as well. *See clinic oncology note for uploaded report.*     In addition, she has a 3-4 cm x 2 cm lesion on the posterior right shoulder and right lateral neck region with some vascularity, bullous with crusting. She notes that this lesion had been present for eight years and endorses some pruritus and periods of resolution, denies any pain or bleeding with the lesion.       She presented to outpatient clinic here on 03/27/2019 as a referral from [facility] to discuss further treatment options. Plan made for in-patient admission for urology evaluation and possible initiation of chemotherapy.         In addition, the pt has a 3-4 cm x 2 cm lesion on the posterior R shoulder and R lateral neck region with some vascularity, bullous with crusting which the pt has not had follow-up for as of this visit. She notes that this lesion had been present for eight years and endorses some pruritus and periods of resolution, denies any pain or bleeding with the lesion.       Pt had cologuard completed for colorectal cancer screening on December 2018, which was negative for malignancy.      She presented to [facility] 4/1/19 for planned in-patient management of her L-sided hydronephrosis and initiation of AIM chemotherapy. Following better control of her pain, her appetite improved and she was able to increase her food intake and regain weight (up to 149lbs).  Admission labs notable for microcytic anemia with low iron studies and slight hypercalcemia (10.8 corrected). Evaluated by urology on day of admission. Based on assessment of prior imaging, it was determined that, based on hydronephrosis and degree of atrophy, the function of the left kidney was not salvageable and  that risk of nephrostomy tube or stent outweighed benefit. She had a PICC line placed on day of admission. Chemotherapy was initiated on 04/02/19 with Doxorubucin, Ifosfamide, and Mesnex. Symptoms during chemotherapy initiation included intermittent abdominal pain and nausea that were controlled with PRN Zofran and Oxycodone. Patient noted to develop new onset issues with fine motor movement of fingers on 04/04; out of concern for neurological toxicity, Ifosfamide was discontinued with symptom resolution. On 04/05, she developed increased urinary frequency with lower back pain with radiation around the left hip; UA ordered, but negative for infection. Pain resolved after dose of Gabapentin and applying heating pads, suspect could be secondary to radiculopathy from tumor burden. Neulasta was administered on day after discharge.     The pt had shedding of hair last week but has completely lost the rest of it. Her abdominal pain has been well controlled at home. Pt has had pain and ringing in ears, worse on the R side; she suspects that she is having an ear infection. Denies any significant weight changes, nausea/vomitng, constipation, diarrhea. Her neuropathy has been stable on gabapentin and hand tremors have become much better/ resolved for most part.      Her PCP is Dr. Valles at [facility].         Interval Hx: NAEO; she had nausea without vomiting this AM but was well-controlled with zofran, reports walking around frequently    Oncology Treatment Plan:   IP SARCOMA DOXORUBICIN IFOSFAMIDE MESNA (4 DAYS)    Medications:  Continuous Infusions:   sodium chloride 0.9% 100 mL/hr at 05/07/19 1400     Scheduled Meds:   amLODIPine  5 mg Oral Daily    dexamethasone (DECADRON) IVPB  12 mg Intravenous Q24H    DOXOrubicin (ADRIAMYCIN) chemo infusion  25 mg/m2 (Treatment Plan Recorded) Intravenous Q24H    gabapentin  300 mg Oral QHS    ondansetron  8 mg Intravenous Q12H    polyethylene glycol  17 g Oral Daily     sodium chloride 0.9%  10 mL Intravenous Q6H     PRN Meds:alteplase, dextrose 50%, dextrose 50%, glucagon (human recombinant), glucose, glucose, heparin, porcine (PF), magic mouthwash (diphenhydrAMINE 12.5 mg/5 mL 20 mL, aluminum & magnesium hydroxide-simethicone (MYLANTA) 20 mL, lidocaine HCl 2% (XYLOCAINE) 20 mL) solution, oxyCODONE, prochlorperazine, sodium chloride 0.9%, Flushing PICC Protocol **AND** sodium chloride 0.9% **AND** sodium chloride 0.9%, sodium chloride 0.9%, zolpidem     Review of patient's allergies indicates:  No Known Allergies     Past Medical History:   Diagnosis Date    Cancer     Gallstones     GERD (gastroesophageal reflux disease)     Hypertension     SVT (supraventricular tachycardia)      Past Surgical History:   Procedure Laterality Date     SECTION      X3    HYSTERECTOMY      Partial    TUBAL LIGATION       Family History     Problem Relation (Age of Onset)    Hypertension Brother    Lung disease Mother    No Known Problems Sister, Maternal Aunt, Maternal Uncle, Paternal Aunt, Paternal Uncle, Maternal Grandmother, Maternal Grandfather, Paternal Grandmother, Paternal Grandfather, Daughter, Daughter, Son    Pancreatic cancer Father        Tobacco Use    Smoking status: Never Smoker    Smokeless tobacco: Never Used   Substance and Sexual Activity    Alcohol use: No     Frequency: Never    Drug use: No    Sexual activity: Never       Review of Systems   Constitutional: Negative for appetite change, chills, fatigue and fever.   HENT: Negative for mouth sores, nosebleeds and sore throat.    Respiratory: Negative for cough, chest tightness and shortness of breath.    Cardiovascular: Negative for chest pain and palpitations.   Gastrointestinal: Positive for constipation. Negative for abdominal pain, diarrhea, nausea and vomiting.   Genitourinary: Negative for dysuria, frequency and hematuria.   Musculoskeletal: Negative for arthralgias and gait problem.   Skin:  Negative for color change and pallor.   Neurological: Negative for seizures, syncope and headaches.   Hematological: Negative for adenopathy.     Objective:     Vital Signs (Most Recent):  Temp: 98 °F (36.7 °C) (05/08/19 0414)  Pulse: 84 (05/08/19 0414)  Resp: 18 (05/08/19 0414)  BP: (!) 144/72 (05/08/19 0414)  SpO2: 97 % (05/08/19 0414) Vital Signs (24h Range):  Temp:  [97.7 °F (36.5 °C)-98.2 °F (36.8 °C)] 98 °F (36.7 °C)  Pulse:  [78-84] 84  Resp:  [18] 18  SpO2:  [97 %-99 %] 97 %  BP: (119-169)/(60-82) 144/72     Weight: 65.6 kg (144 lb 10 oz)  Body mass index is 24.82 kg/m².  Body surface area is 1.72 meters squared.      Intake/Output Summary (Last 24 hours) at 5/8/2019 0755  Last data filed at 5/8/2019 0414  Gross per 24 hour   Intake 2139.1 ml   Output 1400 ml   Net 739.1 ml       Physical Exam   Constitutional: She is oriented to person, place, and time. She appears well-developed and well-nourished.   HENT:   Head: Normocephalic and atraumatic.   Eyes: Pupils are equal, round, and reactive to light. EOM are normal.   Neck: Normal range of motion. Neck supple.   Cardiovascular: Normal rate and regular rhythm.   No murmur heard.  Pulmonary/Chest: Effort normal and breath sounds normal. No respiratory distress. She has no wheezes.   Abdominal: Soft. Bowel sounds are normal. She exhibits no distension. There is tenderness.   Tenderness on deep palpation   Musculoskeletal: Normal range of motion. She exhibits edema. She exhibits no tenderness.   Neurological: She is alert and oriented to person, place, and time.   Skin: Skin is warm and dry. Capillary refill takes less than 2 seconds. No pallor.   Psychiatric: She has a normal mood and affect.       Significant Labs:   CBC:   Recent Labs   Lab 05/06/19  1022 05/07/19  0506 05/08/19  0359   WBC 7.72 7.18 6.54   HGB 8.8* 7.9* 8.3*   HCT 30.3* 27.9* 28.5*   * 445* 426*   , CMP:   Recent Labs   Lab 05/06/19  1022 05/07/19  0506 05/08/19  0359    138 137    K 4.0 4.0 4.1    111* 112*   CO2 20* 19* 17*   GLU 83 81 146*   BUN 10 10 10   CREATININE 0.6 0.6 0.6   CALCIUM 10.2 9.5 9.7   PROT 6.9 5.9* 6.3   ALBUMIN 3.1* 2.6* 2.8*   BILITOT 0.7 0.4 0.3   ALKPHOS 107 93 90   AST 9* 8* 8*   ALT 7* 5* <5*   ANIONGAP 9 8 8   EGFRNONAA >60.0 >60.0 >60.0    and Coagulation:   Recent Labs   Lab 05/06/19  1724   INR 1.1       Diagnostic Results:      Assessment/Plan:     SVT (supraventricular tachycardia)  - no AFib noted during last hospital admission  -baseline EKG        Hydronephrosis of left kidney  - from external compression of large sarcoma  - left kidney very atrophic and not salvageable per Nephrology           Sarcoma  20 cm mass noted in CT abdomen/pelvis; L ureter noted to be have left sided hydronephresis and noted to have some mild inguinal adneopathy  - path reviewed at our institution consistent with sarcoma  - PET-CT reveals a large anterior abdominal mass, low activity sarcoma without significant metastatic spread, SUV max of 4.7   - Echo with EF of 60%, normal LV systolic function and indeterminate diastolic function  - tumor causing pain in central abdomen as well as compression of L ureter and causing hydronephrosis  - s/p cycle 1 Inpatient AIM completed  (adrimaycin, ifosfomide, mesna)   - Ifosfomide dropped on day 3 due to neurotoxicity  - PET- CT results reviewed, no significant change after one round  - will plan for cycle 2 chemo with single agent adriamycin  - PICC consult placed        Irritable bowel syndrome  - continue laxatives PRN - On miralax for now, which helps her very well with constipation        Hypertension  - continue norvasc        Neuropathy  Neuropathy, possible L sided sciatica  - pt with lower back pain around site of tumor  - continue gabapentin 300 mg at night for pain         Hypercalcemia of malignancy  - mild, around baseline of 11 on admit improved today 10.7  - Continue IV fluids at 100 cc/hr             Dolores Lemos  MD  Hematology/Oncology  [facility]      ATTENDING NOTE, ONCOLOGY INPATIENT TEAM    As above; events of last 24 hours noted.  Patient seen and examined, chart reviewed.  She has tolerated  The adriamycin infusion well and only experienced mild nausea last night.  Appears comfortable, in NAD.  Lungs are clear to auscultation.  Abdomen is soft, nontender.  The large abdominal mass remains unchanged.  Labs reviewed.    PLAN  Follow electrolytes given her borderline hypercalcemia.  Continue the 72 hour adriamycin infusion/  Continue IV hydration for one more day given her hypercalcemia.    Patient is ambulatory; no need for DVT prophylaxis while she remains hospitalized.  We will follow.  Her multiple questions were answered to her satisfaction.      Norman Cazares MD

## 2019-05-09 LAB
ALBUMIN SERPL BCP-MCNC: 2.8 G/DL (ref 3.5–5.2)
ALP SERPL-CCNC: 80 U/L (ref 55–135)
ALT SERPL W/O P-5'-P-CCNC: <5 U/L (ref 10–44)
ANION GAP SERPL CALC-SCNC: 6 MMOL/L (ref 8–16)
AST SERPL-CCNC: 6 U/L (ref 10–40)
BASOPHILS # BLD AUTO: 0.01 K/UL (ref 0–0.2)
BASOPHILS NFR BLD: 0.1 % (ref 0–1.9)
BILIRUB SERPL-MCNC: 0.2 MG/DL (ref 0.1–1)
BUN SERPL-MCNC: 14 MG/DL (ref 8–23)
CALCIUM SERPL-MCNC: 9.6 MG/DL (ref 8.7–10.5)
CHLORIDE SERPL-SCNC: 112 MMOL/L (ref 95–110)
CO2 SERPL-SCNC: 19 MMOL/L (ref 23–29)
CREAT SERPL-MCNC: 0.7 MG/DL (ref 0.5–1.4)
DIFFERENTIAL METHOD: ABNORMAL
EOSINOPHIL # BLD AUTO: 0 K/UL (ref 0–0.5)
EOSINOPHIL NFR BLD: 0 % (ref 0–8)
ERYTHROCYTE [DISTWIDTH] IN BLOOD BY AUTOMATED COUNT: 21.6 % (ref 11.5–14.5)
EST. GFR  (AFRICAN AMERICAN): >60 ML/MIN/1.73 M^2
EST. GFR  (NON AFRICAN AMERICAN): >60 ML/MIN/1.73 M^2
GLUCOSE SERPL-MCNC: 126 MG/DL (ref 70–110)
HCT VFR BLD AUTO: 27.2 % (ref 37–48.5)
HGB BLD-MCNC: 7.9 G/DL (ref 12–16)
IMM GRANULOCYTES # BLD AUTO: 0.04 K/UL (ref 0–0.04)
IMM GRANULOCYTES NFR BLD AUTO: 0.4 % (ref 0–0.5)
LYMPHOCYTES # BLD AUTO: 0.8 K/UL (ref 1–4.8)
LYMPHOCYTES NFR BLD: 9.4 % (ref 18–48)
MAGNESIUM SERPL-MCNC: 1.9 MG/DL (ref 1.6–2.6)
MCH RBC QN AUTO: 21.9 PG (ref 27–31)
MCHC RBC AUTO-ENTMCNC: 29 G/DL (ref 32–36)
MCV RBC AUTO: 75 FL (ref 82–98)
MONOCYTES # BLD AUTO: 0.2 K/UL (ref 0.3–1)
MONOCYTES NFR BLD: 2.6 % (ref 4–15)
NEUTROPHILS # BLD AUTO: 7.8 K/UL (ref 1.8–7.7)
NEUTROPHILS NFR BLD: 87.5 % (ref 38–73)
NRBC BLD-RTO: 0 /100 WBC
PHOSPHATE SERPL-MCNC: 3.2 MG/DL (ref 2.7–4.5)
PLATELET # BLD AUTO: 408 K/UL (ref 150–350)
PMV BLD AUTO: 11.9 FL (ref 9.2–12.9)
POTASSIUM SERPL-SCNC: 4.1 MMOL/L (ref 3.5–5.1)
PROT SERPL-MCNC: 6.1 G/DL (ref 6–8.4)
RBC # BLD AUTO: 3.61 M/UL (ref 4–5.4)
SODIUM SERPL-SCNC: 137 MMOL/L (ref 136–145)
WBC # BLD AUTO: 8.92 K/UL (ref 3.9–12.7)

## 2019-05-09 PROCEDURE — 99233 SBSQ HOSP IP/OBS HIGH 50: CPT | Mod: ,,, | Performed by: INTERNAL MEDICINE

## 2019-05-09 PROCEDURE — 25000003 PHARM REV CODE 250: Performed by: STUDENT IN AN ORGANIZED HEALTH CARE EDUCATION/TRAINING PROGRAM

## 2019-05-09 PROCEDURE — 84100 ASSAY OF PHOSPHORUS: CPT

## 2019-05-09 PROCEDURE — 20600001 HC STEP DOWN PRIVATE ROOM

## 2019-05-09 PROCEDURE — A4216 STERILE WATER/SALINE, 10 ML: HCPCS | Performed by: INTERNAL MEDICINE

## 2019-05-09 PROCEDURE — 25000003 PHARM REV CODE 250: Performed by: INTERNAL MEDICINE

## 2019-05-09 PROCEDURE — 83735 ASSAY OF MAGNESIUM: CPT

## 2019-05-09 PROCEDURE — 85025 COMPLETE CBC W/AUTO DIFF WBC: CPT

## 2019-05-09 PROCEDURE — 63600175 PHARM REV CODE 636 W HCPCS: Performed by: INTERNAL MEDICINE

## 2019-05-09 PROCEDURE — 99233 PR SUBSEQUENT HOSPITAL CARE,LEVL III: ICD-10-PCS | Mod: ,,, | Performed by: INTERNAL MEDICINE

## 2019-05-09 PROCEDURE — 80053 COMPREHEN METABOLIC PANEL: CPT

## 2019-05-09 RX ORDER — AMLODIPINE BESYLATE 10 MG/1
10 TABLET ORAL DAILY
Status: DISCONTINUED | OUTPATIENT
Start: 2019-05-09 | End: 2019-05-10 | Stop reason: HOSPADM

## 2019-05-09 RX ADMIN — ONDANSETRON 8 MG: 2 INJECTION INTRAMUSCULAR; INTRAVENOUS at 03:05

## 2019-05-09 RX ADMIN — GABAPENTIN 300 MG: 300 CAPSULE ORAL at 09:05

## 2019-05-09 RX ADMIN — Medication 10 ML: at 09:05

## 2019-05-09 RX ADMIN — SODIUM CHLORIDE 44 MG: 0.9 INJECTION, SOLUTION INTRAVENOUS at 05:05

## 2019-05-09 RX ADMIN — SODIUM CHLORIDE: 0.9 INJECTION, SOLUTION INTRAVENOUS at 01:05

## 2019-05-09 RX ADMIN — AMLODIPINE BESYLATE 10 MG: 10 TABLET ORAL at 09:05

## 2019-05-09 RX ADMIN — Medication 10 ML: at 12:05

## 2019-05-09 RX ADMIN — DEXAMETHASONE SODIUM PHOSPHATE 12 MG: 4 INJECTION, SOLUTION INTRAMUSCULAR; INTRAVENOUS at 03:05

## 2019-05-09 RX ADMIN — ZOLPIDEM TARTRATE 5 MG: 5 TABLET ORAL at 09:05

## 2019-05-09 RX ADMIN — SODIUM CHLORIDE: 0.9 INJECTION, SOLUTION INTRAVENOUS at 04:05

## 2019-05-09 RX ADMIN — PROCHLORPERAZINE MALEATE 10 MG: 5 TABLET, FILM COATED ORAL at 06:05

## 2019-05-09 RX ADMIN — Medication 10 ML: at 05:05

## 2019-05-09 RX ADMIN — OXYCODONE HYDROCHLORIDE 5 MG: 5 TABLET ORAL at 04:05

## 2019-05-09 RX ADMIN — POLYETHYLENE GLYCOL 3350 17 G: 17 POWDER, FOR SOLUTION ORAL at 09:05

## 2019-05-09 NOTE — ASSESSMENT & PLAN NOTE
- mild, around baseline of 11 on admit improved today 10.5  - Continue IV fluids at 100 cc/hr for now and will today evening

## 2019-05-09 NOTE — ASSESSMENT & PLAN NOTE
- Neuropathy, possible L sided sciatica  - pt with lower back pain around site of tumor  - continue gabapentin 300 mg at night for pain

## 2019-05-09 NOTE — ASSESSMENT & PLAN NOTE
20 cm mass noted in CT abdomen/pelvis; L ureter noted to be have left sided hydronephresis and noted to have some mild inguinal adneopathy  - path reviewed at our institution consistent with sarcoma  - PET-CT reveals a large anterior abdominal mass, low activity sarcoma without significant metastatic spread, SUV max of 4.7   - Echo with EF of 60%, normal LV systolic function and indeterminate diastolic function  - tumor causing pain in central abdomen as well as compression of L ureter and causing hydronephrosis  - s/p cycle 1 Inpatient AIM completed  (adrimaycin, ifosfomide, mesna)   - Ifosfomide dropped on day 3 due to neurotoxicity  - PET- CT results reviewed, no significant change after one round  - PICC in place  - On cycle 2 chemo with single agent adriamycin day 2. Will complete on Friday. Will stay at [facility] and get Neulasta on Saturday.

## 2019-05-09 NOTE — PLAN OF CARE
Problem: Adult Inpatient Plan of Care  Goal: Plan of Care Review  Outcome: Ongoing (interventions implemented as appropriate)  Pt receiving cycle 2 of chemo. Afebrile. Free from falls or injury. No complaints of pain. NS infusing at 75. Doxy infusing at 20.8. Zofran given as scheduled. Compazine given once for nausea. Up to bedside commode independently. Bed locked in lowest position, non skid socks on, call light within reach. Pt instructed to call if any assistance is needed. Vitals stable.  Will cont to tavo pt.

## 2019-05-09 NOTE — SUBJECTIVE & OBJECTIVE
Interval Hx:   She had mild nausea without vomiting this AM but was well-controlled with Zofran and Phenergan  Had Mild abdominal pain today and has 1 dose Oxycodone and feels better  She reports walking around frequently  No acute issues overnight    Oncology Treatment Plan:   IP SARCOMA DOXORUBICIN IFOSFAMIDE MESNA (4 DAYS)    Medications:  Continuous Infusions:   sodium chloride 0.9% 100 mL/hr at 19 0408     Scheduled Meds:   amLODIPine  5 mg Oral Daily    dexamethasone (DECADRON) IVPB  12 mg Intravenous Q24H    DOXOrubicin (ADRIAMYCIN) chemo infusion  25 mg/m2 (Treatment Plan Recorded) Intravenous Q24H    gabapentin  300 mg Oral QHS    ondansetron  8 mg Intravenous Q12H    polyethylene glycol  17 g Oral Daily    sodium chloride 0.9%  10 mL Intravenous Q6H     PRN Meds:alteplase, dextrose 50%, dextrose 50%, glucagon (human recombinant), glucose, glucose, heparin, porcine (PF), magic mouthwash (diphenhydrAMINE 12.5 mg/5 mL 20 mL, aluminum & magnesium hydroxide-simethicone (MYLANTA) 20 mL, lidocaine HCl 2% (XYLOCAINE) 20 mL) solution, oxyCODONE, prochlorperazine, sodium chloride 0.9%, Flushing PICC Protocol **AND** sodium chloride 0.9% **AND** sodium chloride 0.9%, sodium chloride 0.9%, zolpidem     Review of patient's allergies indicates:  No Known Allergies     Past Medical History:   Diagnosis Date    Cancer     Gallstones     GERD (gastroesophageal reflux disease)     Hypertension     SVT (supraventricular tachycardia)      Past Surgical History:   Procedure Laterality Date     SECTION      X3    HYSTERECTOMY      Partial    TUBAL LIGATION       Family History     Problem Relation (Age of Onset)    Hypertension Brother    Lung disease Mother    No Known Problems Sister, Maternal Aunt, Maternal Uncle, Paternal Aunt, Paternal Uncle, Maternal Grandmother, Maternal Grandfather, Paternal Grandmother, Paternal Grandfather, Daughter, Daughter, Son    Pancreatic cancer Father         Tobacco Use    Smoking status: Never Smoker    Smokeless tobacco: Never Used   Substance and Sexual Activity    Alcohol use: No     Frequency: Never    Drug use: No    Sexual activity: Never       Review of Systems   Constitutional: Negative for appetite change, chills, fatigue and fever.   HENT: Negative for mouth sores, nosebleeds and sore throat.    Respiratory: Negative for cough, chest tightness and shortness of breath.    Cardiovascular: Negative for chest pain and palpitations.   Gastrointestinal: Positive for constipation. Negative for abdominal pain, diarrhea, nausea and vomiting.   Genitourinary: Negative for dysuria, frequency and hematuria.   Musculoskeletal: Negative for arthralgias and gait problem.   Skin: Negative for color change and pallor.   Neurological: Negative for seizures, syncope and headaches.   Hematological: Negative for adenopathy.     Objective:     Vital Signs (Most Recent):  Temp: 97.4 °F (36.3 °C) (05/09/19 0756)  Pulse: 76 (05/09/19 0756)  Resp: 18 (05/09/19 0756)  BP: 124/74 (05/09/19 0756)  SpO2: 97 % (05/09/19 0756) Vital Signs (24h Range):  Temp:  [97.4 °F (36.3 °C)-98 °F (36.7 °C)] 97.4 °F (36.3 °C)  Pulse:  [76-81] 76  Resp:  [18-20] 18  SpO2:  [94 %-97 %] 97 %  BP: (124-155)/(64-80) 124/74     Weight: 65.6 kg (144 lb 10 oz)  Body mass index is 24.82 kg/m².  Body surface area is 1.72 meters squared.      Intake/Output Summary (Last 24 hours) at 5/9/2019 0838  Last data filed at 5/9/2019 0357  Gross per 24 hour   Intake 1261 ml   Output 1200 ml   Net 61 ml       Physical Exam   Constitutional: She is oriented to person, place, and time. She appears well-developed and well-nourished.   HENT:   Head: Normocephalic and atraumatic.   Eyes: Pupils are equal, round, and reactive to light. EOM are normal.   Neck: Normal range of motion. Neck supple.   Cardiovascular: Normal rate and regular rhythm.   No murmur heard.  Pulmonary/Chest: Effort normal and breath sounds normal.  No respiratory distress. She has no wheezes.   Abdominal: Soft. Bowel sounds are normal. She exhibits no distension. There is tenderness.   Tenderness on deep palpation   Musculoskeletal: Normal range of motion. She exhibits edema. She exhibits no tenderness.   Neurological: She is alert and oriented to person, place, and time.   Skin: Skin is warm and dry. Capillary refill takes less than 2 seconds. No pallor.   Psychiatric: She has a normal mood and affect.       Significant Labs:   CBC:   Recent Labs   Lab 05/08/19  0359 05/09/19  0400   WBC 6.54 8.92   HGB 8.3* 7.9*   HCT 28.5* 27.2*   * 408*   , CMP:   Recent Labs   Lab 05/08/19  0359 05/09/19  0400    137   K 4.1 4.1   * 112*   CO2 17* 19*   * 126*   BUN 10 14   CREATININE 0.6 0.7   CALCIUM 9.7 9.6   PROT 6.3 6.1   ALBUMIN 2.8* 2.8*   BILITOT 0.3 0.2   ALKPHOS 90 80   AST 8* 6*   ALT <5* <5*   ANIONGAP 8 6*   EGFRNONAA >60.0 >60.0    and Coagulation:   No results for input(s): PT, INR, APTT in the last 48 hours.    Diagnostic Results:

## 2019-05-09 NOTE — PLAN OF CARE
Problem: Adult Inpatient Plan of Care  Goal: Plan of Care Review  Outcome: Ongoing (interventions implemented as appropriate)  Side rails up x2; call bell in place; bed in lowest, locked position; skid proof socks on; no evidence of skin breakdown; care plan explained to patient; pt remains free of injury. Pt tolerated po, void, ambulates in room and hendricks and tolerates, Denies pain and n/v or diarrhea. IVF infusing at 100 cc/hr and doxorubicin infusng at 20.8 cc/hr. VSS and afebrile.

## 2019-05-09 NOTE — PROGRESS NOTES
[facility]  Hematology/Oncology  Progress Note    Patient Name: Tiffany Kaur  Admission Date: 5/6/2019  Hospital Length of Stay: 3 days  Code Status: Full Code     Subjective:     HPI:  63 Year old female with abdominal Low Grade Sarcoma,  left sided hydronephresis s/p inpatient cycle 1 of AIM chemotherapy on 4/2 now presented for cycle 2 with single agent adriamycin. She was initially seen in the clinic by Dr. Silvestre and admitted to Oncology service.     She denied of any fever, chills, abdominal pain, chest pain, SOB, leg pain, nausea, vomiting, diarrhea, dysuria or dizziness.     Oncologic History  Pt is a 64 yo  female with PMhx of SVT (possible AFib), chronic IBS (describes a history of suffering from constipation as a child), HTN who presents to the hospital to discuss further options at treating recently found low grade sarcoma in her abdomen, workup for this which was started in [facility]. She was referred by a Dr. Rosa, who is a general surgeon in the [place] area.  She had not been able to get healthcare for a while as she was not enrolled in her 's plan through the  but has been able to get enrolled in Medicare since December.     She started to have abdominal bloating in mid-December 2018. Initially, she had attributed this problem to baseline IBS and was treated at that time with various combinations of Senna, Miralax, suppositories, and enema. Additionally, she lost ~45lbs over the course of one month (174lb to 130lb) which she reports was primarily due to diffuse prandial-associated abdominal pain described mostly as cramping sensation. During this time she could only tolerated clear liquids, such as Jello and broth.      Her workup in mid-December started with an ultrasound which revealed cholelithiasis and large pelvic mass, therefore, follow-up of CT abdomen/pelvis was completed on 01/06/19. Origin of her mass has been unlcear,  but she was found to have a 20 cm abdominal mass and a picture of chronic severe left hydronephrosis. She had a follow-up CT guided core biopsy on 02/06/19 of abdominal mass which revealed a low grade sarcoma, which has been verified by our pathologists as well. *See clinic oncology note for uploaded report.*     In addition, she has a 3-4 cm x 2 cm lesion on the posterior right shoulder and right lateral neck region with some vascularity, bullous with crusting. She notes that this lesion had been present for eight years and endorses some pruritus and periods of resolution, denies any pain or bleeding with the lesion.       She presented to outpatient clinic here on 03/27/2019 as a referral from [facility] to discuss further treatment options. Plan made for in-patient admission for urology evaluation and possible initiation of chemotherapy.         In addition, the pt has a 3-4 cm x 2 cm lesion on the posterior R shoulder and R lateral neck region with some vascularity, bullous with crusting which the pt has not had follow-up for as of this visit. She notes that this lesion had been present for eight years and endorses some pruritus and periods of resolution, denies any pain or bleeding with the lesion.       Pt had cologuard completed for colorectal cancer screening on December 2018, which was negative for malignancy.      She presented to [facility] 4/1/19 for planned in-patient management of her L-sided hydronephrosis and initiation of AIM chemotherapy. Following better control of her pain, her appetite improved and she was able to increase her food intake and regain weight (up to 149lbs).  Admission labs notable for microcytic anemia with low iron studies and slight hypercalcemia (10.8 corrected). Evaluated by urology on day of admission. Based on assessment of prior imaging, it was determined that, based on hydronephrosis and degree of atrophy, the function of the left kidney was not salvageable and  that risk of nephrostomy tube or stent outweighed benefit. She had a PICC line placed on day of admission. Chemotherapy was initiated on 04/02/19 with Doxorubucin, Ifosfamide, and Mesnex. Symptoms during chemotherapy initiation included intermittent abdominal pain and nausea that were controlled with PRN Zofran and Oxycodone. Patient noted to develop new onset issues with fine motor movement of fingers on 04/04; out of concern for neurological toxicity, Ifosfamide was discontinued with symptom resolution. On 04/05, she developed increased urinary frequency with lower back pain with radiation around the left hip; UA ordered, but negative for infection. Pain resolved after dose of Gabapentin and applying heating pads, suspect could be secondary to radiculopathy from tumor burden. Neulasta was administered on day after discharge.     The pt had shedding of hair last week but has completely lost the rest of it. Her abdominal pain has been well controlled at home. Pt has had pain and ringing in ears, worse on the R side; she suspects that she is having an ear infection. Denies any significant weight changes, nausea/vomitng, constipation, diarrhea. Her neuropathy has been stable on gabapentin and hand tremors have become much better/ resolved for most part.      Her PCP is Dr. Valles at [facility].         Interval Hx:   She had mild nausea without vomiting this AM but was well-controlled with Zofran and Phenergan  Had Mild abdominal pain today and has 1 dose Oxycodone and feels better  She reports walking around frequently  No acute issues overnight    Oncology Treatment Plan:   IP SARCOMA DOXORUBICIN IFOSFAMIDE MESNA (4 DAYS)    Medications:  Continuous Infusions:   sodium chloride 0.9% 100 mL/hr at 05/09/19 0408     Scheduled Meds:   amLODIPine  5 mg Oral Daily    dexamethasone (DECADRON) IVPB  12 mg Intravenous Q24H    DOXOrubicin (ADRIAMYCIN) chemo infusion  25 mg/m2 (Treatment Plan Recorded) Intravenous  Q24H    gabapentin  300 mg Oral QHS    ondansetron  8 mg Intravenous Q12H    polyethylene glycol  17 g Oral Daily    sodium chloride 0.9%  10 mL Intravenous Q6H     PRN Meds:alteplase, dextrose 50%, dextrose 50%, glucagon (human recombinant), glucose, glucose, heparin, porcine (PF), magic mouthwash (diphenhydrAMINE 12.5 mg/5 mL 20 mL, aluminum & magnesium hydroxide-simethicone (MYLANTA) 20 mL, lidocaine HCl 2% (XYLOCAINE) 20 mL) solution, oxyCODONE, prochlorperazine, sodium chloride 0.9%, Flushing PICC Protocol **AND** sodium chloride 0.9% **AND** sodium chloride 0.9%, sodium chloride 0.9%, zolpidem     Review of patient's allergies indicates:  No Known Allergies     Past Medical History:   Diagnosis Date    Cancer     Gallstones     GERD (gastroesophageal reflux disease)     Hypertension     SVT (supraventricular tachycardia)      Past Surgical History:   Procedure Laterality Date     SECTION      X3    HYSTERECTOMY      Partial    TUBAL LIGATION       Family History     Problem Relation (Age of Onset)    Hypertension Brother    Lung disease Mother    No Known Problems Sister, Maternal Aunt, Maternal Uncle, Paternal Aunt, Paternal Uncle, Maternal Grandmother, Maternal Grandfather, Paternal Grandmother, Paternal Grandfather, Daughter, Daughter, Son    Pancreatic cancer Father        Tobacco Use    Smoking status: Never Smoker    Smokeless tobacco: Never Used   Substance and Sexual Activity    Alcohol use: No     Frequency: Never    Drug use: No    Sexual activity: Never       Review of Systems   Constitutional: Negative for appetite change, chills, fatigue and fever.   HENT: Negative for mouth sores, nosebleeds and sore throat.    Respiratory: Negative for cough, chest tightness and shortness of breath.    Cardiovascular: Negative for chest pain and palpitations.   Gastrointestinal: Positive for constipation. Negative for abdominal pain, diarrhea, nausea and vomiting.   Genitourinary:  Negative for dysuria, frequency and hematuria.   Musculoskeletal: Negative for arthralgias and gait problem.   Skin: Negative for color change and pallor.   Neurological: Negative for seizures, syncope and headaches.   Hematological: Negative for adenopathy.     Objective:     Vital Signs (Most Recent):  Temp: 97.4 °F (36.3 °C) (05/09/19 0756)  Pulse: 76 (05/09/19 0756)  Resp: 18 (05/09/19 0756)  BP: 124/74 (05/09/19 0756)  SpO2: 97 % (05/09/19 0756) Vital Signs (24h Range):  Temp:  [97.4 °F (36.3 °C)-98 °F (36.7 °C)] 97.4 °F (36.3 °C)  Pulse:  [76-81] 76  Resp:  [18-20] 18  SpO2:  [94 %-97 %] 97 %  BP: (124-155)/(64-80) 124/74     Weight: 65.6 kg (144 lb 10 oz)  Body mass index is 24.82 kg/m².  Body surface area is 1.72 meters squared.      Intake/Output Summary (Last 24 hours) at 5/9/2019 0838  Last data filed at 5/9/2019 0357  Gross per 24 hour   Intake 1261 ml   Output 1200 ml   Net 61 ml       Physical Exam   Constitutional: She is oriented to person, place, and time. She appears well-developed and well-nourished.   HENT:   Head: Normocephalic and atraumatic.   Eyes: Pupils are equal, round, and reactive to light. EOM are normal.   Neck: Normal range of motion. Neck supple.   Cardiovascular: Normal rate and regular rhythm.   No murmur heard.  Pulmonary/Chest: Effort normal and breath sounds normal. No respiratory distress. She has no wheezes.   Abdominal: Soft. Bowel sounds are normal. She exhibits no distension. There is tenderness.   Tenderness on deep palpation   Musculoskeletal: Normal range of motion. She exhibits edema. She exhibits no tenderness.   Neurological: She is alert and oriented to person, place, and time.   Skin: Skin is warm and dry. Capillary refill takes less than 2 seconds. No pallor.   Psychiatric: She has a normal mood and affect.       Significant Labs:   CBC:   Recent Labs   Lab 05/08/19  0359 05/09/19  0400   WBC 6.54 8.92   HGB 8.3* 7.9*   HCT 28.5* 27.2*   * 408*   , CMP:    Recent Labs   Lab 05/08/19  0359 05/09/19  0400    137   K 4.1 4.1   * 112*   CO2 17* 19*   * 126*   BUN 10 14   CREATININE 0.6 0.7   CALCIUM 9.7 9.6   PROT 6.3 6.1   ALBUMIN 2.8* 2.8*   BILITOT 0.3 0.2   ALKPHOS 90 80   AST 8* 6*   ALT <5* <5*   ANIONGAP 8 6*   EGFRNONAA >60.0 >60.0    and Coagulation:   No results for input(s): PT, INR, APTT in the last 48 hours.    Diagnostic Results:      Assessment/Plan:     SVT (supraventricular tachycardia)  - no AFib noted during last hospital admission  -baseline EKG        Hydronephrosis of left kidney  - from external compression of large sarcoma  - left kidney very atrophic and not salvageable per Nephrology           Sarcoma  20 cm mass noted in CT abdomen/pelvis; L ureter noted to be have left sided hydronephresis and noted to have some mild inguinal adneopathy  - path reviewed at our institution consistent with sarcoma  - PET-CT reveals a large anterior abdominal mass, low activity sarcoma without significant metastatic spread, SUV max of 4.7   - Echo with EF of 60%, normal LV systolic function and indeterminate diastolic function  - tumor causing pain in central abdomen as well as compression of L ureter and causing hydronephrosis  - s/p cycle 1 Inpatient AIM completed  (adrimaycin, ifosfomide, mesna)   - Ifosfomide dropped on day 3 due to neurotoxicity  - PET- CT results reviewed, no significant change after one round  - PICC in place  - On cycle 2 chemo with single agent adriamycin day 2. Will complete on Friday. Will stay at [facility] and get Neulasta on Saturday.          Irritable bowel syndrome  - continue laxatives PRN - On miralax for now, which helps her very well with constipation        Hypertension  - continue norvasc        Neuropathy  - Neuropathy, possible L sided sciatica  - pt with lower back pain around site of tumor  - continue gabapentin 300 mg at night for pain         Hypercalcemia of malignancy  - mild, around baseline of  11 on admit improved today 10.5  - Continue IV fluids at 100 cc/hr for now and will today evening             Gokul Hensley MD  Hematology/Oncology  [facility]        ATTENDING NOTE, ONCOLOGY INPATIENT TEAM    As above; events of last 24 hours noted.  Patient seen and examined, chart reviewed.  Appears comfortable, in NAD.  Lungs are clear to auscultation.  Abdomen is soft, nontender.  The large abdominal mass is unchanged.  Labs reviewed.  Her corrected calcium today is 10.5 mg/dl.    PLAN  Repeat electrolytes in am.  Continue with the 72 hour adriamycin infusion.  Continue hydration for today; will discontinue after two liters given her normal calcium.  No need for DVT prophylaxis since she is fully ambulatory.  She will need neulasta after her discharge.  Her multiple questions were answered to her satisfaction.      Norman Cazares MD

## 2019-05-09 NOTE — PROGRESS NOTES
Chemo note: Doxorubicin verified with 2 chemo nurses, picc with good blood return. All connections secured and with chemo tape, chemo precautions maintained. Pt instructed to call with any concerns.

## 2019-05-10 ENCOUNTER — TELEPHONE (OUTPATIENT)
Dept: HEMATOLOGY/ONCOLOGY | Facility: CLINIC | Age: 64
End: 2019-05-10

## 2019-05-10 VITALS
SYSTOLIC BLOOD PRESSURE: 162 MMHG | HEART RATE: 70 BPM | WEIGHT: 144.63 LBS | HEIGHT: 64 IN | BODY MASS INDEX: 24.69 KG/M2 | TEMPERATURE: 98 F | RESPIRATION RATE: 18 BRPM | DIASTOLIC BLOOD PRESSURE: 72 MMHG | OXYGEN SATURATION: 99 %

## 2019-05-10 LAB
ALBUMIN SERPL BCP-MCNC: 2.7 G/DL (ref 3.5–5.2)
ALP SERPL-CCNC: 71 U/L (ref 55–135)
ALT SERPL W/O P-5'-P-CCNC: 5 U/L (ref 10–44)
ANION GAP SERPL CALC-SCNC: 8 MMOL/L (ref 8–16)
AST SERPL-CCNC: 8 U/L (ref 10–40)
BASOPHILS # BLD AUTO: 0.01 K/UL (ref 0–0.2)
BASOPHILS NFR BLD: 0.2 % (ref 0–1.9)
BILIRUB SERPL-MCNC: 0.3 MG/DL (ref 0.1–1)
BUN SERPL-MCNC: 14 MG/DL (ref 8–23)
CALCIUM SERPL-MCNC: 9.3 MG/DL (ref 8.7–10.5)
CHLORIDE SERPL-SCNC: 112 MMOL/L (ref 95–110)
CO2 SERPL-SCNC: 18 MMOL/L (ref 23–29)
CREAT SERPL-MCNC: 0.6 MG/DL (ref 0.5–1.4)
DIFFERENTIAL METHOD: ABNORMAL
EOSINOPHIL # BLD AUTO: 0 K/UL (ref 0–0.5)
EOSINOPHIL NFR BLD: 0 % (ref 0–8)
ERYTHROCYTE [DISTWIDTH] IN BLOOD BY AUTOMATED COUNT: 21.4 % (ref 11.5–14.5)
EST. GFR  (AFRICAN AMERICAN): >60 ML/MIN/1.73 M^2
EST. GFR  (NON AFRICAN AMERICAN): >60 ML/MIN/1.73 M^2
GLUCOSE SERPL-MCNC: 95 MG/DL (ref 70–110)
HCT VFR BLD AUTO: 26.5 % (ref 37–48.5)
HGB BLD-MCNC: 7.7 G/DL (ref 12–16)
IMM GRANULOCYTES # BLD AUTO: 0.03 K/UL (ref 0–0.04)
IMM GRANULOCYTES NFR BLD AUTO: 0.5 % (ref 0–0.5)
LYMPHOCYTES # BLD AUTO: 0.8 K/UL (ref 1–4.8)
LYMPHOCYTES NFR BLD: 13.1 % (ref 18–48)
MAGNESIUM SERPL-MCNC: 1.8 MG/DL (ref 1.6–2.6)
MCH RBC QN AUTO: 22.1 PG (ref 27–31)
MCHC RBC AUTO-ENTMCNC: 29.1 G/DL (ref 32–36)
MCV RBC AUTO: 76 FL (ref 82–98)
MONOCYTES # BLD AUTO: 0.4 K/UL (ref 0.3–1)
MONOCYTES NFR BLD: 6.7 % (ref 4–15)
NEUTROPHILS # BLD AUTO: 5 K/UL (ref 1.8–7.7)
NEUTROPHILS NFR BLD: 79.5 % (ref 38–73)
NRBC BLD-RTO: 0 /100 WBC
PHOSPHATE SERPL-MCNC: 3 MG/DL (ref 2.7–4.5)
PLATELET # BLD AUTO: 328 K/UL (ref 150–350)
PMV BLD AUTO: 12.1 FL (ref 9.2–12.9)
POTASSIUM SERPL-SCNC: 3.8 MMOL/L (ref 3.5–5.1)
PROT SERPL-MCNC: 5.8 G/DL (ref 6–8.4)
RBC # BLD AUTO: 3.48 M/UL (ref 4–5.4)
SODIUM SERPL-SCNC: 138 MMOL/L (ref 136–145)
WBC # BLD AUTO: 6.26 K/UL (ref 3.9–12.7)

## 2019-05-10 PROCEDURE — 84100 ASSAY OF PHOSPHORUS: CPT

## 2019-05-10 PROCEDURE — 63600175 PHARM REV CODE 636 W HCPCS: Performed by: INTERNAL MEDICINE

## 2019-05-10 PROCEDURE — 80053 COMPREHEN METABOLIC PANEL: CPT

## 2019-05-10 PROCEDURE — 99239 PR HOSPITAL DISCHARGE DAY,>30 MIN: ICD-10-PCS | Mod: GC,,, | Performed by: INTERNAL MEDICINE

## 2019-05-10 PROCEDURE — 85025 COMPLETE CBC W/AUTO DIFF WBC: CPT

## 2019-05-10 PROCEDURE — 25000003 PHARM REV CODE 250: Performed by: INTERNAL MEDICINE

## 2019-05-10 PROCEDURE — 25000003 PHARM REV CODE 250: Performed by: STUDENT IN AN ORGANIZED HEALTH CARE EDUCATION/TRAINING PROGRAM

## 2019-05-10 PROCEDURE — 83735 ASSAY OF MAGNESIUM: CPT

## 2019-05-10 PROCEDURE — A4216 STERILE WATER/SALINE, 10 ML: HCPCS | Performed by: INTERNAL MEDICINE

## 2019-05-10 PROCEDURE — 99239 HOSP IP/OBS DSCHRG MGMT >30: CPT | Mod: GC,,, | Performed by: INTERNAL MEDICINE

## 2019-05-10 RX ORDER — MORPHINE SULFATE 15 MG/1
15 TABLET, FILM COATED, EXTENDED RELEASE ORAL 2 TIMES DAILY
Qty: 60 TABLET | Refills: 0 | Status: SHIPPED | OUTPATIENT
Start: 2019-05-10 | End: 2019-05-20 | Stop reason: ALTCHOICE

## 2019-05-10 RX ORDER — PROCHLORPERAZINE MALEATE 10 MG
10 TABLET ORAL EVERY 6 HOURS PRN
Qty: 120 TABLET | Refills: 0 | Status: SHIPPED | OUTPATIENT
Start: 2019-05-10 | End: 2019-06-10

## 2019-05-10 RX ORDER — MORPHINE SULFATE 15 MG/1
15 TABLET, FILM COATED, EXTENDED RELEASE ORAL 2 TIMES DAILY
Qty: 60 TABLET | Refills: 0
Start: 2019-05-10 | End: 2019-05-10 | Stop reason: SDUPTHER

## 2019-05-10 RX ORDER — PROCHLORPERAZINE MALEATE 10 MG
10 TABLET ORAL EVERY 6 HOURS PRN
Qty: 120 TABLET | Refills: 0 | Status: SHIPPED | OUTPATIENT
Start: 2019-05-10 | End: 2019-05-10

## 2019-05-10 RX ADMIN — OXYCODONE HYDROCHLORIDE 5 MG: 5 TABLET ORAL at 07:05

## 2019-05-10 RX ADMIN — ONDANSETRON 8 MG: 2 INJECTION INTRAMUSCULAR; INTRAVENOUS at 03:05

## 2019-05-10 RX ADMIN — AMLODIPINE BESYLATE 10 MG: 10 TABLET ORAL at 09:05

## 2019-05-10 RX ADMIN — Medication 10 ML: at 12:05

## 2019-05-10 RX ADMIN — OXYCODONE HYDROCHLORIDE 5 MG: 5 TABLET ORAL at 09:05

## 2019-05-10 NOTE — DISCHARGE SUMMARY
[facility]  Hematology/Oncology  Discharge Summary      Patient Name: Tiffany Kaur  MRN: [id number]  Admission Date: 5/6/2019  Hospital Length of Stay: 4 days  Discharge Date and Time:  05/10/2019 2:01 PM  Attending Physician: Norman Cazares MD   Discharging Provider: Dolores Lemos MD  Primary Care Provider: Ethel Good MD    HPI: 63 Year old female with abdominal Low Grade Sarcoma,  left sided hydronephresis s/p inpatient cycle 1 of AIM chemotherapy on 4/2 now presented for cycle 2 with single agent adriamycin. She was initially seen in the clinic by Dr. Silvestre and admitted to Oncology service.     She denied of any fever, chills, abdominal pain, chest pain, SOB, leg pain, nausea, vomiting, diarrhea, dysuria or dizziness.     Oncologic History  Pt is a 62 yo  female with PMhx of SVT (possible AFib), chronic IBS (describes a history of suffering from constipation as a child), HTN who presents to the hospital to discuss further options at treating recently found low grade sarcoma in her abdomen, workup for this which was started in [facility]. She was referred by a Dr. Rosa, who is a general surgeon in the [place] area.  She had not been able to get healthcare for a while as she was not enrolled in her 's plan through the  but has been able to get enrolled in Medicare since December.     She started to have abdominal bloating in mid-December 2018. Initially, she had attributed this problem to baseline IBS and was treated at that time with various combinations of Senna, Miralax, suppositories, and enema. Additionally, she lost ~45lbs over the course of one month (174lb to 130lb) which she reports was primarily due to diffuse prandial-associated abdominal pain described mostly as cramping sensation. During this time she could only tolerated clear liquids, such as Jello and broth.      Her workup in mid-December started with an ultrasound which  revealed cholelithiasis and large pelvic mass, therefore, follow-up of CT abdomen/pelvis was completed on 01/06/19. Origin of her mass has been unlcear, but she was found to have a 20 cm abdominal mass and a picture of chronic severe left hydronephrosis. She had a follow-up CT guided core biopsy on 02/06/19 of abdominal mass which revealed a low grade sarcoma, which has been verified by our pathologists as well. *See clinic oncology note for uploaded report.*     In addition, she has a 3-4 cm x 2 cm lesion on the posterior right shoulder and right lateral neck region with some vascularity, bullous with crusting. She notes that this lesion had been present for eight years and endorses some pruritus and periods of resolution, denies any pain or bleeding with the lesion.       She presented to outpatient clinic here on 03/27/2019 as a referral from [facility] to discuss further treatment options. Plan made for in-patient admission for urology evaluation and possible initiation of chemotherapy.         In addition, the pt has a 3-4 cm x 2 cm lesion on the posterior R shoulder and R lateral neck region with some vascularity, bullous with crusting which the pt has not had follow-up for as of this visit. She notes that this lesion had been present for eight years and endorses some pruritus and periods of resolution, denies any pain or bleeding with the lesion.       Pt had cologuard completed for colorectal cancer screening on December 2018, which was negative for malignancy.      She presented to [facility] 4/1/19 for planned in-patient management of her L-sided hydronephrosis and initiation of AIM chemotherapy. Following better control of her pain, her appetite improved and she was able to increase her food intake and regain weight (up to 149lbs).  Admission labs notable for microcytic anemia with low iron studies and slight hypercalcemia (10.8 corrected). Evaluated by urology on day of admission. Based on  assessment of prior imaging, it was determined that, based on hydronephrosis and degree of atrophy, the function of the left kidney was not salvageable and that risk of nephrostomy tube or stent outweighed benefit. She had a PICC line placed on day of admission. Chemotherapy was initiated on 04/02/19 with Doxorubucin, Ifosfamide, and Mesnex. Symptoms during chemotherapy initiation included intermittent abdominal pain and nausea that were controlled with PRN Zofran and Oxycodone. Patient noted to develop new onset issues with fine motor movement of fingers on 04/04; out of concern for neurological toxicity, Ifosfamide was discontinued with symptom resolution. On 04/05, she developed increased urinary frequency with lower back pain with radiation around the left hip; UA ordered, but negative for infection. Pain resolved after dose of Gabapentin and applying heating pads, suspect could be secondary to radiculopathy from tumor burden. Neulasta was administered on day after discharge.     The pt had shedding of hair last week but has completely lost the rest of it. Her abdominal pain has been well controlled at home. Pt has had pain and ringing in ears, worse on the R side; she suspects that she is having an ear infection. Denies any significant weight changes, nausea/vomitng, constipation, diarrhea. Her neuropathy has been stable on gabapentin and hand tremors have become much better/ resolved for most part.      Her PCP is Dr. Valles at [facility].         * No surgery found *     Hospital Course: 05/06/2019: Admitted for cycle 2 Adriamycin chemotherapy for Abdominal low grade sarcoma. PICC line consult placed    During her hospital course, Ms. Neely remained hemodynamically stable and had no issues with her 3 days of adriamycin infusion. She had nausea in the mornings which was controlled with Compro. She did not have vomiting and was able to keep up oral intake as well as remain ambulatory in the halls.  Of note, she came in with a CorrCa of 10.9 which improved with fluids and came down to 10.3 on day of discharge. Her MS contin was reduced from 30mg to 15mg BID upon discharge as she did not require much pain control while in house.       Vital Signs (Most Recent):  Temp: 97.9 °F (36.6 °C) (05/10/19 0347)  Pulse: 69 (05/10/19 0347)  Resp: 17 (05/10/19 0347)  BP: (!) 152/70 (05/10/19 0347)  SpO2: 95 % (05/10/19 0347) Vital Signs (24h Range):  Temp:  [97.3 °F (36.3 °C)-98.2 °F (36.8 °C)] 97.9 °F (36.6 °C)  Pulse:  [69-88] 69  Resp:  [17-18] 17  SpO2:  [95 %-97 %] 95 %  BP: (124-174)/(67-75) 152/70      Weight: 65.6 kg (144 lb 10 oz)  Body mass index is 24.82 kg/m².  Body surface area is 1.72 meters squared.        Intake/Output Summary (Last 24 hours) at 5/10/2019 0753  Last data filed at 5/10/2019 0700      Gross per 24 hour   Intake 2383.02 ml   Output 2950 ml   Net -566.98 ml         Physical Exam   Constitutional: She is oriented to person, place, and time. She appears well-developed and well-nourished.   HENT:   Head: Normocephalic and atraumatic.   Eyes: Pupils are equal, round, and reactive to light. EOM are normal.   Neck: Normal range of motion. Neck supple.   Cardiovascular: Normal rate and regular rhythm.   No murmur heard.  Pulmonary/Chest: Effort normal and breath sounds normal. No respiratory distress. She has no wheezes.   Abdominal: Soft. Bowel sounds are normal. She exhibits no distension. There is tenderness.   Tenderness on deep palpation   Musculoskeletal: Normal range of motion. She exhibits edema. She exhibits no tenderness.   Neurological: She is alert and oriented to person, place, and time.   Skin: Skin is warm and dry. Capillary refill takes less than 2 seconds. No pallor.   Psychiatric: She has a normal mood and affect.           Consults:   Consults (From admission, onward)        Status Ordering Provider     Inpatient consult to PICC team ([facility])  Once     Provider:  (Not yet assigned)     Completed NEWTON DOMINGO          Significant Diagnostic Studies: per chart    Pending Diagnostic Studies:     None        Final Active Diagnoses:    Diagnosis Date Noted POA    PRINCIPAL PROBLEM:  Sarcoma [C49.9] 04/01/2019 Yes    Hydronephrosis of left kidney [N13.30] 04/01/2019 Yes    SVT (supraventricular tachycardia) [I47.1] 04/01/2019 Yes    Hypercalcemia of malignancy [E83.52] 03/27/2019 Yes    Neuropathy [G62.9] 03/27/2019 Yes    Hypertension [I10] 03/27/2019 Yes    Irritable bowel syndrome [K58.9] 03/27/2019 Yes      Problems Resolved During this Admission:      Discharged Condition: good    Disposition: Home or Self Care    Follow Up:  Follow-up Information     Fernando Silvestre MD.    Specialty:  Hematology and Oncology  Why:  As scheduled by the clinic  Contact information:  [address]  [facility] [id number]  [phone]                 Patient Instructions:   No discharge procedures on file.  Medications:  Reconciled Home Medications:      Medication List      START taking these medications    prochlorperazine 10 MG tablet  Commonly known as:  COMPAZINE  Take 1 tablet (10 mg total) by mouth every 6 (six) hours as needed.        CHANGE how you take these medications    morphine 15 MG 12 hr tablet  Commonly known as:  MS CONTIN  Take 1 tablet (15 mg total) by mouth 2 (two) times daily.  What changed:    · medication strength  · how much to take  · when to take this  · reasons to take this        CONTINUE taking these medications    amLODIPine 5 MG tablet  Commonly known as:  NORVASC  Take 1 tablet (5 mg total) by mouth once daily.     * diphenhydrAMINE-aluminum-magnesium hydroxide-simethicone-lidocaine HCl 2%  Swish and spit 15 mLs every 4 (four) hours as needed.     * magic mouthwash diphen/antac/lidoc  Swish and spit 15 mLs every 4 (four) hours as needed.     gabapentin 300 MG capsule  Commonly known as:  NEURONTIN  Take 1 capsule (300 mg total) by mouth every evening. Take one pill  (300 mg at night), can take up to two a night (600 mg) if tolerated.     HYDROcodone-acetaminophen  mg per tablet  Commonly known as:  NORCO  Take 1 tablet by mouth every 6 (six) hours as needed for Pain.     ondansetron 4 MG Tbdl  Commonly known as:  ZOFRAN-ODT  Dissolve 2 tablets (8 mg total) by mouth every 6 (six) hours as needed.     polyethylene glycol 17 gram/dose powder  Commonly known as:  GLYCOLAX  Mix 1 capful (17 g) with liquid and take by mouth daily as needed.     zolpidem 5 MG Tab  Commonly known as:  AMBIEN         * This list has 2 medication(s) that are the same as other medications prescribed for you. Read the directions carefully, and ask your doctor or other care provider to review them with you.                Dolores Lemos MD  Hematology/Oncology  [facility]    Attending Note  I have personally taken the history and examined this patient and agree with the resident's note as stated above.  Tolerated chemotherapy well  Neulasta tomorrow  We will see outpatient and repeat scans

## 2019-05-10 NOTE — ASSESSMENT & PLAN NOTE
20 cm mass noted in CT abdomen/pelvis; L ureter noted to be have left sided hydronephresis and noted to have some mild inguinal adneopathy  - path reviewed at our institution consistent with sarcoma  - PET-CT reveals a large anterior abdominal mass, low activity sarcoma without significant metastatic spread, SUV max of 4.7   - Echo with EF of 60%, normal LV systolic function and indeterminate diastolic function  - tumor causing pain in central abdomen as well as compression of L ureter and causing hydronephrosis  - s/p cycle 1 Inpatient AIM completed  (adrimaycin, ifosfomide, mesna)   - Ifosfomide dropped on day 3 due to neurotoxicity  - PET- CT results reviewed, no significant change after one round  - PICC in place  - On cycle 2 chemo with single agent adriamycin day 2. Will complete on Friday. Will stay at [facility] and get Neulasta on Saturday.

## 2019-05-10 NOTE — ASSESSMENT & PLAN NOTE
20 cm mass noted in CT abdomen/pelvis; L ureter noted to be have left sided hydronephresis and noted to have some mild inguinal adneopathy  - path reviewed at our institution consistent with sarcoma  - PET-CT reveals a large anterior abdominal mass, low activity sarcoma without significant metastatic spread, SUV max of 4.7   - Echo with EF of 60%, normal LV systolic function and indeterminate diastolic function  - tumor causing pain in central abdomen as well as compression of L ureter and causing hydronephrosis  - s/p cycle 1 Inpatient AIM completed  (adrimaycin, ifosfomide, mesna)   - Ifosfomide dropped on day 3 due to neurotoxicity  - PET- CT results reviewed, no significant change after one round  - PICC in place  - On cycle 2 chemo with single agent adriamycin day 3. Will complete today. Will stay at [facility] and get Neulasta on Saturday.

## 2019-05-10 NOTE — PLAN OF CARE
Problem: Adult Inpatient Plan of Care  Goal: Plan of Care Review  Outcome: Ongoing (interventions implemented as appropriate)  POC discussed at beginning of shift and PRN. Questions asked and answered.  Pt remains free of injury.  Voids per [facility], ambulates in room and tolerates. Nausea x 1 episode resolved w scheduled antiemetic. Denies pain or diarrhea. IVF dc'd and doxorubicin infusing at 20.8 cc/hr. VSS and afebrile. Plan to dc after chemo complete today.

## 2019-05-10 NOTE — SUBJECTIVE & OBJECTIVE
Interval Hx:   NAEO, She continues to have mild nausea without vomiting this AM but was well-controlled with Zofran and Phenergan. She reports walking around frequently    Oncology Treatment Plan:   IP SARCOMA DOXORUBICIN IFOSFAMIDE MESNA (4 DAYS)    Medications:  Continuous Infusions:    Scheduled Meds:   amLODIPine  10 mg Oral Daily    DOXOrubicin (ADRIAMYCIN) chemo infusion  25 mg/m2 (Treatment Plan Recorded) Intravenous Q24H    gabapentin  300 mg Oral QHS    polyethylene glycol  17 g Oral Daily    sodium chloride 0.9%  10 mL Intravenous Q6H     PRN Meds:alteplase, dextrose 50%, dextrose 50%, glucagon (human recombinant), glucose, glucose, heparin, porcine (PF), magic mouthwash (diphenhydrAMINE 12.5 mg/5 mL 20 mL, aluminum & magnesium hydroxide-simethicone (MYLANTA) 20 mL, lidocaine HCl 2% (XYLOCAINE) 20 mL) solution, oxyCODONE, prochlorperazine, sodium chloride 0.9%, Flushing PICC Protocol **AND** sodium chloride 0.9% **AND** sodium chloride 0.9%, sodium chloride 0.9%, zolpidem     Review of patient's allergies indicates:  No Known Allergies     Past Medical History:   Diagnosis Date    Cancer     Gallstones     GERD (gastroesophageal reflux disease)     Hypertension     SVT (supraventricular tachycardia)      Past Surgical History:   Procedure Laterality Date     SECTION      X3    HYSTERECTOMY      Partial    TUBAL LIGATION       Family History     Problem Relation (Age of Onset)    Hypertension Brother    Lung disease Mother    No Known Problems Sister, Maternal Aunt, Maternal Uncle, Paternal Aunt, Paternal Uncle, Maternal Grandmother, Maternal Grandfather, Paternal Grandmother, Paternal Grandfather, Daughter, Daughter, Son    Pancreatic cancer Father        Tobacco Use    Smoking status: Never Smoker    Smokeless tobacco: Never Used   Substance and Sexual Activity    Alcohol use: No     Frequency: Never    Drug use: No    Sexual activity: Never       Review of Systems    Constitutional: Negative for appetite change, chills, fatigue and fever.   HENT: Negative for mouth sores, nosebleeds and sore throat.    Respiratory: Negative for cough, chest tightness and shortness of breath.    Cardiovascular: Negative for chest pain and palpitations.   Gastrointestinal: Positive for nausea. Negative for abdominal pain, constipation, diarrhea and vomiting.   Genitourinary: Negative for dysuria, frequency and hematuria.   Musculoskeletal: Negative for arthralgias and gait problem.   Skin: Negative for color change and pallor.   Neurological: Negative for seizures, syncope and headaches.   Hematological: Negative for adenopathy.     Objective:     Vital Signs (Most Recent):  Temp: 97.9 °F (36.6 °C) (05/10/19 0347)  Pulse: 69 (05/10/19 0347)  Resp: 17 (05/10/19 0347)  BP: (!) 152/70 (05/10/19 0347)  SpO2: 95 % (05/10/19 0347) Vital Signs (24h Range):  Temp:  [97.3 °F (36.3 °C)-98.2 °F (36.8 °C)] 97.9 °F (36.6 °C)  Pulse:  [69-88] 69  Resp:  [17-18] 17  SpO2:  [95 %-97 %] 95 %  BP: (124-174)/(67-75) 152/70     Weight: 65.6 kg (144 lb 10 oz)  Body mass index is 24.82 kg/m².  Body surface area is 1.72 meters squared.      Intake/Output Summary (Last 24 hours) at 5/10/2019 0753  Last data filed at 5/10/2019 0700  Gross per 24 hour   Intake 2383.02 ml   Output 2950 ml   Net -566.98 ml       Physical Exam   Constitutional: She is oriented to person, place, and time. She appears well-developed and well-nourished.   HENT:   Head: Normocephalic and atraumatic.   Eyes: Pupils are equal, round, and reactive to light. EOM are normal.   Neck: Normal range of motion. Neck supple.   Cardiovascular: Normal rate and regular rhythm.   No murmur heard.  Pulmonary/Chest: Effort normal and breath sounds normal. No respiratory distress. She has no wheezes.   Abdominal: Soft. Bowel sounds are normal. She exhibits no distension. There is tenderness.   Tenderness on deep palpation   Musculoskeletal: Normal range of  motion. She exhibits edema. She exhibits no tenderness.   Neurological: She is alert and oriented to person, place, and time.   Skin: Skin is warm and dry. Capillary refill takes less than 2 seconds. No pallor.   Psychiatric: She has a normal mood and affect.       Significant Labs:   CBC:   Recent Labs   Lab 05/09/19  0400 05/10/19  0400   WBC 8.92 6.26   HGB 7.9* 7.7*   HCT 27.2* 26.5*   * 328   , CMP:   Recent Labs   Lab 05/09/19  0400 05/10/19  0400    138   K 4.1 3.8   * 112*   CO2 19* 18*   * 95   BUN 14 14   CREATININE 0.7 0.6   CALCIUM 9.6 9.3   PROT 6.1 5.8*   ALBUMIN 2.8* 2.7*   BILITOT 0.2 0.3   ALKPHOS 80 71   AST 6* 8*   ALT <5* 5*   ANIONGAP 6* 8   EGFRNONAA >60.0 >60.0    and Coagulation:   No results for input(s): PT, INR, APTT in the last 48 hours.    Diagnostic Results:

## 2019-05-10 NOTE — ASSESSMENT & PLAN NOTE
- mild, around baseline of 11 on admit improved today 10.3  - Discontinue IV fluids at 100 cc/hr in PM

## 2019-05-10 NOTE — PROGRESS NOTES
[facility]  Hematology/Oncology  Progress Note    Patient Name: Tiffany Kaur  Admission Date: 5/6/2019  Hospital Length of Stay: 4 days  Code Status: Full Code     Subjective:     HPI:  63 Year old female with abdominal Low Grade Sarcoma,  left sided hydronephresis s/p inpatient cycle 1 of AIM chemotherapy on 4/2 now presented for cycle 2 with single agent adriamycin. She was initially seen in the clinic by Dr. Silvestre and admitted to Oncology service.     She denied of any fever, chills, abdominal pain, chest pain, SOB, leg pain, nausea, vomiting, diarrhea, dysuria or dizziness.     Oncologic History  Pt is a 62 yo  female with PMhx of SVT (possible AFib), chronic IBS (describes a history of suffering from constipation as a child), HTN who presents to the hospital to discuss further options at treating recently found low grade sarcoma in her abdomen, workup for this which was started in [facility]. She was referred by a Dr. Rosa, who is a general surgeon in the [place] area.  She had not been able to get healthcare for a while as she was not enrolled in her 's plan through the  but has been able to get enrolled in Medicare since December.     She started to have abdominal bloating in mid-December 2018. Initially, she had attributed this problem to baseline IBS and was treated at that time with various combinations of Senna, Miralax, suppositories, and enema. Additionally, she lost ~45lbs over the course of one month (174lb to 130lb) which she reports was primarily due to diffuse prandial-associated abdominal pain described mostly as cramping sensation. During this time she could only tolerated clear liquids, such as Jello and broth.      Her workup in mid-December started with an ultrasound which revealed cholelithiasis and large pelvic mass, therefore, follow-up of CT abdomen/pelvis was completed on 01/06/19. Origin of her mass has been unlcear,  but she was found to have a 20 cm abdominal mass and a picture of chronic severe left hydronephrosis. She had a follow-up CT guided core biopsy on 02/06/19 of abdominal mass which revealed a low grade sarcoma, which has been verified by our pathologists as well. *See clinic oncology note for uploaded report.*     In addition, she has a 3-4 cm x 2 cm lesion on the posterior right shoulder and right lateral neck region with some vascularity, bullous with crusting. She notes that this lesion had been present for eight years and endorses some pruritus and periods of resolution, denies any pain or bleeding with the lesion.       She presented to outpatient clinic here on 03/27/2019 as a referral from [facility] to discuss further treatment options. Plan made for in-patient admission for urology evaluation and possible initiation of chemotherapy.         In addition, the pt has a 3-4 cm x 2 cm lesion on the posterior R shoulder and R lateral neck region with some vascularity, bullous with crusting which the pt has not had follow-up for as of this visit. She notes that this lesion had been present for eight years and endorses some pruritus and periods of resolution, denies any pain or bleeding with the lesion.       Pt had cologuard completed for colorectal cancer screening on December 2018, which was negative for malignancy.      She presented to [facility] 4/1/19 for planned in-patient management of her L-sided hydronephrosis and initiation of AIM chemotherapy. Following better control of her pain, her appetite improved and she was able to increase her food intake and regain weight (up to 149lbs).  Admission labs notable for microcytic anemia with low iron studies and slight hypercalcemia (10.8 corrected). Evaluated by urology on day of admission. Based on assessment of prior imaging, it was determined that, based on hydronephrosis and degree of atrophy, the function of the left kidney was not salvageable and  that risk of nephrostomy tube or stent outweighed benefit. She had a PICC line placed on day of admission. Chemotherapy was initiated on 04/02/19 with Doxorubucin, Ifosfamide, and Mesnex. Symptoms during chemotherapy initiation included intermittent abdominal pain and nausea that were controlled with PRN Zofran and Oxycodone. Patient noted to develop new onset issues with fine motor movement of fingers on 04/04; out of concern for neurological toxicity, Ifosfamide was discontinued with symptom resolution. On 04/05, she developed increased urinary frequency with lower back pain with radiation around the left hip; UA ordered, but negative for infection. Pain resolved after dose of Gabapentin and applying heating pads, suspect could be secondary to radiculopathy from tumor burden. Neulasta was administered on day after discharge.     The pt had shedding of hair last week but has completely lost the rest of it. Her abdominal pain has been well controlled at home. Pt has had pain and ringing in ears, worse on the R side; she suspects that she is having an ear infection. Denies any significant weight changes, nausea/vomitng, constipation, diarrhea. Her neuropathy has been stable on gabapentin and hand tremors have become much better/ resolved for most part.      Her PCP is Dr. Valles at [facility].         Interval Hx:   NAEO, She continues to have mild nausea without vomiting this AM but was well-controlled with Zofran and Phenergan. She reports walking around frequently    Oncology Treatment Plan:   IP SARCOMA DOXORUBICIN IFOSFAMIDE MESNA (4 DAYS)    Medications:  Continuous Infusions:    Scheduled Meds:   amLODIPine  10 mg Oral Daily    DOXOrubicin (ADRIAMYCIN) chemo infusion  25 mg/m2 (Treatment Plan Recorded) Intravenous Q24H    gabapentin  300 mg Oral QHS    polyethylene glycol  17 g Oral Daily    sodium chloride 0.9%  10 mL Intravenous Q6H     PRN Meds:alteplase, dextrose 50%, dextrose 50%, glucagon  (human recombinant), glucose, glucose, heparin, porcine (PF), magic mouthwash (diphenhydrAMINE 12.5 mg/5 mL 20 mL, aluminum & magnesium hydroxide-simethicone (MYLANTA) 20 mL, lidocaine HCl 2% (XYLOCAINE) 20 mL) solution, oxyCODONE, prochlorperazine, sodium chloride 0.9%, Flushing PICC Protocol **AND** sodium chloride 0.9% **AND** sodium chloride 0.9%, sodium chloride 0.9%, zolpidem     Review of patient's allergies indicates:  No Known Allergies     Past Medical History:   Diagnosis Date    Cancer     Gallstones     GERD (gastroesophageal reflux disease)     Hypertension     SVT (supraventricular tachycardia)      Past Surgical History:   Procedure Laterality Date     SECTION      X3    HYSTERECTOMY      Partial    TUBAL LIGATION       Family History     Problem Relation (Age of Onset)    Hypertension Brother    Lung disease Mother    No Known Problems Sister, Maternal Aunt, Maternal Uncle, Paternal Aunt, Paternal Uncle, Maternal Grandmother, Maternal Grandfather, Paternal Grandmother, Paternal Grandfather, Daughter, Daughter, Son    Pancreatic cancer Father        Tobacco Use    Smoking status: Never Smoker    Smokeless tobacco: Never Used   Substance and Sexual Activity    Alcohol use: No     Frequency: Never    Drug use: No    Sexual activity: Never       Review of Systems   Constitutional: Negative for appetite change, chills, fatigue and fever.   HENT: Negative for mouth sores, nosebleeds and sore throat.    Respiratory: Negative for cough, chest tightness and shortness of breath.    Cardiovascular: Negative for chest pain and palpitations.   Gastrointestinal: Positive for nausea. Negative for abdominal pain, constipation, diarrhea and vomiting.   Genitourinary: Negative for dysuria, frequency and hematuria.   Musculoskeletal: Negative for arthralgias and gait problem.   Skin: Negative for color change and pallor.   Neurological: Negative for seizures, syncope and headaches.    Hematological: Negative for adenopathy.     Objective:     Vital Signs (Most Recent):  Temp: 97.9 °F (36.6 °C) (05/10/19 0347)  Pulse: 69 (05/10/19 0347)  Resp: 17 (05/10/19 0347)  BP: (!) 152/70 (05/10/19 0347)  SpO2: 95 % (05/10/19 0347) Vital Signs (24h Range):  Temp:  [97.3 °F (36.3 °C)-98.2 °F (36.8 °C)] 97.9 °F (36.6 °C)  Pulse:  [69-88] 69  Resp:  [17-18] 17  SpO2:  [95 %-97 %] 95 %  BP: (124-174)/(67-75) 152/70     Weight: 65.6 kg (144 lb 10 oz)  Body mass index is 24.82 kg/m².  Body surface area is 1.72 meters squared.      Intake/Output Summary (Last 24 hours) at 5/10/2019 0753  Last data filed at 5/10/2019 0700  Gross per 24 hour   Intake 2383.02 ml   Output 2950 ml   Net -566.98 ml       Physical Exam   Constitutional: She is oriented to person, place, and time. She appears well-developed and well-nourished.   HENT:   Head: Normocephalic and atraumatic.   Eyes: Pupils are equal, round, and reactive to light. EOM are normal.   Neck: Normal range of motion. Neck supple.   Cardiovascular: Normal rate and regular rhythm.   No murmur heard.  Pulmonary/Chest: Effort normal and breath sounds normal. No respiratory distress. She has no wheezes.   Abdominal: Soft. Bowel sounds are normal. She exhibits no distension. There is tenderness.   Tenderness on deep palpation   Musculoskeletal: Normal range of motion. She exhibits edema. She exhibits no tenderness.   Neurological: She is alert and oriented to person, place, and time.   Skin: Skin is warm and dry. Capillary refill takes less than 2 seconds. No pallor.   Psychiatric: She has a normal mood and affect.       Significant Labs:   CBC:   Recent Labs   Lab 05/09/19  0400 05/10/19  0400   WBC 8.92 6.26   HGB 7.9* 7.7*   HCT 27.2* 26.5*   * 328   , CMP:   Recent Labs   Lab 05/09/19  0400 05/10/19  0400    138   K 4.1 3.8   * 112*   CO2 19* 18*   * 95   BUN 14 14   CREATININE 0.7 0.6   CALCIUM 9.6 9.3   PROT 6.1 5.8*   ALBUMIN 2.8* 2.7*    BILITOT 0.2 0.3   ALKPHOS 80 71   AST 6* 8*   ALT <5* 5*   ANIONGAP 6* 8   EGFRNONAA >60.0 >60.0    and Coagulation:   No results for input(s): PT, INR, APTT in the last 48 hours.    Diagnostic Results:      Assessment/Plan:     SVT (supraventricular tachycardia)  - no AFib noted during last hospital admission  -baseline EKG        Hydronephrosis of left kidney  - from external compression of large sarcoma  - left kidney very atrophic and not salvageable per Nephrology           Sarcoma  20 cm mass noted in CT abdomen/pelvis; L ureter noted to be have left sided hydronephresis and noted to have some mild inguinal adneopathy  - path reviewed at our institution consistent with sarcoma  - PET-CT reveals a large anterior abdominal mass, low activity sarcoma without significant metastatic spread, SUV max of 4.7   - Echo with EF of 60%, normal LV systolic function and indeterminate diastolic function  - tumor causing pain in central abdomen as well as compression of L ureter and causing hydronephrosis  - s/p cycle 1 Inpatient AIM completed  (adrimaycin, ifosfomide, mesna)   - Ifosfomide dropped on day 3 due to neurotoxicity  - PET- CT results reviewed, no significant change after one round  - PICC in place  - On cycle 2 chemo with single agent adriamycin day 2. Will complete on Friday. Will stay at [facility] and get Neulasta on Saturday.          Irritable bowel syndrome  - continue laxatives PRN - On miralax for now, which helps her very well with constipation        Hypertension  - continue norvasc        Neuropathy  - Neuropathy, possible L sided sciatica  - pt with lower back pain around site of tumor  - continue gabapentin 300 mg at night for pain     Hypercalcemia of malignancy  - mild, around baseline of 11 on admit improved today 10.3  - Discontinue IV fluids at 100 cc/hr in PM               Dolores Lemos MD  Hematology/Oncology  [facility]

## 2019-05-10 NOTE — PLAN OF CARE
MDRs completed with Dr. Caruso and the team. Patient to finish chemotherapy later today. Plans for patient to discharge to the [facility] later today. Patient to receive the Neulasta injection in the clinic tomorrow. MD discussed discharge plans with the patient; patient verbalized understanding. Discharge and follow-up instructions to be completed by the bedside nurse.    CM messaged the clinic inquiring about the Neulasta injection time for tomorrow. CM to add appt to patient's AVS once received.     05/10/19 1155   Final Note   Assessment Type Final Discharge Note   Anticipated Discharge Disposition Home   What phone number can be called within the next 1-3 days to see how you are doing after discharge?   ([phone])   Hospital Follow Up  Appt(s) scheduled? Yes   Discharge plans and expectations educations in teach back method with documentation complete? Yes  (per bedside nurse)

## 2019-05-10 NOTE — PLAN OF CARE
POC discussed at beginning of shift and PRN. Questions asked and answered.  Pt remains free of injury.  Voids per [facility], ambulates in room and tolerates. Nausea x 1 episode resolved w scheduled antiemetic. Denies pain or diarrhea. IVF dc'd and doxorubicin infusing at 20.8 cc/hr. VSS and afebrile. Plan to dc after chemo complete today.

## 2019-05-11 ENCOUNTER — INFUSION (OUTPATIENT)
Dept: INFUSION THERAPY | Facility: HOSPITAL | Age: 64
End: 2019-05-11
Attending: STUDENT IN AN ORGANIZED HEALTH CARE EDUCATION/TRAINING PROGRAM
Payer: MEDICARE

## 2019-05-11 DIAGNOSIS — C49.4 PRIMARY INTRA-ABDOMINAL SARCOMA: Primary | ICD-10-CM

## 2019-05-11 PROCEDURE — 96372 THER/PROPH/DIAG INJ SC/IM: CPT

## 2019-05-11 PROCEDURE — 63600175 PHARM REV CODE 636 W HCPCS: Mod: JG | Performed by: INTERNAL MEDICINE

## 2019-05-11 RX ADMIN — PEGFILGRASTIM 6 MG: 6 INJECTION SUBCUTANEOUS at 09:05

## 2019-05-14 ENCOUNTER — TELEPHONE (OUTPATIENT)
Dept: HEMATOLOGY/ONCOLOGY | Facility: CLINIC | Age: 64
End: 2019-05-14

## 2019-05-14 DIAGNOSIS — C49.4 PRIMARY INTRA-ABDOMINAL SARCOMA: Primary | ICD-10-CM

## 2019-05-14 DIAGNOSIS — D49.89 NEOPLASM OF ABDOMEN: ICD-10-CM

## 2019-05-20 ENCOUNTER — LAB VISIT (OUTPATIENT)
Dept: LAB | Facility: HOSPITAL | Age: 64
End: 2019-05-20
Attending: STUDENT IN AN ORGANIZED HEALTH CARE EDUCATION/TRAINING PROGRAM
Payer: MEDICARE

## 2019-05-20 ENCOUNTER — OFFICE VISIT (OUTPATIENT)
Dept: HEMATOLOGY/ONCOLOGY | Facility: CLINIC | Age: 64
End: 2019-05-20
Payer: MEDICARE

## 2019-05-20 VITALS
TEMPERATURE: 98 F | SYSTOLIC BLOOD PRESSURE: 150 MMHG | RESPIRATION RATE: 18 BRPM | BODY MASS INDEX: 24.65 KG/M2 | OXYGEN SATURATION: 100 % | DIASTOLIC BLOOD PRESSURE: 68 MMHG | WEIGHT: 144.38 LBS | HEART RATE: 84 BPM | HEIGHT: 64 IN

## 2019-05-20 DIAGNOSIS — C49.4 PRIMARY INTRA-ABDOMINAL SARCOMA: Primary | ICD-10-CM

## 2019-05-20 DIAGNOSIS — D49.89 NEOPLASM OF ABDOMEN: ICD-10-CM

## 2019-05-20 DIAGNOSIS — C49.4 PRIMARY INTRA-ABDOMINAL SARCOMA: ICD-10-CM

## 2019-05-20 DIAGNOSIS — C49.4 MALIGNANT NEOPLASM OF CONNECTIVE AND SOFT TISSUE OF ABDOMEN: ICD-10-CM

## 2019-05-20 LAB
ALBUMIN SERPL BCP-MCNC: 2.9 G/DL (ref 3.5–5.2)
ALP SERPL-CCNC: 111 U/L (ref 55–135)
ALT SERPL W/O P-5'-P-CCNC: 6 U/L (ref 10–44)
ANION GAP SERPL CALC-SCNC: 11 MMOL/L (ref 8–16)
AST SERPL-CCNC: 10 U/L (ref 10–40)
BASOPHILS # BLD AUTO: 0.11 K/UL (ref 0–0.2)
BASOPHILS NFR BLD: 1.7 % (ref 0–1.9)
BILIRUB SERPL-MCNC: 0.3 MG/DL (ref 0.1–1)
BUN SERPL-MCNC: 9 MG/DL (ref 8–23)
CALCIUM SERPL-MCNC: 10.1 MG/DL (ref 8.7–10.5)
CHLORIDE SERPL-SCNC: 107 MMOL/L (ref 95–110)
CO2 SERPL-SCNC: 22 MMOL/L (ref 23–29)
CREAT SERPL-MCNC: 0.6 MG/DL (ref 0.5–1.4)
DIFFERENTIAL METHOD: ABNORMAL
EOSINOPHIL # BLD AUTO: 0.1 K/UL (ref 0–0.5)
EOSINOPHIL NFR BLD: 1.8 % (ref 0–8)
ERYTHROCYTE [DISTWIDTH] IN BLOOD BY AUTOMATED COUNT: 21.7 % (ref 11.5–14.5)
EST. GFR  (AFRICAN AMERICAN): >60 ML/MIN/1.73 M^2
EST. GFR  (NON AFRICAN AMERICAN): >60 ML/MIN/1.73 M^2
GLUCOSE SERPL-MCNC: 95 MG/DL (ref 70–110)
HCT VFR BLD AUTO: 28.4 % (ref 37–48.5)
HGB BLD-MCNC: 8.5 G/DL (ref 12–16)
IMM GRANULOCYTES # BLD AUTO: 0.3 K/UL (ref 0–0.04)
IMM GRANULOCYTES NFR BLD AUTO: 4.6 % (ref 0–0.5)
LYMPHOCYTES # BLD AUTO: 1.5 K/UL (ref 1–4.8)
LYMPHOCYTES NFR BLD: 22.3 % (ref 18–48)
MAGNESIUM SERPL-MCNC: 2 MG/DL (ref 1.6–2.6)
MCH RBC QN AUTO: 22.4 PG (ref 27–31)
MCHC RBC AUTO-ENTMCNC: 29.9 G/DL (ref 32–36)
MCV RBC AUTO: 75 FL (ref 82–98)
MONOCYTES # BLD AUTO: 0.5 K/UL (ref 0.3–1)
MONOCYTES NFR BLD: 8 % (ref 4–15)
NEUTROPHILS # BLD AUTO: 4 K/UL (ref 1.8–7.7)
NEUTROPHILS NFR BLD: 61.6 % (ref 38–73)
NRBC BLD-RTO: 0 /100 WBC
PHOSPHATE SERPL-MCNC: 2.6 MG/DL (ref 2.7–4.5)
PLATELET # BLD AUTO: 262 K/UL (ref 150–350)
PMV BLD AUTO: 11.8 FL (ref 9.2–12.9)
POTASSIUM SERPL-SCNC: 3 MMOL/L (ref 3.5–5.1)
PROT SERPL-MCNC: 6.7 G/DL (ref 6–8.4)
RBC # BLD AUTO: 3.79 M/UL (ref 4–5.4)
SODIUM SERPL-SCNC: 140 MMOL/L (ref 136–145)
URATE SERPL-MCNC: 3.9 MG/DL (ref 2.4–5.7)
WBC # BLD AUTO: 6.5 K/UL (ref 3.9–12.7)

## 2019-05-20 PROCEDURE — 99999 PR PBB SHADOW E&M-EST. PATIENT-LVL III: CPT | Mod: PBBFAC,GC,, | Performed by: STUDENT IN AN ORGANIZED HEALTH CARE EDUCATION/TRAINING PROGRAM

## 2019-05-20 PROCEDURE — 99999 PR PBB SHADOW E&M-EST. PATIENT-LVL III: ICD-10-PCS | Mod: PBBFAC,GC,, | Performed by: STUDENT IN AN ORGANIZED HEALTH CARE EDUCATION/TRAINING PROGRAM

## 2019-05-20 PROCEDURE — 84100 ASSAY OF PHOSPHORUS: CPT

## 2019-05-20 PROCEDURE — 3077F PR MOST RECENT SYSTOLIC BLOOD PRESSURE >= 140 MM HG: ICD-10-PCS | Mod: CPTII,GC,S$GLB, | Performed by: STUDENT IN AN ORGANIZED HEALTH CARE EDUCATION/TRAINING PROGRAM

## 2019-05-20 PROCEDURE — 84550 ASSAY OF BLOOD/URIC ACID: CPT

## 2019-05-20 PROCEDURE — 3008F BODY MASS INDEX DOCD: CPT | Mod: CPTII,GC,S$GLB, | Performed by: STUDENT IN AN ORGANIZED HEALTH CARE EDUCATION/TRAINING PROGRAM

## 2019-05-20 PROCEDURE — 80053 COMPREHEN METABOLIC PANEL: CPT

## 2019-05-20 PROCEDURE — 99214 PR OFFICE/OUTPT VISIT, EST, LEVL IV, 30-39 MIN: ICD-10-PCS | Mod: GC,S$GLB,, | Performed by: STUDENT IN AN ORGANIZED HEALTH CARE EDUCATION/TRAINING PROGRAM

## 2019-05-20 PROCEDURE — 3008F PR BODY MASS INDEX (BMI) DOCUMENTED: ICD-10-PCS | Mod: CPTII,GC,S$GLB, | Performed by: STUDENT IN AN ORGANIZED HEALTH CARE EDUCATION/TRAINING PROGRAM

## 2019-05-20 PROCEDURE — 3078F DIAST BP <80 MM HG: CPT | Mod: CPTII,GC,S$GLB, | Performed by: STUDENT IN AN ORGANIZED HEALTH CARE EDUCATION/TRAINING PROGRAM

## 2019-05-20 PROCEDURE — 1101F PR PT FALLS ASSESS DOC 0-1 FALLS W/OUT INJ PAST YR: ICD-10-PCS | Mod: CPTII,GC,S$GLB, | Performed by: STUDENT IN AN ORGANIZED HEALTH CARE EDUCATION/TRAINING PROGRAM

## 2019-05-20 PROCEDURE — 3077F SYST BP >= 140 MM HG: CPT | Mod: CPTII,GC,S$GLB, | Performed by: STUDENT IN AN ORGANIZED HEALTH CARE EDUCATION/TRAINING PROGRAM

## 2019-05-20 PROCEDURE — 99214 OFFICE O/P EST MOD 30 MIN: CPT | Mod: GC,S$GLB,, | Performed by: STUDENT IN AN ORGANIZED HEALTH CARE EDUCATION/TRAINING PROGRAM

## 2019-05-20 PROCEDURE — 1101F PT FALLS ASSESS-DOCD LE1/YR: CPT | Mod: CPTII,GC,S$GLB, | Performed by: STUDENT IN AN ORGANIZED HEALTH CARE EDUCATION/TRAINING PROGRAM

## 2019-05-20 PROCEDURE — 36415 COLL VENOUS BLD VENIPUNCTURE: CPT

## 2019-05-20 PROCEDURE — 85025 COMPLETE CBC W/AUTO DIFF WBC: CPT

## 2019-05-20 PROCEDURE — 83735 ASSAY OF MAGNESIUM: CPT

## 2019-05-20 PROCEDURE — 3078F PR MOST RECENT DIASTOLIC BLOOD PRESSURE < 80 MM HG: ICD-10-PCS | Mod: CPTII,GC,S$GLB, | Performed by: STUDENT IN AN ORGANIZED HEALTH CARE EDUCATION/TRAINING PROGRAM

## 2019-05-20 RX ORDER — MORPHINE SULFATE 30 MG/1
30 TABLET, FILM COATED, EXTENDED RELEASE ORAL 2 TIMES DAILY
Qty: 90 TABLET | Refills: 0 | Status: SHIPPED | OUTPATIENT
Start: 2019-05-20 | End: 2019-06-23 | Stop reason: SDUPTHER

## 2019-05-20 RX ORDER — POTASSIUM CHLORIDE 20 MEQ/1
40 TABLET, EXTENDED RELEASE ORAL DAILY
Qty: 6 TABLET | Refills: 0 | Status: SHIPPED | OUTPATIENT
Start: 2019-05-20 | End: 2019-05-23

## 2019-05-20 NOTE — Clinical Note
In three weeks, can we also see if she can get dermatology follow-up for lesion on upper posterior shoulder, for that same Monday, on 6/10?

## 2019-05-20 NOTE — Clinical Note
Plan to see pt about three weeks for clinic follow-upCheck CBC, CMP, Mg, PHos prior to visit in 3 weeks, Plan for PET-CT on same day; possible admission at that time for C3 of Adriamycin for abdominal sarcomaPlease cancel appointments for CT scan this week and visit next week.Patient requesting call with new appointment times, thanks.

## 2019-05-20 NOTE — PROGRESS NOTES
PATIENT: Tiffany Kaur  MRN: [id number]  DATE: 5/20/2019      Diagnosis:   1. Primary intra-abdominal sarcoma        Chief Complaint: Follow-up visit    Oncologic History:   Low Grade Sarcoma  - 20 cm mass noted in outside CT abdomen/pelvis; L ureter noted to be have left sided hydronephrosis and noted to have some mild inguinal adneopathy  - PET-CT on 3/27 reveals a large anterior abdominal mass, low activity sarcoma without significant metastatic spread, SUV max of 4.7   - Inpatient AIM C1 (4/2/19 - 4/5/19) completed  (adrimaycin, ifosfomide, mesna) next week given symptoms above, discussed palliative intent of therapy  - Ifosfomide dropped on day 3 due to neurotoxicity  - repeat PET-CT with large abdominal mass with SUV max of 3.9      Pt is a 62 yo  female with PMhx of SVT (possible AFib), chronic IBS (describes a history of suffering from constipation as a child), HTN who presents to the hospital to discuss further options at treating recently found low grade sarcoma in her abdomen, workup for this which was started in [facility]. She was referred by a Dr. Rosa, who is a general surgeon in the [place] area.  She had not been able to get healthcare for a while as she was not enrolled in her 's plan through the  but has been able to get enrolled in Medicare since December.     She started to have abdominal bloating in mid-December 2018. Initially, she had attributed this problem to baseline IBS and was treated at that time with various combinations of Senna, Miralax, suppositories, and enema. Additionally, she lost ~45lbs over the course of one month (174lb to 130lb) which she reports was primarily due to diffuse prandial-associated abdominal pain described mostly as cramping sensation. During this time she could only tolerated clear liquids, such as Jello and broth.      Her workup in mid-December started with an ultrasound which revealed cholelithiasis and large  pelvic mass, therefore, follow-up of CT abdomen/pelvis was completed on 01/06/19. Origin of her mass has been unlcear, but she was found to have a 20 cm abdominal mass and a picture of chronic severe left hydronephrosis. She had a follow-up CT guided core biopsy on 02/06/19 of abdominal mass which revealed a low grade sarcoma, which has been verified by our pathologists as well. *See clinic oncology note for uploaded report.*     In addition, she has a 3-4 cm x 2 cm lesion on the posterior right shoulder and right lateral neck region with some vascularity, bullous with crusting. She notes that this lesion had been present for eight years and endorses some pruritus and periods of resolution, denies any pain or bleeding with the lesion.       She presented to outpatient clinic here on 03/27/2019 as a referral from [facility] to discuss further treatment options. Plan made for in-patient admission for urology evaluation and possible initiation of chemotherapy.         In addition, the pt has a 3-4 cm x 2 cm lesion on the posterior R shoulder and R lateral neck region with some vascularity, bullous with crusting which the pt has not had follow-up for as of this visit. She notes that this lesion had been present for eight years and endorses some pruritus and periods of resolution, denies any pain or bleeding with the lesion.       Pt had cologuard completed for colorectal cancer screening on December 2018, which was negative for malignancy.      She presented to [facility] 4/1/19 for planned in-patient management of her L-sided hydronephrosis and initiation of AIM chemotherapy. Following better control of her pain, her appetite improved and she was able to increase her food intake and regain weight (up to 149lbs).  Admission labs notable for microcytic anemia with low iron studies and slight hypercalcemia (10.8 corrected). Evaluated by urology on day of admission. Based on assessment of prior imaging, it was  determined that, based on hydronephrosis and degree of atrophy, the function of the left kidney was not salvageable and that risk of nephrostomy tube or stent outweighed benefit. She had a PICC line placed on day of admission. Chemotherapy was initiated on 04/02/19 with Doxorubucin, Ifosfamide, and Mesnex. Symptoms during chemotherapy initiation included intermittent abdominal pain and nausea that were controlled with PRN Zofran and Oxycodone. Patient noted to develop new onset issues with fine motor movement of fingers on 04/04; out of concern for neurological toxicity, Ifosfamide was discontinued with symptom resolution. On 04/05, she developed increased urinary frequency with lower back pain with radiation around the left hip; UA ordered, but negative for infection. Pain resolved after dose of Gabapentin and applying heating pads, suspect could be secondary to radiculopathy from tumor burden. Neulasta was administered on day after discharge.     The pt had shedding of hair after first week of chemohterapy but then completely lost the rest of it. Her abdominal pain has been well controlled at home. Pt has had pain and ringing in ears, worse on the R side; she suspects that she is having an ear infection; she was prescribed a course of augmentin for this problem. . Her neuropathy has been stable on gabapentin and hand tremors have become much better/ resolved for most part.       Subjective:   Pt finished Adriamycin cycle 2 a week ago, with neulasta on Saturday.  The pt had nausea with adriamycin but this was controlled on nausea meds. She has required MS Contin 30 BID since being at home over weekend.  Her appetite has been ok although the pain has been limiting it some.  She has had hair loss again after past cycle.  She denies nausea/vomiting, mouth sores, diarrhea, constipation, new rashes, fevers, etc.    Past Medical History:   Past Medical History:   Diagnosis Date    Cancer     Gallstones     GERD  (gastroesophageal reflux disease)     Hypertension     SVT (supraventricular tachycardia)        Past Surgical HIstory:   Past Surgical History:   Procedure Laterality Date     SECTION      X3    HYSTERECTOMY      Partial    TUBAL LIGATION         Family History:   Family History   Problem Relation Age of Onset    Lung disease Mother     Pancreatic cancer Father     No Known Problems Sister     Hypertension Brother     No Known Problems Maternal Aunt     No Known Problems Maternal Uncle     No Known Problems Paternal Aunt     No Known Problems Paternal Uncle     No Known Problems Maternal Grandmother     No Known Problems Maternal Grandfather     No Known Problems Paternal Grandmother     No Known Problems Paternal Grandfather     No Known Problems Daughter     No Known Problems Daughter     No Known Problems Son        Social History:  reports that she has never smoked. She has never used smokeless tobacco. She reports that she does not drink alcohol or use drugs.    Allergies:  Review of patient's allergies indicates:  No Known Allergies    Medications:  Current Outpatient Medications   Medication Sig Dispense Refill    amLODIPine (NORVASC) 5 MG tablet Take 1 tablet (5 mg total) by mouth once daily. 30 tablet 11    diphenhydrAMINE-aluminum-magnesium hydroxide-simethicone-lidocaine HCl 2% Swish and spit 15 mLs every 4 (four) hours as needed. 300 mL 0    gabapentin (NEURONTIN) 300 MG capsule Take 1 capsule (300 mg total) by mouth every evening. Take one pill (300 mg at night), can take up to two a night (600 mg) if tolerated. 90 capsule 2    HYDROcodone-acetaminophen (NORCO)  mg per tablet Take 1 tablet by mouth every 6 (six) hours as needed for Pain. 120 tablet 0    magic mouthwash diphen/antac/lidoc Swish and spit 15 mLs every 4 (four) hours as needed. 300 mL 0    morphine (MS CONTIN) 15 MG 12 hr tablet Take 1 tablet (15 mg total) by mouth 2 (two) times daily. 60 tablet 0     ondansetron (ZOFRAN-ODT) 4 MG TbDL Dissolve 2 tablets (8 mg total) by mouth every 6 (six) hours as needed. 30 tablet 0    polyethylene glycol (GLYCOLAX) 17 gram/dose powder Mix 1 capful (17 g) with liquid and take by mouth daily as needed. 510 g 0    prochlorperazine (COMPAZINE) 10 MG tablet Take 1 tablet (10 mg total) by mouth every 6 (six) hours as needed. 120 tablet 0    zolpidem (AMBIEN) 5 MG Tab        No current facility-administered medications for this visit.        Review of Systems   Constitutional: Negative for appetite change, chills and fever.   HENT: Negative for sore throat.    Eyes: Negative for visual disturbance.   Respiratory: Negative for cough, chest tightness, shortness of breath and wheezing.    Cardiovascular: Positive for palpitations. Negative for chest pain.   Gastrointestinal: Positive for abdominal pain. Negative for blood in stool, diarrhea, nausea, rectal pain and vomiting.   Genitourinary: Negative for dysuria.   Musculoskeletal: Negative for gait problem.   Skin: Negative for rash.        + skin lesion   Neurological: Negative for syncope and headaches.   Hematological: Negative for adenopathy. Does not bruise/bleed easily.       ECOG Performance Status: 1   Objective:      Vitals: There were no vitals filed for this visit.  BMI: There is no height or weight on file to calculate BMI.    Physical Exam   Constitutional: She is oriented to person, place, and time. She appears well-developed. No distress.   HENT:   Head: Normocephalic and atraumatic.   Mouth/Throat: No oropharyngeal exudate.   Eyes: Pupils are equal, round, and reactive to light. EOM are normal. No scleral icterus.   Neck: Normal range of motion. Neck supple.   Cardiovascular: Normal rate, regular rhythm and normal heart sounds. Exam reveals no gallop and no friction rub.   No murmur heard.  Pulmonary/Chest: Effort normal and breath sounds normal. No respiratory distress. She has no wheezes. She has no rales.    Abdominal: She exhibits mass. There is tenderness. There is no guarding.   Firm, mid-abdomen ; central abdominal bruit   Musculoskeletal: Normal range of motion. She exhibits no edema.   Lymphadenopathy:     She has no cervical adenopathy.   Neurological: She is alert and oriented to person, place, and time. No cranial nerve deficit.   Skin: Skin is warm and dry. No rash noted. She is not diaphoretic.   3-4 cm lesion in R posterior neck, R medial shoulder area; vascular and bullous   Psychiatric: She has a normal mood and affect.       Laboratory Data:  No visits with results within 1 Week(s) from this visit.   Latest known visit with results is:   Admission on 05/06/2019, Discharged on 05/10/2019   Component Date Value Ref Range Status    Troponin I 05/06/2019 <0.006  0.000 - 0.026 ng/mL Final    Comment: The reference interval for Troponin I represents the 99th percentile   cutoff   for our facility and is consistent with 3rd generation assay   performance.      Hemoglobin A1C 05/06/2019 5.3  4.0 - 5.6 % Final    Comment: ADA Screening Guidelines:  5.7-6.4%  Consistent with prediabetes  >or=6.5%  Consistent with diabetes  High levels of fetal hemoglobin interfere with the HbA1C  assay. Heterozygous hemoglobin variants (HbS, HgC, etc)do  not significantly interfere with this assay.   However, presence of multiple variants may affect accuracy.      Estimated Avg Glucose 05/06/2019 105  68 - 131 mg/dL Final    Prothrombin Time 05/06/2019 11.1  9.0 - 12.5 sec Final    INR 05/06/2019 1.1  0.8 - 1.2 Final    Comment: Coumadin Therapy:  2.0 - 3.0 for INR for all indicators except mechanical heart valves  and antiphospholipid syndromes which should use 2.5 - 3.5.      Specimen UA 05/06/2019 Urine, Clean Catch   Final    Color, UA 05/06/2019 Yellow  Yellow, Straw, Therese Final    Appearance, UA 05/06/2019 Hazy* Clear Final    pH, UA 05/06/2019 8.0  5.0 - 8.0 Final    Specific Gravity, UA 05/06/2019 1.010  1.005  - 1.030 Final    Protein, UA 05/06/2019 Negative  Negative Final    Comment: Recommend a 24 hour urine protein or a urine   protein/creatinine ratio if globulin induced proteinuria is  clinically suspected.      Glucose, UA 05/06/2019 Negative  Negative Final    Ketones, UA 05/06/2019 1+* Negative Final    Bilirubin (UA) 05/06/2019 Negative  Negative Final    Occult Blood UA 05/06/2019 Negative  Negative Final    Nitrite, UA 05/06/2019 Negative  Negative Final    Leukocytes, UA 05/06/2019 Negative  Negative Final    WBC 05/07/2019 7.18  3.90 - 12.70 K/uL Final    RBC 05/07/2019 3.64* 4.00 - 5.40 M/uL Final    Hemoglobin 05/07/2019 7.9* 12.0 - 16.0 g/dL Final    Hematocrit 05/07/2019 27.9* 37.0 - 48.5 % Final    Mean Corpuscular Volume 05/07/2019 77* 82 - 98 fL Final    Mean Corpuscular Hemoglobin 05/07/2019 21.7* 27.0 - 31.0 pg Final    Mean Corpuscular Hemoglobin Conc 05/07/2019 28.3* 32.0 - 36.0 g/dL Final    RDW 05/07/2019 21.3* 11.5 - 14.5 % Final    Platelets 05/07/2019 445* 150 - 350 K/uL Final    MPV 05/07/2019 11.5  9.2 - 12.9 fL Final    Immature Granulocytes 05/07/2019 0.4  0.0 - 0.5 % Final    Gran # (ANC) 05/07/2019 4.1  1.8 - 7.7 K/uL Final    Immature Grans (Abs) 05/07/2019 0.03  0.00 - 0.04 K/uL Final    Comment: Mild elevation in immature granulocytes is non specific and   can be seen in a variety of conditions including stress response,   acute inflammation, trauma and pregnancy. Correlation with other   laboratory and clinical findings is essential.      Lymph # 05/07/2019 1.4  1.0 - 4.8 K/uL Final    Mono # 05/07/2019 0.7  0.3 - 1.0 K/uL Final    Eos # 05/07/2019 0.8* 0.0 - 0.5 K/uL Final    Baso # 05/07/2019 0.16  0.00 - 0.20 K/uL Final    nRBC 05/07/2019 0  0 /100 WBC Final    Gran% 05/07/2019 57.6  38.0 - 73.0 % Final    Lymph% 05/07/2019 18.9  18.0 - 48.0 % Final    Mono% 05/07/2019 9.3  4.0 - 15.0 % Final    Eosinophil% 05/07/2019 11.6* 0.0 - 8.0 % Final     Basophil% 05/07/2019 2.2* 0.0 - 1.9 % Final    Differential Method 05/07/2019 Automated   Final    Sodium 05/07/2019 138  136 - 145 mmol/L Final    Potassium 05/07/2019 4.0  3.5 - 5.1 mmol/L Final    Chloride 05/07/2019 111* 95 - 110 mmol/L Final    CO2 05/07/2019 19* 23 - 29 mmol/L Final    Glucose 05/07/2019 81  70 - 110 mg/dL Final    BUN, Bld 05/07/2019 10  8 - 23 mg/dL Final    Creatinine 05/07/2019 0.6  0.5 - 1.4 mg/dL Final    Calcium 05/07/2019 9.5  8.7 - 10.5 mg/dL Final    Total Protein 05/07/2019 5.9* 6.0 - 8.4 g/dL Final    Albumin 05/07/2019 2.6* 3.5 - 5.2 g/dL Final    Total Bilirubin 05/07/2019 0.4  0.1 - 1.0 mg/dL Final    Comment: For infants and newborns, interpretation of results should be based  on gestational age, weight and in agreement with clinical  observations.  Premature Infant recommended reference ranges:  Up to 24 hours.............<8.0 mg/dL  Up to 48 hours............<12.0 mg/dL  3-5 days..................<15.0 mg/dL  6-29 days.................<15.0 mg/dL      Alkaline Phosphatase 05/07/2019 93  55 - 135 U/L Final    AST 05/07/2019 8* 10 - 40 U/L Final    ALT 05/07/2019 5* 10 - 44 U/L Final    Anion Gap 05/07/2019 8  8 - 16 mmol/L Final    eGFR if African American 05/07/2019 >60.0  >60 mL/min/1.73 m^2 Final    eGFR if non African American 05/07/2019 >60.0  >60 mL/min/1.73 m^2 Final    Comment: Calculation used to obtain the estimated glomerular filtration  rate (eGFR) is the CKD-EPI equation.       Magnesium 05/07/2019 2.0  1.6 - 2.6 mg/dL Final    Phosphorus 05/07/2019 2.8  2.7 - 4.5 mg/dL Final    WBC 05/08/2019 6.54  3.90 - 12.70 K/uL Final    RBC 05/08/2019 3.83* 4.00 - 5.40 M/uL Final    Hemoglobin 05/08/2019 8.3* 12.0 - 16.0 g/dL Final    Hematocrit 05/08/2019 28.5* 37.0 - 48.5 % Final    Mean Corpuscular Volume 05/08/2019 74* 82 - 98 fL Final    Mean Corpuscular Hemoglobin 05/08/2019 21.7* 27.0 - 31.0 pg Final    Mean Corpuscular Hemoglobin Conc  05/08/2019 29.1* 32.0 - 36.0 g/dL Final    RDW 05/08/2019 21.2* 11.5 - 14.5 % Final    Platelets 05/08/2019 426* 150 - 350 K/uL Final    MPV 05/08/2019 11.3  9.2 - 12.9 fL Final    Immature Granulocytes 05/08/2019 0.6* 0.0 - 0.5 % Final    Gran # (ANC) 05/08/2019 5.5  1.8 - 7.7 K/uL Final    Immature Grans (Abs) 05/08/2019 0.04  0.00 - 0.04 K/uL Final    Comment: Mild elevation in immature granulocytes is non specific and   can be seen in a variety of conditions including stress response,   acute inflammation, trauma and pregnancy. Correlation with other   laboratory and clinical findings is essential.      Lymph # 05/08/2019 0.9* 1.0 - 4.8 K/uL Final    Mono # 05/08/2019 0.1* 0.3 - 1.0 K/uL Final    Eos # 05/08/2019 0.0  0.0 - 0.5 K/uL Final    Baso # 05/08/2019 0.05  0.00 - 0.20 K/uL Final    nRBC 05/08/2019 0  0 /100 WBC Final    Gran% 05/08/2019 83.9* 38.0 - 73.0 % Final    Lymph% 05/08/2019 13.8* 18.0 - 48.0 % Final    Mono% 05/08/2019 0.9* 4.0 - 15.0 % Final    Eosinophil% 05/08/2019 0.0  0.0 - 8.0 % Final    Basophil% 05/08/2019 0.8  0.0 - 1.9 % Final    Differential Method 05/08/2019 Automated   Final    Sodium 05/08/2019 137  136 - 145 mmol/L Final    Potassium 05/08/2019 4.1  3.5 - 5.1 mmol/L Final    Chloride 05/08/2019 112* 95 - 110 mmol/L Final    CO2 05/08/2019 17* 23 - 29 mmol/L Final    Glucose 05/08/2019 146* 70 - 110 mg/dL Final    BUN, Bld 05/08/2019 10  8 - 23 mg/dL Final    Creatinine 05/08/2019 0.6  0.5 - 1.4 mg/dL Final    Calcium 05/08/2019 9.7  8.7 - 10.5 mg/dL Final    Total Protein 05/08/2019 6.3  6.0 - 8.4 g/dL Final    Albumin 05/08/2019 2.8* 3.5 - 5.2 g/dL Final    Total Bilirubin 05/08/2019 0.3  0.1 - 1.0 mg/dL Final    Comment: For infants and newborns, interpretation of results should be based  on gestational age, weight and in agreement with clinical  observations.  Premature Infant recommended reference ranges:  Up to 24 hours.............<8.0 mg/dL  Up  to 48 hours............<12.0 mg/dL  3-5 days..................<15.0 mg/dL  6-29 days.................<15.0 mg/dL      Alkaline Phosphatase 05/08/2019 90  55 - 135 U/L Final    AST 05/08/2019 8* 10 - 40 U/L Final    ALT 05/08/2019 <5* 10 - 44 U/L Final    Anion Gap 05/08/2019 8  8 - 16 mmol/L Final    eGFR if African American 05/08/2019 >60.0  >60 mL/min/1.73 m^2 Final    eGFR if non African American 05/08/2019 >60.0  >60 mL/min/1.73 m^2 Final    Comment: Calculation used to obtain the estimated glomerular filtration  rate (eGFR) is the CKD-EPI equation.       Magnesium 05/08/2019 1.8  1.6 - 2.6 mg/dL Final    Phosphorus 05/08/2019 3.2  2.7 - 4.5 mg/dL Final    WBC 05/09/2019 8.92  3.90 - 12.70 K/uL Final    RBC 05/09/2019 3.61* 4.00 - 5.40 M/uL Final    Hemoglobin 05/09/2019 7.9* 12.0 - 16.0 g/dL Final    Hematocrit 05/09/2019 27.2* 37.0 - 48.5 % Final    Mean Corpuscular Volume 05/09/2019 75* 82 - 98 fL Final    Mean Corpuscular Hemoglobin 05/09/2019 21.9* 27.0 - 31.0 pg Final    Mean Corpuscular Hemoglobin Conc 05/09/2019 29.0* 32.0 - 36.0 g/dL Final    RDW 05/09/2019 21.6* 11.5 - 14.5 % Final    Platelets 05/09/2019 408* 150 - 350 K/uL Final    MPV 05/09/2019 11.9  9.2 - 12.9 fL Final    Immature Granulocytes 05/09/2019 0.4  0.0 - 0.5 % Final    Gran # (ANC) 05/09/2019 7.8* 1.8 - 7.7 K/uL Final    Immature Grans (Abs) 05/09/2019 0.04  0.00 - 0.04 K/uL Final    Comment: Mild elevation in immature granulocytes is non specific and   can be seen in a variety of conditions including stress response,   acute inflammation, trauma and pregnancy. Correlation with other   laboratory and clinical findings is essential.      Lymph # 05/09/2019 0.8* 1.0 - 4.8 K/uL Final    Mono # 05/09/2019 0.2* 0.3 - 1.0 K/uL Final    Eos # 05/09/2019 0.0  0.0 - 0.5 K/uL Final    Baso # 05/09/2019 0.01  0.00 - 0.20 K/uL Final    nRBC 05/09/2019 0  0 /100 WBC Final    Gran% 05/09/2019 87.5* 38.0 - 73.0 % Final     Lymph% 05/09/2019 9.4* 18.0 - 48.0 % Final    Mono% 05/09/2019 2.6* 4.0 - 15.0 % Final    Eosinophil% 05/09/2019 0.0  0.0 - 8.0 % Final    Basophil% 05/09/2019 0.1  0.0 - 1.9 % Final    Differential Method 05/09/2019 Automated   Final    Sodium 05/09/2019 137  136 - 145 mmol/L Final    Potassium 05/09/2019 4.1  3.5 - 5.1 mmol/L Final    Chloride 05/09/2019 112* 95 - 110 mmol/L Final    CO2 05/09/2019 19* 23 - 29 mmol/L Final    Glucose 05/09/2019 126* 70 - 110 mg/dL Final    BUN, Bld 05/09/2019 14  8 - 23 mg/dL Final    Creatinine 05/09/2019 0.7  0.5 - 1.4 mg/dL Final    Calcium 05/09/2019 9.6  8.7 - 10.5 mg/dL Final    Total Protein 05/09/2019 6.1  6.0 - 8.4 g/dL Final    Albumin 05/09/2019 2.8* 3.5 - 5.2 g/dL Final    Total Bilirubin 05/09/2019 0.2  0.1 - 1.0 mg/dL Final    Comment: For infants and newborns, interpretation of results should be based  on gestational age, weight and in agreement with clinical  observations.  Premature Infant recommended reference ranges:  Up to 24 hours.............<8.0 mg/dL  Up to 48 hours............<12.0 mg/dL  3-5 days..................<15.0 mg/dL  6-29 days.................<15.0 mg/dL      Alkaline Phosphatase 05/09/2019 80  55 - 135 U/L Final    AST 05/09/2019 6* 10 - 40 U/L Final    ALT 05/09/2019 <5* 10 - 44 U/L Final    Anion Gap 05/09/2019 6* 8 - 16 mmol/L Final    eGFR if African American 05/09/2019 >60.0  >60 mL/min/1.73 m^2 Final    eGFR if non African American 05/09/2019 >60.0  >60 mL/min/1.73 m^2 Final    Comment: Calculation used to obtain the estimated glomerular filtration  rate (eGFR) is the CKD-EPI equation.       Magnesium 05/09/2019 1.9  1.6 - 2.6 mg/dL Final    Phosphorus 05/09/2019 3.2  2.7 - 4.5 mg/dL Final    WBC 05/10/2019 6.26  3.90 - 12.70 K/uL Final    RBC 05/10/2019 3.48* 4.00 - 5.40 M/uL Final    Hemoglobin 05/10/2019 7.7* 12.0 - 16.0 g/dL Final    Hematocrit 05/10/2019 26.5* 37.0 - 48.5 % Final    Mean Corpuscular Volume  05/10/2019 76* 82 - 98 fL Final    Mean Corpuscular Hemoglobin 05/10/2019 22.1* 27.0 - 31.0 pg Final    Mean Corpuscular Hemoglobin Conc 05/10/2019 29.1* 32.0 - 36.0 g/dL Final    RDW 05/10/2019 21.4* 11.5 - 14.5 % Final    Platelets 05/10/2019 328  150 - 350 K/uL Final    MPV 05/10/2019 12.1  9.2 - 12.9 fL Final    Immature Granulocytes 05/10/2019 0.5  0.0 - 0.5 % Final    Gran # (ANC) 05/10/2019 5.0  1.8 - 7.7 K/uL Final    Immature Grans (Abs) 05/10/2019 0.03  0.00 - 0.04 K/uL Final    Comment: Mild elevation in immature granulocytes is non specific and   can be seen in a variety of conditions including stress response,   acute inflammation, trauma and pregnancy. Correlation with other   laboratory and clinical findings is essential.      Lymph # 05/10/2019 0.8* 1.0 - 4.8 K/uL Final    Mono # 05/10/2019 0.4  0.3 - 1.0 K/uL Final    Eos # 05/10/2019 0.0  0.0 - 0.5 K/uL Final    Baso # 05/10/2019 0.01  0.00 - 0.20 K/uL Final    nRBC 05/10/2019 0  0 /100 WBC Final    Gran% 05/10/2019 79.5* 38.0 - 73.0 % Final    Lymph% 05/10/2019 13.1* 18.0 - 48.0 % Final    Mono% 05/10/2019 6.7  4.0 - 15.0 % Final    Eosinophil% 05/10/2019 0.0  0.0 - 8.0 % Final    Basophil% 05/10/2019 0.2  0.0 - 1.9 % Final    Differential Method 05/10/2019 Automated   Final    Sodium 05/10/2019 138  136 - 145 mmol/L Final    Potassium 05/10/2019 3.8  3.5 - 5.1 mmol/L Final    Chloride 05/10/2019 112* 95 - 110 mmol/L Final    CO2 05/10/2019 18* 23 - 29 mmol/L Final    Glucose 05/10/2019 95  70 - 110 mg/dL Final    BUN, Bld 05/10/2019 14  8 - 23 mg/dL Final    Creatinine 05/10/2019 0.6  0.5 - 1.4 mg/dL Final    Calcium 05/10/2019 9.3  8.7 - 10.5 mg/dL Final    Total Protein 05/10/2019 5.8* 6.0 - 8.4 g/dL Final    Albumin 05/10/2019 2.7* 3.5 - 5.2 g/dL Final    Total Bilirubin 05/10/2019 0.3  0.1 - 1.0 mg/dL Final    Comment: For infants and newborns, interpretation of results should be based  on gestational age, weight  and in agreement with clinical  observations.  Premature Infant recommended reference ranges:  Up to 24 hours.............<8.0 mg/dL  Up to 48 hours............<12.0 mg/dL  3-5 days..................<15.0 mg/dL  6-29 days.................<15.0 mg/dL      Alkaline Phosphatase 05/10/2019 71  55 - 135 U/L Final    AST 05/10/2019 8* 10 - 40 U/L Final    ALT 05/10/2019 5* 10 - 44 U/L Final    Anion Gap 05/10/2019 8  8 - 16 mmol/L Final    eGFR if African American 05/10/2019 >60.0  >60 mL/min/1.73 m^2 Final    eGFR if non African American 05/10/2019 >60.0  >60 mL/min/1.73 m^2 Final    Comment: Calculation used to obtain the estimated glomerular filtration  rate (eGFR) is the CKD-EPI equation.       Magnesium 05/10/2019 1.8  1.6 - 2.6 mg/dL Final    Phosphorus 05/10/2019 3.0  2.7 - 4.5 mg/dL Final         Imaging:   Assessment:       1. Primary intra-abdominal sarcoma           Plan:     Low Grade Sarcoma  - ECOG PS 1  - 20 cm mass noted in CT abdomen/pelvis; L ureter noted to be have left sided hydronephresis and noted to have some mild inguinal adneopathy  - path reviewed at our institution also consistent with sarcoma as well  - Reviewed images and discussed with Dr. Mota previously, pt is not a surgical candidate at this time as significant amount of central vasculature (aorta, iliac arteries/veins) involved and significant amount of vascularity to mass  - PET-CT reveals a large anterior abdominal mass, low activity sarcoma without significant metastatic spread, SUV max of 4.7   - Echo with EF of 60%, normal LV systolic function and indeterminate diastolic function  - tumor causing pain in central abdomen as well as compression of L ureter and causing hydronephrosis  - Inpatient AIM C1 completed  (adrimaycin, ifosfomide, mesna) next week given symptoms above, discussed palliative intent of therapy  - went over risks/benefits/ side effects of chemotherapy and pt wishes to proceed with therapy, consent form  signed and form submitted to be scanned into system  - Ifosfomide dropped on day 3 due to neurotoxicity  - PET- CT results reviewed, suv max decreased to 3.9 for abdominal mass and necrosis of part of mass noted  - Plan for round 2 with single agent adriamycin, given prior neurotoxicity with ifosfomide    Pain secondary to neoplasm  - continue MS Contin 30 BID     Hypercalcemia of malignancy  - mild, stable around baseline of 11 today     Severe L sided hydronephrosis   - from external compression of large sarcoma  - left kidney very atrophic and not salvageable per utology     Neuropathy, possible L sided sciatica  - pt with lower back pain around site of tumor  - continue gabapentin 300 mg at night for pain      HTN  - continue norvasc 5     SVT  - unclear of rhythm  - no AFib noted while pt was admitted into hospital     IBS  - continue laxatives PRN - can use senna + miralax, while on pain meds       Discussed with Dr. Caruso     Follow-up:   Plan to see pt about three weeks for clinic follow up  Check CBC, CMP, Mg, PHos prior to visit in 3 weeks, Plan for PET-CT on same day; possible admission at that time for C3 of Adriamycin for abdominal sarcoma  Will see if we can dermatology follow-up for lesion on upper posterior shoulder    Pt prefers to be emailed at [email or web address]     Fernando Silvestre MD  Hematology and Oncology Fellow, PGY IV  [facility]    Distress Screening Results: Psychosocial Distress screening score of Distress Score: 0 noted and reviewed. No intervention indicated.

## 2019-05-21 ENCOUNTER — TELEPHONE (OUTPATIENT)
Dept: HEMATOLOGY/ONCOLOGY | Facility: CLINIC | Age: 64
End: 2019-05-21

## 2019-05-21 NOTE — TELEPHONE ENCOUNTER
Called and spoke with patient and gave her the u[pcoming apt information on 6/10. Apt slips mailed out as well

## 2019-05-21 NOTE — TELEPHONE ENCOUNTER
----- Message from Fernando Silvestre MD sent at 5/20/2019  6:12 PM CDT -----  Plan to see pt about three weeks for clinic follow-up  Check CBC, CMP, Mg, PHos prior to visit in 3 weeks, Plan for PET-CT on same day; possible admission at that time for C3 of Adriamycin for abdominal sarcoma    Please cancel appointments for CT scan this week and visit next week.  Patient requesting call with new appointment times, thanks.

## 2019-05-22 ENCOUNTER — TELEPHONE (OUTPATIENT)
Dept: DERMATOLOGY | Facility: CLINIC | Age: 64
End: 2019-05-22

## 2019-05-22 NOTE — TELEPHONE ENCOUNTER
----- Message from Kathi Mcelroy sent at 5/21/2019  9:55 AM CDT -----  Good morning, Dr. Silvestre is referring this patient to be seen in derm for lesion on upper posterior shoulder. Can you assist me with scheduling her an appointment. The patient is coming from [place] and has appointments with us on 6/10, so if possible an appointment that day would be appreciated.

## 2019-06-09 ENCOUNTER — TELEPHONE (OUTPATIENT)
Dept: HEMATOLOGY/ONCOLOGY | Facility: CLINIC | Age: 64
End: 2019-06-09

## 2019-06-09 DIAGNOSIS — C49.4 PRIMARY INTRA-ABDOMINAL SARCOMA: Primary | ICD-10-CM

## 2019-06-10 ENCOUNTER — OFFICE VISIT (OUTPATIENT)
Dept: HEMATOLOGY/ONCOLOGY | Facility: CLINIC | Age: 64
End: 2019-06-10
Payer: MEDICARE

## 2019-06-10 ENCOUNTER — TELEPHONE (OUTPATIENT)
Dept: HEMATOLOGY/ONCOLOGY | Facility: CLINIC | Age: 64
End: 2019-06-10

## 2019-06-10 ENCOUNTER — HOSPITAL ENCOUNTER (OUTPATIENT)
Dept: CARDIOLOGY | Facility: CLINIC | Age: 64
Discharge: HOME | End: 2019-06-10
Attending: STUDENT IN AN ORGANIZED HEALTH CARE EDUCATION/TRAINING PROGRAM
Payer: MEDICARE

## 2019-06-10 ENCOUNTER — HOSPITAL ENCOUNTER (OUTPATIENT)
Dept: INTERVENTIONAL RADIOLOGY/VASCULAR | Facility: HOSPITAL | Age: 64
Discharge: HOME | End: 2019-06-10
Attending: STUDENT IN AN ORGANIZED HEALTH CARE EDUCATION/TRAINING PROGRAM
Payer: MEDICARE

## 2019-06-10 ENCOUNTER — INITIAL CONSULT (OUTPATIENT)
Dept: DERMATOLOGY | Facility: CLINIC | Age: 64
End: 2019-06-10
Payer: MEDICARE

## 2019-06-10 ENCOUNTER — HOSPITAL ENCOUNTER (OUTPATIENT)
Dept: RADIOLOGY | Facility: HOSPITAL | Age: 64
Discharge: HOME | End: 2019-06-10
Attending: STUDENT IN AN ORGANIZED HEALTH CARE EDUCATION/TRAINING PROGRAM
Payer: MEDICARE

## 2019-06-10 VITALS
TEMPERATURE: 98 F | DIASTOLIC BLOOD PRESSURE: 74 MMHG | HEART RATE: 87 BPM | BODY MASS INDEX: 22.97 KG/M2 | RESPIRATION RATE: 20 BRPM | WEIGHT: 133.81 LBS | SYSTOLIC BLOOD PRESSURE: 165 MMHG

## 2019-06-10 VITALS
SYSTOLIC BLOOD PRESSURE: 164 MMHG | RESPIRATION RATE: 18 BRPM | DIASTOLIC BLOOD PRESSURE: 72 MMHG | WEIGHT: 144 LBS | BODY MASS INDEX: 24.59 KG/M2 | HEIGHT: 64 IN | DIASTOLIC BLOOD PRESSURE: 72 MMHG | SYSTOLIC BLOOD PRESSURE: 164 MMHG | OXYGEN SATURATION: 100 % | HEART RATE: 78 BPM | HEART RATE: 86 BPM

## 2019-06-10 DIAGNOSIS — D49.89 NEOPLASM OF ABDOMEN: ICD-10-CM

## 2019-06-10 DIAGNOSIS — C49.4 PRIMARY INTRA-ABDOMINAL SARCOMA: ICD-10-CM

## 2019-06-10 DIAGNOSIS — D48.9 NEOPLASM OF UNCERTAIN BEHAVIOR: ICD-10-CM

## 2019-06-10 DIAGNOSIS — Z12.83 SCREENING EXAM FOR SKIN CANCER: Primary | ICD-10-CM

## 2019-06-10 DIAGNOSIS — L72.0 MILIA: ICD-10-CM

## 2019-06-10 DIAGNOSIS — L82.1 SEBORRHEIC KERATOSES: ICD-10-CM

## 2019-06-10 DIAGNOSIS — C49.4 PRIMARY INTRA-ABDOMINAL SARCOMA: Primary | ICD-10-CM

## 2019-06-10 DIAGNOSIS — C49.4 MALIGNANT NEOPLASM OF CONNECTIVE AND SOFT TISSUE OF ABDOMEN: ICD-10-CM

## 2019-06-10 DIAGNOSIS — D18.00 ANGIOMA: ICD-10-CM

## 2019-06-10 DIAGNOSIS — C76.2 MALIGNANT NEOPLASM OF ABDOMEN: ICD-10-CM

## 2019-06-10 DIAGNOSIS — L81.4 LENTIGO: ICD-10-CM

## 2019-06-10 LAB
ASCENDING AORTA: 3.03 CM
AV INDEX (PROSTH): 0.68
AV MEAN GRADIENT: 4.45 MMHG
AV PEAK GRADIENT: 9.24 MMHG
AV VALVE AREA: 2.39 CM2
AV VELOCITY RATIO: 0.66
BSA FOR ECHO PROCEDURE: 1.72 M2
CV ECHO LV RWT: 0.33 CM
DOP CALC AO PEAK VEL: 1.52 M/S
DOP CALC AO VTI: 26.73 CM
DOP CALC LVOT AREA: 3.53 CM2
DOP CALC LVOT DIAMETER: 2.12 CM
DOP CALC LVOT PEAK VEL: 1 M/S
DOP CALC LVOT STROKE VOLUME: 63.96 CM3
DOP CALCLVOT PEAK VEL VTI: 18.13 CM
E WAVE DECELERATION TIME: 229.8 MSEC
E/A RATIO: 0.8
E/E' RATIO: 10.67
ECHO LV POSTERIOR WALL: 0.81 CM (ref 0.6–1.1)
FRACTIONAL SHORTENING: 31 % (ref 28–44)
INTERVENTRICULAR SEPTUM: 0.97 CM (ref 0.6–1.1)
IVRT: 0.08 MSEC
LA MAJOR: 5.34 CM
LA MINOR: 5.2 CM
LA WIDTH: 4 CM
LEFT ATRIUM SIZE: 4.05 CM
LEFT ATRIUM VOLUME INDEX: 42.7 ML/M2
LEFT ATRIUM VOLUME: 72.56 CM3
LEFT INTERNAL DIMENSION IN SYSTOLE: 3.46 CM (ref 2.1–4)
LEFT VENTRICLE DIASTOLIC VOLUME INDEX: 68.98 ML/M2
LEFT VENTRICLE DIASTOLIC VOLUME: 117.34 ML
LEFT VENTRICLE MASS INDEX: 91 G/M2
LEFT VENTRICLE SYSTOLIC VOLUME INDEX: 29 ML/M2
LEFT VENTRICLE SYSTOLIC VOLUME: 49.33 ML
LEFT VENTRICULAR INTERNAL DIMENSION IN DIASTOLE: 4.98 CM (ref 3.5–6)
LEFT VENTRICULAR MASS: 154.86 G
LV LATERAL E/E' RATIO: 9.14
LV SEPTAL E/E' RATIO: 12.8
MV PEAK A VEL: 0.8 M/S
MV PEAK E VEL: 0.64 M/S
PISA TR MAX VEL: 2.27 M/S
POCT GLUCOSE: 75 MG/DL (ref 70–110)
PULM VEIN S/D RATIO: 1.69
PV PEAK D VEL: 0.26 M/S
PV PEAK S VEL: 0.44 M/S
PV PEAK VELOCITY: 1.09 CM/S
RA MAJOR: 4.48 CM
RA PRESSURE: 3 MMHG
RA WIDTH: 3.96 CM
RIGHT VENTRICULAR END-DIASTOLIC DIMENSION: 3.55 CM
RV TISSUE DOPPLER FREE WALL SYSTOLIC VELOCITY 1 (APICAL 4 CHAMBER VIEW): 15.69 M/S
SINUS: 3.16 CM
STJ: 2.41 CM
TDI LATERAL: 0.07
TDI SEPTAL: 0.05
TDI: 0.06
TR MAX PG: 20.61 MMHG
TRICUSPID ANNULAR PLANE SYSTOLIC EXCURSION: 2.35 CM
TV REST PULMONARY ARTERY PRESSURE: 24 MMHG

## 2019-06-10 PROCEDURE — 88305 TISSUE EXAM BY PATHOLOGIST: CPT | Performed by: PATHOLOGY

## 2019-06-10 PROCEDURE — 1101F PR PT FALLS ASSESS DOC 0-1 FALLS W/OUT INJ PAST YR: ICD-10-PCS | Mod: CPTII,GC,S$GLB, | Performed by: STUDENT IN AN ORGANIZED HEALTH CARE EDUCATION/TRAINING PROGRAM

## 2019-06-10 PROCEDURE — 11102 TANGNTL BX SKIN SINGLE LES: CPT | Mod: S$GLB,,, | Performed by: DERMATOLOGY

## 2019-06-10 PROCEDURE — 3078F DIAST BP <80 MM HG: CPT | Mod: CPTII,GC,S$GLB, | Performed by: STUDENT IN AN ORGANIZED HEALTH CARE EDUCATION/TRAINING PROGRAM

## 2019-06-10 PROCEDURE — 99999 PR PBB SHADOW E&M-EST. PATIENT-LVL III: CPT | Mod: PBBFAC,GC,, | Performed by: STUDENT IN AN ORGANIZED HEALTH CARE EDUCATION/TRAINING PROGRAM

## 2019-06-10 PROCEDURE — 99999 PR PBB SHADOW E&M-EST. PATIENT-LVL III: ICD-10-PCS | Mod: PBBFAC,GC,, | Performed by: STUDENT IN AN ORGANIZED HEALTH CARE EDUCATION/TRAINING PROGRAM

## 2019-06-10 PROCEDURE — 93306 TTE W/DOPPLER COMPLETE: CPT | Mod: S$GLB,,, | Performed by: INTERNAL MEDICINE

## 2019-06-10 PROCEDURE — 3008F PR BODY MASS INDEX (BMI) DOCUMENTED: ICD-10-PCS | Mod: CPTII,GC,S$GLB, | Performed by: STUDENT IN AN ORGANIZED HEALTH CARE EDUCATION/TRAINING PROGRAM

## 2019-06-10 PROCEDURE — 1101F PR PT FALLS ASSESS DOC 0-1 FALLS W/OUT INJ PAST YR: ICD-10-PCS | Mod: CPTII,S$GLB,, | Performed by: DERMATOLOGY

## 2019-06-10 PROCEDURE — 36573 IR PICC LINE PLACEMENT W/O PORT, W/IMG > 5 Y/O: ICD-10-PCS | Mod: RT,,, | Performed by: FAMILY MEDICINE

## 2019-06-10 PROCEDURE — 1101F PT FALLS ASSESS-DOCD LE1/YR: CPT | Mod: CPTII,S$GLB,, | Performed by: DERMATOLOGY

## 2019-06-10 PROCEDURE — 11102 PR TANGENTIAL BIOPSY, SKIN, SINGLE LESION: ICD-10-PCS | Mod: S$GLB,,, | Performed by: DERMATOLOGY

## 2019-06-10 PROCEDURE — 88305 TISSUE EXAM BY PATHOLOGIST: CPT | Mod: 26,,, | Performed by: PATHOLOGY

## 2019-06-10 PROCEDURE — 3008F BODY MASS INDEX DOCD: CPT | Mod: CPTII,GC,S$GLB, | Performed by: STUDENT IN AN ORGANIZED HEALTH CARE EDUCATION/TRAINING PROGRAM

## 2019-06-10 PROCEDURE — A9552 F18 FDG: HCPCS

## 2019-06-10 PROCEDURE — 78815 PET IMAGE W/CT SKULL-THIGH: CPT | Mod: 26,PS,, | Performed by: RADIOLOGY

## 2019-06-10 PROCEDURE — 99999 PR PBB SHADOW E&M-EST. PATIENT-LVL II: CPT | Mod: PBBFAC,,, | Performed by: DERMATOLOGY

## 2019-06-10 PROCEDURE — 99202 OFFICE O/P NEW SF 15 MIN: CPT | Mod: 25,S$GLB,, | Performed by: DERMATOLOGY

## 2019-06-10 PROCEDURE — 99999 PR PBB SHADOW E&M-EST. PATIENT-LVL II: ICD-10-PCS | Mod: PBBFAC,,, | Performed by: DERMATOLOGY

## 2019-06-10 PROCEDURE — 78815 PET IMAGE W/CT SKULL-THIGH: CPT | Mod: TC,PS

## 2019-06-10 PROCEDURE — 99202 PR OFFICE/OUTPT VISIT, NEW, LEVL II, 15-29 MIN: ICD-10-PCS | Mod: 25,S$GLB,, | Performed by: DERMATOLOGY

## 2019-06-10 PROCEDURE — 1101F PT FALLS ASSESS-DOCD LE1/YR: CPT | Mod: CPTII,GC,S$GLB, | Performed by: STUDENT IN AN ORGANIZED HEALTH CARE EDUCATION/TRAINING PROGRAM

## 2019-06-10 PROCEDURE — 99214 OFFICE O/P EST MOD 30 MIN: CPT | Mod: GC,S$GLB,, | Performed by: STUDENT IN AN ORGANIZED HEALTH CARE EDUCATION/TRAINING PROGRAM

## 2019-06-10 PROCEDURE — C1751 CATH, INF, PER/CENT/MIDLINE: HCPCS

## 2019-06-10 PROCEDURE — 99214 PR OFFICE/OUTPT VISIT, EST, LEVL IV, 30-39 MIN: ICD-10-PCS | Mod: GC,S$GLB,, | Performed by: STUDENT IN AN ORGANIZED HEALTH CARE EDUCATION/TRAINING PROGRAM

## 2019-06-10 PROCEDURE — 3078F PR MOST RECENT DIASTOLIC BLOOD PRESSURE < 80 MM HG: ICD-10-PCS | Mod: CPTII,GC,S$GLB, | Performed by: STUDENT IN AN ORGANIZED HEALTH CARE EDUCATION/TRAINING PROGRAM

## 2019-06-10 PROCEDURE — 3077F SYST BP >= 140 MM HG: CPT | Mod: CPTII,GC,S$GLB, | Performed by: STUDENT IN AN ORGANIZED HEALTH CARE EDUCATION/TRAINING PROGRAM

## 2019-06-10 PROCEDURE — 78815 NM PET CT ROUTINE: ICD-10-PCS | Mod: 26,PS,, | Performed by: RADIOLOGY

## 2019-06-10 PROCEDURE — 88305 TISSUE SPECIMEN TO PATHOLOGY, DERMATOLOGY: ICD-10-PCS | Mod: 26,,, | Performed by: PATHOLOGY

## 2019-06-10 PROCEDURE — 36573 INSJ PICC RS&I 5 YR+: CPT | Mod: RT,,, | Performed by: FAMILY MEDICINE

## 2019-06-10 PROCEDURE — 93306 TRANSTHORACIC ECHO (TTE) COMPLETE (CUPID ONLY): ICD-10-PCS | Mod: S$GLB,,, | Performed by: INTERNAL MEDICINE

## 2019-06-10 PROCEDURE — 3077F PR MOST RECENT SYSTOLIC BLOOD PRESSURE >= 140 MM HG: ICD-10-PCS | Mod: CPTII,GC,S$GLB, | Performed by: STUDENT IN AN ORGANIZED HEALTH CARE EDUCATION/TRAINING PROGRAM

## 2019-06-10 RX ORDER — PAZOPANIB 200 MG/1
800 TABLET ORAL DAILY
Qty: 120 TABLET | Refills: 2 | Status: SHIPPED | OUTPATIENT
Start: 2019-06-10 | End: 2019-06-12

## 2019-06-10 RX ORDER — HYDROCODONE BITARTRATE AND ACETAMINOPHEN 10; 325 MG/1; MG/1
1 TABLET ORAL EVERY 6 HOURS PRN
Qty: 120 TABLET | Refills: 0 | Status: SHIPPED | OUTPATIENT
Start: 2019-06-10 | End: 2019-09-05 | Stop reason: SDUPTHER

## 2019-06-10 NOTE — PROGRESS NOTES
PATIENT: Tiffany Kaur  MRN: [id number]  DATE: 6/10/2019      Diagnosis:   1. Malignant neoplasm of abdomen         Chief Complaint: Follow-up      Oncologic History:   Low Grade Sarcoma  - 20 cm mass noted in outside CT abdomen/pelvis; L ureter noted to be have left sided hydronephrosis and noted to have some mild inguinal adneopathy  - PET-CT on 3/27 reveals a large anterior abdominal mass, low activity sarcoma without significant metastatic spread, SUV max of 4.7   - Inpatient AIM C1 (4/2/19 - 4/5/19) completed  (adrimaycin, ifosfomide, mesna) next week given symptoms above, discussed palliative intent of therapy  - Ifosfomide dropped on day 3 due to neurotoxicity  - repeat PET-CT on 4/22 with large abdominal mass with SUV max of 3.9   - C2 on 5/7 - 5/10   - PET-CT on 5/20 with  Large anterior abdoinal mass SUV 8 (previously 3.9), left lower pulmonary lesion (prior SUV of 2.3, now 2.5)  - switching to pazopanib    Pt is a 64 yo  female with PMhx of SVT (possible AFib), chronic IBS (describes a history of suffering from constipation as a child), HTN who presents to the hospital to discuss further options at treating 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 She was referred by a Dr. Rosa, who is a general surgeon in the [place] area.  She had not been able to get healthcare for a while as she was not enrolled in her 's plan through the  but has been able to get enrolled in Medicare since December.     She started to have abdominal bloating in mid-December 2018. Initially, she had attributed this problem to baseline IBS and was treated at that time with various combinations of Senna, Miralax, suppositories, and enema. Additionally, she lost ~45lbs over the course of one month (174lb to 130lb) which she reports was primarily due to diffuse prandial-associated abdominal pain described mostly as cramping sensation.  During this time she could only tolerated clear liquids, such as Jello and broth.      Her workup in mid-December started with an ultrasound which revealed cholelithiasis and large pelvic mass, therefore, follow-up of CT abdomen/pelvis was completed on 01/06/19. Origin of her mass has been unlcear, but she was found to have a 20 cm abdominal mass and a picture of chronic severe left hydronephrosis. She had a follow-up CT guided core biopsy on 02/06/19 of abdominal mass which revealed a low grade sarcoma, which has been verified by our pathologists as well. *See clinic oncology note for uploaded report.*     In addition, she has a 3-4 cm x 2 cm lesion on the posterior right shoulder and right lateral neck region with some vascularity, bullous with crusting. She notes that this lesion had been present for eight years and endorses some pruritus and periods of resolution, denies any pain or bleeding with the lesion.       She presented to outpatient clinic here on 03/27/2019 as a referral from [facility] to discuss further treatment options. Plan made for in-patient admission for urology evaluation and possible initiation of chemotherapy.         In addition, the pt has a 3-4 cm x 2 cm lesion on the posterior R shoulder and R lateral neck region with some vascularity, bullous with crusting which the pt has not had follow-up for as of this visit. She notes that this lesion had been present for eight years and endorses some pruritus and periods of resolution, denies any pain or bleeding with the lesion.       Pt had cologuard completed for colorectal cancer screening on December 2018, which was negative for malignancy.      She presented to [facility] 4/1/19 for planned in-patient management of her L-sided hydronephrosis and initiation of AIM chemotherapy. Following better control of her pain, her appetite improved and she was able to increase her food intake and regain weight (up to 149lbs).  Admission labs  notable for microcytic anemia with low iron studies and slight hypercalcemia (10.8 corrected). Evaluated by urology on day of admission. Based on assessment of prior imaging, it was determined that, based on hydronephrosis and degree of atrophy, the function of the left kidney was not salvageable and that risk of nephrostomy tube or stent outweighed benefit. She had a PICC line placed on day of admission. Chemotherapy was initiated on 19 with Doxorubucin, Ifosfamide, and Mesnex. Symptoms during chemotherapy initiation included intermittent abdominal pain and nausea that were controlled with PRN Zofran and Oxycodone. Patient noted to develop new onset issues with fine motor movement of fingers on ; out of concern for neurological toxicity, Ifosfamide was discontinued with symptom resolution. On , she developed increased urinary frequency with lower back pain with radiation around the left hip; UA ordered, but negative for infection. Pain resolved after dose of Gabapentin and applying heating pads, suspect could be secondary to radiculopathy from tumor burden. Neulasta was administered on day after discharge.     The pt had shedding of hair after first week of chemohterapy but then completely lost the rest of it. Her abdominal pain has been well controlled at home. Pt has had pain and ringing in ears, worse on the R side; she suspects that she is having an ear infection; she was prescribed a course of augmentin for this problem. . Her neuropathy has been stable on gabapentin and hand tremors have become much better/ resolved for most part.       Subjective:       Past Medical History:   Past Medical History:   Diagnosis Date    Cancer     Gallstones     GERD (gastroesophageal reflux disease)     Hypertension     SVT (supraventricular tachycardia)        Past Surgical HIstory:   Past Surgical History:   Procedure Laterality Date     SECTION      X3    HYSTERECTOMY      Partial    TUBAL  LIGATION         Family History:   Family History   Problem Relation Age of Onset    Lung disease Mother     Pancreatic cancer Father     No Known Problems Sister     Hypertension Brother     No Known Problems Maternal Aunt     No Known Problems Maternal Uncle     No Known Problems Paternal Aunt     No Known Problems Paternal Uncle     No Known Problems Maternal Grandmother     No Known Problems Maternal Grandfather     No Known Problems Paternal Grandmother     No Known Problems Paternal Grandfather     Melanoma Daughter     No Known Problems Daughter     No Known Problems Son        Social History:  reports that she has never smoked. She has never used smokeless tobacco. She reports that she does not drink alcohol or use drugs.    Allergies:  Review of patient's allergies indicates:  No Known Allergies    Medications:  Current Outpatient Medications   Medication Sig Dispense Refill    amLODIPine (NORVASC) 5 MG tablet Take 1 tablet (5 mg total) by mouth once daily. 30 tablet 11    gabapentin (NEURONTIN) 300 MG capsule Take 1 capsule (300 mg total) by mouth every evening. Take one pill (300 mg at night), can take up to two a night (600 mg) if tolerated. 90 capsule 2    morphine (MS CONTIN) 30 MG 12 hr tablet Take 1 tablet (30 mg total) by mouth 2 (two) times daily. 90 tablet 0    prochlorperazine (COMPAZINE) 10 MG tablet Take 1 tablet (10 mg total) by mouth every 6 (six) hours as needed. 120 tablet 0    zolpidem (AMBIEN) 5 MG Tab       diphenhydrAMINE-aluminum-magnesium hydroxide-simethicone-lidocaine HCl 2% Swish and spit 15 mLs every 4 (four) hours as needed. 300 mL 0    HYDROcodone-acetaminophen (NORCO)  mg per tablet Take 1 tablet by mouth every 6 (six) hours as needed for Pain. 120 tablet 0    magic mouthwash diphen/antac/lidoc Swish and spit 15 mLs every 4 (four) hours as needed. 300 mL 0    ondansetron (ZOFRAN-ODT) 4 MG TbDL Dissolve 2 tablets (8 mg total) by mouth every 6 (six)  hours as needed. 30 tablet 0    polyethylene glycol (GLYCOLAX) 17 gram/dose powder Mix 1 capful (17 g) with liquid and take by mouth daily as needed. 510 g 0     No current facility-administered medications for this visit.        Review of Systems   Constitutional: Negative for appetite change, chills and fever.   HENT: Negative for sore throat.    Eyes: Negative for visual disturbance.   Respiratory: Negative for cough, chest tightness, shortness of breath and wheezing.    Cardiovascular: Negative for chest pain and leg swelling.   Gastrointestinal: Positive for abdominal pain and nausea. Negative for blood in stool, diarrhea, rectal pain and vomiting.   Genitourinary: Negative for dysuria.   Musculoskeletal: Negative for gait problem.   Skin: Negative for rash.        + skin lesion   Neurological: Negative for syncope and headaches.   Hematological: Negative for adenopathy. Does not bruise/bleed easily.       ECOG Performance Status: 1   Objective:      Vitals:   Vitals:    06/10/19 1512   BP: (!) 165/74   BP Location: Left arm   Patient Position: Sitting   Pulse: 87   Resp: 20   Temp: 97.8 °F (36.6 °C)   TempSrc: Oral   Weight: 60.7 kg (133 lb 13.1 oz)     BMI: Body mass index is 22.97 kg/m².    Physical Exam   Constitutional: She is oriented to person, place, and time. She appears well-developed. No distress.   HENT:   Head: Normocephalic and atraumatic.   Mouth/Throat: No oropharyngeal exudate.   Eyes: Pupils are equal, round, and reactive to light. EOM are normal. No scleral icterus.   Neck: Normal range of motion. Neck supple.   Cardiovascular: Normal rate, regular rhythm and normal heart sounds. Exam reveals no gallop and no friction rub.   No murmur heard.  Pulmonary/Chest: Effort normal and breath sounds normal. No respiratory distress. She has no wheezes. She has no rales.   Abdominal: She exhibits mass. There is tenderness. There is no guarding.   Firm, mid-abdomen ; central abdominal bruit    Musculoskeletal: Normal range of motion. She exhibits no edema.   Lymphadenopathy:     She has no cervical adenopathy.   Neurological: She is alert and oriented to person, place, and time. No cranial nerve deficit.   Skin: Skin is warm and dry. No rash noted. She is not diaphoretic.   3-4 cm lesion in R posterior neck, R medial shoulder area; vascular and bullous   Psychiatric: She has a normal mood and affect.       Laboratory Data:  Lab Visit on 06/10/2019   Component Date Value Ref Range Status    WBC 06/10/2019 6.89  3.90 - 12.70 K/uL Final    RBC 06/10/2019 4.62  4.00 - 5.40 M/uL Final    Hemoglobin 06/10/2019 10.3* 12.0 - 16.0 g/dL Final    Hematocrit 06/10/2019 34.7* 37.0 - 48.5 % Final    Mean Corpuscular Volume 06/10/2019 75* 82 - 98 fL Final    Mean Corpuscular Hemoglobin 06/10/2019 22.3* 27.0 - 31.0 pg Final    Mean Corpuscular Hemoglobin Conc 06/10/2019 29.7* 32.0 - 36.0 g/dL Final    RDW 06/10/2019 21.2* 11.5 - 14.5 % Final    Platelets 06/10/2019 477* 150 - 350 K/uL Final    MPV 06/10/2019 11.6  9.2 - 12.9 fL Final    Immature Granulocytes 06/10/2019 0.4  0.0 - 0.5 % Final    Gran # (ANC) 06/10/2019 4.1  1.8 - 7.7 K/uL Final    Immature Grans (Abs) 06/10/2019 0.03  0.00 - 0.04 K/uL Final    Comment: Mild elevation in immature granulocytes is non specific and   can be seen in a variety of conditions including stress response,   acute inflammation, trauma and pregnancy. Correlation with other   laboratory and clinical findings is essential.      Lymph # 06/10/2019 1.7  1.0 - 4.8 K/uL Final    Mono # 06/10/2019 0.7  0.3 - 1.0 K/uL Final    Eos # 06/10/2019 0.2  0.0 - 0.5 K/uL Final    Baso # 06/10/2019 0.17  0.00 - 0.20 K/uL Final    nRBC 06/10/2019 0  0 /100 WBC Final    Gran% 06/10/2019 59.3  38.0 - 73.0 % Final    Lymph% 06/10/2019 24.8  18.0 - 48.0 % Final    Mono% 06/10/2019 10.2  4.0 - 15.0 % Final    Eosinophil% 06/10/2019 2.8  0.0 - 8.0 % Final    Basophil% 06/10/2019 2.5*  0.0 - 1.9 % Final    Differential Method 06/10/2019 Automated   Final    Sodium 06/10/2019 139  136 - 145 mmol/L Final    Potassium 06/10/2019 4.0  3.5 - 5.1 mmol/L Final    Chloride 06/10/2019 109  95 - 110 mmol/L Final    CO2 06/10/2019 20* 23 - 29 mmol/L Final    Glucose 06/10/2019 121* 70 - 110 mg/dL Final    BUN, Bld 06/10/2019 11  8 - 23 mg/dL Final    Creatinine 06/10/2019 0.6  0.5 - 1.4 mg/dL Final    Calcium 06/10/2019 10.8* 8.7 - 10.5 mg/dL Final    Total Protein 06/10/2019 7.3  6.0 - 8.4 g/dL Final    Albumin 06/10/2019 3.4* 3.5 - 5.2 g/dL Final    Total Bilirubin 06/10/2019 0.7  0.1 - 1.0 mg/dL Final    Comment: For infants and newborns, interpretation of results should be based  on gestational age, weight and in agreement with clinical  observations.  Premature Infant recommended reference ranges:  Up to 24 hours.............<8.0 mg/dL  Up to 48 hours............<12.0 mg/dL  3-5 days..................<15.0 mg/dL  6-29 days.................<15.0 mg/dL      Alkaline Phosphatase 06/10/2019 93  55 - 135 U/L Final    AST 06/10/2019 10  10 - 40 U/L Final    ALT 06/10/2019 <5* 10 - 44 U/L Final    Anion Gap 06/10/2019 10  8 - 16 mmol/L Final    eGFR if African American 06/10/2019 >60.0  >60 mL/min/1.73 m^2 Final    eGFR if non African American 06/10/2019 >60.0  >60 mL/min/1.73 m^2 Final    Comment: Calculation used to obtain the estimated glomerular filtration  rate (eGFR) is the CKD-EPI equation.       Magnesium 06/10/2019 2.1  1.6 - 2.6 mg/dL Final    Phosphorus 06/10/2019 3.1  2.7 - 4.5 mg/dL Final   Hospital Outpatient Visit on 06/10/2019   Component Date Value Ref Range Status    BSA 06/10/2019 1.72  m2 Final    TDI SEPTAL 06/10/2019 0.05   Final    LV LATERAL E/E' RATIO 06/10/2019 9.14   Final    LV SEPTAL E/E' RATIO 06/10/2019 12.80   Final    LA WIDTH 06/10/2019 4.00  cm Final    TDI LATERAL 06/10/2019 0.07   Final    PV PEAK VELOCITY 06/10/2019 1.09  cm/s Final    LVIDD  06/10/2019 4.98  3.5 - 6.0 cm Final    IVS 06/10/2019 0.97  0.6 - 1.1 cm Final    PW 06/10/2019 0.81  0.6 - 1.1 cm Final    LVIDS 06/10/2019 3.46  2.1 - 4.0 cm Final    FS 06/10/2019 31  28 - 44 % Final    LA volume 06/10/2019 72.56  cm3 Final    Sinus 06/10/2019 3.16  cm Final    STJ 06/10/2019 2.41  cm Final    Ascending aorta 06/10/2019 3.03  cm Final    LV mass 06/10/2019 154.86  g Final    LA size 06/10/2019 4.05  cm Final    RVDD 06/10/2019 3.55  cm Final    TAPSE 06/10/2019 2.35  cm Final    RV S' 06/10/2019 15.69  m/s Final    Left Ventricle Relative Wall Thick* 06/10/2019 0.33  cm Final    AV mean gradient 06/10/2019 4.45  mmHg Final    AV valve area 06/10/2019 2.39  cm2 Final    AV Velocity Ratio 06/10/2019 0.66   Final    AV index (prosthetic) 06/10/2019 0.68   Final    E/A ratio 06/10/2019 0.80   Final    Mean e' 06/10/2019 0.06   Final    E wave decelartion time 06/10/2019 229.80  msec Final    IVRT 06/10/2019 0.08  msec Final    Pulm vein S/D ratio 06/10/2019 1.69   Final    LVOT diameter 06/10/2019 2.12  cm Final    LVOT area 06/10/2019 3.53  cm2 Final    LVOT peak bhupendra 06/10/2019 1.0  m/s Final    LVOT peak VTI 06/10/2019 18.13  cm Final    Ao peak bhupendra 06/10/2019 1.52  m/s Final    Ao VTI 06/10/2019 26.73  cm Final    LVOT stroke volume 06/10/2019 63.96  cm3 Final    AV peak gradient 06/10/2019 9.24  mmHg Final    E/E' ratio 06/10/2019 10.67   Final    MV Peak E Bhupendra 06/10/2019 0.64  m/s Final    TR Max Bhupendra 06/10/2019 2.27  m/s Final    MV Peak A Bhupendra 06/10/2019 0.80  m/s Final    PV Peak S Bhupendra 06/10/2019 0.44  m/s Final    PV Peak D Bhupendra 06/10/2019 0.26  m/s Final    LV Systolic Volume 06/10/2019 49.33  mL Final    LV Systolic Volume Index 06/10/2019 29.0  mL/m2 Final    LV Diastolic Volume 06/10/2019 117.34  mL Final    LV Diastolic Volume Index 06/10/2019 68.98  mL/m2 Final    LA Volume Index 06/10/2019 42.7  mL/m2 Final    LV Mass Index 06/10/2019 91.0  g/m2  Final    RA Major Axis 06/10/2019 4.48  cm Final    Left Atrium Minor Axis 06/10/2019 5.20  cm Final    Left Atrium Major Axis 06/10/2019 5.34  cm Final    Triscuspid Valve Regurgitation Pea* 06/10/2019 20.61  mmHg Final    RA Width 06/10/2019 3.96  cm Final    Right Atrial Pressure (from IVC) 06/10/2019 3  mmHg Final    TV rest pulmonary artery pressure 06/10/2019 24  mmHg Final   Hospital Outpatient Visit on 06/10/2019   Component Date Value Ref Range Status    POCT Glucose 06/10/2019 75  70 - 110 mg/dL Final         Imaging:     EXAMINATION:  NM PET CT ROUTINE    CLINICAL HISTORY:  Neoplasm: abdomen, metastatic, recurrence, suspected/known;f/u intradominal sarcoma after chemo; 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 After an approximately 60 min distribution time, PET/CT images were acquired from the skull base to the mid thigh. Transmission images were acquired to correct for   attenuation using a whole body low-dose CT scan without contrast with the arms positioned above the head. Glycemia at the time of injection was 75 mg/dL.    COMPARISON:  PET-CT 04/22/2019. PET-CT 03/27/2019.    FINDINGS:  Quality of the study: Adequate.    Neck and chest:There is a left lower lobe superior segment mass measuring 2.2 x 1.2 cm (axial series 2, image 71), relatively stable in size since PET-CT 03/27/2019 but today demonstrating mildly increased hypermetabolic activity with SUV max 2.5,   previously 2.3 (PET CT 04/22/2019).    Abdomen and pelvis: There is redemonstration of a large anterior abdominal mass with diffusely mild hypermetabolic activity (noting the overall extent of which is difficult to measure due to its large size) with SUV max 3.0, previously 3.9. This mass   has a central area of non avid radiotracer activity, likely necrosis.    Osseous structures: Mild diffuse uptake throughout the  osseous structures, likely secondary to red marrow conversion. No abnormal focal hypermetabolic lesion.    Physiologic uptake of the tracer is present within the brain, salivary glands, myocardium, GI and  tracts.    Index lesions:    -left lower lobe pulmonary mass SUV max 2.5, previously 2.3.    -large anterior abdominal mass SUV max 8.0, previously 3.9.    Incidental CT findings:    Stable size of a right thyroid lobe lesion at 0.5 cm. Several scattered partially calcified gallstones without evidence of cholecystitis.    There is persistent left renal hydronephrosis with atrophy of the left kidney, relatively unchanged. In the right kidney there is a 0.9 cm collecting system nephrolith and an additional more superior small nephroliths. No evidence of hydronephrosis.   Unchanged right renal 1.9 cm cyst. There is a large stool ball in the rectum. Scattered diverticulosis coli without evidence of diverticulitis.     Impression    Redemonstration of mildly increased hypermetabolic activity within the large pelvic mass, relatively unchanged since prior PET-CT.    Stable appearance of a left lower lobe pulmonary mass with mild hypermetabolic activity and relatively unchanged since prior PET-CT.  This is concerning for metastatic disease.   No evidence of a new hypermetabolic lesion on today's exam.    Mild diffuse uptake throughout the osseous structures, likely red marrow conversion.    Left kidney atrophy and hydronephrosis, unchanged.  Two right renal nonobstructing nephrolith.    Stable incidental findings, as above.    Electronically signed by resident: Mau April  Date: 06/10/2019  Time: 10:40        Assessment:       1. Malignant neoplasm of abdomen            Plan:     Low Grade Sarcoma  - ECOG PS 1  - 20 cm mass noted in CT abdomen/pelvis; L ureter noted to be have left sided hydronephresis and noted to have some mild inguinal adneopathy  - path reviewed at our institution also consistent with sarcoma as  well  - Reviewed images and discussed with Dr. Mota previously, pt is not a surgical candidate at this time as significant amount of central vasculature (aorta, iliac arteries/veins) involved and significant amount of vascularity to mass  - PET-CT reveals a large anterior abdominal mass, low activity sarcoma without significant metastatic spread, SUV max of 4.7   - Echo with EF of 60%, normal LV systolic function and indeterminate diastolic function  - tumor causing pain in central abdomen as well as compression of L ureter and causing hydronephrosis  - Inpatient AIM C1 completed  (adrimaycin, ifosfomide, mesna) next week given symptoms above, discussed palliative intent of therapy  - went over risks/benefits/ side effects of chemotherapy and pt wishes to proceed with therapy, consent form signed and form submitted to be scanned into system  - Ifosfomide dropped on day 3 due to neurotoxicity  - PET- CT on 4/22 results reviewed, suv max decreased to 3.9 for abdominal mass and necrosis of part of mass noted  - completed two chemo tx with doxorubicin, first round with ifosofomide as well   - PET-CT on 5/20 with  Large anterior abdoinal mass SUV 8? (previously 3.9), left lower lobe pulmonary lesion (prior SUV of 2.3, now 2.5)  - discussed switching to pazopanib with monthly follow-up      Probable BCC, R inferior neck  - lesion was biopsied today, 6/10  - additional f/u per Dermatology, appreciate help with case    Pain secondary to neoplasm  - continue MS Contin 30 BID     Hypercalcemia of malignancy  - mild, stable around baseline, currently 11.3 today     Severe L sided hydronephrosis   - from external compression of large sarcoma  - left kidney very atrophic and not salvageable per utology     Neuropathy, possible L sided sciatica  - pt with lower back pain around site of tumor  - continue gabapentin 300 mg at night for pain      HTN  - continue norvasc 5     SVT  - unclear of rhythm  - no AFib noted while pt was  admitted into hospital     IBS  - continue laxatives PRN - can use senna + miralax, while on pain meds       Discussed with Dr. Caruso     Follow-up:   Prescription sent; plan to start pazopanib ASAP  Will titrate pazopanib as per following schedule -  start with pazopanib first, starting at oral dose of 400 mg for three days and then titrating to 600 mg for a week before reaching final dose of 800 mg/ day. We had discussed that BP medications would likely have to be changed with this transition as well.    Plan to see pt in five weeks for clinical follow-up; check CBC, CMP, Mg, Phos prior to visit    Fernando Silvestre MD   Hematology and Oncology Fellow, PGY IV  [facility]    Distress Screening Results: Psychosocial Distress screening score of Distress Score: 0 noted and reviewed. No intervention indicated.

## 2019-06-10 NOTE — PROCEDURES
Radiology Post-Procedure Note    Pre Op Diagnosis: abdominal sarcoma    Post Op Diagnosis: Same    Procedure: PICC placement    Procedure performed by: Cat ANTONIO, Deisy     Written Informed Consent Obtained: Yes    Specimen Removed: No    Estimated Blood Loss: Minimal    Findings:   Using realtime U/S guidance a 5 Fr PICC catheter was placed into the right Brachial Vein with tip of the catheter in the SVC.    PICC is ready for use.     ELIANE Zhang, FNP  Interventional Radiology  [phone] clinic

## 2019-06-10 NOTE — LETTER
Margareth 10, 2019      Fernando Silvestre MD  [address]  [facility] [address]           [facility] - Dermatology  [address]  [facility] [address]  Phone: [phone]  Fax: [phone]          Patient: Tiffany Kaur   MR Number: [id number]   YOB: 1955   Date of Visit: 6/10/2019       Dear Dr. Fernando Silvestre:    Thank you for referring Tiffany Kaur to me for evaluation. Attached you will find relevant portions of my assessment and plan of care.    If you have questions, please do not hesitate to call me. I look forward to following Tiffany aKur along with you.    Sincerely,    Anais White MD    Enclosure  CC:  No Recipients    If you would like to receive this communication electronically, please contact [email or web address] or [phone] to request more information on [employer] Link access.    For providers and/or their staff who would like to refer a patient to [facility], please contact us through our one-stop-shop provider referral line, [facility], at [phone].    If you feel you have received this communication in error or would no longer like to receive these types of communications, please e-mail [email or web address]

## 2019-06-10 NOTE — PROGRESS NOTES
Double lumen PICC placed to right  upper extremity with the use of ultrasound and Xray guidance. Length of 37 cms to SVC. Pt tolerated well PICC ready for use. Left via w/c to waiting room to family.

## 2019-06-10 NOTE — H&P
Radiology History & Physical      SUBJECTIVE:     Chief Complaint: Abdominal sarcoma.    History of Present Illness:  Tiffany Kaur is a 63 y.o. 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    Past Medical History:   Diagnosis Date    Cancer     Gallstones     GERD (gastroesophageal reflux disease)     Hypertension     SVT (supraventricular tachycardia)      Past Surgical History:   Procedure Laterality Date     SECTION      X3    HYSTERECTOMY      Partial    TUBAL LIGATION         Home Meds:   Prior to Admission medications    Medication Sig Start Date End Date Taking? Authorizing Provider   amLODIPine (NORVASC) 5 MG tablet Take 1 tablet (5 mg total) by mouth once daily. 19  Kalpesh Lewis MD   diphenhydrAMINE-aluminum-magnesium hydroxide-simethicone-lidocaine HCl 2% Swish and spit 15 mLs every 4 (four) hours as needed. 19   Fernando Silvestre MD   gabapentin (NEURONTIN) 300 MG capsule Take 1 capsule (300 mg total) by mouth every evening. Take one pill (300 mg at night), can take up to two a night (600 mg) if tolerated. 19  Fernando Silvestre MD   HYDROcodone-acetaminophen (NORCO)  mg per tablet Take 1 tablet by mouth every 6 (six) hours as needed for Pain. 3/13/19   Fernando Silvestre MD   magic mouthwash diphen/antac/lidoc Swish and spit 15 mLs every 4 (four) hours as needed. 19   Fernando Silvestre MD   morphine (MS CONTIN) 30 MG 12 hr tablet Take 1 tablet (30 mg total) by mouth 2 (two) times daily. 19   Fernando Silvestre MD   ondansetron (ZOFRAN-ODT) 4 MG TbDL Dissolve 2 tablets (8 mg total) by mouth every 6 (six) hours as needed. 19   Kalpesh Lewis MD   polyethylene glycol (GLYCOLAX) 17 gram/dose powder Mix 1 capful (17 g) with liquid and take by mouth daily as needed. 19   Kalpesh Lewis MD   prochlorperazine (COMPAZINE) 10 MG tablet Take 1 tablet (10 mg total) by mouth every 6 (six) hours as  needed. 5/10/19 6/9/19  Dolores Lemos MD   zolpidem (AMBIEN) 5 MG Tab  1/4/19   Historical Provider, MD     Anticoagulants/Antiplatelets: no anticoagulation    Allergies: Review of patient's allergies indicates:  No Known Allergies  Sedation History:  no adverse reactions    Review of Systems:   Hematological: no known coagulopathies  Respiratory: no shortness of breath  Cardiovascular: no chest pain  Gastrointestinal: no abdominal pain  Genito-Urinary: no dysuria  Musculoskeletal: negative  Neurological: no TIA or stroke symptoms         OBJECTIVE:     Vital Signs (Most Recent)       Physical Exam:  ASA: 3  Mallampati: 2    General: no acute distress  Mental Status: alert and oriented to person, place and time  HEENT: normocephalic, atraumatic  Chest: unlabored breathing  Heart: regular heart rate  Abdomen: nondistended  Extremity: moves all extremities    Laboratory  Lab Results   Component Value Date    INR 1.1 05/06/2019       Lab Results   Component Value Date    WBC 6.89 06/10/2019    HGB 10.3 (L) 06/10/2019    HCT 34.7 (L) 06/10/2019    MCV 75 (L) 06/10/2019     (H) 06/10/2019      Lab Results   Component Value Date     (H) 06/10/2019     06/10/2019    K 4.0 06/10/2019     06/10/2019    CO2 20 (L) 06/10/2019    BUN 11 06/10/2019    CREATININE 0.6 06/10/2019    CALCIUM 10.8 (H) 06/10/2019    MG 2.1 06/10/2019    ALT <5 (L) 06/10/2019    AST 10 06/10/2019    ALBUMIN 3.4 (L) 06/10/2019    BILITOT 0.7 06/10/2019       ASSESSMENT/PLAN:     Sedation Plan: Local.  Patient will undergo PICC line placement.      Jovi Agee MD  PGY-II Radiology Resident  [address] [facility]  Pager: [phone]

## 2019-06-10 NOTE — PROGRESS NOTES
Subjective:       Patient ID:  Tiffany Kaur is a 63 y.o. female who presents for initial evaluation of skin lesion on R inferior neck that began about 8 years ago as a small skin colored bump and has since grown in size. Rarely itches, nontender, does not bleed spontaneously. 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     No personal history of skin cancer; daughter diagnosed with melanoma at age 40.     The patient denies any moles or growths of the skin that are rapidly growing, hurting, itching, bleeding, or changing colors.    Chief Complaint   Patient presents with    Lesion     Pt here today for lesion on back, 8+ years, itching, growing, Tx. none     Lesion  - Initial  Affected locations: back  Duration: 8 years  Signs / symptoms: itching  Severity: moderate to severe  Timing: recurrent  Aggravated by: nothing  Relieving factors/Treatments tried: nothing  Improvement on treatment: no relief        Review of Systems   Constitutional: Negative for fever, chills, weight loss, weight gain, fatigue, night sweats and malaise.   Skin: Positive for wears hat. Negative for daily sunscreen use and activity-related sunscreen use.   Hematologic/Lymphatic: Bruises/bleeds easily.        Objective:    Physical Exam   Constitutional: She appears well-developed and well-nourished. No distress.   Neurological: She is alert and oriented to person, place, and time. She is not disoriented.   Psychiatric: She has a normal mood and affect.   Skin:   Areas Examined (abnormalities noted in diagram):   Scalp / Hair Palpated and Inspected  Head / Face Inspection Performed  Neck Inspection Performed  Chest / Axilla Inspection Performed  Abdomen Inspection Performed  Genitals / Buttocks / Groin Inspection Performed  Back Inspection Performed  RUE Inspected  LUE Inspection Performed  RLE Inspected  LLE Inspection Performed  Nails and Digits  Inspection Performed                   Diagram Legend     Erythematous scaling macule/papule c/w actinic keratosis       Vascular papule c/w angioma      Pigmented verrucoid papule/plaque c/w seborrheic keratosis      Yellow umbilicated papule c/w sebaceous hyperplasia      Irregularly shaped tan macule c/w lentigo     1-2 mm smooth white papules consistent with Milia      Movable subcutaneous cyst with punctum c/w epidermal inclusion cyst      Subcutaneous movable cyst c/w pilar cyst      Firm pink to brown papule c/w dermatofibroma      Pedunculated fleshy papule(s) c/w skin tag(s)      Evenly pigmented macule c/w junctional nevus     Mildly variegated pigmented, slightly irregular-bordered macule c/w mildly atypical nevus      Flesh colored to evenly pigmented papule c/w intradermal nevus       Pink pearly papule/plaque c/w basal cell carcinoma      Erythematous hyperkeratotic cursted plaque c/w SCC      Surgical scar with no sign of skin cancer recurrence      Open and closed comedones      Inflammatory papules and pustules      Verrucoid papule consistent consistent with wart     Erythematous eczematous patches and plaques     Dystrophic onycholytic nail with subungual debris c/w onychomycosis     Umbilicated papule    Erythematous-base heme-crusted tan verrucoid plaque consistent with inflamed seborrheic keratosis     Erythematous Silvery Scaling Plaque c/w Psoriasis     See annotation        Right inferior neck 3 x 2 cm erythematous shiny telangectatic plaque with crusting    Assessment / Plan:      Pathology Orders:     Normal Orders This Visit    Tissue Specimen To Pathology, Dermatology     Questions:    Directional Terms:  Other(comment)    Clinical Information:  3 cm x 2 cm pearly lobular papule with crusting patient with hx of sarcoma r/o BCC    Specific Site:  right inferior neck        Screening exam for skin cancer  Lesion on right inferior neck concerning for BCC, less likely DFSP. See biopsy note  below.    Upper body skin examination performed today including at least 6 points as noted in physical examination. Suspicious lesions noted.    Seborrheic keratoses  These are benign inherited growths without a malignant potential. Reassurance given to patient. No treatment is necessary.     Angioma  This is a benign vascular lesion. Reassurance given. No treatment required.     Neoplasm of uncertain behavior -   Right inferior neck 3 x 2 cm erythematous shiny telangectatic plaque with crusting    -     Tissue Specimen To Pathology, Dermatology    Shave biopsy procedure note:    Shave biopsy performed after verbal consent including risk of infection, scar, recurrence, need for additional treatment of site. Area prepped with alcohol, anesthetized with approximately 1.0cc of 1% lidocaine with epinephrine. Lesional tissue shaved with razor blade. Hemostasis achieved with application of aluminum chloride. No complications. Dressing applied. Wound care explained.    Lentigo  This is a benign hyperpigmented sun induced lesion. Daily sun protection will reduce the number of new lesions.   Milia   - stable and chronic             Follow up if symptoms worsen or fail to improve.

## 2019-06-10 NOTE — Clinical Note
Lotemax 1 gtt OU QID x 1 week, BID x 1 week. Resume Patanol week #3. Plan to see pt in five weeks for clinical follow-up; check CBC, CMP, Mg, Phos prior to visit

## 2019-06-10 NOTE — PROGRESS NOTES
Pt arrived to room  185 for PICC line placement . Pt awake, alert, and oriented. Awaiting consent.

## 2019-06-12 ENCOUNTER — TELEPHONE (OUTPATIENT)
Dept: DERMATOLOGY | Facility: CLINIC | Age: 64
End: 2019-06-12

## 2019-06-12 ENCOUNTER — TELEPHONE (OUTPATIENT)
Dept: HEMATOLOGY/ONCOLOGY | Facility: CLINIC | Age: 64
End: 2019-06-12

## 2019-06-12 DIAGNOSIS — C49.4 PRIMARY INTRA-ABDOMINAL SARCOMA: Primary | ICD-10-CM

## 2019-06-12 RX ORDER — PAZOPANIB 200 MG/1
800 TABLET ORAL DAILY
Qty: 120 TABLET | Refills: 2 | Status: SHIPPED | OUTPATIENT
Start: 2019-06-12 | End: 2019-09-10 | Stop reason: SDUPTHER

## 2019-06-13 ENCOUNTER — TELEPHONE (OUTPATIENT)
Dept: PHARMACY | Facility: CLINIC | Age: 64
End: 2019-06-13

## 2019-06-13 NOTE — TELEPHONE ENCOUNTER
Informed Patient  that [facility] received prescription for Votrient and prior authorization is required.  [facility] will be back in touch once insurance determination is received.

## 2019-06-17 NOTE — TELEPHONE ENCOUNTER
DOCUMENTATION ONLY:  Prior authorization for Votrient 200mg approved from 06/14/2019 to 12/14/2019    Co-pay: $2,400.75    Patient Assistance IS required. Sending to the financial assistance team to investigate assistance options.ALLIE

## 2019-06-17 NOTE — TELEPHONE ENCOUNTER
Votrient is approved by the patient's insurance with a high copay. We will be assisting the patient in applying to the  patient assistance program. Sending a staff message to Dr Fernando Silvestre regarding assistance application and faxing application prescription for his review and signature.

## 2019-06-21 ENCOUNTER — TELEPHONE (OUTPATIENT)
Dept: HEMATOLOGY/ONCOLOGY | Facility: HOSPITAL | Age: 64
End: 2019-06-21

## 2019-06-21 DIAGNOSIS — G25.81 RESTLESS LEGS SYNDROME: Primary | ICD-10-CM

## 2019-06-21 RX ORDER — PRAMIPEXOLE DIHYDROCHLORIDE 0.12 MG/1
TABLET ORAL
Qty: 60 TABLET | Refills: 1 | Status: SHIPPED | OUTPATIENT
Start: 2019-06-21

## 2019-06-23 ENCOUNTER — TELEPHONE (OUTPATIENT)
Dept: HEMATOLOGY/ONCOLOGY | Facility: CLINIC | Age: 64
End: 2019-06-23

## 2019-06-23 DIAGNOSIS — C49.4 PRIMARY INTRA-ABDOMINAL SARCOMA: ICD-10-CM

## 2019-06-23 RX ORDER — MORPHINE SULFATE 30 MG/1
30 TABLET, FILM COATED, EXTENDED RELEASE ORAL 2 TIMES DAILY
Qty: 90 TABLET | Refills: 0 | Status: SHIPPED | OUTPATIENT
Start: 2019-06-23 | End: 2019-07-22 | Stop reason: SDUPTHER

## 2019-06-25 NOTE — TELEPHONE ENCOUNTER
Patient was approved to receive her Votrient from the [employer] Patient Assistance Program through 12/31/19 with $0.00 copay. Patient has been informed. Sending a staff message to Dr Fernando Silvestre regarding Votrient assistance approval.

## 2019-06-25 NOTE — TELEPHONE ENCOUNTER
FOR DOCUMENTATION ONLY:  Financial Assistance for Votrient is approved from 6/25/19 to 12/31/19 with $0.00 copay  Source: [facility] Patient Assistance Program  Patient Savings: $[id number]

## 2019-07-09 ENCOUNTER — TELEPHONE (OUTPATIENT)
Dept: HEMATOLOGY/ONCOLOGY | Facility: CLINIC | Age: 64
End: 2019-07-09

## 2019-07-09 DIAGNOSIS — C49.4 PRIMARY INTRA-ABDOMINAL SARCOMA: Primary | ICD-10-CM

## 2019-07-15 ENCOUNTER — TELEPHONE (OUTPATIENT)
Dept: DERMATOLOGY | Facility: CLINIC | Age: 64
End: 2019-07-15

## 2019-07-15 ENCOUNTER — TELEPHONE (OUTPATIENT)
Dept: HEMATOLOGY/ONCOLOGY | Facility: CLINIC | Age: 64
End: 2019-07-15

## 2019-07-15 DIAGNOSIS — C49.4 PRIMARY INTRA-ABDOMINAL SARCOMA: Primary | ICD-10-CM

## 2019-07-15 NOTE — TELEPHONE ENCOUNTER
Pt had appt for consult today for BCC on R inferior neck. Pt is a no show, called and left message to see if she needed to reschedule.

## 2019-07-17 ENCOUNTER — TELEPHONE (OUTPATIENT)
Dept: DERMATOLOGY | Facility: CLINIC | Age: 64
End: 2019-07-17
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
## 2019-07-22 ENCOUNTER — TELEPHONE (OUTPATIENT)
Dept: HEMATOLOGY/ONCOLOGY | Facility: CLINIC | Age: 64
End: 2019-07-22

## 2019-07-22 DIAGNOSIS — C49.4 PRIMARY INTRA-ABDOMINAL SARCOMA: ICD-10-CM

## 2019-07-22 RX ORDER — MORPHINE SULFATE 30 MG/1
30 TABLET, FILM COATED, EXTENDED RELEASE ORAL 2 TIMES DAILY
Qty: 90 TABLET | Refills: 0 | Status: SHIPPED | OUTPATIENT
Start: 2019-07-22 | End: 2019-08-22 | Stop reason: SDUPTHER

## 2019-07-24 ENCOUNTER — TELEPHONE (OUTPATIENT)
Dept: HEMATOLOGY/ONCOLOGY | Facility: CLINIC | Age: 64
End: 2019-07-24

## 2019-07-24 DIAGNOSIS — C49.4 PRIMARY INTRA-ABDOMINAL SARCOMA: Primary | ICD-10-CM

## 2019-07-24 RX ORDER — CITALOPRAM 10 MG/1
10 TABLET ORAL DAILY
Qty: 30 TABLET | Refills: 1 | Status: SHIPPED | OUTPATIENT
Start: 2019-07-24 | End: 2019-10-25 | Stop reason: SDUPTHER

## 2019-07-29 ENCOUNTER — TELEPHONE (OUTPATIENT)
Dept: HEMATOLOGY/ONCOLOGY | Facility: HOSPITAL | Age: 64
End: 2019-07-29

## 2019-07-29 DIAGNOSIS — C49.4 PRIMARY INTRA-ABDOMINAL SARCOMA: Primary | ICD-10-CM

## 2019-07-31 ENCOUNTER — TELEPHONE (OUTPATIENT)
Dept: HEMATOLOGY/ONCOLOGY | Facility: CLINIC | Age: 64
End: 2019-07-31

## 2019-07-31 DIAGNOSIS — C76.2 MALIGNANT NEOPLASM OF ABDOMEN: Primary | ICD-10-CM

## 2019-07-31 RX ORDER — ONDANSETRON 4 MG/1
8 TABLET, ORALLY DISINTEGRATING ORAL EVERY 8 HOURS PRN
Qty: 60 TABLET | Refills: 1 | Status: SHIPPED | OUTPATIENT
Start: 2019-07-31

## 2019-08-06 ENCOUNTER — TELEPHONE (OUTPATIENT)
Dept: HEMATOLOGY/ONCOLOGY | Facility: CLINIC | Age: 64
End: 2019-08-06

## 2019-08-06 NOTE — TELEPHONE ENCOUNTER
----- Message from Mery Joseph sent at 8/6/2019  8:35 AM CDT -----  Contact: Pt   Reschedule existing appt    Verify appt date 8/13  rescheduling just want to reschedule appt times only   Callback#[phone]  If today or next day appt    Verify type of appt  ( md visit/ infusion ) Echo, Labs and Dr Silvestre Visit   Reason for rescheduling ?. If urgent / symptom . ( follow symptom template )  If busy  send urgent message  and pt will be called back within 30 min   3. If reason non urgent - message  /attempt to soft transfer call if available.   #if not available - send message to  and benjamín urgent         II . If rescheduling future appt date greater than 48 hours                  #pt name/ MRN                   #provider seeing pt                   #appt rescheduling                   #message sent to  for provider

## 2019-08-06 NOTE — TELEPHONE ENCOUNTER
Returned call, spoke with patient in regards to changing her apts. Patient stated that she wanted to move her apts earlier. I attempted to move the echo up but they did not have anything else available on 8/12. I also advised her Dr. Silvestre was booked.   She asked I speak with him to see if we can at least move his apt time up so they are not here so late. I told her I would send him a message. She voiced appreciation.

## 2019-08-12 ENCOUNTER — TELEPHONE (OUTPATIENT)
Dept: HEMATOLOGY/ONCOLOGY | Facility: CLINIC | Age: 64
End: 2019-08-12

## 2019-08-12 DIAGNOSIS — C49.4 PRIMARY INTRA-ABDOMINAL SARCOMA: Primary | ICD-10-CM

## 2019-08-12 RX ORDER — PROCHLORPERAZINE MALEATE 5 MG
5 TABLET ORAL 4 TIMES DAILY PRN
Qty: 30 TABLET | Refills: 1 | Status: SHIPPED | OUTPATIENT
Start: 2019-08-12 | End: 2019-11-20 | Stop reason: SDUPTHER

## 2019-08-22 ENCOUNTER — TELEPHONE (OUTPATIENT)
Dept: HEMATOLOGY/ONCOLOGY | Facility: CLINIC | Age: 64
End: 2019-08-22

## 2019-08-22 DIAGNOSIS — C49.4 PRIMARY INTRA-ABDOMINAL SARCOMA: ICD-10-CM

## 2019-08-22 RX ORDER — MORPHINE SULFATE 30 MG/1
30 TABLET, FILM COATED, EXTENDED RELEASE ORAL 2 TIMES DAILY
Qty: 90 TABLET | Refills: 0 | Status: SHIPPED | OUTPATIENT
Start: 2019-08-22 | End: 2019-09-17

## 2019-08-29 ENCOUNTER — TELEPHONE (OUTPATIENT)
Dept: HEMATOLOGY/ONCOLOGY | Facility: CLINIC | Age: 64
End: 2019-08-29

## 2019-08-29 DIAGNOSIS — D49.89 NEOPLASM OF ABDOMEN: ICD-10-CM

## 2019-08-29 DIAGNOSIS — C49.4 MALIGNANT NEOPLASM OF CONNECTIVE AND SOFT TISSUE OF ABDOMEN: ICD-10-CM

## 2019-08-30 NOTE — TELEPHONE ENCOUNTER
tried reaching out to pt on today to discuss upcoming appointments, but no answer,a detail message was left on v/m to contact office to discuss.

## 2019-09-05 ENCOUNTER — TELEPHONE (OUTPATIENT)
Dept: HEMATOLOGY/ONCOLOGY | Facility: CLINIC | Age: 64
End: 2019-09-05

## 2019-09-05 DIAGNOSIS — C76.2 MALIGNANT NEOPLASM OF ABDOMEN: ICD-10-CM

## 2019-09-05 RX ORDER — HYDROCODONE BITARTRATE AND ACETAMINOPHEN 10; 325 MG/1; MG/1
1 TABLET ORAL EVERY 6 HOURS PRN
Qty: 120 TABLET | Refills: 0 | Status: SHIPPED | OUTPATIENT
Start: 2019-09-05 | End: 2019-11-06 | Stop reason: SDUPTHER

## 2019-09-10 ENCOUNTER — TELEPHONE (OUTPATIENT)
Dept: HEMATOLOGY/ONCOLOGY | Facility: CLINIC | Age: 64
End: 2019-09-10

## 2019-09-10 DIAGNOSIS — C49.4 PRIMARY INTRA-ABDOMINAL SARCOMA: ICD-10-CM

## 2019-09-10 DIAGNOSIS — G62.9 NEUROPATHY: ICD-10-CM

## 2019-09-10 RX ORDER — PAZOPANIB 200 MG/1
800 TABLET ORAL DAILY
Qty: 120 TABLET | Refills: 2 | Status: SHIPPED | OUTPATIENT
Start: 2019-09-10 | End: 2019-12-16 | Stop reason: SDUPTHER

## 2019-09-10 RX ORDER — GABAPENTIN 300 MG/1
300 CAPSULE ORAL NIGHTLY
Qty: 90 CAPSULE | Refills: 2 | Status: SHIPPED | OUTPATIENT
Start: 2019-09-10 | End: 2019-09-11 | Stop reason: SDUPTHER

## 2019-09-11 ENCOUNTER — TELEPHONE (OUTPATIENT)
Dept: HEMATOLOGY/ONCOLOGY | Facility: CLINIC | Age: 64
End: 2019-09-11

## 2019-09-11 DIAGNOSIS — G62.9 NEUROPATHY: ICD-10-CM

## 2019-09-11 DIAGNOSIS — C49.4 PRIMARY INTRA-ABDOMINAL SARCOMA: ICD-10-CM

## 2019-09-11 RX ORDER — GABAPENTIN 300 MG/1
300 CAPSULE ORAL NIGHTLY
Qty: 90 CAPSULE | Refills: 2 | Status: SHIPPED | OUTPATIENT
Start: 2019-09-11 | End: 2019-11-11 | Stop reason: SDUPTHER

## 2019-09-16 ENCOUNTER — OFFICE VISIT (OUTPATIENT)
Dept: HEMATOLOGY/ONCOLOGY | Facility: CLINIC | Age: 64
End: 2019-09-16
Payer: MEDICARE

## 2019-09-16 ENCOUNTER — HOSPITAL ENCOUNTER (OUTPATIENT)
Dept: RADIOLOGY | Facility: HOSPITAL | Age: 64
Discharge: HOME | End: 2019-09-16
Attending: STUDENT IN AN ORGANIZED HEALTH CARE EDUCATION/TRAINING PROGRAM
Payer: MEDICARE

## 2019-09-16 VITALS
WEIGHT: 131.38 LBS | DIASTOLIC BLOOD PRESSURE: 63 MMHG | HEART RATE: 63 BPM | BODY MASS INDEX: 22.55 KG/M2 | RESPIRATION RATE: 20 BRPM | TEMPERATURE: 98 F | SYSTOLIC BLOOD PRESSURE: 139 MMHG | OXYGEN SATURATION: 99 %

## 2019-09-16 DIAGNOSIS — C49.4 MALIGNANT NEOPLASM OF CONNECTIVE AND SOFT TISSUE OF ABDOMEN: ICD-10-CM

## 2019-09-16 DIAGNOSIS — D49.89 NEOPLASM OF ABDOMEN: ICD-10-CM

## 2019-09-16 LAB — POCT GLUCOSE: 88 MG/DL (ref 70–110)

## 2019-09-16 PROCEDURE — 3075F SYST BP GE 130 - 139MM HG: CPT | Mod: CPTII,GC,S$GLB, | Performed by: STUDENT IN AN ORGANIZED HEALTH CARE EDUCATION/TRAINING PROGRAM

## 2019-09-16 PROCEDURE — 3008F PR BODY MASS INDEX (BMI) DOCUMENTED: ICD-10-PCS | Mod: CPTII,GC,S$GLB, | Performed by: STUDENT IN AN ORGANIZED HEALTH CARE EDUCATION/TRAINING PROGRAM

## 2019-09-16 PROCEDURE — 78816 PET IMAGE W/CT FULL BODY: CPT | Mod: TC,PS

## 2019-09-16 PROCEDURE — 99999 PR PBB SHADOW E&M-EST. PATIENT-LVL IV: CPT | Mod: PBBFAC,GC,, | Performed by: STUDENT IN AN ORGANIZED HEALTH CARE EDUCATION/TRAINING PROGRAM

## 2019-09-16 PROCEDURE — 1101F PR PT FALLS ASSESS DOC 0-1 FALLS W/OUT INJ PAST YR: ICD-10-PCS | Mod: CPTII,GC,S$GLB, | Performed by: STUDENT IN AN ORGANIZED HEALTH CARE EDUCATION/TRAINING PROGRAM

## 2019-09-16 PROCEDURE — 3075F PR MOST RECENT SYSTOLIC BLOOD PRESS GE 130-139MM HG: ICD-10-PCS | Mod: CPTII,GC,S$GLB, | Performed by: STUDENT IN AN ORGANIZED HEALTH CARE EDUCATION/TRAINING PROGRAM

## 2019-09-16 PROCEDURE — 3078F PR MOST RECENT DIASTOLIC BLOOD PRESSURE < 80 MM HG: ICD-10-PCS | Mod: CPTII,GC,S$GLB, | Performed by: STUDENT IN AN ORGANIZED HEALTH CARE EDUCATION/TRAINING PROGRAM

## 2019-09-16 PROCEDURE — 99214 PR OFFICE/OUTPT VISIT, EST, LEVL IV, 30-39 MIN: ICD-10-PCS | Mod: GC,S$GLB,, | Performed by: STUDENT IN AN ORGANIZED HEALTH CARE EDUCATION/TRAINING PROGRAM

## 2019-09-16 PROCEDURE — 78816 NM PET CT WHOLE BODY: ICD-10-PCS | Mod: 26,PS,, | Performed by: RADIOLOGY

## 2019-09-16 PROCEDURE — 99999 PR PBB SHADOW E&M-EST. PATIENT-LVL IV: ICD-10-PCS | Mod: PBBFAC,GC,, | Performed by: STUDENT IN AN ORGANIZED HEALTH CARE EDUCATION/TRAINING PROGRAM

## 2019-09-16 PROCEDURE — 3078F DIAST BP <80 MM HG: CPT | Mod: CPTII,GC,S$GLB, | Performed by: STUDENT IN AN ORGANIZED HEALTH CARE EDUCATION/TRAINING PROGRAM

## 2019-09-16 PROCEDURE — 99214 OFFICE O/P EST MOD 30 MIN: CPT | Mod: GC,S$GLB,, | Performed by: STUDENT IN AN ORGANIZED HEALTH CARE EDUCATION/TRAINING PROGRAM

## 2019-09-16 PROCEDURE — 78816 PET IMAGE W/CT FULL BODY: CPT | Mod: 26,PS,, | Performed by: RADIOLOGY

## 2019-09-16 PROCEDURE — 1101F PT FALLS ASSESS-DOCD LE1/YR: CPT | Mod: CPTII,GC,S$GLB, | Performed by: STUDENT IN AN ORGANIZED HEALTH CARE EDUCATION/TRAINING PROGRAM

## 2019-09-16 PROCEDURE — 3008F BODY MASS INDEX DOCD: CPT | Mod: CPTII,GC,S$GLB, | Performed by: STUDENT IN AN ORGANIZED HEALTH CARE EDUCATION/TRAINING PROGRAM

## 2019-09-16 NOTE — PROGRESS NOTES
PATIENT: Tiffany Kaur  MRN: [id number]  DATE: 9/17/2019      Diagnosis:   1. Neoplasm of abdomen    2. 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         Chief Complaint: Malignant neoplasm of abdomen      Oncologic History:   Low Grade Sarcoma  - 20 cm mass noted in outside CT abdomen/pelvis; L ureter noted to be have left sided hydronephrosis and noted to have some mild inguinal adneopathy  - PET-CT on 3/27 reveals a large anterior abdominal mass, low activity sarcoma without significant metastatic spread, SUV max of 4.7   - Inpatient AIM C1 (4/2/19 - 4/5/19) completed  (adrimaycin, ifosfomide, mesna) next week given symptoms above, discussed palliative intent of therapy  - Ifosfomide dropped on day 3 due to neurotoxicity  - repeat PET-CT on 4/22 with large abdominal mass with SUV max of 3.9   - C2 on 5/7 - 5/10   - PET-CT on 5/20 with  Large anterior abdoinal mass SUV 8 (previously 3.9), left lower pulmonary lesion (prior SUV of 2.3, now 2.5)  - switching to pazopanib     Pt is a 62 yo  female with PMhx of SVT (possible AFib), chronic IBS (describes a history of suffering from constipation as a child), HTN who presents to the hospital to discuss further options at treating 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 She was referred by a Dr. Rosa, who is a general surgeon in the [place] area.  She had not been able to get healthcare for a while as she was not enrolled in her 's plan through the  but has been able to get enrolled in Medicare since December.     She started to have abdominal bloating in mid-December 2018. Initially, she had attributed this problem to baseline IBS and was treated at that time with various combinations of Senna, Miralax, suppositories, and enema. Additionally, she lost ~45lbs over the course of one month (174lb to 130lb) which she reports was primarily due to diffuse  prandial-associated abdominal pain described mostly as cramping sensation. During this time she could only tolerated clear liquids, such as Jello and broth.      Her workup in mid-December started with an ultrasound which revealed cholelithiasis and large pelvic mass, therefore, follow-up of CT abdomen/pelvis was completed on 01/06/19. Origin of her mass has been unlcear, but she was found to have a 20 cm abdominal mass and a picture of chronic severe left hydronephrosis. She had a follow-up CT guided core biopsy on 02/06/19 of abdominal mass which revealed a low grade sarcoma, which has been verified by our pathologists as well. *See clinic oncology note for uploaded report.*     In addition, she has a 3-4 cm x 2 cm lesion on the posterior right shoulder and right lateral neck region with some vascularity, bullous with crusting. She notes that this lesion had been present for eight years and endorses some pruritus and periods of resolution, denies any pain or bleeding with the lesion.       She presented to outpatient clinic here on 03/27/2019 as a referral from [facility] to discuss further treatment options. Plan made for in-patient admission for urology evaluation and possible initiation of chemotherapy.         In addition, the pt has a 3-4 cm x 2 cm lesion on the posterior R shoulder and R lateral neck region with some vascularity, bullous with crusting which the pt has not had follow-up for as of this visit. She notes that this lesion had been present for eight years and endorses some pruritus and periods of resolution, denies any pain or bleeding with the lesion.       Pt had cologuard completed for colorectal cancer screening on December 2018, which was negative for malignancy.      She presented to [facility] 4/1/19 for planned in-patient management of her L-sided hydronephrosis and initiation of AIM chemotherapy. Following better control of her pain, her appetite improved and she was able to  increase her food intake and regain weight (up to 149lbs).  Admission labs notable for microcytic anemia with low iron studies and slight hypercalcemia (10.8 corrected). Evaluated by urology on day of admission. Based on assessment of prior imaging, it was determined that, based on hydronephrosis and degree of atrophy, the function of the left kidney was not salvageable and that risk of nephrostomy tube or stent outweighed benefit. She had a PICC line placed on day of admission. Chemotherapy was initiated on 04/02/19 with Doxorubucin, Ifosfamide, and Mesnex. Symptoms during chemotherapy initiation included intermittent abdominal pain and nausea that were controlled with PRN Zofran and Oxycodone. Patient noted to develop new onset issues with fine motor movement of fingers on 04/04; out of concern for neurological toxicity, Ifosfamide was discontinued with symptom resolution. On 04/05, she developed increased urinary frequency with lower back pain with radiation around the left hip; UA ordered, but negative for infection. Pain resolved after dose of Gabapentin and applying heating pads, suspect could be secondary to radiculopathy from tumor burden. Neulasta was administered on day after discharge.     The pt had shedding of hair after first week of chemohterapy but then completely lost the rest of it. Her abdominal pain has been well controlled at home. Pt has had pain and ringing in ears, worse on the R side; she suspects that she is having an ear infection; she was prescribed a course of augmentin for this problem. . Her neuropathy has been stable on gabapentin and hand tremors have become much better/ resolved for most part.   Subjective:       Has been out of pazopanib for about two weeks at this point, as there was a problem with prior information given to us regarding approval of med.  This medication was approved at no cost to patient through [employer] assistance program in the past, was supposed to be from   - 18. Discussed informing us about 1-2 weeks prior to running out of these refills in future.    Reviewed PET-CT which showed stable disease at this point and therefore plans to refill pazopanib.     Pt described some intermittent nausea/vomiting on pazopanib, which has not occurred since at least a week. She had it well controlled with taking compazine during the episodes. She has been having increased abdominal pain and back pain with radiation to L leg. Discussed how all of these may be related to not being on chemo in last few weeks but also recommended eventually get an MRI lumbar spine, given no other alarm sx at this time (urinary/bowel dysfunction, gait problems/falls).     Initially, we had discussed doing 45 mg of morphine BID but then discussed how this might involve taking three 15 mg pills and so pt chose to keep 30 mg BID of MS Contin due to ease of administration. Also, we discussed going to 60 mg MS Contin as well given Norco use of 10 mg q6H. Endorses regular bowel movements.     Overall, she states that making trips to [place] is difficult on monthly basis and so we discussed also setting her up with local Oncologist in [place] to provide support while she is there and to check labs maybe q monthly while on oral chemo. We discussed that PET-CT could still be done at [facility] during q3 month follow-ups. Pt was agreeable with this approach.         Past Medical History:   Past Medical History:   Diagnosis Date    Cancer     Gallstones     GERD (gastroesophageal reflux disease)     Hypertension     SVT (supraventricular tachycardia)        Past Surgical HIstory:   Past Surgical History:   Procedure Laterality Date     SECTION      X3    HYSTERECTOMY      Partial    TUBAL LIGATION         Family History:   Family History   Problem Relation Age of Onset    Lung disease Mother     Pancreatic cancer Father     No Known Problems Sister     Hypertension Brother     No  Known Problems Maternal Aunt     No Known Problems Maternal Uncle     No Known Problems Paternal Aunt     No Known Problems Paternal Uncle     No Known Problems Maternal Grandmother     No Known Problems Maternal Grandfather     No Known Problems Paternal Grandmother     No Known Problems Paternal Grandfather     Melanoma Daughter     No Known Problems Daughter     No Known Problems Son        Social History:  reports that she has never smoked. She has never used smokeless tobacco. She reports that she does not drink alcohol or use drugs.    Allergies:  Review of patient's allergies indicates:  No Known Allergies    Medications:  Current Outpatient Medications   Medication Sig Dispense Refill    amLODIPine (NORVASC) 5 MG tablet Take 1 tablet (5 mg total) by mouth once daily. 30 tablet 11    citalopram (CELEXA) 10 MG tablet Take 1 tablet (10 mg total) by mouth once daily. 30 tablet 1    diphenhydrAMINE-aluminum-magnesium hydroxide-simethicone-lidocaine HCl 2% Swish and spit 15 mLs every 4 (four) hours as needed. 300 mL 0    gabapentin (NEURONTIN) 300 MG capsule Take 1 capsule (300 mg total) by mouth every evening. Take one pill (300 mg at night), can take up to two a night (600 mg) if tolerated. 90 capsule 2    HYDROcodone-acetaminophen (NORCO)  mg per tablet Take 1 tablet by mouth every 6 (six) hours as needed for Pain. 120 tablet 0    magic mouthwash diphen/antac/lidoc Swish and spit 15 mLs every 4 (four) hours as needed. 300 mL 0    ondansetron (ZOFRAN-ODT) 4 MG TbDL Take 2 tablets (8 mg total) by mouth every 8 (eight) hours as needed. 60 tablet 1    PAZOpanib (VOTRIENT) 200 mg Tab Take 800 mg by mouth once daily Take 2 tabs or 400 mg daily for 3 days; then 3 tabs or 600 mg daily for 1 week. Final dose is 800 mg daily.. 120 tablet 2    polyethylene glycol (GLYCOLAX) 17 gram/dose powder Mix 1 capful (17 g) with liquid and take by mouth daily as needed. 510 g 0    pramipexole (MIRAPEX)  0.125 MG tablet Take 2 hours before bedtime, for RLS. Titrate to (0.25 mg, 0.5 mg daily) after tolerating one week of prior dose. Max of 0.5 mg nightly. 60 tablet 1    prochlorperazine (COMPAZINE) 5 MG tablet Take 1 tablet (5 mg total) by mouth 4 (four) times daily as needed for Nausea. 30 tablet 1    zolpidem (AMBIEN) 5 MG Tab        No current facility-administered medications for this visit.        Review of Systems   Constitutional: Negative for appetite change, chills and fever.   HENT: Negative for sore throat.    Eyes: Negative for visual disturbance.   Respiratory: Negative for cough, chest tightness, shortness of breath and wheezing.    Cardiovascular: Negative for chest pain and leg swelling.   Gastrointestinal: Positive for abdominal pain, nausea and vomiting. Negative for blood in stool, diarrhea and rectal pain.   Genitourinary: Negative for dysuria.   Musculoskeletal: Positive for back pain. Negative for gait problem.   Skin: Negative for rash.   Neurological: Negative for syncope and headaches.   Hematological: Negative for adenopathy. Does not bruise/bleed easily.       ECOG Performance Status: 1   Objective:      Vitals:   Vitals:    09/16/19 1429   BP: 139/63   BP Location: Right arm   Patient Position: Sitting   BP Method: Medium (Automatic)   Pulse: 63   Resp: 20   Temp: 97.9 °F (36.6 °C)   TempSrc: Oral   SpO2: 99%   Weight: 59.6 kg (131 lb 6.3 oz)             Physical Exam   Constitutional: She is oriented to person, place, and time. She appears well-developed. No distress.   HENT:   Head: Normocephalic and atraumatic.   Mouth/Throat: No oropharyngeal exudate.   Eyes: Pupils are equal, round, and reactive to light. EOM are normal. No scleral icterus.   Neck: Normal range of motion. Neck supple.   Cardiovascular: Normal rate, regular rhythm and normal heart sounds. Exam reveals no gallop and no friction rub.   No murmur heard.  Pulmonary/Chest: Effort normal and breath sounds normal. No  respiratory distress. She has no wheezes. She has no rales.   Abdominal: She exhibits mass. There is tenderness. There is no guarding.   Firm, mid-abdomen ; central abdominal bruit   Musculoskeletal: Normal range of motion. She exhibits no edema.   Lymphadenopathy:     She has no cervical adenopathy.   Neurological: She is alert and oriented to person, place, and time. No cranial nerve deficit.   Skin: Skin is warm and dry. No rash noted. She is not diaphoretic.   Psychiatric: She has a normal mood and affect.       Laboratory Data:  Lab Visit on 09/16/2019   Component Date Value Ref Range Status    WBC 09/16/2019 7.56  3.90 - 12.70 K/uL Final    RBC 09/16/2019 4.55  4.00 - 5.40 M/uL Final    Hemoglobin 09/16/2019 9.7* 12.0 - 16.0 g/dL Final    Hematocrit 09/16/2019 32.9* 37.0 - 48.5 % Final    Mean Corpuscular Volume 09/16/2019 72* 82 - 98 fL Final    Mean Corpuscular Hemoglobin 09/16/2019 21.3* 27.0 - 31.0 pg Final    Mean Corpuscular Hemoglobin Conc 09/16/2019 29.5* 32.0 - 36.0 g/dL Final    RDW 09/16/2019 18.6* 11.5 - 14.5 % Final    Platelets 09/16/2019 379* 150 - 350 K/uL Final    MPV 09/16/2019 10.6  9.2 - 12.9 fL Final    Immature Granulocytes 09/16/2019 0.3  0.0 - 0.5 % Final    Gran # (ANC) 09/16/2019 5.2  1.8 - 7.7 K/uL Final    Immature Grans (Abs) 09/16/2019 0.02  0.00 - 0.04 K/uL Final    Comment: Mild elevation in immature granulocytes is non specific and   can be seen in a variety of conditions including stress response,   acute inflammation, trauma and pregnancy. Correlation with other   laboratory and clinical findings is essential.      Lymph # 09/16/2019 1.6  1.0 - 4.8 K/uL Final    Mono # 09/16/2019 0.5  0.3 - 1.0 K/uL Final    Eos # 09/16/2019 0.2  0.0 - 0.5 K/uL Final    Baso # 09/16/2019 0.11  0.00 - 0.20 K/uL Final    nRBC 09/16/2019 0  0 /100 WBC Final    Gran% 09/16/2019 68.2  38.0 - 73.0 % Final    Lymph% 09/16/2019 21.4  18.0 - 48.0 % Final    Mono% 09/16/2019 6.1   4.0 - 15.0 % Final    Eosinophil% 09/16/2019 2.5  0.0 - 8.0 % Final    Basophil% 09/16/2019 1.5  0.0 - 1.9 % Final    Differential Method 09/16/2019 Automated   Final    Sodium 09/16/2019 138  136 - 145 mmol/L Final    Potassium 09/16/2019 3.9  3.5 - 5.1 mmol/L Final    Chloride 09/16/2019 109  95 - 110 mmol/L Final    CO2 09/16/2019 22* 23 - 29 mmol/L Final    Glucose 09/16/2019 127* 70 - 110 mg/dL Final    BUN, Bld 09/16/2019 15  8 - 23 mg/dL Final    Creatinine 09/16/2019 0.7  0.5 - 1.4 mg/dL Final    Calcium 09/16/2019 10.6* 8.7 - 10.5 mg/dL Final    Total Protein 09/16/2019 7.5  6.0 - 8.4 g/dL Final    Albumin 09/16/2019 3.5  3.5 - 5.2 g/dL Final    Total Bilirubin 09/16/2019 0.6  0.1 - 1.0 mg/dL Final    Comment: For infants and newborns, interpretation of results should be based  on gestational age, weight and in agreement with clinical  observations.  Premature Infant recommended reference ranges:  Up to 24 hours.............<8.0 mg/dL  Up to 48 hours............<12.0 mg/dL  3-5 days..................<15.0 mg/dL  6-29 days.................<15.0 mg/dL      Alkaline Phosphatase 09/16/2019 99  55 - 135 U/L Final    AST 09/16/2019 9* 10 - 40 U/L Final    ALT 09/16/2019 5* 10 - 44 U/L Final    Anion Gap 09/16/2019 7* 8 - 16 mmol/L Final    eGFR if African American 09/16/2019 >60.0  >60 mL/min/1.73 m^2 Final    eGFR if non African American 09/16/2019 >60.0  >60 mL/min/1.73 m^2 Final    Comment: Calculation used to obtain the estimated glomerular filtration  rate (eGFR) is the CKD-EPI equation.      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  After an approximately 60 min distribution time, PET/CT images were acquired from the skull vertex through the feet. Transmission images were acquired to correct for attenuation using a whole body low-dose CT scan without contrast with the arms positioned along the side of the torso. Glycemia at the time of injection was 88 mg/dL.    COMPARISON:  FDG PET-CT 06/10/2019, 04/22/2019    FINDINGS:  Quality of the study: Adequate.    Redemonstration of a large abdominal mass with mild diffuse uptake, noting a central area of non avidity, most compatible necrosis.  The SUV max is 2.76, previously 3.0.    There is an unchanged 1.9 x 1.2 cm soft tissue opacity in the left lower lobe, (axial series 3, image 125) with SUV max of 1.70, previously 2.52.    Mild diffuse uptake throughout the osseous structures.    Physiologic uptake of the tracer is present within the brain, salivary glands, myocardium, GI and  tracts.    Incidental CT findings: Scattered atherosclerotic calcification.  The left kidney is atrophic with hydronephrosis.  There is a nonobstructing 0.9 cm right nephrolith.  Cholelithiasis without evidence of acute cholecystitis.      Impression       Stable mildly hypermetabolic large pelvic mass, relatively unchanged from prior CT.    Stable appearance of the left lower lobe pulmonary soft tissue density with minimal hypermetabolic activity, decreased when compared to prior scan.    No new hypermetabolic lesions to suggest progression of disease.    Mild diffuse uptake throughout the osseous structures, which may be secondary to reactive red marrow conversion versus neulasta injection.    Unchanged additional findings as above.    I, Mau Álvarez MD, attest that I reviewed and interpreted the images.    Electronically signed by resident: Esthela Allred  Date:  09/16/2019  Time: 09:49    Electronically signed by: Mau Álvarez  Date: 09/16/2019  Time: 10:40           Assessment:       1. Neoplasm of abdomen    2. 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 Mota previously, pt is not a surgical candidate at this time as significant amount of central vasculature (aorta, iliac arteries/veins) involved and significant amount of vascularity to mass  - PET-CT reveals a large anterior abdominal mass, low activity sarcoma without significant metastatic spread, SUV max of 4.7   - Echo with EF of 60%, normal LV systolic function and indeterminate diastolic function  - tumor causing pain in central abdomen as well as compression of L ureter and causing hydronephrosis  - Inpatient AIM C1 completed  (adrimaycin, ifosfomide, mesna) next week given symptoms above, discussed palliative intent of therapy  - went over risks/benefits/ side effects of chemotherapy and pt wishes to proceed with therapy, consent form signed and form submitted to be scanned into system  - Ifosfomide dropped on day 3 due to neurotoxicity  - PET- CT on 4/22 results reviewed, suv max decreased to 3.9 for abdominal mass and necrosis of part of mass noted  - completed two chemo tx with doxorubicin, first round with ifosofomide as well   - PET-CT on 5/20 with  Large anterior abdominal mass SUV 3 (previously 3.9), left lower lobe pulmonary lesion (prior SUV of 2.3, now 2.5)  - discussed switching to pazopanib due to ease of administration and good option in control of disease; pt has done this chemotherapy since end of June 2019  - PET-CT from 9/16 with large abd mass (now with SUV max of 3, from  2.76) and soft tissue opacity in left lower lobe (SUV of 1.7 from 2.52)  - Requesting refill for pazopanib given stable disease on PET-CT  - Also will request follow-up with local oncologist [place]      BCC, R inferior neck  - lesion was biopsied, 6/10  - was supposed to follow-up but weather problems occurred around time of initial follow-up     Pain secondary to neoplasm  - continue MS Contin 30 BID     Hypercalcemia of malignancy  - mild, stable around recent corrected baseline of 11     Severe L sided hydronephrosis   - from external compression of large sarcoma  - left kidney very atrophic and not salvageable per urology     Neuropathy, possible L sided sciatica  - pt with lower back pain around site of tumor  - continue gabapentin 300 mg BID  - requested MRI lumbar spine to be completed locally       HTN  - continue norvasc 5     SVT  - unclear of rhythm  - no AFib noted while pt was admitted into hospital     IBS  - continue laxatives PRN - can use senna + miralax, while on pain meds        Discussed with Dr. Caruso     Follow-up:   Follow-up in three months with CBC, CMP, Mg, PHos and PET-CT on same day prior to visit  Continue pazopanib given stable disease  Setup follow-up with Oncology locally as well, at [place]   Check an XR and/or MRI lumbar spine locally for additional eval of back pain, as per discussion with patient      Fernando Silvestre MD  Hematology and Oncology Fellow, PGY V  [facility]

## 2019-09-17 PROBLEM — C49.4 MALIGNANT NEOPLASM OF CONNECTIVE AND SOFT TISSUE OF ABDOMEN: Status: ACTIVE | Noted: 2019-09-17

## 2019-09-17 PROBLEM — D49.89 NEOPLASM OF ABDOMEN: Status: ACTIVE | Noted: 2019-09-17

## 2019-09-23 ENCOUNTER — TELEPHONE (OUTPATIENT)
Dept: HEMATOLOGY/ONCOLOGY | Facility: CLINIC | Age: 64
End: 2019-09-23

## 2019-09-23 DIAGNOSIS — C49.4 PRIMARY INTRA-ABDOMINAL SARCOMA: Primary | ICD-10-CM

## 2019-09-23 RX ORDER — MORPHINE SULFATE 30 MG/1
30 TABLET, FILM COATED, EXTENDED RELEASE ORAL 3 TIMES DAILY
Qty: 90 TABLET | Refills: 0 | Status: SHIPPED | OUTPATIENT
Start: 2019-09-23 | End: 2019-11-06 | Stop reason: SDUPTHER

## 2019-10-02 ENCOUNTER — HISTORICAL (OUTPATIENT)
Dept: ADMINISTRATIVE | Facility: HOSPITAL | Age: 64
End: 2019-10-02

## 2019-10-02 LAB
ABS NEUT (OLG): 4.82 X10(3)/MCL (ref 2.1–9.2)
ALBUMIN SERPL-MCNC: 3.5 GM/DL (ref 3.4–5)
ALBUMIN/GLOB SERPL: 1 RATIO (ref 1.1–2)
ALP SERPL-CCNC: 98 UNIT/L (ref 38–126)
ALT SERPL-CCNC: 11 UNIT/L (ref 12–78)
AST SERPL-CCNC: 9 UNIT/L (ref 15–37)
BASOPHILS # BLD AUTO: 0.1 X10(3)/MCL (ref 0–0.2)
BASOPHILS NFR BLD AUTO: 1.2 %
BILIRUB SERPL-MCNC: 0.6 MG/DL (ref 0.2–1)
BILIRUBIN DIRECT+TOT PNL SERPL-MCNC: 0.1 MG/DL (ref 0–0.5)
BILIRUBIN DIRECT+TOT PNL SERPL-MCNC: 0.5 MG/DL (ref 0–0.8)
BUN SERPL-MCNC: 13 MG/DL (ref 7–18)
CALCIUM SERPL-MCNC: 10.4 MG/DL (ref 8.5–10.1)
CHLORIDE SERPL-SCNC: 107 MMOL/L (ref 98–107)
CO2 SERPL-SCNC: 24 MMOL/L (ref 21–32)
CREAT SERPL-MCNC: 0.53 MG/DL (ref 0.55–1.02)
EOSINOPHIL # BLD AUTO: 0.2 X10(3)/MCL (ref 0–0.9)
EOSINOPHIL NFR BLD AUTO: 2.2 %
ERYTHROCYTE [DISTWIDTH] IN BLOOD BY AUTOMATED COUNT: 18.4 % (ref 11.5–17)
GLOBULIN SER-MCNC: 3.6 GM/DL (ref 2.4–3.5)
GLUCOSE SERPL-MCNC: 86 MG/DL (ref 74–106)
HCT VFR BLD AUTO: 34.2 % (ref 37–47)
HGB BLD-MCNC: 10 GM/DL (ref 12–16)
LYMPHOCYTES # BLD AUTO: 1.7 X10(3)/MCL (ref 0.6–4.6)
LYMPHOCYTES NFR BLD AUTO: 23.4 %
MCH RBC QN AUTO: 21.4 PG (ref 27–31)
MCHC RBC AUTO-ENTMCNC: 29.2 GM/DL (ref 33–36)
MCV RBC AUTO: 73.1 FL (ref 80–94)
MONOCYTES # BLD AUTO: 0.5 X10(3)/MCL (ref 0.1–1.3)
MONOCYTES NFR BLD AUTO: 6.5 %
NEUTROPHILS # BLD AUTO: 4.8 X10(3)/MCL (ref 2.1–9.2)
NEUTROPHILS NFR BLD AUTO: 66.6 %
PLATELET # BLD AUTO: 327 X10(3)/MCL (ref 130–400)
PMV BLD AUTO: 10.1 FL (ref 9.4–12.4)
POTASSIUM SERPL-SCNC: 4.4 MMOL/L (ref 3.5–5.1)
PROT SERPL-MCNC: 7.1 GM/DL (ref 6.4–8.2)
RBC # BLD AUTO: 4.68 X10(6)/MCL (ref 4.2–5.4)
SODIUM SERPL-SCNC: 139 MMOL/L (ref 136–145)
WBC # SPEC AUTO: 7.2 X10(3)/MCL (ref 4.5–11.5)

## 2019-10-25 ENCOUNTER — TELEPHONE (OUTPATIENT)
Dept: HEMATOLOGY/ONCOLOGY | Facility: CLINIC | Age: 64
End: 2019-10-25

## 2019-10-25 DIAGNOSIS — F32.A DEPRESSION, UNSPECIFIED DEPRESSION TYPE: ICD-10-CM

## 2019-10-25 DIAGNOSIS — C49.4 PRIMARY INTRA-ABDOMINAL SARCOMA: ICD-10-CM

## 2019-10-25 DIAGNOSIS — C49.4 PRIMARY INTRA-ABDOMINAL SARCOMA: Primary | ICD-10-CM

## 2019-10-25 RX ORDER — CITALOPRAM 10 MG/1
10 TABLET ORAL DAILY
Qty: 60 TABLET | Refills: 2 | Status: SHIPPED | OUTPATIENT
Start: 2019-10-25 | End: 2019-10-25 | Stop reason: SDUPTHER

## 2019-10-25 RX ORDER — CITALOPRAM 20 MG/1
20 TABLET, FILM COATED ORAL DAILY
Qty: 30 TABLET | Refills: 2 | Status: SHIPPED | OUTPATIENT
Start: 2019-10-25 | End: 2021-01-01 | Stop reason: SDUPTHER

## 2019-10-31 ENCOUNTER — HISTORICAL (OUTPATIENT)
Dept: HEMATOLOGY/ONCOLOGY | Facility: CLINIC | Age: 64
End: 2019-10-31

## 2019-10-31 LAB
ABS NEUT (OLG): 4.01 X10(3)/MCL (ref 2.1–9.2)
ALBUMIN SERPL-MCNC: 3.6 GM/DL (ref 3.4–5)
ALBUMIN/GLOB SERPL: 1 RATIO (ref 1.1–2)
ALP SERPL-CCNC: 96 UNIT/L (ref 38–126)
ALT SERPL-CCNC: 15 UNIT/L (ref 12–78)
AST SERPL-CCNC: 12 UNIT/L (ref 15–37)
BASOPHILS # BLD AUTO: 0.1 X10(3)/MCL (ref 0–0.2)
BASOPHILS NFR BLD AUTO: 1.1 %
BILIRUB SERPL-MCNC: 1 MG/DL (ref 0.2–1)
BILIRUBIN DIRECT+TOT PNL SERPL-MCNC: 0.2 MG/DL (ref 0–0.5)
BILIRUBIN DIRECT+TOT PNL SERPL-MCNC: 0.8 MG/DL (ref 0–0.8)
BUN SERPL-MCNC: 15 MG/DL (ref 7–18)
CALCIUM SERPL-MCNC: 10.7 MG/DL (ref 8.5–10.1)
CHLORIDE SERPL-SCNC: 107 MMOL/L (ref 98–107)
CO2 SERPL-SCNC: 23 MMOL/L (ref 21–32)
CREAT SERPL-MCNC: 0.65 MG/DL (ref 0.55–1.02)
EOSINOPHIL # BLD AUTO: 0.1 X10(3)/MCL (ref 0–0.9)
EOSINOPHIL NFR BLD AUTO: 1.6 %
ERYTHROCYTE [DISTWIDTH] IN BLOOD BY AUTOMATED COUNT: 19.1 % (ref 11.5–17)
GLOBULIN SER-MCNC: 3.6 GM/DL (ref 2.4–3.5)
GLUCOSE SERPL-MCNC: 80 MG/DL (ref 74–106)
HCT VFR BLD AUTO: 34.7 % (ref 37–47)
HGB BLD-MCNC: 10.2 GM/DL (ref 12–16)
LYMPHOCYTES # BLD AUTO: 1.7 X10(3)/MCL (ref 0.6–4.6)
LYMPHOCYTES NFR BLD AUTO: 26.9 %
MCH RBC QN AUTO: 21.4 PG (ref 27–31)
MCHC RBC AUTO-ENTMCNC: 29.4 GM/DL (ref 33–36)
MCV RBC AUTO: 72.7 FL (ref 80–94)
MONOCYTES # BLD AUTO: 0.4 X10(3)/MCL (ref 0.1–1.3)
MONOCYTES NFR BLD AUTO: 5.9 %
NEUTROPHILS # BLD AUTO: 4 X10(3)/MCL (ref 2.1–9.2)
NEUTROPHILS NFR BLD AUTO: 64.3 %
PLATELET # BLD AUTO: 380 X10(3)/MCL (ref 130–400)
PMV BLD AUTO: 10.3 FL (ref 9.4–12.4)
POC CREATININE: 0.6 MG/DL (ref 0.6–1.3)
POTASSIUM SERPL-SCNC: 4.8 MMOL/L (ref 3.5–5.1)
PROT SERPL-MCNC: 7.2 GM/DL (ref 6.4–8.2)
RBC # BLD AUTO: 4.77 X10(6)/MCL (ref 4.2–5.4)
SODIUM SERPL-SCNC: 138 MMOL/L (ref 136–145)
WBC # SPEC AUTO: 6.2 X10(3)/MCL (ref 4.5–11.5)

## 2019-11-06 ENCOUNTER — TELEPHONE (OUTPATIENT)
Dept: HEMATOLOGY/ONCOLOGY | Facility: CLINIC | Age: 64
End: 2019-11-06

## 2019-11-06 DIAGNOSIS — C76.2 MALIGNANT NEOPLASM OF ABDOMEN: ICD-10-CM

## 2019-11-06 DIAGNOSIS — C49.4 PRIMARY INTRA-ABDOMINAL SARCOMA: ICD-10-CM

## 2019-11-06 RX ORDER — MORPHINE SULFATE 30 MG/1
30 TABLET, FILM COATED, EXTENDED RELEASE ORAL 3 TIMES DAILY
Qty: 90 TABLET | Refills: 0 | Status: SHIPPED | OUTPATIENT
Start: 2019-11-06 | End: 2019-12-09 | Stop reason: SDUPTHER

## 2019-11-06 RX ORDER — HYDROCODONE BITARTRATE AND ACETAMINOPHEN 10; 325 MG/1; MG/1
1 TABLET ORAL EVERY 6 HOURS PRN
Qty: 120 TABLET | Refills: 0 | Status: SHIPPED | OUTPATIENT
Start: 2019-11-06 | End: 2019-11-07 | Stop reason: SDUPTHER

## 2019-11-07 ENCOUNTER — TELEPHONE (OUTPATIENT)
Dept: HEMATOLOGY/ONCOLOGY | Facility: CLINIC | Age: 64
End: 2019-11-07

## 2019-11-07 DIAGNOSIS — C76.2 MALIGNANT NEOPLASM OF ABDOMEN: ICD-10-CM

## 2019-11-07 RX ORDER — HYDROCODONE BITARTRATE AND ACETAMINOPHEN 10; 325 MG/1; MG/1
1 TABLET ORAL EVERY 6 HOURS PRN
Qty: 120 TABLET | Refills: 0 | Status: SHIPPED | OUTPATIENT
Start: 2019-11-07 | End: 2019-12-31 | Stop reason: ALTCHOICE

## 2019-11-11 ENCOUNTER — TELEPHONE (OUTPATIENT)
Dept: HEMATOLOGY/ONCOLOGY | Facility: CLINIC | Age: 64
End: 2019-11-11

## 2019-11-11 DIAGNOSIS — G62.9 NEUROPATHY: ICD-10-CM

## 2019-11-11 DIAGNOSIS — C49.4 PRIMARY INTRA-ABDOMINAL SARCOMA: Primary | ICD-10-CM

## 2019-11-11 RX ORDER — GABAPENTIN 300 MG/1
300 CAPSULE ORAL NIGHTLY
Qty: 90 CAPSULE | Refills: 2 | Status: SHIPPED | OUTPATIENT
Start: 2019-11-11 | End: 2020-03-09 | Stop reason: SDUPTHER

## 2019-11-11 RX ORDER — ZOLPIDEM TARTRATE 5 MG/1
5 TABLET ORAL NIGHTLY
Qty: 30 TABLET | Refills: 0 | Status: SHIPPED | OUTPATIENT
Start: 2019-11-11 | End: 2020-01-01 | Stop reason: SDUPTHER

## 2019-11-13 NOTE — H&P
- [facility]  Hematology/Oncology  H&P    Patient Name: Tiffany Kaur  MRN: [id number]  Admission Date: 5/6/2019  Code Status: Full Code   Attending Provider: Norman Cazares MD  Primary Care Physician: Ethel Good MD  Principal Problem:<principal problem not specified>    Subjective:     HPI: 63 Year old female with abdominal Low Grade Sarcoma,  left sided hydronephresis s/p inpatient cycle 1 of AIM chemotherapy on 4/2 now presented for cycle 2 with single agent adriamycin. She was initially seen in the clinic by Dr. Silvestre and admitted to Oncology service.     She denied of any fever, chills, abdominal pain, chest pain, SOB, leg pain, nausea, vomiting, diarrhea, dysuria or dizziness.     Oncologic History  Pt is a 62 yo  female with PMhx of SVT (possible AFib), chronic IBS (describes a history of suffering from constipation as a child), HTN who presents to the hospital to discuss further options at treating recently found low grade sarcoma in her abdomen, workup for this which was started in [facility]. She was referred by a Dr. Rosa, who is a general surgeon in the [place] area.  She had not been able to get healthcare for a while as she was not enrolled in her 's plan through the  but has been able to get enrolled in Medicare since December.     She started to have abdominal bloating in mid-December 2018. Initially, she had attributed this problem to baseline IBS and was treated at that time with various combinations of Senna, Miralax, suppositories, and enema. Additionally, she lost ~45lbs over the course of one month (174lb to 130lb) which she reports was primarily due to diffuse prandial-associated abdominal pain described mostly as cramping sensation. During this time she could only tolerated clear liquids, such as Jello and broth.      Her workup in mid-December started with an ultrasound which revealed cholelithiasis and large  pelvic mass, therefore, follow-up of CT abdomen/pelvis was completed on 01/06/19. Origin of her mass has been unlcear, but she was found to have a 20 cm abdominal mass and a picture of chronic severe left hydronephrosis. She had a follow-up CT guided core biopsy on 02/06/19 of abdominal mass which revealed a low grade sarcoma, which has been verified by our pathologists as well. *See clinic oncology note for uploaded report.*     In addition, she has a 3-4 cm x 2 cm lesion on the posterior right shoulder and right lateral neck region with some vascularity, bullous with crusting. She notes that this lesion had been present for eight years and endorses some pruritus and periods of resolution, denies any pain or bleeding with the lesion.       She presented to outpatient clinic here on 03/27/2019 as a referral from [facility] to discuss further treatment options. Plan made for in-patient admission for urology evaluation and possible initiation of chemotherapy.         In addition, the pt has a 3-4 cm x 2 cm lesion on the posterior R shoulder and R lateral neck region with some vascularity, bullous with crusting which the pt has not had follow-up for as of this visit. She notes that this lesion had been present for eight years and endorses some pruritus and periods of resolution, denies any pain or bleeding with the lesion.       Pt had cologuard completed for colorectal cancer screening on December 2018, which was negative for malignancy.      She presented to [facility] 4/1/19 for planned in-patient management of her L-sided hydronephrosis and initiation of AIM chemotherapy. Following better control of her pain, her appetite improved and she was able to increase her food intake and regain weight (up to 149lbs).  Admission labs notable for microcytic anemia with low iron studies and slight hypercalcemia (10.8 corrected). Evaluated by urology on day of admission. Based on assessment of prior imaging, it was  determined that, based on hydronephrosis and degree of atrophy, the function of the left kidney was not salvageable and that risk of nephrostomy tube or stent outweighed benefit. She had a PICC line placed on day of admission. Chemotherapy was initiated on 04/02/19 with Doxorubucin, Ifosfamide, and Mesnex. Symptoms during chemotherapy initiation included intermittent abdominal pain and nausea that were controlled with PRN Zofran and Oxycodone. Patient noted to develop new onset issues with fine motor movement of fingers on 04/04; out of concern for neurological toxicity, Ifosfamide was discontinued with symptom resolution. On 04/05, she developed increased urinary frequency with lower back pain with radiation around the left hip; UA ordered, but negative for infection. Pain resolved after dose of Gabapentin and applying heating pads, suspect could be secondary to radiculopathy from tumor burden. Neulasta was administered on day after discharge.     The pt had shedding of hair last week but has completely lost the rest of it. Her abdominal pain has been well controlled at home. Pt has had pain and ringing in ears, worse on the R side; she suspects that she is having an ear infection. Denies any significant weight changes, nausea/vomitng, constipation, diarrhea. Her neuropathy has been stable on gabapentin and hand tremors have become much better/ resolved for most part.      Her PCP is Dr. Valles at [facility].          Oncology Treatment Plan:   IP SARCOMA DOXORUBICIN IFOSFAMIDE MESNA (4 DAYS)    Medications:  Continuous Infusions:  Scheduled Meds:   [START ON 5/7/2019] amLODIPine  5 mg Oral Daily    enoxaparin  40 mg Subcutaneous Daily    gabapentin  300 mg Oral QHS    polyethylene glycol  17 g Oral Daily     PRN Meds:dextrose 50%, dextrose 50%, glucagon (human recombinant), glucose, glucose, magic mouthwash (diphenhydrAMINE 12.5 mg/5 mL 20 mL, aluminum & magnesium hydroxide-simethicone (MYLANTA) 20  mL, lidocaine HCl 2% (XYLOCAINE) 20 mL) solution, ondansetron, oxyCODONE, prochlorperazine, sodium chloride 0.9%, zolpidem     Review of patient's allergies indicates:  No Known Allergies     Past Medical History:   Diagnosis Date    Cancer     Gallstones     GERD (gastroesophageal reflux disease)     Hypertension     SVT (supraventricular tachycardia)      Past Surgical History:   Procedure Laterality Date     SECTION      X3    HYSTERECTOMY      Partial    TUBAL LIGATION       Family History     Problem Relation (Age of Onset)    Hypertension Brother    Lung disease Mother    No Known Problems Sister, Maternal Aunt, Maternal Uncle, Paternal Aunt, Paternal Uncle, Maternal Grandmother, Maternal Grandfather, Paternal Grandmother, Paternal Grandfather, Daughter, Daughter, Son    Pancreatic cancer Father        Tobacco Use    Smoking status: Never Smoker    Smokeless tobacco: Never Used   Substance and Sexual Activity    Alcohol use: No     Frequency: Never    Drug use: No    Sexual activity: Never       Review of Systems   Constitutional: Negative for appetite change, chills, fatigue and fever.   HENT: Negative for mouth sores, nosebleeds and sore throat.    Respiratory: Negative for cough, chest tightness and shortness of breath.    Cardiovascular: Negative for chest pain and palpitations.   Gastrointestinal: Positive for constipation. Negative for abdominal pain, diarrhea, nausea and vomiting.   Genitourinary: Negative for dysuria, frequency and hematuria.   Musculoskeletal: Negative for arthralgias and gait problem.   Skin: Negative for color change and pallor.   Neurological: Negative for seizures, syncope and headaches.   Hematological: Negative for adenopathy.     Objective:     Vital Signs (Most Recent):  Temp: 97.6 °F (36.4 °C) (19)  Pulse: 88 (19)  Resp: 18 (19)  BP: (!) 168/83 (19)  SpO2: 99 % (19) Vital Signs (24h Range):  Temp:   [97.6 °F (36.4 °C)-98.3 °F (36.8 °C)] 97.6 °F (36.4 °C)  Pulse:  [80-88] 88  Resp:  [16-18] 18  SpO2:  [99 %] 99 %  BP: (121-168)/(60-83) 168/83     Weight: 65.6 kg (144 lb 10 oz)  Body mass index is 24.82 kg/m².  Body surface area is 1.72 meters squared.    No intake or output data in the 24 hours ending 05/06/19 1756    Physical Exam   Constitutional: She is oriented to person, place, and time. She appears well-developed and well-nourished.   HENT:   Head: Normocephalic and atraumatic.   Eyes: Pupils are equal, round, and reactive to light. EOM are normal.   Neck: Normal range of motion. Neck supple.   Cardiovascular: Normal rate and regular rhythm.   No murmur heard.  Pulmonary/Chest: Effort normal and breath sounds normal. No respiratory distress. She has no wheezes.   Abdominal: Soft. Bowel sounds are normal. She exhibits no distension. There is tenderness.   Tenderness on deep palpation   Musculoskeletal: Normal range of motion. She exhibits edema. She exhibits no tenderness.   Neurological: She is alert and oriented to person, place, and time.   Skin: Skin is warm and dry. Capillary refill takes less than 2 seconds. No pallor.   Psychiatric: She has a normal mood and affect.       Significant Labs:   CBC:   Recent Labs   Lab 05/06/19  1022   WBC 7.72   HGB 8.8*   HCT 30.3*   *   , CMP:   Recent Labs   Lab 05/06/19  1022      K 4.0      CO2 20*   GLU 83   BUN 10   CREATININE 0.6   CALCIUM 10.2   PROT 6.9   ALBUMIN 3.1*   BILITOT 0.7   ALKPHOS 107   AST 9*   ALT 7*   ANIONGAP 9   EGFRNONAA >60.0    and Coagulation:   Recent Labs   Lab 05/06/19  1724   INR 1.1       Diagnostic Results:      Assessment/Plan:     SVT (supraventricular tachycardia)  - no AFib noted during last hospital admission  - Will get EKG today        Hydronephrosis of left kidney  - from external compression of large sarcoma  - left kidney very atrophic and not salvageable per Nephrology           Sarcoma  20 cm mass noted  in CT abdomen/pelvis; L ureter noted to be have left sided hydronephresis and noted to have some mild inguinal adneopathy  - path reviewed at our institution consistent with sarcoma  - PET-CT reveals a large anterior abdominal mass, low activity sarcoma without significant metastatic spread, SUV max of 4.7   - Echo with EF of 60%, normal LV systolic function and indeterminate diastolic function  - tumor causing pain in central abdomen as well as compression of L ureter and causing hydronephrosis  - s/p cycle 1 Inpatient AIM completed  (adrimaycin, ifosfomide, mesna)   - Ifosfomide dropped on day 3 due to neurotoxicity  - PET- CT results reviewed, no significant change after one round  - will plan for cycle 2 chemo with single agent adriamycin  - PICC consult placed        Irritable bowel syndrome  - continue laxatives PRN - On miralax for now, which helps her very well with constipation        Hypertension  - continue norvasc        Neuropathy  Neuropathy, possible L sided sciatica  - pt with lower back pain around site of tumor  - continue gabapentin 300 mg at night for pain         Hypercalcemia of malignancy  - mild, around baseline of 11 today  - Will start her on IV fluids at 100 cc/hr        Gokul Hensley MD  Hematology/Oncology  [facility]      ATTENDING NOTE, ONCOLOGY INPATIENT TEAM    As above.  Patient seen and examined in the Clinic, chart reviewed.  Appears comfortable, in NAD.  Lungs are clear to auscultation.  Abdomen is soft, with a large (20 cm) mid abdominal nontender mass.Labs reviewed.    PLAN  Admit for chemotherapy.  She will need a PIC line .  We will follow.  Her multiple questions were answered to her satisfaction.      Norman Cazares MD

## 2019-11-15 ENCOUNTER — TELEPHONE (OUTPATIENT)
Dept: HEMATOLOGY/ONCOLOGY | Facility: CLINIC | Age: 64
End: 2019-11-15

## 2019-11-15 DIAGNOSIS — I10 HYPERTENSION, UNSPECIFIED TYPE: Primary | ICD-10-CM

## 2019-11-15 RX ORDER — AMLODIPINE BESYLATE 5 MG/1
5 TABLET ORAL DAILY
Qty: 30 TABLET | Refills: 1 | Status: SHIPPED | OUTPATIENT
Start: 2019-11-15 | End: 2021-01-01

## 2019-11-20 ENCOUNTER — TELEPHONE (OUTPATIENT)
Dept: HEMATOLOGY/ONCOLOGY | Facility: CLINIC | Age: 64
End: 2019-11-20

## 2019-11-20 DIAGNOSIS — C49.4 PRIMARY INTRA-ABDOMINAL SARCOMA: ICD-10-CM

## 2019-11-20 RX ORDER — PROCHLORPERAZINE MALEATE 5 MG
5 TABLET ORAL 3 TIMES DAILY PRN
Qty: 90 TABLET | Refills: 1 | Status: SHIPPED | OUTPATIENT
Start: 2019-11-20 | End: 2020-01-01 | Stop reason: SDUPTHER

## 2019-12-02 ENCOUNTER — HISTORICAL (OUTPATIENT)
Dept: HEMATOLOGY/ONCOLOGY | Facility: CLINIC | Age: 64
End: 2019-12-02

## 2019-12-02 LAB
ABS NEUT (OLG): 4.79 X10(3)/MCL (ref 2.1–9.2)
ALBUMIN SERPL-MCNC: 3.3 GM/DL (ref 3.4–5)
ALBUMIN/GLOB SERPL: 0.9 {RATIO}
ALP SERPL-CCNC: 113 UNIT/L (ref 38–126)
ALT SERPL-CCNC: 10 UNIT/L (ref 12–78)
AST SERPL-CCNC: 9 UNIT/L (ref 15–37)
BASOPHILS # BLD AUTO: 0.1 X10(3)/MCL (ref 0–0.2)
BASOPHILS NFR BLD AUTO: 1 %
BILIRUB SERPL-MCNC: 0.7 MG/DL (ref 0.2–1)
BILIRUBIN DIRECT+TOT PNL SERPL-MCNC: 0.1 MG/DL (ref 0–0.2)
BILIRUBIN DIRECT+TOT PNL SERPL-MCNC: 0.6 MG/DL (ref 0–0.8)
BUN SERPL-MCNC: 11 MG/DL (ref 7–18)
CALCIUM SERPL-MCNC: 11 MG/DL (ref 8.5–10.1)
CHLORIDE SERPL-SCNC: 107 MMOL/L (ref 98–107)
CO2 SERPL-SCNC: 21 MMOL/L (ref 21–32)
CREAT SERPL-MCNC: 0.58 MG/DL (ref 0.55–1.02)
EOSINOPHIL # BLD AUTO: 0.1 X10(3)/MCL (ref 0–0.9)
EOSINOPHIL NFR BLD AUTO: 1.3 %
ERYTHROCYTE [DISTWIDTH] IN BLOOD BY AUTOMATED COUNT: 19.6 % (ref 11.5–17)
GLOBULIN SER-MCNC: 3.8 GM/DL (ref 2.4–3.5)
GLUCOSE SERPL-MCNC: 82 MG/DL (ref 74–106)
HCT VFR BLD AUTO: 32 % (ref 37–47)
HGB BLD-MCNC: 9.3 GM/DL (ref 12–16)
LYMPHOCYTES # BLD AUTO: 1.5 X10(3)/MCL (ref 0.6–4.6)
LYMPHOCYTES NFR BLD AUTO: 21.3 %
MCH RBC QN AUTO: 22 PG (ref 27–31)
MCHC RBC AUTO-ENTMCNC: 29.1 GM/DL (ref 33–36)
MCV RBC AUTO: 75.8 FL (ref 80–94)
MONOCYTES # BLD AUTO: 0.4 X10(3)/MCL (ref 0.1–1.3)
MONOCYTES NFR BLD AUTO: 6.1 %
NEUTROPHILS # BLD AUTO: 4.8 X10(3)/MCL (ref 2.1–9.2)
NEUTROPHILS NFR BLD AUTO: 70 %
PLATELET # BLD AUTO: 344 X10(3)/MCL (ref 130–400)
PMV BLD AUTO: 10.4 FL (ref 9.4–12.4)
POTASSIUM SERPL-SCNC: 4.7 MMOL/L (ref 3.5–5.1)
PROT SERPL-MCNC: 7.1 GM/DL (ref 6.4–8.2)
RBC # BLD AUTO: 4.22 X10(6)/MCL (ref 4.2–5.4)
SODIUM SERPL-SCNC: 137 MMOL/L (ref 136–145)
WBC # SPEC AUTO: 6.8 X10(3)/MCL (ref 4.5–11.5)

## 2019-12-09 ENCOUNTER — TELEPHONE (OUTPATIENT)
Dept: HEMATOLOGY/ONCOLOGY | Facility: CLINIC | Age: 64
End: 2019-12-09

## 2019-12-09 ENCOUNTER — HISTORICAL (OUTPATIENT)
Dept: HEMATOLOGY/ONCOLOGY | Facility: CLINIC | Age: 64
End: 2019-12-09

## 2019-12-09 DIAGNOSIS — C49.4 PRIMARY INTRA-ABDOMINAL SARCOMA: Primary | ICD-10-CM

## 2019-12-09 LAB
ABS NEUT (OLG): 4.86 X10(3)/MCL (ref 2.1–9.2)
ALBUMIN SERPL-MCNC: 3.1 GM/DL (ref 3.4–5)
ALBUMIN/GLOB SERPL: 0.9 RATIO (ref 1.1–2)
ALP SERPL-CCNC: 103 UNIT/L (ref 38–126)
ALT SERPL-CCNC: 12 UNIT/L (ref 12–78)
AST SERPL-CCNC: 10 UNIT/L (ref 15–37)
BASOPHILS # BLD AUTO: 0.1 X10(3)/MCL (ref 0–0.2)
BASOPHILS NFR BLD AUTO: 0.8 %
BILIRUB SERPL-MCNC: 0.5 MG/DL (ref 0.2–1)
BILIRUBIN DIRECT+TOT PNL SERPL-MCNC: 0.1 MG/DL (ref 0–0.5)
BILIRUBIN DIRECT+TOT PNL SERPL-MCNC: 0.4 MG/DL (ref 0–0.8)
BUN SERPL-MCNC: 14 MG/DL (ref 7–18)
CALCIUM SERPL-MCNC: 10.2 MG/DL (ref 8.5–10.1)
CHLORIDE SERPL-SCNC: 111 MMOL/L (ref 98–107)
CO2 SERPL-SCNC: 22 MMOL/L (ref 21–32)
CREAT SERPL-MCNC: 0.64 MG/DL (ref 0.55–1.02)
EOSINOPHIL # BLD AUTO: 0.1 X10(3)/MCL (ref 0–0.9)
EOSINOPHIL NFR BLD AUTO: 1.9 %
ERYTHROCYTE [DISTWIDTH] IN BLOOD BY AUTOMATED COUNT: 19 % (ref 11.5–17)
GLOBULIN SER-MCNC: 3.6 GM/DL (ref 2.4–3.5)
GLUCOSE SERPL-MCNC: 96 MG/DL (ref 74–106)
HCT VFR BLD AUTO: 30.4 % (ref 37–47)
HGB BLD-MCNC: 9 GM/DL (ref 12–16)
LYMPHOCYTES # BLD AUTO: 1.8 X10(3)/MCL (ref 0.6–4.6)
LYMPHOCYTES NFR BLD AUTO: 24.2 %
MCH RBC QN AUTO: 22.2 PG (ref 27–31)
MCHC RBC AUTO-ENTMCNC: 29.6 GM/DL (ref 33–36)
MCV RBC AUTO: 74.9 FL (ref 80–94)
MONOCYTES # BLD AUTO: 0.5 X10(3)/MCL (ref 0.1–1.3)
MONOCYTES NFR BLD AUTO: 7 %
NEUTROPHILS # BLD AUTO: 4.9 X10(3)/MCL (ref 2.1–9.2)
NEUTROPHILS NFR BLD AUTO: 65.7 %
PLATELET # BLD AUTO: 333 X10(3)/MCL (ref 130–400)
PMV BLD AUTO: 9.4 FL (ref 9.4–12.4)
POTASSIUM SERPL-SCNC: 4.1 MMOL/L (ref 3.5–5.1)
PROT SERPL-MCNC: 6.7 GM/DL (ref 6.4–8.2)
RBC # BLD AUTO: 4.06 X10(6)/MCL (ref 4.2–5.4)
SODIUM SERPL-SCNC: 141 MMOL/L (ref 136–145)
WBC # SPEC AUTO: 7.4 X10(3)/MCL (ref 4.5–11.5)

## 2019-12-09 RX ORDER — MORPHINE SULFATE 30 MG/1
30 TABLET, FILM COATED, EXTENDED RELEASE ORAL 3 TIMES DAILY
Qty: 90 TABLET | Refills: 0 | Status: SHIPPED | OUTPATIENT
Start: 2019-12-09 | End: 2020-01-17 | Stop reason: SDUPTHER

## 2019-12-10 ENCOUNTER — TELEPHONE (OUTPATIENT)
Dept: HEMATOLOGY/ONCOLOGY | Facility: CLINIC | Age: 64
End: 2019-12-10

## 2019-12-16 ENCOUNTER — HOSPITAL ENCOUNTER (OUTPATIENT)
Dept: CARDIOLOGY | Facility: CLINIC | Age: 64
Discharge: HOME | End: 2019-12-16
Attending: STUDENT IN AN ORGANIZED HEALTH CARE EDUCATION/TRAINING PROGRAM
Payer: MEDICARE

## 2019-12-16 ENCOUNTER — OFFICE VISIT (OUTPATIENT)
Dept: HEMATOLOGY/ONCOLOGY | Facility: CLINIC | Age: 64
End: 2019-12-16
Payer: MEDICARE

## 2019-12-16 ENCOUNTER — HOSPITAL ENCOUNTER (OUTPATIENT)
Dept: RADIOLOGY | Facility: HOSPITAL | Age: 64
Discharge: HOME | End: 2019-12-16
Attending: STUDENT IN AN ORGANIZED HEALTH CARE EDUCATION/TRAINING PROGRAM
Payer: MEDICARE

## 2019-12-16 VITALS
WEIGHT: 151.69 LBS | BODY MASS INDEX: 25.9 KG/M2 | OXYGEN SATURATION: 98 % | SYSTOLIC BLOOD PRESSURE: 136 MMHG | DIASTOLIC BLOOD PRESSURE: 63 MMHG | TEMPERATURE: 98 F | HEART RATE: 82 BPM | HEIGHT: 64 IN

## 2019-12-16 VITALS
HEART RATE: 74 BPM | WEIGHT: 144 LBS | DIASTOLIC BLOOD PRESSURE: 64 MMHG | BODY MASS INDEX: 24.59 KG/M2 | HEIGHT: 64 IN | SYSTOLIC BLOOD PRESSURE: 130 MMHG

## 2019-12-16 DIAGNOSIS — D49.89 NEOPLASM OF ABDOMEN: ICD-10-CM

## 2019-12-16 DIAGNOSIS — C49.4 MALIGNANT NEOPLASM OF CONNECTIVE AND SOFT TISSUE OF ABDOMEN: ICD-10-CM

## 2019-12-16 DIAGNOSIS — C49.4 PRIMARY INTRA-ABDOMINAL SARCOMA: ICD-10-CM

## 2019-12-16 LAB
ASCENDING AORTA: 3.15 CM
AV INDEX (PROSTH): 0.68
AV MEAN GRADIENT: 6 MMHG
AV PEAK GRADIENT: 11 MMHG
AV VALVE AREA: 2.53 CM2
AV VELOCITY RATIO: 0.76
BSA FOR ECHO PROCEDURE: 1.72 M2
CV ECHO LV RWT: 0.31 CM
DOP CALC AO PEAK VEL: 1.65 M/S
DOP CALC AO VTI: 38.68 CM
DOP CALC LVOT AREA: 3.7 CM2
DOP CALC LVOT DIAMETER: 2.18 CM
DOP CALC LVOT PEAK VEL: 1.26 M/S
DOP CALC LVOT STROKE VOLUME: 97.85 CM3
DOP CALCLVOT PEAK VEL VTI: 26.23 CM
E WAVE DECELERATION TIME: 236.84 MSEC
E/A RATIO: 1.06
E/E' RATIO: 9.64 M/S
ECHO LV POSTERIOR WALL: 0.8 CM (ref 0.6–1.1)
FRACTIONAL SHORTENING: 34 % (ref 28–44)
INTERVENTRICULAR SEPTUM: 0.97 CM (ref 0.6–1.1)
IVRT: 0.07 MSEC
LA MAJOR: 6.04 CM
LA MINOR: 5.99 CM
LA WIDTH: 5.08 CM
LEFT ATRIUM SIZE: 4.25 CM
LEFT ATRIUM VOLUME INDEX: 64.9 ML/M2
LEFT ATRIUM VOLUME: 110.38 CM3
LEFT INTERNAL DIMENSION IN SYSTOLE: 3.47 CM (ref 2.1–4)
LEFT VENTRICLE DIASTOLIC VOLUME INDEX: 77.14 ML/M2
LEFT VENTRICLE DIASTOLIC VOLUME: 131.23 ML
LEFT VENTRICLE MASS INDEX: 98 G/M2
LEFT VENTRICLE SYSTOLIC VOLUME INDEX: 29.3 ML/M2
LEFT VENTRICLE SYSTOLIC VOLUME: 49.82 ML
LEFT VENTRICULAR INTERNAL DIMENSION IN DIASTOLE: 5.23 CM (ref 3.5–6)
LEFT VENTRICULAR MASS: 166.95 G
LV LATERAL E/E' RATIO: 8.15 M/S
LV SEPTAL E/E' RATIO: 11.78 M/S
MV PEAK A VEL: 1 M/S
MV PEAK E VEL: 1.06 M/S
PISA TR MAX VEL: 2.7 M/S
POCT GLUCOSE: 86 MG/DL (ref 70–110)
PULM VEIN S/D RATIO: 1.31
PV PEAK D VEL: 0.42 M/S
PV PEAK S VEL: 0.55 M/S
RA MAJOR: 5.5 CM
RA PRESSURE: 3 MMHG
RA WIDTH: 4.8 CM
RIGHT VENTRICULAR END-DIASTOLIC DIMENSION: 3.49 CM
SINUS: 2.58 CM
STJ: 2.9 CM
TDI LATERAL: 0.13 M/S
TDI SEPTAL: 0.09 M/S
TDI: 0.11 M/S
TR MAX PG: 29 MMHG
TRICUSPID ANNULAR PLANE SYSTOLIC EXCURSION: 3.02 CM
TV REST PULMONARY ARTERY PRESSURE: 32 MMHG

## 2019-12-16 PROCEDURE — 78816 PET IMAGE W/CT FULL BODY: CPT | Mod: TC,PS

## 2019-12-16 PROCEDURE — 3078F DIAST BP <80 MM HG: CPT | Mod: CPTII,GC,S$GLB, | Performed by: STUDENT IN AN ORGANIZED HEALTH CARE EDUCATION/TRAINING PROGRAM

## 2019-12-16 PROCEDURE — 1100F PTFALLS ASSESS-DOCD GE2>/YR: CPT | Mod: CPTII,GC,S$GLB, | Performed by: STUDENT IN AN ORGANIZED HEALTH CARE EDUCATION/TRAINING PROGRAM

## 2019-12-16 PROCEDURE — 1159F PR MEDICATION LIST DOCUMENTED IN MEDICAL RECORD: ICD-10-PCS | Mod: GC,S$GLB,, | Performed by: STUDENT IN AN ORGANIZED HEALTH CARE EDUCATION/TRAINING PROGRAM

## 2019-12-16 PROCEDURE — 1125F AMNT PAIN NOTED PAIN PRSNT: CPT | Mod: GC,S$GLB,, | Performed by: STUDENT IN AN ORGANIZED HEALTH CARE EDUCATION/TRAINING PROGRAM

## 2019-12-16 PROCEDURE — 3075F PR MOST RECENT SYSTOLIC BLOOD PRESS GE 130-139MM HG: ICD-10-PCS | Mod: CPTII,GC,S$GLB, | Performed by: STUDENT IN AN ORGANIZED HEALTH CARE EDUCATION/TRAINING PROGRAM

## 2019-12-16 PROCEDURE — 78816 NM PET CT WHOLE BODY: ICD-10-PCS | Mod: 26,PS,, | Performed by: RADIOLOGY

## 2019-12-16 PROCEDURE — 99999 PR PBB SHADOW E&M-EST. PATIENT-LVL IV: CPT | Mod: PBBFAC,GC,, | Performed by: STUDENT IN AN ORGANIZED HEALTH CARE EDUCATION/TRAINING PROGRAM

## 2019-12-16 PROCEDURE — 93306 TTE W/DOPPLER COMPLETE: CPT | Mod: 26,,, | Performed by: INTERNAL MEDICINE

## 2019-12-16 PROCEDURE — 3288F FALL RISK ASSESSMENT DOCD: CPT | Mod: CPTII,GC,S$GLB, | Performed by: STUDENT IN AN ORGANIZED HEALTH CARE EDUCATION/TRAINING PROGRAM

## 2019-12-16 PROCEDURE — 3075F SYST BP GE 130 - 139MM HG: CPT | Mod: CPTII,GC,S$GLB, | Performed by: STUDENT IN AN ORGANIZED HEALTH CARE EDUCATION/TRAINING PROGRAM

## 2019-12-16 PROCEDURE — 3078F PR MOST RECENT DIASTOLIC BLOOD PRESSURE < 80 MM HG: ICD-10-PCS | Mod: CPTII,GC,S$GLB, | Performed by: STUDENT IN AN ORGANIZED HEALTH CARE EDUCATION/TRAINING PROGRAM

## 2019-12-16 PROCEDURE — 1100F PR PT FALLS ASSESS DOC 2+ FALLS/FALL W/INJURY/YR: ICD-10-PCS | Mod: CPTII,GC,S$GLB, | Performed by: STUDENT IN AN ORGANIZED HEALTH CARE EDUCATION/TRAINING PROGRAM

## 2019-12-16 PROCEDURE — 99999 PR PBB SHADOW E&M-EST. PATIENT-LVL IV: ICD-10-PCS | Mod: PBBFAC,GC,, | Performed by: STUDENT IN AN ORGANIZED HEALTH CARE EDUCATION/TRAINING PROGRAM

## 2019-12-16 PROCEDURE — 93306 ECHO (CUPID ONLY): ICD-10-PCS | Mod: 26,,, | Performed by: INTERNAL MEDICINE

## 2019-12-16 PROCEDURE — 1125F PR PAIN SEVERITY QUANTIFIED, PAIN PRESENT: ICD-10-PCS | Mod: GC,S$GLB,, | Performed by: STUDENT IN AN ORGANIZED HEALTH CARE EDUCATION/TRAINING PROGRAM

## 2019-12-16 PROCEDURE — 99214 PR OFFICE/OUTPT VISIT, EST, LEVL IV, 30-39 MIN: ICD-10-PCS | Mod: GC,S$GLB,, | Performed by: STUDENT IN AN ORGANIZED HEALTH CARE EDUCATION/TRAINING PROGRAM

## 2019-12-16 PROCEDURE — 78816 PET IMAGE W/CT FULL BODY: CPT | Mod: 26,PS,, | Performed by: RADIOLOGY

## 2019-12-16 PROCEDURE — 3288F PR FALLS RISK ASSESSMENT DOCUMENTED: ICD-10-PCS | Mod: CPTII,GC,S$GLB, | Performed by: STUDENT IN AN ORGANIZED HEALTH CARE EDUCATION/TRAINING PROGRAM

## 2019-12-16 PROCEDURE — 1159F MED LIST DOCD IN RCRD: CPT | Mod: GC,S$GLB,, | Performed by: STUDENT IN AN ORGANIZED HEALTH CARE EDUCATION/TRAINING PROGRAM

## 2019-12-16 PROCEDURE — 99214 OFFICE O/P EST MOD 30 MIN: CPT | Mod: GC,S$GLB,, | Performed by: STUDENT IN AN ORGANIZED HEALTH CARE EDUCATION/TRAINING PROGRAM

## 2019-12-16 PROCEDURE — 93306 TTE W/DOPPLER COMPLETE: CPT

## 2019-12-16 RX ORDER — PAZOPANIB 200 MG/1
800 TABLET ORAL DAILY
Qty: 120 TABLET | Refills: 5 | Status: SHIPPED | OUTPATIENT
Start: 2019-12-16 | End: 2020-02-19

## 2019-12-16 RX ORDER — FUROSEMIDE 40 MG/1
40 TABLET ORAL DAILY
COMMUNITY
End: 2020-01-01 | Stop reason: ALTCHOICE

## 2019-12-16 RX ORDER — POTASSIUM CHLORIDE 750 MG/1
20 TABLET, EXTENDED RELEASE ORAL DAILY
COMMUNITY

## 2019-12-16 NOTE — PROGRESS NOTES
PATIENT: Tiffany Kaur  MRN: [id number]  DATE: 12/16/2019      Diagnosis:   1. Primary intra-abdominal sarcoma        Chief Complaint: Malignant neoplasm of abdomen and Neoplasm of abdomen      Oncologic History:   Low Grade Sarcoma  - 20 cm mass noted in outside CT abdomen/pelvis; L ureter noted to be have left sided hydronephrosis and noted to have some mild inguinal adneopathy  - PET-CT on 3/27 reveals a large anterior abdominal mass, low activity sarcoma without significant metastatic spread, SUV max of 4.7   - Inpatient AIM C1 (4/2/19 - 4/5/19) completed  (adrimaycin, ifosfomide, mesna) next week given symptoms above, discussed palliative intent of therapy  - Ifosfomide dropped on day 3 due to neurotoxicity  - repeat PET-CT on 4/22 with large abdominal mass with SUV max of 3.9   - C2 on 5/7 - 5/10   - PET-CT on 5/20 with  Large anterior abdoinal mass SUV 8 (previously 3.9), left lower pulmonary lesion (prior SUV of 2.3, now 2.5)  - continue pazopanib      Pt is a 62 yo  female with PMhx of SVT (possible AFib), chronic IBS (describes a history of suffering from constipation as a child), HTN who presents to the hospital to discuss further options at treating recently found low grade sarcoma in her abdomen, workup for this which was started in [facility]. She was referred by a Dr. Rosa, who is a general surgeon in the [place] area.  She had not been able to get healthcare for a while as she was not enrolled in her 's plan through the  but has been able to get enrolled in Medicare since December.     She started to have abdominal bloating in mid-December 2018. Initially, she had attributed this problem to baseline IBS and was treated at that time with various combinations of Senna, Miralax, suppositories, and enema. Additionally, she lost ~45lbs over the course of one month (174lb to 130lb) which she reports was primarily due to diffuse prandial-associated abdominal  pain described mostly as cramping sensation. During this time she could only tolerated clear liquids, such as Jello and broth.      Her workup in mid-December started with an ultrasound which revealed cholelithiasis and large pelvic mass, therefore, follow-up of CT abdomen/pelvis was completed on 01/06/19. Origin of her mass has been unlcear, but she was found to have a 20 cm abdominal mass and a picture of chronic severe left hydronephrosis. She had a follow-up CT guided core biopsy on 02/06/19 of abdominal mass which revealed a low grade sarcoma, which has been verified by our pathologists as well. *See clinic oncology note for uploaded report.*     In addition, she has a 3-4 cm x 2 cm lesion on the posterior right shoulder and right lateral neck region with some vascularity, bullous with crusting. She notes that this lesion had been present for eight years and endorses some pruritus and periods of resolution, denies any pain or bleeding with the lesion.       She presented to outpatient clinic here on 03/27/2019 as a referral from [facility] to discuss further treatment options. Plan made for in-patient admission for urology evaluation and possible initiation of chemotherapy.         In addition, the pt has a 3-4 cm x 2 cm lesion on the posterior R shoulder and R lateral neck region with some vascularity, bullous with crusting which the pt has not had follow-up for as of this visit. She notes that this lesion had been present for eight years and endorses some pruritus and periods of resolution, denies any pain or bleeding with the lesion.       Pt had cologuard completed for colorectal cancer screening on December 2018, which was negative for malignancy.      She presented to [facility] 4/1/19 for planned in-patient management of her L-sided hydronephrosis and initiation of AIM chemotherapy. Following better control of her pain, her appetite improved and she was able to increase her food intake and regain  weight (up to 149lbs).  Admission labs notable for microcytic anemia with low iron studies and slight hypercalcemia (10.8 corrected). Evaluated by urology on day of admission. Based on assessment of prior imaging, it was determined that, based on hydronephrosis and degree of atrophy, the function of the left kidney was not salvageable and that risk of nephrostomy tube or stent outweighed benefit. She had a PICC line placed on day of admission. Chemotherapy was initiated on 04/02/19 with Doxorubucin, Ifosfamide, and Mesnex. Symptoms during chemotherapy initiation included intermittent abdominal pain and nausea that were controlled with PRN Zofran and Oxycodone. Patient noted to develop new onset issues with fine motor movement of fingers on 04/04; out of concern for neurological toxicity, Ifosfamide was discontinued with symptom resolution. On 04/05, she developed increased urinary frequency with lower back pain with radiation around the left hip; UA ordered, but negative for infection. Pain resolved after dose of Gabapentin and applying heating pads, suspect could be secondary to radiculopathy from tumor burden. Neulasta was administered on day after discharge.     The pt had shedding of hair after first week of chemohterapy but then completely lost the rest of it. Her abdominal pain has been well controlled at home. Pt has had pain and ringing in ears, worse on the R side; she suspects that she is having an ear infection; she was prescribed a course of augmentin for this problem. . Her neuropathy has been stable on gabapentin and hand tremors have become much better/ resolved for most part.     Has been out of pazopanib for about two weeks at this point, as there was a problem with prior information given to us regarding approval of med.  This medication was approved at no cost to patient through [employer] assistance program in the past, was supposed to be from 6/25 - 12/31/18. Discussed informing us about 1-2  weeks prior to running out of these refills in future.     Pt described some intermittent nausea/vomiting on pazopanib, which has not occurred since at least a week. She had it well controlled with taking compazine during the episodes.       Subjective:     She is currently seeing Dr. Francois in [place].     Nausea has been well controlled with compazine and pain is mostly 4/10 after pain meds are used; currently she in on Morphine 30 mg TID.     Calcium of around 11 recently, stable. Noted to be 11 again today on our labs.     Back pain noted around mostly lower back and also some tremors of hands. Discussed that back pain was likely secondary to size of tumor and tremors of hand, which maybe from prior chemotherapy although the incidence of it is largely unknown. She is able to ambulate well without many problems usually.     Her legs have had bilateral swelling but these will apparently get much better with reclining and keeping them elevated.     We also discussed possibility of an [facility] visit to discuss a clinical trial as well.     Past Medical History:   Past Medical History:   Diagnosis Date    Cancer     Gallstones     GERD (gastroesophageal reflux disease)     Hypertension     SVT (supraventricular tachycardia)        Past Surgical HIstory:   Past Surgical History:   Procedure Laterality Date     SECTION      X3    HYSTERECTOMY      Partial    TUBAL LIGATION         Family History:   Family History   Problem Relation Age of Onset    Lung disease Mother     Pancreatic cancer Father     No Known Problems Sister     Hypertension Brother     No Known Problems Maternal Aunt     No Known Problems Maternal Uncle     No Known Problems Paternal Aunt     No Known Problems Paternal Uncle     No Known Problems Maternal Grandmother     No Known Problems Maternal Grandfather     No Known Problems Paternal Grandmother     No Known Problems Paternal Grandfather     Melanoma Daughter      No Known Problems Daughter     No Known Problems Son        Social History:  reports that she has never smoked. She has never used smokeless tobacco. She reports that she does not drink alcohol or use drugs.    Allergies:  Review of patient's allergies indicates:  No Known Allergies    Medications:  Current Outpatient Medications   Medication Sig Dispense Refill    amLODIPine (NORVASC) 5 MG tablet Take 1 tablet (5 mg total) by mouth once daily. 30 tablet 1    citalopram (CELEXA) 20 MG tablet Take 1 tablet (20 mg total) by mouth once daily. (Patient taking differently: Take 10 mg by mouth once daily. ) 30 tablet 2    furosemide (LASIX) 40 MG tablet Take 40 mg by mouth once daily.      gabapentin (NEURONTIN) 300 MG capsule Take 1 capsule (300 mg total) by mouth every evening. Take one pill (300 mg at night), can take up to two a night (600 mg) if tolerated. 90 capsule 2    HYDROcodone-acetaminophen (NORCO)  mg per tablet Take 1 tablet by mouth every 6 (six) hours as needed for Pain. 120 tablet 0    morphine (MS CONTIN) 30 MG 12 hr tablet Take 1 tablet (30 mg total) by mouth 3 (three) times daily. 90 tablet 0    ondansetron (ZOFRAN-ODT) 4 MG TbDL Take 2 tablets (8 mg total) by mouth every 8 (eight) hours as needed. 60 tablet 1    PAZOpanib (VOTRIENT) 200 mg Tab Take 800 mg by mouth once daily Take 800 mg (4 tabs) daily. 120 tablet 5    polyethylene glycol (GLYCOLAX) 17 gram/dose powder Mix 1 capful (17 g) with liquid and take by mouth daily as needed. 510 g 0    potassium chloride (KLOR-CON) 10 MEQ TbSR Take 20 mEq by mouth once daily.      pramipexole (MIRAPEX) 0.125 MG tablet Take 2 hours before bedtime, for RLS. Titrate to (0.25 mg, 0.5 mg daily) after tolerating one week of prior dose. Max of 0.5 mg nightly. 60 tablet 1    prochlorperazine (COMPAZINE) 5 MG tablet Take 1 tablet (5 mg total) by mouth 3 (three) times daily as needed for Nausea. 90 tablet 1    zolpidem (AMBIEN) 5 MG Tab Take 1  "tablet (5 mg total) by mouth every evening. 30 tablet 0    diphenhydrAMINE-aluminum-magnesium hydroxide-simethicone-lidocaine HCl 2% Swish and spit 15 mLs every 4 (four) hours as needed. 300 mL 0    magic mouthwash diphen/antac/lidoc Swish and spit 15 mLs every 4 (four) hours as needed. 300 mL 0     No current facility-administered medications for this visit.        Review of Systems   Constitutional: Negative for appetite change, chills and fever.   HENT: Negative for sore throat.    Eyes: Negative for visual disturbance.   Respiratory: Negative for cough, chest tightness, shortness of breath and wheezing.    Cardiovascular: Negative for chest pain and leg swelling.   Gastrointestinal: Positive for abdominal pain, nausea and vomiting. Negative for blood in stool, diarrhea and rectal pain.   Genitourinary: Negative for dysuria.   Musculoskeletal: Positive for back pain. Negative for gait problem.   Skin: Negative for rash.   Neurological: Positive for tremors. Negative for syncope and headaches.   Hematological: Negative for adenopathy. Does not bruise/bleed easily.       ECOG Performance Status: 1   Objective:      Vitals:   Vitals:    12/16/19 1517   BP: 136/63   BP Location: Right arm   Patient Position: Sitting   Pulse: 82   Temp: 98.3 °F (36.8 °C)   TempSrc: Oral   SpO2: 98%   Weight: 68.8 kg (151 lb 10.8 oz)   Height: 5' 4" (1.626 m)       Physical Exam   Constitutional: She is oriented to person, place, and time. She appears well-developed. No distress.   HENT:   Head: Normocephalic and atraumatic.   Mouth/Throat: No oropharyngeal exudate.   Eyes: Pupils are equal, round, and reactive to light. EOM are normal. No scleral icterus.   Neck: Normal range of motion. Neck supple.   Cardiovascular: Normal rate, regular rhythm and normal heart sounds. Exam reveals no gallop and no friction rub.   No murmur heard.  Pulmonary/Chest: Effort normal and breath sounds normal. No respiratory distress. She has no wheezes. " She has no rales.   Abdominal: She exhibits mass. There is tenderness. There is no guarding.   Firm, mid-abdomen ; central abdominal bruit   Musculoskeletal: Normal range of motion.   2+ pitting edema bilatearlly   Lymphadenopathy:     She has no cervical adenopathy.   Neurological: She is alert and oriented to person, place, and time. No cranial nerve deficit.   Skin: Skin is warm and dry. No rash noted. She is not diaphoretic.   Psychiatric: She has a normal mood and affect.       Laboratory Data:  Hospital Outpatient Visit on 12/16/2019   Component Date Value Ref Range Status    BSA 12/16/2019 1.72  m2 Final    TDI SEPTAL 12/16/2019 0.09  m/s Final    LV LATERAL E/E' RATIO 12/16/2019 8.15  m/s Final    LV SEPTAL E/E' RATIO 12/16/2019 11.78  m/s Final    LA WIDTH 12/16/2019 5.08  cm Final    TDI LATERAL 12/16/2019 0.13  m/s Final    LVIDD 12/16/2019 5.23  3.5 - 6.0 cm Final    IVS 12/16/2019 0.97  0.6 - 1.1 cm Final    PW 12/16/2019 0.80  0.6 - 1.1 cm Final    LVIDS 12/16/2019 3.47  2.1 - 4.0 cm Final    FS 12/16/2019 34  28 - 44 % Final    LA volume 12/16/2019 110.38  cm3 Final    Sinus 12/16/2019 2.58  cm Final    STJ 12/16/2019 2.90  cm Final    Ascending aorta 12/16/2019 3.15  cm Final    LV mass 12/16/2019 166.95  g Final    LA size 12/16/2019 4.25  cm Final    RVDD 12/16/2019 3.49  cm Final    TAPSE 12/16/2019 3.02  cm Final    Left Ventricle Relative Wall Thick* 12/16/2019 0.31  cm Final    AV mean gradient 12/16/2019 6  mmHg Final    AV valve area 12/16/2019 2.53  cm2 Final    AV Velocity Ratio 12/16/2019 0.76   Final    AV index (prosthetic) 12/16/2019 0.68   Final    E/A ratio 12/16/2019 1.06   Final    Mean e' 12/16/2019 0.11  m/s Final    E wave decelartion time 12/16/2019 236.84  msec Final    IVRT 12/16/2019 0.07  msec Final    Pulm vein S/D ratio 12/16/2019 1.31   Final    LVOT diameter 12/16/2019 2.18  cm Final    LVOT area 12/16/2019 3.7  cm2 Final    LVOT peak anthony  12/16/2019 1.26  m/s Final    LVOT peak VTI 12/16/2019 26.23  cm Final    Ao peak bhupendra 12/16/2019 1.65  m/s Final    Ao VTI 12/16/2019 38.68  cm Final    LVOT stroke volume 12/16/2019 97.85  cm3 Final    AV peak gradient 12/16/2019 11  mmHg Final    E/E' ratio 12/16/2019 9.64  m/s Final    MV Peak E Bhupendra 12/16/2019 1.06  m/s Final    TR Max Bhupendra 12/16/2019 2.70  m/s Final    MV Peak A Bhupendra 12/16/2019 1.00  m/s Final    PV Peak S Bhupendra 12/16/2019 0.55  m/s Final    PV Peak D Bhupendra 12/16/2019 0.42  m/s Final    LV Systolic Volume 12/16/2019 49.82  mL Final    LV Systolic Volume Index 12/16/2019 29.3  mL/m2 Final    LV Diastolic Volume 12/16/2019 131.23  mL Final    LV Diastolic Volume Index 12/16/2019 77.14  mL/m2 Final    LA Volume Index 12/16/2019 64.9  mL/m2 Final    LV Mass Index 12/16/2019 98  g/m2 Final    RA Major Axis 12/16/2019 5.50  cm Final    Left Atrium Minor Axis 12/16/2019 5.99  cm Final    Left Atrium Major Axis 12/16/2019 6.04  cm Final    Triscuspid Valve Regurgitation Pea* 12/16/2019 29  mmHg Final    RA Width 12/16/2019 4.80  cm Final    Right Atrial Pressure (from IVC) 12/16/2019 3  mmHg Final    TV rest pulmonary artery pressure 12/16/2019 32  mmHg Final   Lab Visit on 12/16/2019   Component Date Value Ref Range Status    WBC 12/16/2019 8.61  3.90 - 12.70 K/uL Final    RBC 12/16/2019 4.39  4.00 - 5.40 M/uL Final    Hemoglobin 12/16/2019 9.4* 12.0 - 16.0 g/dL Final    Hematocrit 12/16/2019 33.7* 37.0 - 48.5 % Final    Mean Corpuscular Volume 12/16/2019 77* 82 - 98 fL Final    Mean Corpuscular Hemoglobin 12/16/2019 21.4* 27.0 - 31.0 pg Final    Mean Corpuscular Hemoglobin Conc 12/16/2019 27.9* 32.0 - 36.0 g/dL Final    RDW 12/16/2019 19.4* 11.5 - 14.5 % Final    Platelets 12/16/2019 396* 150 - 350 K/uL Final    MPV 12/16/2019 11.0  9.2 - 12.9 fL Final    Immature Granulocytes 12/16/2019 0.5  0.0 - 0.5 % Final    Gran # (ANC) 12/16/2019 5.8  1.8 - 7.7 K/uL Final     Immature Grans (Abs) 12/16/2019 0.04  0.00 - 0.04 K/uL Final    Comment: Mild elevation in immature granulocytes is non specific and   can be seen in a variety of conditions including stress response,   acute inflammation, trauma and pregnancy. Correlation with other   laboratory and clinical findings is essential.      Lymph # 12/16/2019 2.1  1.0 - 4.8 K/uL Final    Mono # 12/16/2019 0.4  0.3 - 1.0 K/uL Final    Eos # 12/16/2019 0.2  0.0 - 0.5 K/uL Final    Baso # 12/16/2019 0.11  0.00 - 0.20 K/uL Final    nRBC 12/16/2019 0  0 /100 WBC Final    Gran% 12/16/2019 67.6  38.0 - 73.0 % Final    Lymph% 12/16/2019 24.4  18.0 - 48.0 % Final    Mono% 12/16/2019 4.5  4.0 - 15.0 % Final    Eosinophil% 12/16/2019 1.7  0.0 - 8.0 % Final    Basophil% 12/16/2019 1.3  0.0 - 1.9 % Final    Differential Method 12/16/2019 Automated   Final    Sodium 12/16/2019 137  136 - 145 mmol/L Final    Potassium 12/16/2019 4.4  3.5 - 5.1 mmol/L Final    Chloride 12/16/2019 109  95 - 110 mmol/L Final    CO2 12/16/2019 19* 23 - 29 mmol/L Final    Glucose 12/16/2019 93  70 - 110 mg/dL Final    BUN, Bld 12/16/2019 14  8 - 23 mg/dL Final    Creatinine 12/16/2019 0.8  0.5 - 1.4 mg/dL Final    Calcium 12/16/2019 10.6* 8.7 - 10.5 mg/dL Final    Total Protein 12/16/2019 7.6  6.0 - 8.4 g/dL Final    Albumin 12/16/2019 3.5  3.5 - 5.2 g/dL Final    Total Bilirubin 12/16/2019 0.7  0.1 - 1.0 mg/dL Final    Comment: For infants and newborns, interpretation of results should be based  on gestational age, weight and in agreement with clinical  observations.  Premature Infant recommended reference ranges:  Up to 24 hours.............<8.0 mg/dL  Up to 48 hours............<12.0 mg/dL  3-5 days..................<15.0 mg/dL  6-29 days.................<15.0 mg/dL      Alkaline Phosphatase 12/16/2019 110  55 - 135 U/L Final    AST 12/16/2019 13  10 - 40 U/L Final    ALT 12/16/2019 7* 10 - 44 U/L Final    Anion Gap 12/16/2019 9  8 - 16 mmol/L  Final    eGFR if African American 12/16/2019 >60.0  >60 mL/min/1.73 m^2 Final    eGFR if non African American 12/16/2019 >60.0  >60 mL/min/1.73 m^2 Final    Comment: Calculation used to obtain the estimated glomerular filtration  rate (eGFR) is the CKD-EPI equation.       Magnesium 12/16/2019 2.0  1.6 - 2.6 mg/dL Final    Phosphorus 12/16/2019 2.7  2.7 - 4.5 mg/dL Final    Ferritin 12/16/2019 13* 20.0 - 300.0 ng/mL Final    Iron 12/16/2019 19* 30 - 160 ug/dL Final    Transferrin 12/16/2019 236  200 - 375 mg/dL Final    TIBC 12/16/2019 349  250 - 450 ug/dL Final    Saturated Iron 12/16/2019 5* 20 - 50 % Final   Hospital Outpatient Visit on 12/16/2019   Component Date Value Ref Range Status    POCT Glucose 12/16/2019 86  70 - 110 mg/dL Final         Imaging:    Assessment:       1. Primary intra-abdominal sarcoma           Plan:     Low Grade Sarcoma  - ECOG PS 1  - 20 cm mass noted in CT abdomen/pelvis; L ureter noted to be have left sided hydronephresis and noted to have some mild inguinal adneopathy  - path reviewed at our institution also consistent with sarcoma as well  - Reviewed images and discussed with Dr. Mota previously, pt is not a surgical candidate at this time as significant amount of central vasculature (aorta, iliac arteries/veins) involved and significant amount of vascularity to mass  - PET-CT reveals a large anterior abdominal mass, low activity sarcoma without significant metastatic spread, SUV max of 4.7   - Echo with EF of 60%, normal LV systolic function and indeterminate diastolic function  - tumor causing pain in central abdomen as well as compression of L ureter and causing hydronephrosis  - Inpatient AIM C1 completed  (adrimaycin, ifosfomide, mesna) next week given symptoms above, discussed palliative intent of therapy  - went over risks/benefits/ side effects of chemotherapy and pt wishes to proceed with therapy, consent form signed and form submitted to be scanned into  system  - Ifosfomide dropped on day 3 due to neurotoxicity  - PET- CT on 4/22 results reviewed, suv max decreased to 3.9 for abdominal mass and necrosis of part of mass noted  - completed two chemo tx with doxorubicin, first round with ifosofomide as well   - PET-CT on 5/20 with  Large anterior abdominal mass SUV 3 (previously 3.9), left lower lobe pulmonary lesion (prior SUV of 2.3, now 2.5)  - discussed switching to pazopanib due to ease of administration and good option in control of disease; pt has done this chemotherapy since end of June 2019  - PET-CT from 9/16 with large abd mass (now with SUV max of 3, from 2.76) and soft tissue opacity in left lower lobe (SUV of 1.7 from 2.52)  - PET-CT from  Today, 12/16 with mass of SUV max 3.62 (slightly higher) and uptake of mass in LLL, noted to be less at 1.39 from 1.7   - Sent message to pharmacy about continuing with refill of pazopanib for 2020 application  - continue to follow-up locally with Dr. Francois    Iron deficiency anemia  - Hemoglobin of 9.4, MCV of 77  - ferritin of 13, discussed getting 2 rounds of injectafer locally       BCC, R inferior neck  - lesion was biopsied, 6/10  - was supposed to follow-up but weather problems occurred around time of initial follow-up     Pain secondary to neoplasm  - continue MS Contin 30 TID     Hypercalcemia of malignancy  - mild, stable around recent corrected baseline of 11     Severe L sided hydronephrosis   - from external compression of large sarcoma  - left kidney very atrophic and not salvageable per urology     Neuropathy, possible L sided sciatica  - pt with lower back pain around site of tumor  - continue gabapentin 300 mg BID  - prior MRI lumbar spine in 10/2019 without any cord compression      HTN  - continue norvasc 5     SVT  - unclear of rhythm  - no AFib noted while pt was admitted into hospital     IBS  - continue laxatives PRN - can use senna + miralax, while on pain meds     Discussed with   [place]     Follow-up:   Follow-up in three months with CBC, CMP  and PET-CT on morning of next visit  Continue pazopanib given stable disease  Continue to follow-up with Dr. Francois's office and arrange for IV iron there       Fernando Silvestre MD  Hematology and Oncology Fellow, PG V  [facility]

## 2019-12-20 ENCOUNTER — TELEPHONE (OUTPATIENT)
Dept: PHARMACY | Facility: CLINIC | Age: 64
End: 2019-12-20

## 2019-12-20 NOTE — TELEPHONE ENCOUNTER
DOCUMENTATION ONLY:  Prior authorization for Votrient 200 mg Tablet #120/30 approved from 12/19/2019 to 06/19/2020  Case ID: [id number]    Co-pay: $2,258.64    Patient Assistance IS required, we are re-enrolling the patient in the assistance program - [facility]     Father phoned in stating son has reported that when he \"runs fast sometimes my chest hurts\", for about 2-3 days.  Son reports this happens \"sometimes\", but not always when he runs fast. Father has not witnessed this and son only told father today is hap

## 2019-12-23 NOTE — TELEPHONE ENCOUNTER
Votrient is approved by the patients insurance with a high copay. We will be assisting the patient with her 2020 assistance renewal application for the  patient assistance program. Sending a staff message to Dr Silvestre regarding assistance renewal application and faxing application prescription for his review and signature.

## 2019-12-31 ENCOUNTER — TELEPHONE (OUTPATIENT)
Dept: HEMATOLOGY/ONCOLOGY | Facility: CLINIC | Age: 64
End: 2019-12-31

## 2019-12-31 DIAGNOSIS — C49.4 PRIMARY INTRA-ABDOMINAL SARCOMA: Primary | ICD-10-CM

## 2019-12-31 RX ORDER — OXYCODONE AND ACETAMINOPHEN 10; 325 MG/1; MG/1
1 TABLET ORAL EVERY 6 HOURS PRN
Qty: 120 TABLET | Refills: 0 | Status: CANCELLED | OUTPATIENT
Start: 2019-12-31 | End: 2020-01-01

## 2019-12-31 RX ORDER — OXYCODONE AND ACETAMINOPHEN 10; 325 MG/1; MG/1
1 TABLET ORAL EVERY 6 HOURS PRN
Qty: 120 TABLET | Refills: 0 | OUTPATIENT
Start: 2019-12-31 | End: 2020-01-01

## 2019-12-31 RX ORDER — OXYCODONE AND ACETAMINOPHEN 10; 325 MG/1; MG/1
1 TABLET ORAL EVERY 6 HOURS PRN
Qty: 120 TABLET | Refills: 0 | Status: SHIPPED | OUTPATIENT
Start: 2019-12-31 | End: 2020-02-03 | Stop reason: SDUPTHER

## 2020-01-01 ENCOUNTER — PATIENT MESSAGE (OUTPATIENT)
Dept: HEMATOLOGY/ONCOLOGY | Facility: CLINIC | Age: 65
End: 2020-01-01

## 2020-01-01 ENCOUNTER — TELEPHONE (OUTPATIENT)
Dept: HEMATOLOGY/ONCOLOGY | Facility: CLINIC | Age: 65
End: 2020-01-01

## 2020-01-01 ENCOUNTER — INFUSION (OUTPATIENT)
Dept: INFUSION THERAPY | Facility: HOSPITAL | Age: 65
End: 2020-01-01
Attending: STUDENT IN AN ORGANIZED HEALTH CARE EDUCATION/TRAINING PROGRAM
Payer: MEDICARE

## 2020-01-01 ENCOUNTER — HOSPITAL ENCOUNTER (OUTPATIENT)
Dept: RADIOLOGY | Facility: HOSPITAL | Age: 65
Discharge: HOME | End: 2020-08-03
Attending: STUDENT IN AN ORGANIZED HEALTH CARE EDUCATION/TRAINING PROGRAM
Payer: MEDICARE

## 2020-01-01 ENCOUNTER — OFFICE VISIT (OUTPATIENT)
Dept: HEMATOLOGY/ONCOLOGY | Facility: CLINIC | Age: 65
End: 2020-01-01
Payer: MEDICARE

## 2020-01-01 ENCOUNTER — DOCUMENTATION ONLY (OUTPATIENT)
Dept: HEMATOLOGY/ONCOLOGY | Facility: CLINIC | Age: 65
End: 2020-01-01

## 2020-01-01 ENCOUNTER — TELEPHONE (OUTPATIENT)
Dept: CARDIOLOGY | Facility: CLINIC | Age: 65
End: 2020-01-01

## 2020-01-01 ENCOUNTER — LAB VISIT (OUTPATIENT)
Dept: LAB | Facility: HOSPITAL | Age: 65
End: 2020-01-01
Payer: MEDICARE

## 2020-01-01 ENCOUNTER — INFUSION (OUTPATIENT)
Dept: INFUSION THERAPY | Facility: HOSPITAL | Age: 65
End: 2020-01-01
Attending: INTERNAL MEDICINE
Payer: MEDICARE

## 2020-01-01 ENCOUNTER — HOSPITAL ENCOUNTER (OUTPATIENT)
Dept: RADIOLOGY | Facility: HOSPITAL | Age: 65
Discharge: HOME | End: 2020-11-16
Attending: STUDENT IN AN ORGANIZED HEALTH CARE EDUCATION/TRAINING PROGRAM
Payer: MEDICARE

## 2020-01-01 ENCOUNTER — LAB VISIT (OUTPATIENT)
Dept: LAB | Facility: HOSPITAL | Age: 65
End: 2020-01-01
Attending: STUDENT IN AN ORGANIZED HEALTH CARE EDUCATION/TRAINING PROGRAM
Payer: MEDICARE

## 2020-01-01 ENCOUNTER — CLINICAL SUPPORT (OUTPATIENT)
Dept: HEMATOLOGY/ONCOLOGY | Facility: CLINIC | Age: 65
End: 2020-01-01
Payer: MEDICARE

## 2020-01-01 ENCOUNTER — OFFICE VISIT (OUTPATIENT)
Dept: FAMILY MEDICINE | Facility: CLINIC | Age: 65
End: 2020-01-01
Payer: MEDICARE

## 2020-01-01 ENCOUNTER — HOSPITAL ENCOUNTER (OUTPATIENT)
Dept: RADIOLOGY | Facility: HOSPITAL | Age: 65
Discharge: HOME | End: 2020-08-31
Attending: STUDENT IN AN ORGANIZED HEALTH CARE EDUCATION/TRAINING PROGRAM
Payer: MEDICARE

## 2020-01-01 VITALS
TEMPERATURE: 98 F | RESPIRATION RATE: 18 BRPM | DIASTOLIC BLOOD PRESSURE: 61 MMHG | SYSTOLIC BLOOD PRESSURE: 135 MMHG | BODY MASS INDEX: 27.28 KG/M2 | SYSTOLIC BLOOD PRESSURE: 139 MMHG | HEIGHT: 64 IN | HEART RATE: 75 BPM | OXYGEN SATURATION: 98 % | WEIGHT: 160.25 LBS | DIASTOLIC BLOOD PRESSURE: 65 MMHG | HEART RATE: 75 BPM | WEIGHT: 157.63 LBS | RESPIRATION RATE: 18 BRPM | RESPIRATION RATE: 18 BRPM | DIASTOLIC BLOOD PRESSURE: 65 MMHG | SYSTOLIC BLOOD PRESSURE: 142 MMHG | HEIGHT: 64 IN | DIASTOLIC BLOOD PRESSURE: 65 MMHG | HEART RATE: 82 BPM | BODY MASS INDEX: 27.36 KG/M2 | WEIGHT: 159.81 LBS | OXYGEN SATURATION: 96 % | TEMPERATURE: 98 F | SYSTOLIC BLOOD PRESSURE: 139 MMHG | BODY MASS INDEX: 26.91 KG/M2 | HEIGHT: 64 IN | TEMPERATURE: 98 F | HEART RATE: 83 BPM

## 2020-01-01 VITALS
HEART RATE: 104 BPM | HEIGHT: 64 IN | BODY MASS INDEX: 26.76 KG/M2 | WEIGHT: 156.75 LBS | SYSTOLIC BLOOD PRESSURE: 133 MMHG | TEMPERATURE: 99 F | DIASTOLIC BLOOD PRESSURE: 60 MMHG

## 2020-01-01 VITALS
TEMPERATURE: 97 F | WEIGHT: 147.25 LBS | DIASTOLIC BLOOD PRESSURE: 60 MMHG | HEIGHT: 64 IN | RESPIRATION RATE: 18 BRPM | BODY MASS INDEX: 25.14 KG/M2 | HEART RATE: 88 BPM | SYSTOLIC BLOOD PRESSURE: 135 MMHG

## 2020-01-01 VITALS
BODY MASS INDEX: 26.63 KG/M2 | RESPIRATION RATE: 18 BRPM | WEIGHT: 156 LBS | HEART RATE: 111 BPM | DIASTOLIC BLOOD PRESSURE: 64 MMHG | SYSTOLIC BLOOD PRESSURE: 118 MMHG | TEMPERATURE: 98 F | HEIGHT: 64 IN | OXYGEN SATURATION: 98 %

## 2020-01-01 VITALS
TEMPERATURE: 98 F | OXYGEN SATURATION: 99 % | SYSTOLIC BLOOD PRESSURE: 125 MMHG | HEART RATE: 73 BPM | WEIGHT: 155.63 LBS | BODY MASS INDEX: 26.57 KG/M2 | DIASTOLIC BLOOD PRESSURE: 59 MMHG | RESPIRATION RATE: 20 BRPM | HEIGHT: 64 IN

## 2020-01-01 VITALS
OXYGEN SATURATION: 98 % | SYSTOLIC BLOOD PRESSURE: 132 MMHG | HEART RATE: 88 BPM | WEIGHT: 159.38 LBS | DIASTOLIC BLOOD PRESSURE: 56 MMHG | BODY MASS INDEX: 27.21 KG/M2 | HEIGHT: 64 IN | RESPIRATION RATE: 18 BRPM

## 2020-01-01 VITALS
SYSTOLIC BLOOD PRESSURE: 130 MMHG | DIASTOLIC BLOOD PRESSURE: 60 MMHG | RESPIRATION RATE: 18 BRPM | TEMPERATURE: 98 F | HEART RATE: 81 BPM

## 2020-01-01 VITALS
DIASTOLIC BLOOD PRESSURE: 60 MMHG | BODY MASS INDEX: 25.93 KG/M2 | RESPIRATION RATE: 18 BRPM | HEIGHT: 65 IN | HEART RATE: 78 BPM | WEIGHT: 155.63 LBS | SYSTOLIC BLOOD PRESSURE: 129 MMHG | TEMPERATURE: 98 F

## 2020-01-01 VITALS
HEIGHT: 64 IN | HEART RATE: 76 BPM | DIASTOLIC BLOOD PRESSURE: 63 MMHG | RESPIRATION RATE: 18 BRPM | RESPIRATION RATE: 18 BRPM | DIASTOLIC BLOOD PRESSURE: 74 MMHG | SYSTOLIC BLOOD PRESSURE: 140 MMHG | HEART RATE: 104 BPM | TEMPERATURE: 98 F | SYSTOLIC BLOOD PRESSURE: 134 MMHG | WEIGHT: 155.19 LBS | TEMPERATURE: 98 F | BODY MASS INDEX: 26.49 KG/M2

## 2020-01-01 VITALS
TEMPERATURE: 98 F | BODY MASS INDEX: 25.7 KG/M2 | HEIGHT: 64 IN | WEIGHT: 150.56 LBS | DIASTOLIC BLOOD PRESSURE: 60 MMHG | SYSTOLIC BLOOD PRESSURE: 128 MMHG | HEART RATE: 92 BPM

## 2020-01-01 VITALS
RESPIRATION RATE: 16 BRPM | TEMPERATURE: 97 F | SYSTOLIC BLOOD PRESSURE: 96 MMHG | BODY MASS INDEX: 24.81 KG/M2 | WEIGHT: 145.31 LBS | OXYGEN SATURATION: 99 % | HEIGHT: 64 IN | DIASTOLIC BLOOD PRESSURE: 51 MMHG | HEART RATE: 75 BPM

## 2020-01-01 VITALS
WEIGHT: 147.25 LBS | RESPIRATION RATE: 16 BRPM | HEART RATE: 77 BPM | SYSTOLIC BLOOD PRESSURE: 115 MMHG | HEIGHT: 64 IN | TEMPERATURE: 98 F | BODY MASS INDEX: 25.14 KG/M2 | DIASTOLIC BLOOD PRESSURE: 55 MMHG

## 2020-01-01 VITALS
RESPIRATION RATE: 18 BRPM | HEART RATE: 88 BPM | BODY MASS INDEX: 26.91 KG/M2 | DIASTOLIC BLOOD PRESSURE: 70 MMHG | TEMPERATURE: 98 F | WEIGHT: 157.63 LBS | SYSTOLIC BLOOD PRESSURE: 157 MMHG | HEIGHT: 64 IN

## 2020-01-01 VITALS
HEIGHT: 64 IN | OXYGEN SATURATION: 95 % | BODY MASS INDEX: 24.81 KG/M2 | RESPIRATION RATE: 16 BRPM | HEART RATE: 92 BPM | WEIGHT: 145.31 LBS | TEMPERATURE: 98 F | SYSTOLIC BLOOD PRESSURE: 103 MMHG | DIASTOLIC BLOOD PRESSURE: 50 MMHG

## 2020-01-01 DIAGNOSIS — D50.9 IRON DEFICIENCY ANEMIA, UNSPECIFIED IRON DEFICIENCY ANEMIA TYPE: Primary | ICD-10-CM

## 2020-01-01 DIAGNOSIS — C49.4 PRIMARY INTRA-ABDOMINAL SARCOMA: Primary | ICD-10-CM

## 2020-01-01 DIAGNOSIS — I82.492 ACUTE EMBOLISM AND THROMBOSIS OF OTHER SPECIFIED DEEP VEIN OF LEFT LOWER EXTREMITY: ICD-10-CM

## 2020-01-01 DIAGNOSIS — Z03.818 ENCOUNTER FOR OBSERVATION FOR SUSPECTED EXPOSURE TO OTHER BIOLOGICAL AGENTS RULED OUT: ICD-10-CM

## 2020-01-01 DIAGNOSIS — C49.4 PRIMARY INTRA-ABDOMINAL SARCOMA: ICD-10-CM

## 2020-01-01 DIAGNOSIS — G62.9 NEUROPATHY: ICD-10-CM

## 2020-01-01 DIAGNOSIS — F51.04 PSYCHOPHYSIOLOGICAL INSOMNIA: ICD-10-CM

## 2020-01-01 DIAGNOSIS — R11.2 NAUSEA AND VOMITING, INTRACTABILITY OF VOMITING NOT SPECIFIED, UNSPECIFIED VOMITING TYPE: ICD-10-CM

## 2020-01-01 DIAGNOSIS — F51.04 PSYCHOPHYSIOLOGICAL INSOMNIA: Primary | ICD-10-CM

## 2020-01-01 DIAGNOSIS — I89.0 LYMPHEDEMA: ICD-10-CM

## 2020-01-01 DIAGNOSIS — Z13.9 SCREENING FOR CONDITION: Primary | ICD-10-CM

## 2020-01-01 DIAGNOSIS — R60.0 BILATERAL LOWER EXTREMITY EDEMA: ICD-10-CM

## 2020-01-01 DIAGNOSIS — E03.2 HYPOTHYROIDISM DUE TO MEDICATION: ICD-10-CM

## 2020-01-01 DIAGNOSIS — R11.2 NAUSEA AND VOMITING, INTRACTABILITY OF VOMITING NOT SPECIFIED, UNSPECIFIED VOMITING TYPE: Primary | ICD-10-CM

## 2020-01-01 DIAGNOSIS — R26.9 ABNORMAL GAIT: Primary | ICD-10-CM

## 2020-01-01 LAB
ABO + RH BLD: NORMAL
ABO + RH BLD: NORMAL
ALBUMIN SERPL BCP-MCNC: 3 G/DL (ref 3.5–5.2)
ALBUMIN SERPL BCP-MCNC: 3.2 G/DL (ref 3.5–5.2)
ALP SERPL-CCNC: 121 U/L (ref 55–135)
ALP SERPL-CCNC: 122 U/L (ref 55–135)
ALT SERPL W/O P-5'-P-CCNC: 8 U/L (ref 10–44)
ALT SERPL W/O P-5'-P-CCNC: <5 U/L (ref 10–44)
ANION GAP SERPL CALC-SCNC: 6 MMOL/L (ref 8–16)
ANION GAP SERPL CALC-SCNC: 8 MMOL/L (ref 8–16)
AST SERPL-CCNC: 10 U/L (ref 10–40)
AST SERPL-CCNC: 19 U/L (ref 10–40)
BASOPHILS # BLD AUTO: 0.09 K/UL (ref 0–0.2)
BASOPHILS # BLD AUTO: 0.13 K/UL (ref 0–0.2)
BASOPHILS NFR BLD: 1 % (ref 0–1.9)
BASOPHILS NFR BLD: 1.7 % (ref 0–1.9)
BILIRUB SERPL-MCNC: 0.6 MG/DL (ref 0.1–1)
BILIRUB SERPL-MCNC: 0.8 MG/DL (ref 0.1–1)
BLD GP AB SCN CELLS X3 SERPL QL: NORMAL
BLD GP AB SCN CELLS X3 SERPL QL: NORMAL
BLD PROD TYP BPU: NORMAL
BLOOD UNIT EXPIRATION DATE: NORMAL
BLOOD UNIT TYPE CODE: 5100
BLOOD UNIT TYPE: NORMAL
BUN SERPL-MCNC: 12 MG/DL (ref 8–23)
BUN SERPL-MCNC: 14 MG/DL (ref 8–23)
CALCIUM SERPL-MCNC: 9.7 MG/DL (ref 8.7–10.5)
CALCIUM SERPL-MCNC: 9.9 MG/DL (ref 8.7–10.5)
CHLORIDE SERPL-SCNC: 105 MMOL/L (ref 95–110)
CHLORIDE SERPL-SCNC: 107 MMOL/L (ref 95–110)
CO2 SERPL-SCNC: 22 MMOL/L (ref 23–29)
CO2 SERPL-SCNC: 23 MMOL/L (ref 23–29)
CODING SYSTEM: NORMAL
CREAT SERPL-MCNC: 0.6 MG/DL (ref 0.5–1.4)
CREAT SERPL-MCNC: 0.7 MG/DL (ref 0.5–1.4)
DIFFERENTIAL METHOD: ABNORMAL
DIFFERENTIAL METHOD: ABNORMAL
DISPENSE STATUS: NORMAL
EOSINOPHIL # BLD AUTO: 0.1 K/UL (ref 0–0.5)
EOSINOPHIL # BLD AUTO: 0.5 K/UL (ref 0–0.5)
EOSINOPHIL NFR BLD: 1.5 % (ref 0–8)
EOSINOPHIL NFR BLD: 5.8 % (ref 0–8)
ERYTHROCYTE [DISTWIDTH] IN BLOOD BY AUTOMATED COUNT: 21.5 % (ref 11.5–14.5)
ERYTHROCYTE [DISTWIDTH] IN BLOOD BY AUTOMATED COUNT: 24.3 % (ref 11.5–14.5)
EST. GFR  (AFRICAN AMERICAN): >60 ML/MIN/1.73 M^2
EST. GFR  (AFRICAN AMERICAN): >60 ML/MIN/1.73 M^2
EST. GFR  (NON AFRICAN AMERICAN): >60 ML/MIN/1.73 M^2
EST. GFR  (NON AFRICAN AMERICAN): >60 ML/MIN/1.73 M^2
GLUCOSE SERPL-MCNC: 83 MG/DL (ref 70–110)
GLUCOSE SERPL-MCNC: 96 MG/DL (ref 70–110)
HCT VFR BLD AUTO: 28.7 % (ref 37–48.5)
HCT VFR BLD AUTO: 33.8 % (ref 37–48.5)
HGB BLD-MCNC: 10 G/DL (ref 12–16)
HGB BLD-MCNC: 7.8 G/DL (ref 12–16)
IMM GRANULOCYTES # BLD AUTO: 0.03 K/UL (ref 0–0.04)
IMM GRANULOCYTES # BLD AUTO: 0.05 K/UL (ref 0–0.04)
IMM GRANULOCYTES NFR BLD AUTO: 0.4 % (ref 0–0.5)
IMM GRANULOCYTES NFR BLD AUTO: 0.5 % (ref 0–0.5)
LYMPHOCYTES # BLD AUTO: 1.5 K/UL (ref 1–4.8)
LYMPHOCYTES # BLD AUTO: 1.8 K/UL (ref 1–4.8)
LYMPHOCYTES NFR BLD: 16.6 % (ref 18–48)
LYMPHOCYTES NFR BLD: 23 % (ref 18–48)
MCH RBC QN AUTO: 20.3 PG (ref 27–31)
MCH RBC QN AUTO: 27.6 PG (ref 27–31)
MCHC RBC AUTO-ENTMCNC: 27.2 G/DL (ref 32–36)
MCHC RBC AUTO-ENTMCNC: 29.6 G/DL (ref 32–36)
MCV RBC AUTO: 75 FL (ref 82–98)
MCV RBC AUTO: 93 FL (ref 82–98)
MONOCYTES # BLD AUTO: 0.7 K/UL (ref 0.3–1)
MONOCYTES # BLD AUTO: 0.9 K/UL (ref 0.3–1)
MONOCYTES NFR BLD: 10.2 % (ref 4–15)
MONOCYTES NFR BLD: 9.3 % (ref 4–15)
NEUTROPHILS # BLD AUTO: 4.7 K/UL (ref 1.8–7.7)
NEUTROPHILS # BLD AUTO: 6.5 K/UL (ref 1.8–7.7)
NEUTROPHILS NFR BLD: 59.8 % (ref 38–73)
NEUTROPHILS NFR BLD: 70.2 % (ref 38–73)
NRBC BLD-RTO: 0 /100 WBC
NRBC BLD-RTO: 0 /100 WBC
NUM UNITS TRANS PACKED RBC: NORMAL
PLATELET # BLD AUTO: 291 K/UL (ref 150–350)
PLATELET # BLD AUTO: 562 K/UL (ref 150–350)
PMV BLD AUTO: 10.5 FL (ref 9.2–12.9)
PMV BLD AUTO: 10.8 FL (ref 9.2–12.9)
POCT GLUCOSE: 104 MG/DL (ref 70–110)
POTASSIUM SERPL-SCNC: 3.8 MMOL/L (ref 3.5–5.1)
POTASSIUM SERPL-SCNC: 4.2 MMOL/L (ref 3.5–5.1)
PROT SERPL-MCNC: 6.7 G/DL (ref 6–8.4)
PROT SERPL-MCNC: 7 G/DL (ref 6–8.4)
RBC # BLD AUTO: 3.62 M/UL (ref 4–5.4)
RBC # BLD AUTO: 3.84 M/UL (ref 4–5.4)
SARS-COV-2 RDRP RESP QL NAA+PROBE: NEGATIVE
SODIUM SERPL-SCNC: 135 MMOL/L (ref 136–145)
SODIUM SERPL-SCNC: 136 MMOL/L (ref 136–145)
WBC # BLD AUTO: 7.87 K/UL (ref 3.9–12.7)
WBC # BLD AUTO: 9.25 K/UL (ref 3.9–12.7)

## 2020-01-01 PROCEDURE — 1159F MED LIST DOCD IN RCRD: CPT | Mod: GC,S$GLB,, | Performed by: STUDENT IN AN ORGANIZED HEALTH CARE EDUCATION/TRAINING PROGRAM

## 2020-01-01 PROCEDURE — 3077F PR MOST RECENT SYSTOLIC BLOOD PRESSURE >= 140 MM HG: ICD-10-PCS | Mod: CPTII,GC,S$GLB, | Performed by: STUDENT IN AN ORGANIZED HEALTH CARE EDUCATION/TRAINING PROGRAM

## 2020-01-01 PROCEDURE — 25500020 PHARM REV CODE 255: Performed by: STUDENT IN AN ORGANIZED HEALTH CARE EDUCATION/TRAINING PROGRAM

## 2020-01-01 PROCEDURE — 99999 PR PBB SHADOW E&M-EST. PATIENT-LVL III: CPT | Mod: PBBFAC,GC,, | Performed by: STUDENT IN AN ORGANIZED HEALTH CARE EDUCATION/TRAINING PROGRAM

## 2020-01-01 PROCEDURE — 3078F PR MOST RECENT DIASTOLIC BLOOD PRESSURE < 80 MM HG: ICD-10-PCS | Mod: CPTII,GC,S$GLB, | Performed by: STUDENT IN AN ORGANIZED HEALTH CARE EDUCATION/TRAINING PROGRAM

## 2020-01-01 PROCEDURE — 1101F PT FALLS ASSESS-DOCD LE1/YR: CPT | Mod: CPTII,GC,S$GLB, | Performed by: STUDENT IN AN ORGANIZED HEALTH CARE EDUCATION/TRAINING PROGRAM

## 2020-01-01 PROCEDURE — 74178 CT ABD&PLV WO CNTR FLWD CNTR: CPT | Mod: TC

## 2020-01-01 PROCEDURE — 3008F PR BODY MASS INDEX (BMI) DOCUMENTED: ICD-10-PCS | Mod: CPTII,GC,S$GLB, | Performed by: STUDENT IN AN ORGANIZED HEALTH CARE EDUCATION/TRAINING PROGRAM

## 2020-01-01 PROCEDURE — 1126F PR PAIN SEVERITY QUANTIFIED, NO PAIN PRESENT: ICD-10-PCS | Mod: GC,S$GLB,, | Performed by: STUDENT IN AN ORGANIZED HEALTH CARE EDUCATION/TRAINING PROGRAM

## 2020-01-01 PROCEDURE — 1125F AMNT PAIN NOTED PAIN PRSNT: CPT | Mod: GC,S$GLB,, | Performed by: STUDENT IN AN ORGANIZED HEALTH CARE EDUCATION/TRAINING PROGRAM

## 2020-01-01 PROCEDURE — 63600175 PHARM REV CODE 636 W HCPCS: Performed by: INTERNAL MEDICINE

## 2020-01-01 PROCEDURE — 99999 PR PBB SHADOW E&M-EST. PATIENT-LVL IV: ICD-10-PCS | Mod: PBBFAC,GC,, | Performed by: STUDENT IN AN ORGANIZED HEALTH CARE EDUCATION/TRAINING PROGRAM

## 2020-01-01 PROCEDURE — 25000003 PHARM REV CODE 250: Performed by: INTERNAL MEDICINE

## 2020-01-01 PROCEDURE — 99215 OFFICE O/P EST HI 40 MIN: CPT | Mod: GC,S$GLB,, | Performed by: STUDENT IN AN ORGANIZED HEALTH CARE EDUCATION/TRAINING PROGRAM

## 2020-01-01 PROCEDURE — 1100F PR PT FALLS ASSESS DOC 2+ FALLS/FALL W/INJURY/YR: ICD-10-PCS | Mod: S$GLB,,, | Performed by: FAMILY MEDICINE

## 2020-01-01 PROCEDURE — 96417 CHEMO IV INFUS EACH ADDL SEQ: CPT

## 2020-01-01 PROCEDURE — 3074F SYST BP LT 130 MM HG: CPT | Mod: CPTII,GC,S$GLB, | Performed by: STUDENT IN AN ORGANIZED HEALTH CARE EDUCATION/TRAINING PROGRAM

## 2020-01-01 PROCEDURE — 1101F PR PT FALLS ASSESS DOC 0-1 FALLS W/OUT INJ PAST YR: ICD-10-PCS | Mod: CPTII,GC,S$GLB, | Performed by: STUDENT IN AN ORGANIZED HEALTH CARE EDUCATION/TRAINING PROGRAM

## 2020-01-01 PROCEDURE — 3074F PR MOST RECENT SYSTOLIC BLOOD PRESSURE < 130 MM HG: ICD-10-PCS | Mod: CPTII,GC,S$GLB, | Performed by: STUDENT IN AN ORGANIZED HEALTH CARE EDUCATION/TRAINING PROGRAM

## 2020-01-01 PROCEDURE — 96375 TX/PRO/DX INJ NEW DRUG ADDON: CPT

## 2020-01-01 PROCEDURE — 99999 PR PBB SHADOW E&M-EST. PATIENT-LVL IV: CPT | Mod: PBBFAC,GC,, | Performed by: STUDENT IN AN ORGANIZED HEALTH CARE EDUCATION/TRAINING PROGRAM

## 2020-01-01 PROCEDURE — 3075F SYST BP GE 130 - 139MM HG: CPT | Mod: CPTII,GC,S$GLB, | Performed by: STUDENT IN AN ORGANIZED HEALTH CARE EDUCATION/TRAINING PROGRAM

## 2020-01-01 PROCEDURE — 1125F PR PAIN SEVERITY QUANTIFIED, PAIN PRESENT: ICD-10-PCS | Mod: GC,S$GLB,, | Performed by: STUDENT IN AN ORGANIZED HEALTH CARE EDUCATION/TRAINING PROGRAM

## 2020-01-01 PROCEDURE — 3288F PR FALLS RISK ASSESSMENT DOCUMENTED: ICD-10-PCS | Mod: S$GLB,,, | Performed by: FAMILY MEDICINE

## 2020-01-01 PROCEDURE — 3078F DIAST BP <80 MM HG: CPT | Mod: CPTII,GC,S$GLB, | Performed by: STUDENT IN AN ORGANIZED HEALTH CARE EDUCATION/TRAINING PROGRAM

## 2020-01-01 PROCEDURE — 63600175 PHARM REV CODE 636 W HCPCS: Mod: JG | Performed by: INTERNAL MEDICINE

## 2020-01-01 PROCEDURE — 3074F PR MOST RECENT SYSTOLIC BLOOD PRESSURE < 130 MM HG: ICD-10-PCS | Mod: S$GLB,,, | Performed by: FAMILY MEDICINE

## 2020-01-01 PROCEDURE — 1159F PR MEDICATION LIST DOCUMENTED IN MEDICAL RECORD: ICD-10-PCS | Mod: GC,S$GLB,, | Performed by: STUDENT IN AN ORGANIZED HEALTH CARE EDUCATION/TRAINING PROGRAM

## 2020-01-01 PROCEDURE — 96367 TX/PROPH/DG ADDL SEQ IV INF: CPT

## 2020-01-01 PROCEDURE — 99215 PR OFFICE/OUTPT VISIT, EST, LEVL V, 40-54 MIN: ICD-10-PCS | Mod: GC,S$GLB,, | Performed by: STUDENT IN AN ORGANIZED HEALTH CARE EDUCATION/TRAINING PROGRAM

## 2020-01-01 PROCEDURE — 3288F FALL RISK ASSESSMENT DOCD: CPT | Mod: S$GLB,,, | Performed by: FAMILY MEDICINE

## 2020-01-01 PROCEDURE — 96413 CHEMO IV INFUSION 1 HR: CPT

## 2020-01-01 PROCEDURE — 78816 PET IMAGE W/CT FULL BODY: CPT | Mod: TC

## 2020-01-01 PROCEDURE — A4216 STERILE WATER/SALINE, 10 ML: HCPCS | Performed by: INTERNAL MEDICINE

## 2020-01-01 PROCEDURE — P9016 RBC LEUKOCYTES REDUCED: HCPCS

## 2020-01-01 PROCEDURE — 3008F BODY MASS INDEX DOCD: CPT | Mod: CPTII,GC,S$GLB, | Performed by: STUDENT IN AN ORGANIZED HEALTH CARE EDUCATION/TRAINING PROGRAM

## 2020-01-01 PROCEDURE — 96377 APPLICATON ON-BODY INJECTOR: CPT | Mod: 59

## 2020-01-01 PROCEDURE — 1100F PTFALLS ASSESS-DOCD GE2>/YR: CPT | Mod: S$GLB,,, | Performed by: FAMILY MEDICINE

## 2020-01-01 PROCEDURE — 1170F FXNL STATUS ASSESSED: CPT | Mod: S$GLB,,, | Performed by: FAMILY MEDICINE

## 2020-01-01 PROCEDURE — 1126F AMNT PAIN NOTED NONE PRSNT: CPT | Mod: GC,S$GLB,, | Performed by: STUDENT IN AN ORGANIZED HEALTH CARE EDUCATION/TRAINING PROGRAM

## 2020-01-01 PROCEDURE — 3077F SYST BP >= 140 MM HG: CPT | Mod: CPTII,GC,S$GLB, | Performed by: STUDENT IN AN ORGANIZED HEALTH CARE EDUCATION/TRAINING PROGRAM

## 2020-01-01 PROCEDURE — 1170F PR FUNCTIONAL STATUS ASSESSED: ICD-10-PCS | Mod: S$GLB,,, | Performed by: FAMILY MEDICINE

## 2020-01-01 PROCEDURE — 96377 APPLICATON ON-BODY INJECTOR: CPT

## 2020-01-01 PROCEDURE — 80053 COMPREHEN METABOLIC PANEL: CPT

## 2020-01-01 PROCEDURE — 3288F FALL RISK ASSESSMENT DOCD: CPT | Mod: CPTII,GC,S$GLB, | Performed by: STUDENT IN AN ORGANIZED HEALTH CARE EDUCATION/TRAINING PROGRAM

## 2020-01-01 PROCEDURE — 99204 PR OFFICE/OUTPT VISIT, NEW, LEVL IV, 45-59 MIN: ICD-10-PCS | Mod: S$GLB,,, | Performed by: FAMILY MEDICINE

## 2020-01-01 PROCEDURE — 99999 PR PBB SHADOW E&M-EST. PATIENT-LVL III: ICD-10-PCS | Mod: PBBFAC,GC,, | Performed by: STUDENT IN AN ORGANIZED HEALTH CARE EDUCATION/TRAINING PROGRAM

## 2020-01-01 PROCEDURE — 99999 PR PBB SHADOW E&M-EST. PATIENT-LVL V: CPT | Mod: PBBFAC,GC,, | Performed by: STUDENT IN AN ORGANIZED HEALTH CARE EDUCATION/TRAINING PROGRAM

## 2020-01-01 PROCEDURE — 63600175 PHARM REV CODE 636 W HCPCS: Performed by: STUDENT IN AN ORGANIZED HEALTH CARE EDUCATION/TRAINING PROGRAM

## 2020-01-01 PROCEDURE — 36430 TRANSFUSION BLD/BLD COMPNT: CPT

## 2020-01-01 PROCEDURE — 3008F PR BODY MASS INDEX (BMI) DOCUMENTED: ICD-10-PCS | Mod: S$GLB,,, | Performed by: FAMILY MEDICINE

## 2020-01-01 PROCEDURE — 3078F DIAST BP <80 MM HG: CPT | Mod: S$GLB,,, | Performed by: FAMILY MEDICINE

## 2020-01-01 PROCEDURE — 3008F BODY MASS INDEX DOCD: CPT | Mod: S$GLB,,, | Performed by: FAMILY MEDICINE

## 2020-01-01 PROCEDURE — 93971 EXTREMITY STUDY: CPT | Mod: TC,LT

## 2020-01-01 PROCEDURE — 3075F PR MOST RECENT SYSTOLIC BLOOD PRESS GE 130-139MM HG: ICD-10-PCS | Mod: CPTII,GC,S$GLB, | Performed by: STUDENT IN AN ORGANIZED HEALTH CARE EDUCATION/TRAINING PROGRAM

## 2020-01-01 PROCEDURE — 78816 PET IMAGE W/CT FULL BODY: CPT | Mod: 26,PS,GC, | Performed by: RADIOLOGY

## 2020-01-01 PROCEDURE — 86850 RBC ANTIBODY SCREEN: CPT

## 2020-01-01 PROCEDURE — 3078F PR MOST RECENT DIASTOLIC BLOOD PRESSURE < 80 MM HG: ICD-10-PCS | Mod: S$GLB,,, | Performed by: FAMILY MEDICINE

## 2020-01-01 PROCEDURE — 3288F PR FALLS RISK ASSESSMENT DOCUMENTED: ICD-10-PCS | Mod: CPTII,GC,S$GLB, | Performed by: STUDENT IN AN ORGANIZED HEALTH CARE EDUCATION/TRAINING PROGRAM

## 2020-01-01 PROCEDURE — U0002 COVID-19 LAB TEST NON-CDC: HCPCS

## 2020-01-01 PROCEDURE — 1159F PR MEDICATION LIST DOCUMENTED IN MEDICAL RECORD: ICD-10-PCS | Mod: S$GLB,,, | Performed by: FAMILY MEDICINE

## 2020-01-01 PROCEDURE — 86901 BLOOD TYPING SEROLOGIC RH(D): CPT

## 2020-01-01 PROCEDURE — 3074F SYST BP LT 130 MM HG: CPT | Mod: S$GLB,,, | Performed by: FAMILY MEDICINE

## 2020-01-01 PROCEDURE — 25000003 PHARM REV CODE 250: Performed by: STUDENT IN AN ORGANIZED HEALTH CARE EDUCATION/TRAINING PROGRAM

## 2020-01-01 PROCEDURE — 78816 NM PET CT WHOLE BODY: ICD-10-PCS | Mod: 26,PS,GC, | Performed by: RADIOLOGY

## 2020-01-01 PROCEDURE — 36415 COLL VENOUS BLD VENIPUNCTURE: CPT

## 2020-01-01 PROCEDURE — 1159F MED LIST DOCD IN RCRD: CPT | Mod: S$GLB,,, | Performed by: FAMILY MEDICINE

## 2020-01-01 PROCEDURE — 85025 COMPLETE CBC W/AUTO DIFF WBC: CPT

## 2020-01-01 PROCEDURE — 86920 COMPATIBILITY TEST SPIN: CPT

## 2020-01-01 PROCEDURE — 99204 OFFICE O/P NEW MOD 45 MIN: CPT | Mod: S$GLB,,, | Performed by: FAMILY MEDICINE

## 2020-01-01 PROCEDURE — 99999 PR PBB SHADOW E&M-EST. PATIENT-LVL V: ICD-10-PCS | Mod: PBBFAC,GC,, | Performed by: STUDENT IN AN ORGANIZED HEALTH CARE EDUCATION/TRAINING PROGRAM

## 2020-01-01 PROCEDURE — 96375 TX/PRO/DX INJ NEW DRUG ADDON: CPT | Mod: 59

## 2020-01-01 RX ORDER — HEPARIN 100 UNIT/ML
500 SYRINGE INTRAVENOUS
Status: CANCELLED | OUTPATIENT
Start: 2020-01-01

## 2020-01-01 RX ORDER — SODIUM CHLORIDE 0.9 % (FLUSH) 0.9 %
10 SYRINGE (ML) INJECTION
Status: DISCONTINUED | OUTPATIENT
Start: 2020-01-01 | End: 2020-01-01 | Stop reason: HOSPADM

## 2020-01-01 RX ORDER — ONDANSETRON HYDROCHLORIDE 8 MG/1
1 TABLET, FILM COATED ORAL 3 TIMES DAILY PRN
COMMUNITY

## 2020-01-01 RX ORDER — ONDANSETRON 2 MG/ML
8 INJECTION INTRAMUSCULAR; INTRAVENOUS
Status: CANCELLED | OUTPATIENT
Start: 2020-01-01

## 2020-01-01 RX ORDER — ONDANSETRON 2 MG/ML
8 INJECTION INTRAMUSCULAR; INTRAVENOUS
Status: COMPLETED | OUTPATIENT
Start: 2020-01-01 | End: 2020-01-01

## 2020-01-01 RX ORDER — MORPHINE SULFATE 60 MG/1
60 TABLET, FILM COATED, EXTENDED RELEASE ORAL 2 TIMES DAILY
Qty: 60 TABLET | Refills: 0 | Status: SHIPPED | OUTPATIENT
Start: 2020-01-01 | End: 2020-01-01 | Stop reason: SDUPTHER

## 2020-01-01 RX ORDER — HEPARIN 100 UNIT/ML
500 SYRINGE INTRAVENOUS
Status: DISCONTINUED | OUTPATIENT
Start: 2020-01-01 | End: 2020-01-01 | Stop reason: HOSPADM

## 2020-01-01 RX ORDER — SODIUM CHLORIDE 0.9 % (FLUSH) 0.9 %
10 SYRINGE (ML) INJECTION
Status: SHIPPED | OUTPATIENT
Start: 2020-01-01

## 2020-01-01 RX ORDER — ZOLPIDEM TARTRATE 5 MG/1
5 TABLET ORAL NIGHTLY
Qty: 30 TABLET | Refills: 0 | Status: SHIPPED | OUTPATIENT
Start: 2020-01-01 | End: 2020-01-01 | Stop reason: SDUPTHER

## 2020-01-01 RX ORDER — OLANZAPINE 5 MG/1
5 TABLET ORAL NIGHTLY
Qty: 60 TABLET | Refills: 2 | Status: SHIPPED | OUTPATIENT
Start: 2020-01-01 | End: 2020-01-01 | Stop reason: ALTCHOICE

## 2020-01-01 RX ORDER — SODIUM CHLORIDE 0.9 % (FLUSH) 0.9 %
10 SYRINGE (ML) INJECTION
Status: CANCELLED | OUTPATIENT
Start: 2020-01-01

## 2020-01-01 RX ORDER — LIDOCAINE AND PRILOCAINE 25; 25 MG/G; MG/G
CREAM TOPICAL
Qty: 30 G | Refills: 2 | Status: SHIPPED | OUTPATIENT
Start: 2020-01-01

## 2020-01-01 RX ORDER — MORPHINE SULFATE 60 MG/1
60 TABLET, FILM COATED, EXTENDED RELEASE ORAL EVERY 8 HOURS PRN
Qty: 90 TABLET | Refills: 0 | Status: SHIPPED | OUTPATIENT
Start: 2020-01-01 | End: 2020-01-01 | Stop reason: ALTCHOICE

## 2020-01-01 RX ORDER — HYDROCODONE BITARTRATE AND ACETAMINOPHEN 500; 5 MG/1; MG/1
TABLET ORAL ONCE
Status: COMPLETED | OUTPATIENT
Start: 2020-01-01 | End: 2020-01-01

## 2020-01-01 RX ORDER — MORPHINE SULFATE 30 MG/1
30 TABLET, FILM COATED, EXTENDED RELEASE ORAL 3 TIMES DAILY
Qty: 90 TABLET | Refills: 0 | Status: SHIPPED | OUTPATIENT
Start: 2020-01-01 | End: 2020-01-01

## 2020-01-01 RX ORDER — ZOLPIDEM TARTRATE 5 MG/1
5 TABLET ORAL NIGHTLY
Qty: 30 TABLET | Refills: 0 | Status: CANCELLED | OUTPATIENT
Start: 2020-01-01

## 2020-01-01 RX ORDER — ACETAMINOPHEN 325 MG/1
650 TABLET ORAL
Status: COMPLETED | OUTPATIENT
Start: 2020-01-01 | End: 2020-01-01

## 2020-01-01 RX ORDER — PROCHLORPERAZINE MALEATE 5 MG
TABLET ORAL
COMMUNITY
Start: 2020-01-01 | End: 2020-01-01 | Stop reason: SDUPTHER

## 2020-01-01 RX ORDER — BUMETANIDE 1 MG/1
1 TABLET ORAL DAILY
Qty: 30 TABLET | Refills: 1 | Status: SHIPPED | OUTPATIENT
Start: 2020-01-01 | End: 2021-01-01

## 2020-01-01 RX ORDER — PROCHLORPERAZINE MALEATE 5 MG
TABLET ORAL
Qty: 90 TABLET | Refills: 0 | Status: SHIPPED | OUTPATIENT
Start: 2020-01-01 | End: 2020-01-01 | Stop reason: SDUPTHER

## 2020-01-01 RX ORDER — MORPHINE SULFATE 30 MG/1
60 TABLET, FILM COATED, EXTENDED RELEASE ORAL 2 TIMES DAILY
Qty: 20 TABLET | Refills: 0 | Status: SHIPPED | OUTPATIENT
Start: 2020-01-01 | End: 2020-01-01 | Stop reason: SDUPTHER

## 2020-01-01 RX ORDER — MORPHINE SULFATE 60 MG/1
60 TABLET, FILM COATED, EXTENDED RELEASE ORAL 2 TIMES DAILY
Qty: 60 TABLET | Refills: 0 | Status: SHIPPED | OUTPATIENT
Start: 2020-01-01 | End: 2020-01-01

## 2020-01-01 RX ORDER — PROCHLORPERAZINE MALEATE 5 MG
TABLET ORAL
Qty: 90 TABLET | Refills: 0 | Status: SHIPPED | OUTPATIENT
Start: 2020-01-01 | End: 2021-01-01 | Stop reason: SDUPTHER

## 2020-01-01 RX ORDER — ZOLPIDEM TARTRATE 5 MG/1
5 TABLET ORAL NIGHTLY
Qty: 30 TABLET | Refills: 0 | Status: SHIPPED | OUTPATIENT
Start: 2020-01-01 | End: 2020-01-01

## 2020-01-01 RX ORDER — ZOLPIDEM TARTRATE 5 MG/1
5 TABLET ORAL NIGHTLY
Qty: 30 TABLET | Refills: 0 | Status: SHIPPED | OUTPATIENT
Start: 2020-01-01 | End: 2021-01-01 | Stop reason: SDUPTHER

## 2020-01-01 RX ORDER — MORPHINE SULFATE 30 MG/1
30 TABLET, FILM COATED, EXTENDED RELEASE ORAL EVERY 8 HOURS PRN
Qty: 60 TABLET | Refills: 0 | Status: SHIPPED | OUTPATIENT
Start: 2020-01-01 | End: 2020-01-01

## 2020-01-01 RX ORDER — MORPHINE SULFATE 30 MG/1
30 TABLET, FILM COATED, EXTENDED RELEASE ORAL 3 TIMES DAILY
Qty: 90 TABLET | Refills: 0 | Status: SHIPPED | OUTPATIENT
Start: 2020-01-01 | End: 2020-01-01 | Stop reason: SDUPTHER

## 2020-01-01 RX ORDER — OXYCODONE AND ACETAMINOPHEN 10; 325 MG/1; MG/1
1 TABLET ORAL EVERY 6 HOURS PRN
Qty: 120 TABLET | Refills: 0 | Status: SHIPPED | OUTPATIENT
Start: 2020-01-01 | End: 2020-01-01 | Stop reason: SDUPTHER

## 2020-01-01 RX ORDER — PROCHLORPERAZINE MALEATE 5 MG
5 TABLET ORAL 3 TIMES DAILY PRN
Qty: 90 TABLET | Refills: 1 | Status: SHIPPED | OUTPATIENT
Start: 2020-01-01 | End: 2020-01-01 | Stop reason: SDUPTHER

## 2020-01-01 RX ORDER — DIPHENHYDRAMINE HCL 25 MG
25 CAPSULE ORAL
Status: COMPLETED | OUTPATIENT
Start: 2020-01-01 | End: 2020-01-01

## 2020-01-01 RX ORDER — OXYCODONE AND ACETAMINOPHEN 10; 325 MG/1; MG/1
1 TABLET ORAL EVERY 6 HOURS PRN
Qty: 120 TABLET | Refills: 0 | Status: SHIPPED | OUTPATIENT
Start: 2020-01-01 | End: 2021-01-01 | Stop reason: SDUPTHER

## 2020-01-01 RX ORDER — IBUPROFEN 200 MG
200 TABLET ORAL EVERY 6 HOURS PRN
COMMUNITY

## 2020-01-01 RX ORDER — GABAPENTIN 300 MG/1
300 CAPSULE ORAL NIGHTLY
Qty: 120 CAPSULE | Refills: 2 | Status: SHIPPED | OUTPATIENT
Start: 2020-01-01 | End: 2020-01-01 | Stop reason: SDUPTHER

## 2020-01-01 RX ORDER — LIDOCAINE AND PRILOCAINE 25; 25 MG/G; MG/G
CREAM TOPICAL
Qty: 30 G | Refills: 2 | Status: SHIPPED | OUTPATIENT
Start: 2020-01-01 | End: 2020-01-01 | Stop reason: SDUPTHER

## 2020-01-01 RX ORDER — GABAPENTIN 300 MG/1
300 CAPSULE ORAL NIGHTLY
Qty: 120 CAPSULE | Refills: 2 | Status: SHIPPED | OUTPATIENT
Start: 2020-01-01 | End: 2021-01-01 | Stop reason: SDUPTHER

## 2020-01-01 RX ORDER — BUMETANIDE 1 MG/1
1 TABLET ORAL DAILY
Qty: 30 TABLET | Refills: 1 | Status: SHIPPED | OUTPATIENT
Start: 2020-01-01 | End: 2020-01-01 | Stop reason: SDUPTHER

## 2020-01-01 RX ORDER — SUCRALFATE 1 G/1
1 TABLET ORAL DAILY PRN
COMMUNITY

## 2020-01-01 RX ORDER — DIPHENHYDRAMINE HCL 25 MG
25 CAPSULE ORAL
Status: CANCELLED | OUTPATIENT
Start: 2020-01-01

## 2020-01-01 RX ORDER — PROCHLORPERAZINE MALEATE 5 MG
5 TABLET ORAL 3 TIMES DAILY PRN
Qty: 90 TABLET | Refills: 2 | Status: SHIPPED | OUTPATIENT
Start: 2020-01-01 | End: 2020-01-01 | Stop reason: ALTCHOICE

## 2020-01-01 RX ORDER — ACETAMINOPHEN 325 MG/1
650 TABLET ORAL
Status: CANCELLED | OUTPATIENT
Start: 2020-01-01

## 2020-01-01 RX ORDER — HYDROCODONE BITARTRATE AND ACETAMINOPHEN 500; 5 MG/1; MG/1
TABLET ORAL ONCE
Status: CANCELLED | OUTPATIENT
Start: 2020-01-01 | End: 2020-01-01

## 2020-01-01 RX ORDER — MORPHINE SULFATE 30 MG/1
30 TABLET, FILM COATED, EXTENDED RELEASE ORAL EVERY 8 HOURS PRN
Qty: 60 TABLET | Refills: 0 | Status: SHIPPED | OUTPATIENT
Start: 2020-01-01 | End: 2020-01-01 | Stop reason: SDUPTHER

## 2020-01-01 RX ORDER — MORPHINE SULFATE 60 MG/1
60 TABLET, FILM COATED, EXTENDED RELEASE ORAL 2 TIMES DAILY
Qty: 60 TABLET | Refills: 0 | Status: SHIPPED | OUTPATIENT
Start: 2020-01-01 | End: 2021-01-01 | Stop reason: SDUPTHER

## 2020-01-01 RX ADMIN — SODIUM CHLORIDE: 0.9 INJECTION, SOLUTION INTRAVENOUS at 10:09

## 2020-01-01 RX ADMIN — IOHEXOL 100 ML: 350 INJECTION, SOLUTION INTRAVENOUS at 02:11

## 2020-01-01 RX ADMIN — GEMCITABINE HYDROCHLORIDE 1630 MG: 1 INJECTION INTRAVENOUS at 03:07

## 2020-01-01 RX ADMIN — GEMCITABINE 1630 MG: 38 INJECTION, SOLUTION INTRAVENOUS at 11:09

## 2020-01-01 RX ADMIN — PEGFILGRASTIM 6 MG: KIT SUBCUTANEOUS at 01:08

## 2020-01-01 RX ADMIN — Medication 10 ML: at 02:08

## 2020-01-01 RX ADMIN — PEGFILGRASTIM 6 MG: KIT SUBCUTANEOUS at 02:07

## 2020-01-01 RX ADMIN — Medication 10 ML: at 12:09

## 2020-01-01 RX ADMIN — HEPARIN SODIUM (PORCINE) LOCK FLUSH IV SOLN 100 UNIT/ML 500 UNITS: 100 SOLUTION at 02:08

## 2020-01-01 RX ADMIN — PEGFILGRASTIM 6 MG: KIT SUBCUTANEOUS at 12:09

## 2020-01-01 RX ADMIN — GEMCITABINE HYDROCHLORIDE 1600 MG: 1 INJECTION INTRAVENOUS at 02:10

## 2020-01-01 RX ADMIN — GEMCITABINE HYDROCHLORIDE 1600 MG: 1 INJECTION INTRAVENOUS at 03:08

## 2020-01-01 RX ADMIN — DOCETAXEL ANHYDROUS 180 MG: 10 INJECTION, SOLUTION INTRAVENOUS at 11:09

## 2020-01-01 RX ADMIN — ONDANSETRON 8 MG: 2 INJECTION, SOLUTION INTRAMUSCULAR; INTRAVENOUS at 01:10

## 2020-01-01 RX ADMIN — DEXAMETHASONE SODIUM PHOSPHATE 20 MG: 4 INJECTION, SOLUTION INTRA-ARTICULAR; INTRALESIONAL; INTRAMUSCULAR; INTRAVENOUS; SOFT TISSUE at 11:08

## 2020-01-01 RX ADMIN — SODIUM CHLORIDE: 0.9 INJECTION, SOLUTION INTRAVENOUS at 11:07

## 2020-01-01 RX ADMIN — ONDANSETRON 8 MG: 2 INJECTION INTRAMUSCULAR; INTRAVENOUS at 01:08

## 2020-01-01 RX ADMIN — Medication 10 ML: at 04:07

## 2020-01-01 RX ADMIN — DOCETAXEL ANHYDROUS 180 MG: 10 INJECTION, SOLUTION INTRAVENOUS at 12:07

## 2020-01-01 RX ADMIN — ONDANSETRON 8 MG: 2 INJECTION, SOLUTION INTRAMUSCULAR; INTRAVENOUS at 03:07

## 2020-01-01 RX ADMIN — SODIUM CHLORIDE: 9 INJECTION, SOLUTION INTRAVENOUS at 10:07

## 2020-01-01 RX ADMIN — SODIUM CHLORIDE: 9 INJECTION, SOLUTION INTRAVENOUS at 01:08

## 2020-01-01 RX ADMIN — DIPHENHYDRAMINE HYDROCHLORIDE 25 MG: 25 CAPSULE ORAL at 10:07

## 2020-01-01 RX ADMIN — SODIUM CHLORIDE: 9 INJECTION, SOLUTION INTRAVENOUS at 03:08

## 2020-01-01 RX ADMIN — DOCETAXEL ANHYDROUS 180 MG: 10 INJECTION, SOLUTION INTRAVENOUS at 12:08

## 2020-01-01 RX ADMIN — GEMCITABINE HYDROCHLORIDE 1600 MG: 1 INJECTION INTRAVENOUS at 01:08

## 2020-01-01 RX ADMIN — GEMCITABINE HYDROCHLORIDE 1600 MG: 1 INJECTION INTRAVENOUS at 12:08

## 2020-01-01 RX ADMIN — HEPARIN 500 UNITS: 100 SYRINGE at 04:10

## 2020-01-01 RX ADMIN — FERRIC CARBOXYMALTOSE INJECTION 750 MG: 50 INJECTION, SOLUTION INTRAVENOUS at 12:10

## 2020-01-01 RX ADMIN — Medication 10 ML: at 04:10

## 2020-01-01 RX ADMIN — HEPARIN 500 UNITS: 100 SYRINGE at 04:07

## 2020-01-01 RX ADMIN — SODIUM CHLORIDE: 0.9 INJECTION, SOLUTION INTRAVENOUS at 03:09

## 2020-01-01 RX ADMIN — ONDANSETRON 8 MG: 2 INJECTION, SOLUTION INTRAMUSCULAR; INTRAVENOUS at 03:08

## 2020-01-01 RX ADMIN — HEPARIN 500 UNITS: 100 SYRINGE at 02:08

## 2020-01-01 RX ADMIN — DOCETAXEL ANHYDROUS 180 MG: 10 INJECTION, SOLUTION INTRAVENOUS at 03:10

## 2020-01-01 RX ADMIN — HEPARIN 500 UNITS: 100 SYRINGE at 12:09

## 2020-01-01 RX ADMIN — HEPARIN 500 UNITS: 100 SYRINGE at 03:10

## 2020-01-01 RX ADMIN — GEMCITABINE HYDROCHLORIDE 1600 MG: 200 INJECTION, POWDER, LYOPHILIZED, FOR SOLUTION INTRAVENOUS at 04:09

## 2020-01-01 RX ADMIN — SODIUM CHLORIDE: 9 INJECTION, SOLUTION INTRAVENOUS at 03:07

## 2020-01-01 RX ADMIN — SODIUM CHLORIDE: 0.9 INJECTION, SOLUTION INTRAVENOUS at 11:08

## 2020-01-01 RX ADMIN — HEPARIN 500 UNITS: 100 SYRINGE at 04:09

## 2020-01-01 RX ADMIN — DEXAMETHASONE SODIUM PHOSPHATE 20 MG: 4 INJECTION, SOLUTION INTRA-ARTICULAR; INTRALESIONAL; INTRAMUSCULAR; INTRAVENOUS; SOFT TISSUE at 01:10

## 2020-01-01 RX ADMIN — ACETAMINOPHEN 650 MG: 325 TABLET ORAL at 10:07

## 2020-01-01 RX ADMIN — SODIUM CHLORIDE: 0.9 INJECTION, SOLUTION INTRAVENOUS at 01:10

## 2020-01-01 RX ADMIN — SODIUM CHLORIDE: 0.9 INJECTION, SOLUTION INTRAVENOUS at 12:10

## 2020-01-01 RX ADMIN — GEMCITABINE HYDROCHLORIDE 1600 MG: 1 INJECTION INTRAVENOUS at 01:07

## 2020-01-01 RX ADMIN — HEPARIN 500 UNITS: 100 SYRINGE at 02:07

## 2020-01-01 RX ADMIN — PEGFILGRASTIM 6 MG: KIT SUBCUTANEOUS at 03:10

## 2020-01-01 RX ADMIN — Medication 10 ML: at 04:09

## 2020-01-01 RX ADMIN — HEPARIN 500 UNITS: 100 SYRINGE at 04:08

## 2020-01-01 RX ADMIN — Medication 10 ML: at 04:08

## 2020-01-01 RX ADMIN — GEMCITABINE HYDROCHLORIDE 1600 MG: 200 INJECTION, POWDER, LYOPHILIZED, FOR SOLUTION INTRAVENOUS at 02:10

## 2020-01-01 RX ADMIN — DEXAMETHASONE SODIUM PHOSPHATE 20 MG: 4 INJECTION, SOLUTION INTRA-ARTICULAR; INTRALESIONAL; INTRAMUSCULAR; INTRAVENOUS; SOFT TISSUE at 11:07

## 2020-01-01 RX ADMIN — DEXAMETHASONE SODIUM PHOSPHATE 20 MG: 4 INJECTION, SOLUTION INTRA-ARTICULAR; INTRALESIONAL; INTRAMUSCULAR; INTRAVENOUS; SOFT TISSUE at 10:09

## 2020-01-01 RX ADMIN — ONDANSETRON 8 MG: 2 INJECTION, SOLUTION INTRAMUSCULAR; INTRAVENOUS at 04:09

## 2020-01-17 ENCOUNTER — TELEPHONE (OUTPATIENT)
Dept: HEMATOLOGY/ONCOLOGY | Facility: CLINIC | Age: 65
End: 2020-01-17

## 2020-01-17 DIAGNOSIS — C49.4 PRIMARY INTRA-ABDOMINAL SARCOMA: ICD-10-CM

## 2020-01-17 RX ORDER — MORPHINE SULFATE 30 MG/1
30 TABLET, FILM COATED, EXTENDED RELEASE ORAL 3 TIMES DAILY
Qty: 90 TABLET | Refills: 0 | Status: SHIPPED | OUTPATIENT
Start: 2020-01-17 | End: 2020-02-24 | Stop reason: SDUPTHER

## 2020-01-17 NOTE — TELEPHONE ENCOUNTER
Telephone Call    Discussed with Dr. Bansal about planning to treat locally with two additional doses of doxorubicin, 75 mg/m2 on day 1, q21 days for two cycles. The pt has so far received 150 mg/m2 dose of doxorubicin with her two prior cycles of AIM and her last Echo did not reveal heart failure. AIM was previously stopped due to neurotoxicity.    If the pt is tolerating treatment well but needs additional tumor debulking to help with pain, we can additionally add dacarbazine to treatment regimen after these initial two cycles of chemotherapy. We can do a max of four additional 4 cycles of doxorubicin at 75 mg/m2, as this will bring her cumulative dose to 450 mg/m2.     Technically, she has not failed pazopanib and so she could go back to doing pazopanib if tumor has been debulked enough.   However, she does have additional options as well such as gemcitabine/docetaxel and trabectidin as next lines of regimen, as well.    Fernando Silvestre MD  Hematology/Oncology/Oncology Fellow, PGY V  [facility]

## 2020-02-03 ENCOUNTER — TELEPHONE (OUTPATIENT)
Dept: HEMATOLOGY/ONCOLOGY | Facility: CLINIC | Age: 65
End: 2020-02-03

## 2020-02-03 DIAGNOSIS — C49.4 PRIMARY INTRA-ABDOMINAL SARCOMA: ICD-10-CM

## 2020-02-03 RX ORDER — OXYCODONE AND ACETAMINOPHEN 10; 325 MG/1; MG/1
1 TABLET ORAL EVERY 6 HOURS PRN
Qty: 120 TABLET | Refills: 0 | Status: SHIPPED | OUTPATIENT
Start: 2020-02-03 | End: 2020-03-09 | Stop reason: SDUPTHER

## 2020-02-18 ENCOUNTER — HISTORICAL (OUTPATIENT)
Dept: ADMINISTRATIVE | Facility: HOSPITAL | Age: 65
End: 2020-02-18

## 2020-02-18 LAB
ABS NEUT (OLG): 3.61 X10(3)/MCL (ref 2.1–9.2)
ALBUMIN SERPL-MCNC: 3.2 GM/DL (ref 3.4–5)
ALBUMIN/GLOB SERPL: 0.8 {RATIO}
ALP SERPL-CCNC: 116 UNIT/L (ref 38–126)
ALT SERPL-CCNC: 10 UNIT/L (ref 12–78)
AST SERPL-CCNC: 8 UNIT/L (ref 15–37)
BASOPHILS # BLD AUTO: 0 X10(3)/MCL (ref 0–0.2)
BASOPHILS NFR BLD AUTO: 0.5 %
BILIRUB SERPL-MCNC: 0.5 MG/DL (ref 0.2–1)
BILIRUBIN DIRECT+TOT PNL SERPL-MCNC: 0.1 MG/DL (ref 0–0.2)
BILIRUBIN DIRECT+TOT PNL SERPL-MCNC: 0.4 MG/DL (ref 0–0.8)
BUN SERPL-MCNC: 10 MG/DL (ref 7–18)
CALCIUM SERPL-MCNC: 10.1 MG/DL (ref 8.5–10.1)
CHLORIDE SERPL-SCNC: 107 MMOL/L (ref 98–107)
CO2 SERPL-SCNC: 21 MMOL/L (ref 21–32)
CREAT SERPL-MCNC: 0.56 MG/DL (ref 0.55–1.02)
EOSINOPHIL # BLD AUTO: 0.2 X10(3)/MCL (ref 0–0.9)
EOSINOPHIL NFR BLD AUTO: 3 %
ERYTHROCYTE [DISTWIDTH] IN BLOOD BY AUTOMATED COUNT: 19 % (ref 11.5–17)
FERRITIN SERPL-MCNC: 11 NG/ML (ref 8–388)
GLOBULIN SER-MCNC: 3.8 GM/DL (ref 2.4–3.5)
GLUCOSE SERPL-MCNC: 61 MG/DL (ref 74–106)
HCT VFR BLD AUTO: 31 % (ref 37–47)
HGB BLD-MCNC: 8.9 GM/DL (ref 12–16)
IRON SATN MFR SERPL: 6.5 % (ref 20–50)
IRON SERPL-MCNC: 16 MCG/DL (ref 50–175)
LYMPHOCYTES # BLD AUTO: 2 X10(3)/MCL (ref 0.6–4.6)
LYMPHOCYTES NFR BLD AUTO: 31.6 %
MCH RBC QN AUTO: 21.1 PG (ref 27–31)
MCHC RBC AUTO-ENTMCNC: 28.7 GM/DL (ref 33–36)
MCV RBC AUTO: 73.5 FL (ref 80–94)
MONOCYTES # BLD AUTO: 0.5 X10(3)/MCL (ref 0.1–1.3)
MONOCYTES NFR BLD AUTO: 7.3 %
NEUTROPHILS # BLD AUTO: 3.6 X10(3)/MCL (ref 2.1–9.2)
NEUTROPHILS NFR BLD AUTO: 57.3 %
PLATELET # BLD AUTO: 379 X10(3)/MCL (ref 130–400)
PMV BLD AUTO: 9.8 FL (ref 9.4–12.4)
POTASSIUM SERPL-SCNC: 4.5 MMOL/L (ref 3.5–5.1)
PROT SERPL-MCNC: 7 GM/DL (ref 6.4–8.2)
RBC # BLD AUTO: 4.22 X10(6)/MCL (ref 4.2–5.4)
SODIUM SERPL-SCNC: 136 MMOL/L (ref 136–145)
TIBC SERPL-MCNC: 246 MCG/DL (ref 250–450)
TRANSFERRIN SERPL-MCNC: 205 MG/DL (ref 200–360)
WBC # SPEC AUTO: 6.3 X10(3)/MCL (ref 4.5–11.5)

## 2020-02-19 ENCOUNTER — TELEPHONE (OUTPATIENT)
Dept: DERMATOLOGY | Facility: CLINIC | Age: 65
End: 2020-02-19

## 2020-02-19 DIAGNOSIS — C49.4 PRIMARY INTRA-ABDOMINAL SARCOMA: Primary | ICD-10-CM

## 2020-02-19 NOTE — TELEPHONE ENCOUNTER
Spoke to pt in regards to path received in 6/10/19 with bx proven BCC right inferior neck. Pt stated that she is dealing with other health issues and will call us once she is able to schedule an appointment. Pt appreciated follow up call.

## 2020-02-24 DIAGNOSIS — C49.4 PRIMARY INTRA-ABDOMINAL SARCOMA: ICD-10-CM

## 2020-02-24 RX ORDER — MORPHINE SULFATE 30 MG/1
30 TABLET, FILM COATED, EXTENDED RELEASE ORAL 3 TIMES DAILY
Qty: 90 TABLET | Refills: 0 | Status: SHIPPED | OUTPATIENT
Start: 2020-02-24 | End: 2020-01-01 | Stop reason: SDUPTHER

## 2020-02-26 ENCOUNTER — HISTORICAL (OUTPATIENT)
Dept: CARDIOLOGY | Facility: HOSPITAL | Age: 65
End: 2020-02-26

## 2020-02-27 ENCOUNTER — HISTORICAL (OUTPATIENT)
Dept: ADMINISTRATIVE | Facility: HOSPITAL | Age: 65
End: 2020-02-27

## 2020-02-27 LAB
ABS NEUT (OLG): 4.77 X10(3)/MCL (ref 2.1–9.2)
ALBUMIN SERPL-MCNC: 3.2 GM/DL (ref 3.4–5)
ALBUMIN/GLOB SERPL: 0.8 {RATIO}
ALP SERPL-CCNC: 128 UNIT/L (ref 38–126)
ALT SERPL-CCNC: 12 UNIT/L (ref 12–78)
AST SERPL-CCNC: 12 UNIT/L (ref 15–37)
BASOPHILS # BLD AUTO: 0 X10(3)/MCL (ref 0–0.2)
BASOPHILS NFR BLD AUTO: 0.6 %
BILIRUB SERPL-MCNC: 0.8 MG/DL (ref 0.2–1)
BILIRUBIN DIRECT+TOT PNL SERPL-MCNC: 0.1 MG/DL (ref 0–0.2)
BILIRUBIN DIRECT+TOT PNL SERPL-MCNC: 0.7 MG/DL (ref 0–0.8)
BUN SERPL-MCNC: 13 MG/DL (ref 7–18)
CALCIUM SERPL-MCNC: 10.3 MG/DL (ref 8.5–10.1)
CHLORIDE SERPL-SCNC: 107 MMOL/L (ref 98–107)
CO2 SERPL-SCNC: 23 MMOL/L (ref 21–32)
CREAT SERPL-MCNC: 0.61 MG/DL (ref 0.55–1.02)
EOSINOPHIL # BLD AUTO: 0.2 X10(3)/MCL (ref 0–0.9)
EOSINOPHIL NFR BLD AUTO: 2.8 %
ERYTHROCYTE [DISTWIDTH] IN BLOOD BY AUTOMATED COUNT: 19 % (ref 11.5–17)
GLOBULIN SER-MCNC: 3.9 GM/DL (ref 2.4–3.5)
GLUCOSE SERPL-MCNC: 78 MG/DL (ref 74–106)
HCT VFR BLD AUTO: 31 % (ref 37–47)
HGB BLD-MCNC: 8.9 GM/DL (ref 12–16)
LYMPHOCYTES # BLD AUTO: 1.4 X10(3)/MCL (ref 0.6–4.6)
LYMPHOCYTES NFR BLD AUTO: 21 %
MCH RBC QN AUTO: 21.2 PG (ref 27–31)
MCHC RBC AUTO-ENTMCNC: 28.7 GM/DL (ref 33–36)
MCV RBC AUTO: 73.8 FL (ref 80–94)
MONOCYTES # BLD AUTO: 0.4 X10(3)/MCL (ref 0.1–1.3)
MONOCYTES NFR BLD AUTO: 5.1 %
NEUTROPHILS # BLD AUTO: 4.8 X10(3)/MCL (ref 2.1–9.2)
NEUTROPHILS NFR BLD AUTO: 70.2 %
PLATELET # BLD AUTO: 365 X10(3)/MCL (ref 130–400)
PMV BLD AUTO: 10.3 FL (ref 9.4–12.4)
POTASSIUM SERPL-SCNC: 4.5 MMOL/L (ref 3.5–5.1)
PROT SERPL-MCNC: 7.1 GM/DL (ref 6.4–8.2)
RBC # BLD AUTO: 4.2 X10(6)/MCL (ref 4.2–5.4)
SODIUM SERPL-SCNC: 136 MMOL/L (ref 136–145)
WBC # SPEC AUTO: 6.8 X10(3)/MCL (ref 4.5–11.5)

## 2020-03-02 ENCOUNTER — HISTORICAL (OUTPATIENT)
Dept: SURGERY | Facility: HOSPITAL | Age: 65
End: 2020-03-02

## 2020-03-03 ENCOUNTER — HISTORICAL (OUTPATIENT)
Dept: INFUSION THERAPY | Facility: HOSPITAL | Age: 65
End: 2020-03-03

## 2020-03-04 NOTE — TELEPHONE ENCOUNTER
Patient was approved to receive her Votrient from [employer] Patient Assistance through 12/31/20 with $0.00 copay. Patient will be informed and provided with information needed to schedule a shipment and request refills. Sending a staff message to Dr. Silvestre to notify of assistance approval for Votrient.

## 2020-03-04 NOTE — TELEPHONE ENCOUNTER
FOR DOCUMENTATION ONLY:  Financial Assistance for Votrient is approved from 3/3/20 to 12/31/20 with $0.00 copay  Source: [employer] Patient Assistance Program  Phone: [phone]  Fax: [phone]  Dispensing Pharm[facility]: [facility]  Phone: [phone]  Fax: [phone]

## 2020-03-09 DIAGNOSIS — C49.4 PRIMARY INTRA-ABDOMINAL SARCOMA: ICD-10-CM

## 2020-03-09 DIAGNOSIS — G62.9 NEUROPATHY: ICD-10-CM

## 2020-03-09 RX ORDER — GABAPENTIN 300 MG/1
300 CAPSULE ORAL NIGHTLY
Qty: 90 CAPSULE | Refills: 2 | Status: SHIPPED | OUTPATIENT
Start: 2020-03-09 | End: 2020-01-01 | Stop reason: SDUPTHER

## 2020-03-09 RX ORDER — OXYCODONE AND ACETAMINOPHEN 10; 325 MG/1; MG/1
1 TABLET ORAL EVERY 6 HOURS PRN
Qty: 120 TABLET | Refills: 0 | Status: SHIPPED | OUTPATIENT
Start: 2020-03-09 | End: 2020-01-01 | Stop reason: SDUPTHER

## 2020-03-10 ENCOUNTER — HISTORICAL (OUTPATIENT)
Dept: HEMATOLOGY/ONCOLOGY | Facility: CLINIC | Age: 65
End: 2020-03-10

## 2020-03-10 LAB
ABS NEUT (OLG): 2.79 X10(3)/MCL (ref 2.1–9.2)
ALBUMIN SERPL-MCNC: 2.9 GM/DL (ref 3.4–5)
ALBUMIN/GLOB SERPL: 0.8 RATIO (ref 1.1–2)
ALP SERPL-CCNC: 142 UNIT/L (ref 38–126)
ALT SERPL-CCNC: 10 UNIT/L (ref 12–78)
ANISOCYTOSIS BLD QL SMEAR: NORMAL
AST SERPL-CCNC: 7 UNIT/L (ref 15–37)
BASOPHILS # BLD AUTO: 0 X10(3)/MCL (ref 0–0.2)
BASOPHILS NFR BLD AUTO: 1 %
BASOPHILS NFR BLD MANUAL: 1 % (ref 0–2)
BILIRUB SERPL-MCNC: 0.4 MG/DL (ref 0.2–1)
BILIRUBIN DIRECT+TOT PNL SERPL-MCNC: 0.1 MG/DL (ref 0–0.5)
BILIRUBIN DIRECT+TOT PNL SERPL-MCNC: 0.3 MG/DL (ref 0–0.8)
BUN SERPL-MCNC: 9 MG/DL (ref 7–18)
CALCIUM SERPL-MCNC: 9.5 MG/DL (ref 8.5–10.1)
CHLORIDE SERPL-SCNC: 107 MMOL/L (ref 98–107)
CO2 SERPL-SCNC: 23 MMOL/L (ref 21–32)
CREAT SERPL-MCNC: 0.57 MG/DL (ref 0.55–1.02)
EOSINOPHIL # BLD AUTO: 0.2 X10(3)/MCL (ref 0–0.9)
EOSINOPHIL NFR BLD AUTO: 3.4 %
EOSINOPHIL NFR BLD MANUAL: 1 % (ref 0–8)
ERYTHROCYTE [DISTWIDTH] IN BLOOD BY AUTOMATED COUNT: 19.6 % (ref 11.5–17)
GLOBULIN SER-MCNC: 3.5 GM/DL (ref 2.4–3.5)
GLUCOSE SERPL-MCNC: 97 MG/DL (ref 74–106)
GRANULOCYTES NFR BLD MANUAL: 64 % (ref 47–80)
HCT VFR BLD AUTO: 27.3 % (ref 37–47)
HGB BLD-MCNC: 7.8 GM/DL (ref 12–16)
LYMPHOCYTES # BLD AUTO: 1.6 X10(3)/MCL (ref 0.6–4.6)
LYMPHOCYTES NFR BLD AUTO: 32 %
LYMPHOCYTES NFR BLD MANUAL: 32 % (ref 13–40)
MCH RBC QN AUTO: 21.2 PG (ref 27–31)
MCHC RBC AUTO-ENTMCNC: 28.6 GM/DL (ref 33–36)
MCV RBC AUTO: 74.2 FL (ref 80–94)
MICROCYTES BLD QL SMEAR: NORMAL
MONOCYTES # BLD AUTO: 0.3 X10(3)/MCL (ref 0.1–1.3)
MONOCYTES NFR BLD AUTO: 5.3 %
MONOCYTES NFR BLD MANUAL: 2 % (ref 2–11)
NEUTROPHILS # BLD AUTO: 2.8 X10(3)/MCL (ref 2.1–9.2)
NEUTROPHILS NFR BLD AUTO: 56.7 %
PLATELET # BLD AUTO: 267 X10(3)/MCL (ref 130–400)
PLATELET # BLD EST: NORMAL 10*3/UL
PMV BLD AUTO: 9.5 FL (ref 9.4–12.4)
POIKILOCYTOSIS BLD QL SMEAR: NORMAL
POTASSIUM SERPL-SCNC: 4.1 MMOL/L (ref 3.5–5.1)
PROT SERPL-MCNC: 6.4 GM/DL (ref 6.4–8.2)
RBC # BLD AUTO: 3.68 X10(6)/MCL (ref 4.2–5.4)
SCHISTOCYTES BLD QL AUTO: NORMAL
SODIUM SERPL-SCNC: 136 MMOL/L (ref 136–145)
WBC # SPEC AUTO: 4.9 X10(3)/MCL (ref 4.5–11.5)

## 2020-03-17 ENCOUNTER — HISTORICAL (OUTPATIENT)
Dept: HEMATOLOGY/ONCOLOGY | Facility: CLINIC | Age: 65
End: 2020-03-17

## 2020-03-17 LAB
ABS NEUT (OLG): 4.24 X10(3)/MCL (ref 2.1–9.2)
ALBUMIN SERPL-MCNC: 2.9 GM/DL (ref 3.4–5)
ALP SERPL-CCNC: 154 UNIT/L (ref 38–126)
ALT SERPL-CCNC: 12 UNIT/L (ref 12–78)
ANION GAP SERPL CALC-SCNC: 16 MMOL/L
AST SERPL-CCNC: 8 UNIT/L (ref 15–37)
BASOPHILS # BLD AUTO: 0.1 X10(3)/MCL (ref 0–0.2)
BASOPHILS NFR BLD AUTO: 1.5 %
BILIRUB SERPL-MCNC: 0.3 MG/DL (ref 0.2–1)
BILIRUBIN DIRECT+TOT PNL SERPL-MCNC: 0.1 MG/DL (ref 0–0.5)
BILIRUBIN DIRECT+TOT PNL SERPL-MCNC: 0.2 MG/DL (ref 0–0.8)
BUN SERPL-MCNC: 8 MG/DL (ref 8–26)
CHLORIDE SERPL-SCNC: 106 MMOL/L (ref 98–109)
CREAT SERPL-MCNC: 0.5 MG/DL (ref 0.6–1.3)
EOSINOPHIL # BLD AUTO: 0.1 X10(3)/MCL (ref 0–0.9)
EOSINOPHIL NFR BLD AUTO: 1 %
ERYTHROCYTE [DISTWIDTH] IN BLOOD BY AUTOMATED COUNT: 20.8 % (ref 11.5–17)
GLUCOSE SERPL-MCNC: 96 MG/DL (ref 70–105)
HCT VFR BLD AUTO: 28.5 % (ref 37–47)
HCT VFR BLD CALC: 28 % (ref 38–51)
HGB BLD-MCNC: 8.1 GM/DL (ref 12–16)
HGB BLD-MCNC: 9.5 MG/DL (ref 12–17)
LIVER PROFILE INTERP: ABNORMAL
LYMPHOCYTES # BLD AUTO: 1.5 X10(3)/MCL (ref 0.6–4.6)
LYMPHOCYTES NFR BLD AUTO: 22 %
MCH RBC QN AUTO: 21 PG (ref 27–31)
MCHC RBC AUTO-ENTMCNC: 28.4 GM/DL (ref 33–36)
MCV RBC AUTO: 74 FL (ref 80–94)
MONOCYTES # BLD AUTO: 0.5 X10(3)/MCL (ref 0.1–1.3)
MONOCYTES NFR BLD AUTO: 7.4 %
NEUTROPHILS # BLD AUTO: 4.2 X10(3)/MCL (ref 2.1–9.2)
NEUTROPHILS NFR BLD AUTO: 63.2 %
PLATELET # BLD AUTO: 432 X10(3)/MCL (ref 130–400)
PMV BLD AUTO: 9.9 FL (ref 9.4–12.4)
POC IONIZED CALCIUM: 1.27 MMOL/L (ref 1.12–1.32)
POC TCO2: 20 MMOL/L (ref 24–29)
POTASSIUM BLD-SCNC: 3.8 MMOL/L (ref 3.5–4.9)
PROT SERPL-MCNC: 6.6 GM/DL (ref 6.4–8.2)
RBC # BLD AUTO: 3.85 X10(6)/MCL (ref 4.2–5.4)
SODIUM BLD-SCNC: 138 MMOL/L (ref 138–146)
WBC # SPEC AUTO: 6.7 X10(3)/MCL (ref 4.5–11.5)

## 2020-03-23 ENCOUNTER — HISTORICAL (OUTPATIENT)
Dept: ADMINISTRATIVE | Facility: HOSPITAL | Age: 65
End: 2020-03-23

## 2020-03-23 LAB
ABS NEUT (OLG): 4.01 X10(3)/MCL (ref 2.1–9.2)
ALBUMIN SERPL-MCNC: 2.8 GM/DL (ref 3.4–5)
ALBUMIN/GLOB SERPL: 0.8 {RATIO}
ALP SERPL-CCNC: 162 UNIT/L (ref 38–126)
ALT SERPL-CCNC: 24 UNIT/L (ref 12–78)
AST SERPL-CCNC: 33 UNIT/L (ref 15–37)
BASOPHILS # BLD AUTO: 0.1 X10(3)/MCL (ref 0–0.2)
BASOPHILS NFR BLD AUTO: 1 %
BILIRUB SERPL-MCNC: 0.2 MG/DL (ref 0.2–1)
BILIRUBIN DIRECT+TOT PNL SERPL-MCNC: 0.1 MG/DL (ref 0–0.2)
BILIRUBIN DIRECT+TOT PNL SERPL-MCNC: 0.1 MG/DL (ref 0–0.8)
BUN SERPL-MCNC: 11 MG/DL (ref 7–18)
CALCIUM SERPL-MCNC: 9.2 MG/DL (ref 8.5–10.1)
CHLORIDE SERPL-SCNC: 108 MMOL/L (ref 98–107)
CO2 SERPL-SCNC: 24 MMOL/L (ref 21–32)
CREAT SERPL-MCNC: 0.68 MG/DL (ref 0.55–1.02)
EOSINOPHIL # BLD AUTO: 0.1 X10(3)/MCL (ref 0–0.9)
EOSINOPHIL NFR BLD AUTO: 1 %
ERYTHROCYTE [DISTWIDTH] IN BLOOD BY AUTOMATED COUNT: 21 % (ref 11.5–17)
GLOBULIN SER-MCNC: 3.7 GM/DL (ref 2.4–3.5)
GLUCOSE SERPL-MCNC: 99 MG/DL (ref 74–106)
HCT VFR BLD AUTO: 28.3 % (ref 37–47)
HGB BLD-MCNC: 8.1 GM/DL (ref 12–16)
LYMPHOCYTES # BLD AUTO: 1.4 X10(3)/MCL (ref 0.6–4.6)
LYMPHOCYTES NFR BLD AUTO: 23.1 %
MCH RBC QN AUTO: 21.1 PG (ref 27–31)
MCHC RBC AUTO-ENTMCNC: 28.6 GM/DL (ref 33–36)
MCV RBC AUTO: 73.7 FL (ref 80–94)
MONOCYTES # BLD AUTO: 0.6 X10(3)/MCL (ref 0.1–1.3)
MONOCYTES NFR BLD AUTO: 10 %
NEUTROPHILS # BLD AUTO: 4 X10(3)/MCL (ref 2.1–9.2)
NEUTROPHILS NFR BLD AUTO: 64.6 %
PLATELET # BLD AUTO: 522 X10(3)/MCL (ref 130–400)
PMV BLD AUTO: 9.4 FL (ref 9.4–12.4)
POTASSIUM SERPL-SCNC: 4.1 MMOL/L (ref 3.5–5.1)
PROT SERPL-MCNC: 6.5 GM/DL (ref 6.4–8.2)
RBC # BLD AUTO: 3.84 X10(6)/MCL (ref 4.2–5.4)
SODIUM SERPL-SCNC: 137 MMOL/L (ref 136–145)
WBC # SPEC AUTO: 6.2 X10(3)/MCL (ref 4.5–11.5)

## 2020-03-24 LAB
COLOR STL: NORMAL
CONSISTENCY STL: NORMAL
HEMOCCULT SP2 STL QL: NEGATIVE

## 2020-03-25 LAB
COLOR STL: NORMAL
CONSISTENCY STL: NORMAL

## 2020-03-26 ENCOUNTER — HISTORICAL (OUTPATIENT)
Dept: HEMATOLOGY/ONCOLOGY | Facility: CLINIC | Age: 65
End: 2020-03-26

## 2020-03-26 LAB
COLOR STL: NORMAL
CONSISTENCY STL: NORMAL
HEMOCCULT SP1 STL QL: NEGATIVE

## 2020-03-31 ENCOUNTER — HISTORICAL (OUTPATIENT)
Dept: INFUSION THERAPY | Facility: HOSPITAL | Age: 65
End: 2020-03-31

## 2020-03-31 LAB
ABS NEUT (OLG): 2.95 X10(3)/MCL (ref 2.1–9.2)
BASOPHILS # BLD AUTO: 0.1 X10(3)/MCL (ref 0–0.2)
BASOPHILS NFR BLD AUTO: 1.5 %
EOSINOPHIL # BLD AUTO: 0.2 X10(3)/MCL (ref 0–0.9)
EOSINOPHIL NFR BLD AUTO: 3.3 %
ERYTHROCYTE [DISTWIDTH] IN BLOOD BY AUTOMATED COUNT: 20.9 % (ref 11.5–17)
HCT VFR BLD AUTO: 29.7 % (ref 37–47)
HGB BLD-MCNC: 8.5 GM/DL (ref 12–16)
LYMPHOCYTES # BLD AUTO: 1.6 X10(3)/MCL (ref 0.6–4.6)
LYMPHOCYTES NFR BLD AUTO: 28.8 %
MCH RBC QN AUTO: 21.1 PG (ref 27–31)
MCHC RBC AUTO-ENTMCNC: 28.6 GM/DL (ref 33–36)
MCV RBC AUTO: 73.7 FL (ref 80–94)
MONOCYTES # BLD AUTO: 0.7 X10(3)/MCL (ref 0.1–1.3)
MONOCYTES NFR BLD AUTO: 12.4 %
NEUTROPHILS # BLD AUTO: 3 X10(3)/MCL (ref 2.1–9.2)
NEUTROPHILS NFR BLD AUTO: 53.6 %
PLATELET # BLD AUTO: 492 X10(3)/MCL (ref 130–400)
PMV BLD AUTO: 9.1 FL (ref 9.4–12.4)
RBC # BLD AUTO: 4.03 X10(6)/MCL (ref 4.2–5.4)
WBC # SPEC AUTO: 5.5 X10(3)/MCL (ref 4.5–11.5)

## 2020-04-20 ENCOUNTER — HISTORICAL (OUTPATIENT)
Dept: HEMATOLOGY/ONCOLOGY | Facility: CLINIC | Age: 65
End: 2020-04-20

## 2020-04-20 LAB
ABS NEUT (OLG): 7.91 X10(3)/MCL (ref 2.1–9.2)
ALBUMIN SERPL-MCNC: 3.3 GM/DL (ref 3.4–4.8)
ALP SERPL-CCNC: 120 UNIT/L (ref 40–150)
ALT SERPL-CCNC: 5 UNIT/L (ref 0–55)
ANION GAP SERPL CALC-SCNC: 10 MMOL/L
AST SERPL-CCNC: 8 UNIT/L (ref 5–34)
BASOPHILS # BLD AUTO: 0.1 X10(3)/MCL (ref 0–0.2)
BASOPHILS NFR BLD AUTO: 1.3 %
BILIRUB SERPL-MCNC: 0.8 MG/DL
BILIRUBIN DIRECT+TOT PNL SERPL-MCNC: 0.2 MG/DL (ref 0–0.5)
BILIRUBIN DIRECT+TOT PNL SERPL-MCNC: 0.6 MG/DL (ref 0–0.8)
BUN SERPL-MCNC: 9 MG/DL (ref 8–26)
CHLORIDE SERPL-SCNC: 106 MMOL/L (ref 98–109)
CREAT SERPL-MCNC: 0.5 MG/DL (ref 0.6–1.3)
EOSINOPHIL # BLD AUTO: 0 X10(3)/MCL (ref 0–0.9)
EOSINOPHIL NFR BLD AUTO: 0.5 %
ERYTHROCYTE [DISTWIDTH] IN BLOOD BY AUTOMATED COUNT: 22.3 % (ref 11.5–17)
GLUCOSE SERPL-MCNC: 98 MG/DL (ref 70–105)
HCT VFR BLD AUTO: 28.8 % (ref 37–47)
HCT VFR BLD CALC: 30 % (ref 38–51)
HGB BLD-MCNC: 10.2 MG/DL (ref 12–17)
HGB BLD-MCNC: 8.2 GM/DL (ref 12–16)
LIVER PROFILE INTERP: ABNORMAL
LYMPHOCYTES # BLD AUTO: 1.6 X10(3)/MCL (ref 0.6–4.6)
LYMPHOCYTES NFR BLD AUTO: 14.7 %
MCH RBC QN AUTO: 21.2 PG (ref 27–31)
MCHC RBC AUTO-ENTMCNC: 28.5 GM/DL (ref 33–36)
MCV RBC AUTO: 74.6 FL (ref 80–94)
MONOCYTES # BLD AUTO: 1 X10(3)/MCL (ref 0.1–1.3)
MONOCYTES NFR BLD AUTO: 9.6 %
NEUTROPHILS # BLD AUTO: 7.9 X10(3)/MCL (ref 2.1–9.2)
NEUTROPHILS NFR BLD AUTO: 73.3 %
PLATELET # BLD AUTO: 456 X10(3)/MCL (ref 130–400)
PMV BLD AUTO: 9.3 FL (ref 9.4–12.4)
POC IONIZED CALCIUM: 1.42 MMOL/L (ref 1.12–1.32)
POC TCO2: 24 MMOL/L (ref 24–29)
POTASSIUM BLD-SCNC: 4.3 MMOL/L (ref 3.5–4.9)
PROT SERPL-MCNC: 7.1 GM/DL (ref 5.8–7.6)
RBC # BLD AUTO: 3.86 X10(6)/MCL (ref 4.2–5.4)
SODIUM BLD-SCNC: 134 MMOL/L (ref 138–146)
WBC # SPEC AUTO: 10.8 X10(3)/MCL (ref 4.5–11.5)

## 2020-04-21 ENCOUNTER — HISTORICAL (OUTPATIENT)
Dept: INFUSION THERAPY | Facility: HOSPITAL | Age: 65
End: 2020-04-21

## 2020-04-24 ENCOUNTER — HISTORICAL (OUTPATIENT)
Dept: RADIOLOGY | Facility: HOSPITAL | Age: 65
End: 2020-04-24

## 2020-05-12 ENCOUNTER — HISTORICAL (OUTPATIENT)
Dept: ADMINISTRATIVE | Facility: HOSPITAL | Age: 65
End: 2020-05-12

## 2020-05-12 LAB
ABS NEUT (OLG): 4.86 X10(3)/MCL (ref 2.1–9.2)
ALBUMIN SERPL-MCNC: 3 GM/DL (ref 3.4–4.8)
ALBUMIN/GLOB SERPL: 0.9 RATIO (ref 1.1–2)
ALP SERPL-CCNC: 128 UNIT/L (ref 40–150)
ALT SERPL-CCNC: <5 UNIT/L (ref 0–55)
AST SERPL-CCNC: 10 UNIT/L (ref 5–34)
BASOPHILS # BLD AUTO: 0.1 X10(3)/MCL (ref 0–0.2)
BASOPHILS NFR BLD AUTO: 0.8 %
BILIRUB SERPL-MCNC: 0.4 MG/DL
BILIRUBIN DIRECT+TOT PNL SERPL-MCNC: 0.2 MG/DL (ref 0–0.5)
BILIRUBIN DIRECT+TOT PNL SERPL-MCNC: 0.2 MG/DL (ref 0–0.8)
BUN SERPL-MCNC: 8.8 MG/DL (ref 9.8–20.1)
CALCIUM SERPL-MCNC: 9.2 MG/DL (ref 8.4–10.2)
CHLORIDE SERPL-SCNC: 107 MMOL/L (ref 98–107)
CO2 SERPL-SCNC: 24 MMOL/L (ref 23–31)
CREAT SERPL-MCNC: 0.59 MG/DL (ref 0.55–1.02)
EOSINOPHIL # BLD AUTO: 0.7 X10(3)/MCL (ref 0–0.9)
EOSINOPHIL NFR BLD AUTO: 8.9 %
ERYTHROCYTE [DISTWIDTH] IN BLOOD BY AUTOMATED COUNT: 21.3 % (ref 11.5–17)
GLOBULIN SER-MCNC: 3.4 GM/DL (ref 2.4–3.5)
GLUCOSE SERPL-MCNC: 77 MG/DL (ref 82–115)
HCT VFR BLD AUTO: 28 % (ref 37–47)
HGB BLD-MCNC: 7.9 GM/DL (ref 12–16)
LYMPHOCYTES # BLD AUTO: 1.4 X10(3)/MCL (ref 0.6–4.6)
LYMPHOCYTES NFR BLD AUTO: 18.9 %
MCH RBC QN AUTO: 20.9 PG (ref 27–31)
MCHC RBC AUTO-ENTMCNC: 28.2 GM/DL (ref 33–36)
MCV RBC AUTO: 74.1 FL (ref 80–94)
MONOCYTES # BLD AUTO: 0.6 X10(3)/MCL (ref 0.1–1.3)
MONOCYTES NFR BLD AUTO: 7.6 %
NEUTROPHILS # BLD AUTO: 4.9 X10(3)/MCL (ref 2.1–9.2)
NEUTROPHILS NFR BLD AUTO: 63.7 %
PLATELET # BLD AUTO: 414 X10(3)/MCL (ref 130–400)
PMV BLD AUTO: 10.4 FL (ref 9.4–12.4)
POTASSIUM SERPL-SCNC: 3.8 MMOL/L (ref 3.5–5.1)
PROT SERPL-MCNC: 6.4 GM/DL (ref 5.8–7.6)
RBC # BLD AUTO: 3.78 X10(6)/MCL (ref 4.2–5.4)
SODIUM SERPL-SCNC: 139 MMOL/L (ref 136–145)
WBC # SPEC AUTO: 7.6 X10(3)/MCL (ref 4.5–11.5)

## 2020-05-18 ENCOUNTER — HISTORICAL (OUTPATIENT)
Dept: CARDIOLOGY | Facility: HOSPITAL | Age: 65
End: 2020-05-18

## 2020-05-18 LAB
APTT PPP: 34.9 SECOND(S) (ref 23.2–33.7)
INR PPP: 1 (ref 0–1.3)
PROTHROMBIN TIME: 13.1 SECOND(S) (ref 11.1–13.7)

## 2020-05-26 ENCOUNTER — HISTORICAL (OUTPATIENT)
Dept: ADMINISTRATIVE | Facility: HOSPITAL | Age: 65
End: 2020-05-26

## 2020-05-27 ENCOUNTER — HISTORICAL (OUTPATIENT)
Dept: ADMINISTRATIVE | Facility: HOSPITAL | Age: 65
End: 2020-05-27

## 2020-05-27 LAB
ABS NEUT (OLG): 5.34 X10(3)/MCL (ref 2.1–9.2)
ALBUMIN SERPL-MCNC: 3 GM/DL (ref 3.4–5)
ALBUMIN/GLOB SERPL: 0.9 RATIO (ref 1.1–2)
ALP SERPL-CCNC: 138 UNIT/L (ref 40–150)
ALT SERPL-CCNC: 7 UNIT/L (ref 0–55)
AST SERPL-CCNC: 12 UNIT/L (ref 5–34)
BASOPHILS # BLD AUTO: 0 X10(3)/MCL (ref 0–0.2)
BASOPHILS NFR BLD AUTO: 0.5 %
BILIRUB SERPL-MCNC: 0.5 MG/DL
BILIRUBIN DIRECT+TOT PNL SERPL-MCNC: 0.2 MG/DL (ref 0–0.5)
BILIRUBIN DIRECT+TOT PNL SERPL-MCNC: 0.3 MG/DL (ref 0–0.8)
BUN SERPL-MCNC: 9.6 MG/DL (ref 9.8–20.1)
CALCIUM SERPL-MCNC: 9.2 MG/DL (ref 8.4–10.2)
CHLORIDE SERPL-SCNC: 104 MMOL/L (ref 98–107)
CO2 SERPL-SCNC: 22 MMOL/L (ref 23–31)
CREAT SERPL-MCNC: 0.63 MG/DL (ref 0.55–1.02)
EOSINOPHIL # BLD AUTO: 0.3 X10(3)/MCL (ref 0–0.9)
EOSINOPHIL NFR BLD AUTO: 4.3 %
ERYTHROCYTE [DISTWIDTH] IN BLOOD BY AUTOMATED COUNT: 20.8 % (ref 11.5–17)
GLOBULIN SER-MCNC: 3.5 GM/DL (ref 2.4–3.5)
GLUCOSE SERPL-MCNC: 97 MG/DL (ref 82–115)
HCT VFR BLD AUTO: 28.4 % (ref 37–47)
HGB BLD-MCNC: 8.2 GM/DL (ref 12–16)
LYMPHOCYTES # BLD AUTO: 1.3 X10(3)/MCL (ref 0.6–4.6)
LYMPHOCYTES NFR BLD AUTO: 17.2 %
MCH RBC QN AUTO: 21.1 PG (ref 27–31)
MCHC RBC AUTO-ENTMCNC: 28.9 GM/DL (ref 33–36)
MCV RBC AUTO: 73.2 FL (ref 80–94)
MONOCYTES # BLD AUTO: 0.4 X10(3)/MCL (ref 0.1–1.3)
MONOCYTES NFR BLD AUTO: 5.9 %
NEUTROPHILS # BLD AUTO: 5.3 X10(3)/MCL (ref 2.1–9.2)
NEUTROPHILS NFR BLD AUTO: 71.8 %
PLATELET # BLD AUTO: 375 X10(3)/MCL (ref 130–400)
PMV BLD AUTO: 9.2 FL (ref 9.4–12.4)
POTASSIUM SERPL-SCNC: 3.7 MMOL/L (ref 3.5–5.1)
PROT SERPL-MCNC: 6.5 GM/DL (ref 5.8–7.6)
RBC # BLD AUTO: 3.88 X10(6)/MCL (ref 4.2–5.4)
SODIUM SERPL-SCNC: 136 MMOL/L (ref 136–145)
WBC # SPEC AUTO: 7.4 X10(3)/MCL (ref 4.5–11.5)

## 2020-06-02 ENCOUNTER — HISTORICAL (OUTPATIENT)
Dept: RADIOLOGY | Facility: HOSPITAL | Age: 65
End: 2020-06-02

## 2020-06-09 ENCOUNTER — HISTORICAL (OUTPATIENT)
Dept: INFUSION THERAPY | Facility: HOSPITAL | Age: 65
End: 2020-06-09

## 2020-06-09 LAB
ABS NEUT (OLG): 8.12 X10(3)/MCL (ref 2.1–9.2)
ALBUMIN SERPL-MCNC: 3.2 GM/DL (ref 3.4–5)
ALBUMIN/GLOB SERPL: 1 RATIO (ref 1.1–2)
ALP SERPL-CCNC: 106 UNIT/L (ref 40–150)
ALT SERPL-CCNC: 5 UNIT/L (ref 0–55)
AST SERPL-CCNC: 9 UNIT/L (ref 5–34)
BASOPHILS # BLD AUTO: 0 X10(3)/MCL (ref 0–0.2)
BASOPHILS NFR BLD AUTO: 0.1 %
BILIRUB SERPL-MCNC: 0.7 MG/DL
BILIRUBIN DIRECT+TOT PNL SERPL-MCNC: 0.2 MG/DL (ref 0–0.5)
BILIRUBIN DIRECT+TOT PNL SERPL-MCNC: 0.5 MG/DL (ref 0–0.8)
BUN SERPL-MCNC: 8.7 MG/DL (ref 9.8–20.1)
CALCIUM SERPL-MCNC: 9.2 MG/DL (ref 8.4–10.2)
CHLORIDE SERPL-SCNC: 105 MMOL/L (ref 98–107)
CO2 SERPL-SCNC: 24 MMOL/L (ref 23–31)
CREAT SERPL-MCNC: 0.52 MG/DL (ref 0.55–1.02)
EOSINOPHIL # BLD AUTO: 0.1 X10(3)/MCL (ref 0–0.9)
EOSINOPHIL NFR BLD AUTO: 0.7 %
ERYTHROCYTE [DISTWIDTH] IN BLOOD BY AUTOMATED COUNT: 20.4 % (ref 11.5–17)
GLOBULIN SER-MCNC: 3.2 GM/DL (ref 2.4–3.5)
GLUCOSE SERPL-MCNC: 76 MG/DL (ref 82–115)
HCT VFR BLD AUTO: 30 % (ref 37–47)
HGB BLD-MCNC: 8.5 GM/DL (ref 12–16)
LYMPHOCYTES # BLD AUTO: 1 X10(3)/MCL (ref 0.6–4.6)
LYMPHOCYTES NFR BLD AUTO: 10.8 %
MCH RBC QN AUTO: 20.5 PG (ref 27–31)
MCHC RBC AUTO-ENTMCNC: 28.3 GM/DL (ref 33–36)
MCV RBC AUTO: 72.5 FL (ref 80–94)
MONOCYTES # BLD AUTO: 0.4 X10(3)/MCL (ref 0.1–1.3)
MONOCYTES NFR BLD AUTO: 4.2 %
NEUTROPHILS # BLD AUTO: 8.1 X10(3)/MCL (ref 2.1–9.2)
NEUTROPHILS NFR BLD AUTO: 83.9 %
PLATELET # BLD AUTO: 414 X10(3)/MCL (ref 130–400)
PMV BLD AUTO: 9.3 FL (ref 9.4–12.4)
POTASSIUM SERPL-SCNC: 2.9 MMOL/L (ref 3.5–5.1)
PROT SERPL-MCNC: 6.4 GM/DL (ref 5.8–7.6)
RBC # BLD AUTO: 4.14 X10(6)/MCL (ref 4.2–5.4)
SODIUM SERPL-SCNC: 140 MMOL/L (ref 136–145)
WBC # SPEC AUTO: 9.7 X10(3)/MCL (ref 4.5–11.5)

## 2020-06-23 NOTE — TELEPHONE ENCOUNTER
----- Message from Fernando Silvestre MD sent at 6/23/2020 10:06 AM CDT -----  Pt wanted to be switched to virtual visit for 6/29, would prefer video with visit since it has been long time since I had seen her. She will still need to do labs at local lab prior, please coordinate with her, thanks!  Xavier Silvestre MD

## 2020-06-29 NOTE — Clinical Note
- Plan for Gemcitabine/docetaxel and OBI Neulasta on 7/6/20  - Plan to transfuse one unit of pRBC on Monday morning of next week  - see next week with cBC, CMP prior as double book at 1 pm  Please call patient with appointment times thanks. If she prefers labs from port, I have placed both types of orders for labs    FYI For Dr. Lund and Deisy

## 2020-06-29 NOTE — Clinical Note
Would also like to check a type and screen and ferritin next week  labs on Monday; Also she will need COVID rapid testing scheduling prior to infusion on Monday FYI to Dr. Lund and Deisy

## 2020-06-29 NOTE — Clinical Note
Hi Tina Sanz,  We were trying to get gemcitabine/docetaxel and OBI neulasta approved for this patient as soon as possible. Cc'ing Dr. Lund and Deisy

## 2020-06-29 NOTE — PROGRESS NOTES
PATIENT: Tiffany Kaur  MRN: [id number]  DATE: 6/30/2020      Diagnosis:   1. Primary intra-abdominal sarcoma        Chief Complaint: Primary intra-abdominal sarcoma      Oncologic History:   Low Grade Sarcoma  - 20 cm mass noted in outside CT abdomen/pelvis; L ureter noted to be have left sided hydronephrosis and noted to have some mild inguinal adneopathy  - PET-CT on 3/27 reveals a large anterior abdominal mass, low activity sarcoma without significant metastatic spread, SUV max of 4.7   - Inpatient AIM C1 (4/2/19 - 4/5/19) completed  (adrimaycin, ifosfomide, mesna) next week given symptoms above, discussed palliative intent of therapy  - Ifosfomide dropped on day 3 due to neurotoxicity  - repeat PET-CT on 4/22 with large abdominal mass with SUV max of 3.9   - C2 on 5/7 - 5/10   - PET-CT on 5/20 with  Large anterior abdoinal mass SUV 8 (previously 3.9), left lower pulmonary lesion (prior SUV of 2.3, now 2.5)  - continue pazopanib      Pt is a 64 yo  female with PMhx of SVT (possible AFib), chronic IBS (describes a history of suffering from constipation as a child), HTN who presents to the hospital to discuss further options at treating recently found low grade sarcoma in her abdomen, workup for this which was started in [facility]. She was referred by a Dr. Rosa, who is a general surgeon in the [place] area.  She had not been able to get healthcare for a while as she was not enrolled in her 's plan through the  but has been able to get enrolled in Medicare since December.     She started to have abdominal bloating in mid-December 2018. Initially, she had attributed this problem to baseline IBS and was treated at that time with various combinations of Senna, Miralax, suppositories, and enema. Additionally, she lost ~45lbs over the course of one month (174lb to 130lb) which she reports was primarily due to diffuse prandial-associated abdominal pain described mostly as  cramping sensation. During this time she could only tolerated clear liquids, such as Jello and broth.      Her workup in mid-December started with an ultrasound which revealed cholelithiasis and large pelvic mass, therefore, follow-up of CT abdomen/pelvis was completed on 01/06/19. Origin of her mass has been unlcear, but she was found to have a 20 cm abdominal mass and a picture of chronic severe left hydronephrosis. She had a follow-up CT guided core biopsy on 02/06/19 of abdominal mass which revealed a low grade sarcoma, which has been verified by our pathologists as well. *See clinic oncology note for uploaded report.*     In addition, she has a 3-4 cm x 2 cm lesion on the posterior right shoulder and right lateral neck region with some vascularity, bullous with crusting. She notes that this lesion had been present for eight years and endorses some pruritus and periods of resolution, denies any pain or bleeding with the lesion.       She presented to outpatient clinic here on 03/27/2019 as a referral from [facility] to discuss further treatment options. Plan made for in-patient admission for urology evaluation and possible initiation of chemotherapy.         In addition, the pt has a 3-4 cm x 2 cm lesion on the posterior R shoulder and R lateral neck region with some vascularity, bullous with crusting which the pt has not had follow-up for as of this visit. She notes that this lesion had been present for eight years and endorses some pruritus and periods of resolution, denies any pain or bleeding with the lesion.       Pt had cologuard completed for colorectal cancer screening on December 2018, which was negative for malignancy.      She presented to [facility] 4/1/19 for planned in-patient management of her L-sided hydronephrosis and initiation of AIM chemotherapy. Following better control of her pain, her appetite improved and she was able to increase her food intake and regain weight (up to 149lbs).   Admission labs notable for microcytic anemia with low iron studies and slight hypercalcemia (10.8 corrected). Evaluated by urology on day of admission. Based on assessment of prior imaging, it was determined that, based on hydronephrosis and degree of atrophy, the function of the left kidney was not salvageable and that risk of nephrostomy tube or stent outweighed benefit. She had a PICC line placed on day of admission. Chemotherapy was initiated on 04/02/19 with Doxorubucin, Ifosfamide, and Mesnex. Symptoms during chemotherapy initiation included intermittent abdominal pain and nausea that were controlled with PRN Zofran and Oxycodone. Patient noted to develop new onset issues with fine motor movement of fingers on 04/04; out of concern for neurological toxicity, Ifosfamide was discontinued with symptom resolution. On 04/05, she developed increased urinary frequency with lower back pain with radiation around the left hip; UA ordered, but negative for infection. Pain resolved after dose of Gabapentin and applying heating pads, suspect could be secondary to radiculopathy from tumor burden. Neulasta was administered on day after discharge.     The pt had shedding of hair after first week of chemohterapy but then completely lost the rest of it. Her abdominal pain has been well controlled at home. Pt has had pain and ringing in ears, worse on the R side; she suspects that she is having an ear infection; she was prescribed a course of augmentin for this problem. . Her neuropathy has been stable on gabapentin and hand tremors have become much better/ resolved for most part.      Has been out of pazopanib for about two weeks at this point, as there was a problem with prior information given to us regarding approval of med.  This medication was approved at no cost to patient through [employer] Pt assistance program in the past, was supposed to be from 6/25 - 12/31/18. Discussed informing us about 1-2 weeks prior to running  out of these refills in future.     Pt described some intermittent nausea/vomiting on pazopanib, which has not occurred since at least a week. She had it well controlled with taking compazine during the episodes.     Ms. Neely was living close to the [facility] and so was following up with Dr. Erwin locally.   Nausea has been well controlled with compazine and pain is mostly 4/10 after pain meds are used; currently she in on Morphine 30 mg TID.      Calcium of around 11 recently, stable. Noted to be 11 again today on our labs.      Back pain noted around mostly lower back and also some tremors of hands. Discussed that back pain was likely secondary to size of tumor and tremors of hand, which maybe from prior chemotherapy although the incidence of it is largely unknown. She is able to ambulate well without many problems usually.     Her legs have had bilateral swelling but these will apparently get much better with reclining and keeping them elevated.      We also discussed possibility of an [facility] visit to discuss a clinical trial as well.     Subjective:   She ahd two additional doses of doxorbicin given to her around January...received a total of 300 mg/m2 of doxorubicin.  SHe had slow progression on pazopanib,  In between pt had a lung biopsy which revealed an infecitous etiology and growing intra-abdominal mass that is known to be sarcoma.     Pt was swtiched to Gemcitabine/docetaxel due to progression of disease...pt had gemcit    Hgb of 7.8 today. Pt gets shortenss of breath with exertion, with around . Pt had taken doxycycline and this had improved. We will give her a unit of blood next Monday..She will need irion     She fell on her face three weeks ago; this may have been a syncopal event ('passed out') and this was a first event.   Pt may have had the fall after the first dose of chemotherapy.   She can walk throughout the house, without any assistance.    LAst PET-CT was 4-5 weeks ago. We  discussed edema in lower extremities, due to problems with lymphatic drainage, which has been chronic. Recent Echo in the past month was normal.     Past Medical History:   Past Medical History:   Diagnosis Date    Cancer     Gallstones     GERD (gastroesophageal reflux disease)     Hypertension     SVT (supraventricular tachycardia)        Past Surgical HIstory:   Past Surgical History:   Procedure Laterality Date     SECTION      X3    HYSTERECTOMY      Partial    TUBAL LIGATION         Family History:   Family History   Problem Relation Age of Onset    Lung disease Mother     Pancreatic cancer Father     No Known Problems Sister     Hypertension Brother     No Known Problems Maternal Aunt     No Known Problems Maternal Uncle     No Known Problems Paternal Aunt     No Known Problems Paternal Uncle     No Known Problems Maternal Grandmother     No Known Problems Maternal Grandfather     No Known Problems Paternal Grandmother     No Known Problems Paternal Grandfather     Melanoma Daughter     No Known Problems Daughter     No Known Problems Son        Social History:  reports that she has never smoked. She has never used smokeless tobacco. She reports that she does not drink alcohol or use drugs.    Allergies:  Review of patient's allergies indicates:  No Known Allergies    Medications:  Current Outpatient Medications   Medication Sig Dispense Refill    amLODIPine (NORVASC) 5 MG tablet Take 1 tablet (5 mg total) by mouth once daily. 30 tablet 1    citalopram (CELEXA) 20 MG tablet Take 1 tablet (20 mg total) by mouth once daily. (Patient taking differently: Take 10 mg by mouth once daily. ) 30 tablet 2    diphenhydrAMINE-aluminum-magnesium hydroxide-simethicone-lidocaine HCl 2% Swish and spit 15 mLs every 4 (four) hours as needed. 300 mL 0    furosemide (LASIX) 40 MG tablet Take 40 mg by mouth once daily.      gabapentin (NEURONTIN) 300 MG capsule Take 1 capsule (300 mg total) by  mouth every evening. Take one pill (300 mg at night), can take up to two a night (600 mg) if tolerated. 120 capsule 2    ondansetron (ZOFRAN-ODT) 4 MG TbDL Take 2 tablets (8 mg total) by mouth every 8 (eight) hours as needed. 60 tablet 1    oxyCODONE-acetaminophen (PERCOCET)  mg per tablet Take 1 tablet by mouth every 6 (six) hours as needed for Pain. 120 tablet 0    polyethylene glycol (GLYCOLAX) 17 gram/dose powder Mix 1 capful (17 g) with liquid and take by mouth daily as needed. 510 g 0    potassium chloride (KLOR-CON) 10 MEQ TbSR Take 20 mEq by mouth once daily.      pramipexole (MIRAPEX) 0.125 MG tablet Take 2 hours before bedtime, for RLS. Titrate to (0.25 mg, 0.5 mg daily) after tolerating one week of prior dose. Max of 0.5 mg nightly. 60 tablet 1    prochlorperazine (COMPAZINE) 5 MG tablet Take 1 tablet (5 mg total) by mouth 3 (three) times daily as needed for Nausea. 90 tablet 1    zolpidem (AMBIEN) 5 MG Tab Take 1 tablet (5 mg total) by mouth every evening. 30 tablet 0    magic mouthwash diphen/antac/lidoc Swish and spit 15 mLs every 4 (four) hours as needed. (Patient not taking: Reported on 6/29/2020) 300 mL 0    morphine (MS CONTIN) 30 MG 12 hr tablet Take 2 tablets (60 mg total) by mouth 2 (two) times daily. 20 tablet 0     No current facility-administered medications for this visit.        Review of Systems   Constitutional: Negative for appetite change, chills and fever.   HENT: Negative for sore throat.    Eyes: Negative for visual disturbance.   Respiratory: Negative for cough, chest tightness, shortness of breath and wheezing.    Cardiovascular: Negative for chest pain and leg swelling.   Gastrointestinal: Positive for abdominal pain. Negative for blood in stool, diarrhea and rectal pain.   Genitourinary: Negative for dysuria.   Musculoskeletal: Positive for back pain. Negative for gait problem.   Skin: Negative for rash.   Neurological: Positive for syncope. Negative for headaches.  "  Hematological: Negative for adenopathy. Does not bruise/bleed easily.       ECOG Performance Status: 1   Objective:      Vitals:   Vitals:    06/29/20 1231   BP: (!) 132/56   BP Location: Left arm   Patient Position: Sitting   BP Method: Medium (Automatic)   Pulse: 88   Resp: 18   SpO2: 98%   Weight: 72.3 kg (159 lb 6.3 oz)   Height: 5' 4" (1.626 m)       Physical Exam  Constitutional:       General: She is not in acute distress.     Appearance: She is well-developed. She is not diaphoretic.   HENT:      Head: Normocephalic and atraumatic.      Mouth/Throat:      Pharynx: No oropharyngeal exudate.   Eyes:      General: No scleral icterus.     Pupils: Pupils are equal, round, and reactive to light.   Neck:      Musculoskeletal: Normal range of motion and neck supple.   Cardiovascular:      Rate and Rhythm: Normal rate and regular rhythm.      Heart sounds: Normal heart sounds. No murmur. No friction rub. No gallop.    Pulmonary:      Effort: Pulmonary effort is normal. No respiratory distress.      Breath sounds: Normal breath sounds. No wheezing or rales.   Abdominal:      Palpations: There is mass.      Tenderness: There is abdominal tenderness. There is no guarding.      Comments: Firm, mid-abdomen ; central abdominal bruit   Musculoskeletal: Normal range of motion.      Comments: 2+ pitting edema bilatearlly   Lymphadenopathy:      Cervical: No cervical adenopathy.   Skin:     General: Skin is warm and dry.      Findings: No rash.   Neurological:      Mental Status: She is alert and oriented to person, place, and time.      Cranial Nerves: No cranial nerve deficit.         Laboratory Data:  Lab Visit on 06/29/2020   Component Date Value Ref Range Status    WBC 06/29/2020 9.25  3.90 - 12.70 K/uL Final    RBC 06/29/2020 3.84* 4.00 - 5.40 M/uL Final    Hemoglobin 06/29/2020 7.8* 12.0 - 16.0 g/dL Final    Hematocrit 06/29/2020 28.7* 37.0 - 48.5 % Final    Mean Corpuscular Volume 06/29/2020 75* 82 - 98 fL Final "    Mean Corpuscular Hemoglobin 06/29/2020 20.3* 27.0 - 31.0 pg Final    Mean Corpuscular Hemoglobin Conc 06/29/2020 27.2* 32.0 - 36.0 g/dL Final    RDW 06/29/2020 21.5* 11.5 - 14.5 % Final    Platelets 06/29/2020 562* 150 - 350 K/uL Final    MPV 06/29/2020 10.5  9.2 - 12.9 fL Final    Immature Granulocytes 06/29/2020 0.5  0.0 - 0.5 % Final    Gran # (ANC) 06/29/2020 6.5  1.8 - 7.7 K/uL Final    Immature Grans (Abs) 06/29/2020 0.05* 0.00 - 0.04 K/uL Final    Comment: Mild elevation in immature granulocytes is non specific and   can be seen in a variety of conditions including stress response,   acute inflammation, trauma and pregnancy. Correlation with other   laboratory and clinical findings is essential.      Lymph # 06/29/2020 1.5  1.0 - 4.8 K/uL Final    Mono # 06/29/2020 0.9  0.3 - 1.0 K/uL Final    Eos # 06/29/2020 0.1  0.0 - 0.5 K/uL Final    Baso # 06/29/2020 0.09  0.00 - 0.20 K/uL Final    nRBC 06/29/2020 0  0 /100 WBC Final    Gran% 06/29/2020 70.2  38.0 - 73.0 % Final    Lymph% 06/29/2020 16.6* 18.0 - 48.0 % Final    Mono% 06/29/2020 10.2  4.0 - 15.0 % Final    Eosinophil% 06/29/2020 1.5  0.0 - 8.0 % Final    Basophil% 06/29/2020 1.0  0.0 - 1.9 % Final    Differential Method 06/29/2020 Automated   Final    Sodium 06/29/2020 135* 136 - 145 mmol/L Final    Potassium 06/29/2020 3.8  3.5 - 5.1 mmol/L Final    Chloride 06/29/2020 105  95 - 110 mmol/L Final    CO2 06/29/2020 22* 23 - 29 mmol/L Final    Glucose 06/29/2020 96  70 - 110 mg/dL Final    BUN, Bld 06/29/2020 12  8 - 23 mg/dL Final    Creatinine 06/29/2020 0.7  0.5 - 1.4 mg/dL Final    Calcium 06/29/2020 9.9  8.7 - 10.5 mg/dL Final    Total Protein 06/29/2020 7.0  6.0 - 8.4 g/dL Final    Albumin 06/29/2020 3.2* 3.5 - 5.2 g/dL Final    Total Bilirubin 06/29/2020 0.8  0.1 - 1.0 mg/dL Final    Comment: For infants and newborns, interpretation of results should be based  on gestational age, weight and in agreement with  clinical  observations.  Premature Infant recommended reference ranges:  Up to 24 hours.............<8.0 mg/dL  Up to 48 hours............<12.0 mg/dL  3-5 days..................<15.0 mg/dL  6-29 days.................<15.0 mg/dL      Alkaline Phosphatase 06/29/2020 122  55 - 135 U/L Final    AST 06/29/2020 10  10 - 40 U/L Final    ALT 06/29/2020 <5* 10 - 44 U/L Final    Anion Gap 06/29/2020 8  8 - 16 mmol/L Final    eGFR if African American 06/29/2020 >60.0  >60 mL/min/1.73 m^2 Final    eGFR if non African American 06/29/2020 >60.0  >60 mL/min/1.73 m^2 Final    Comment: Calculation used to obtain the estimated glomerular filtration  rate (eGFR) is the CKD-EPI equation.       Group & Rh 06/29/2020 O POS   Final    Indirect Chalo 06/29/2020 NEG   Final         Imaging:  Assessment:       1. Primary intra-abdominal sarcoma           Plan:     Low Grade Sarcoma  - ECOG PS 1  - 20 cm mass noted in CT abdomen/pelvis; L ureter noted to be have left sided hydronephresis and noted to have some mild inguinal adneopathy  - path reviewed at our institution also consistent with sarcoma as well  - Reviewed images and discussed with Dr. Mota previously, pt is not a surgical candidate at this time as significant amount of central vasculature (aorta, iliac arteries/veins) involved and significant amount of vascularity to mass  - PET-CT reveals a large anterior abdominal mass, low activity sarcoma without significant metastatic spread, SUV max of 4.7   - Echo with EF of 60%, normal LV systolic function and indeterminate diastolic function  - tumor causing pain in central abdomen as well as compression of L ureter and causing hydronephrosis  - Inpatient AIM C1 completed  (adrimaycin, ifosfomide, mesna) next week given symptoms above, discussed palliative intent of therapy  - went over risks/benefits/ side effects of chemotherapy and pt wishes to proceed with therapy, consent form signed and form submitted to be scanned into  system  - Ifosfomide dropped on day 3 due to neurotoxicity  - PET- CT on 4/22 results reviewed, suv max decreased to 3.9 for abdominal mass and necrosis of part of mass noted  - completed two chemo tx with doxorubicin, first round with ifosofomide as well   - PET-CT on 5/20 with  Large anterior abdominal mass SUV 3 (previously 3.9), left lower lobe pulmonary lesion (prior SUV of 2.3, now 2.5)  - discussed switching to pazopanib due to ease of administration and good option in control of disease; pt has done this chemotherapy since end of June 2019  - PET-CT from 9/16 with large abd mass (now with SUV max of 3, from 2.76) and soft tissue opacity in left lower lobe (SUV of 1.7 from 2.52)  - PET-CT from  Today, 12/16 with mass of SUV max 3.62 (slightly higher) and uptake of mass in LLL, noted to be less at 1.39 from 1.7   - Slow progression on pazopanib, then pt switched to gemcitabine/docetaxel; received gemcitabine D1 and then had a syncopal event   - shifted care from Dr. Francois [facility]  - will send plan for gemcitabine/docetaxel to begin on next Monday      Iron deficiency anemia  - Hemoglobin of 7.8, MCV 75; pt with still dyspnea on exertion  - transfuse 1 U of pRBC next week       BCC, R inferior neck  - lesion was biopsied, 6/10  - was supposed to follow-up but weather problems occurred around time of initial follow-up     Pain secondary to neoplasm  - will switch from MS Contin 30 TID to MS Contin 60 BID     Hypercalcemia of malignancy  - mild, stable around recent baseline of 10.6     Severe L sided hydronephrosis   - from external compression of large sarcoma  - left kidney very atrophic and not salvageable per urology     Neuropathy, possible L sided sciatica  - pt with lower back pain around site of tumor  - continue gabapentin 300 mg BID  - prior MRI lumbar spine in 10/2019 without any cord compression      HTN  - continue norvasc 5     SVT  - unclear of rhythm  - no AFib noted while pt was  admitted into hospital     IBS  - continue laxatives PRN - can use senna + miralax, while on pain meds     Discused with DR. Lund     Follow-up:   - Plan for Gemcitabine/docetaxel and OBI Neulasta on 7/6/20 after appointment  - Next PET-CT in about 2 months   - Plan to transfuse one unit of pRBC on Monday morning of next week  - see next week with cBC, CMP prior as double book at 1 pm     Fernando Silvestre MD  Hematology and Oncology, PGY V  [facility]      STAFF NOTE:  I have personally taken the history and examined this patient and agree with Dr. Silvestre's Note as stated above.

## 2020-06-30 NOTE — PLAN OF CARE
START ON PATHWAY REGIMEN - Sarcoma    [id number]        Gemcitabine (Gemzar)       Docetaxel (Taxotere)       [id number]           Additional Orders: Docetaxel is dose reduced per committee to improve   tolerability. Begin corticosteroid premedication prior to docetaxel initiation.    **Always confirm dose/schedule in your pharmacy ordering system**    Patient Characteristics:  Abdominal/Retroperitoneal Soft-Tissue Sarcoma, Unresectable/Metastatic, Third   Line and Beyond, RM/pMMR or MS Unknown  Histology/Anatomic Site: Common STS Histologies: Abdominal/Retroperitoneal  AJCC T Category: T4  AJCC N Category: N0  AJCC M Category: M0  AJCC 8 Stage Grouping: IB  AJCC Grade: GX  Therapeutic Status: Unresectable  Line of Therapy: Third Line and Beyond  Microsatellite/Mismatch Repair Status: Unknown  Intent of Therapy:  Non-Curative / Palliative Intent, Discussed with Patient

## 2020-07-01 NOTE — PROGRESS NOTES
This is the last note on the referral  UNABLE TO PROCESS REQUEST AT THIS TIME. A DUPLICATE REQUEST IS ALREADY ON FILE FROM A PREVIOUS PROVIDER. PER MARINO HAINES SHE IS UNABLE TO GIVE ME PROVIDERS NAME, A FAXED COPY, OR THE DATES OF SERVICE. A MESSAGE WAS LEFT WITH PREVIOUS PROVIDER TO CALL [facility] TO CANCEL PREVIOUS AUTHORIZATION. REF# 1:48 PM PST.

## 2020-07-06 NOTE — PLAN OF CARE
1030 Pt arrived for blood tx, confirmed w/ Dr. Silvestre would like patient to have. Labs reviewed. Meds, hx, axs, treatment plan reviewed with patient. Consent obtained. Pt never received blood in the past. Discussed risks/benefits. No major cardiac hx per pt, however, bilateral lower extremity edema noted +4. To monitor. Notified team of these findings. Premeds tylenol benadryl administered. Port from [place] in place, site accessed maintaining sterile technique. Flushing easily, blood return noted (though positional). Pt resting in chair. VSS. Blankets/snacks provided. WCTM pt closely.

## 2020-07-06 NOTE — PROGRESS NOTES
PATIENT: Tiffany Hendricks  MRN: [id number]  DATE: 7/7/2020      Diagnosis:   1. Primary intra-abdominal sarcoma    2. Bilateral lower extremity edema        Chief Complaint: Primary intra-abdominal sarcoma      Oncologic History:   Low Grade Sarcoma  - 20 cm mass noted in outside CT abdomen/pelvis; L ureter noted to be have left sided hydronephrosis and noted to have some mild inguinal adneopathy  - PET-CT on 3/27 reveals a large anterior abdominal mass, low activity sarcoma without significant metastatic spread, SUV max of 4.7   - Inpatient AIM C1 (4/2/19 - 4/5/19) completed  (adrimaycin, ifosfomide, mesna) next week given symptoms above, discussed palliative intent of therapy  - Ifosfomide dropped on day 3 due to neurotoxicity  - repeat PET-CT on 4/22 with large abdominal mass with SUV max of 3.9   - C2 on 5/7 - 5/10   - PET-CT on 5/20 with  Large anterior abdoinal mass SUV 8 (previously 3.9), left lower pulmonary lesion (prior SUV of 2.3, now 2.5)  - continue pazopanib      Pt is a 62 yo  female with PMhx of SVT (possible AFib), chronic IBS (describes a history of suffering from constipation as a child), HTN who presents to the hospital to discuss further options at treating recently found low grade sarcoma in her abdomen, workup for this which was started in [facility]. She was referred by a Dr. Rosa, who is a general surgeon in the [place] area.  She had not been able to get healthcare for a while as she was not enrolled in her 's plan through the  but has been able to get enrolled in Medicare since December.     She started to have abdominal bloating in mid-December 2018. Initially, she had attributed this problem to baseline IBS and was treated at that time with various combinations of Senna, Miralax, suppositories, and enema. Additionally, she lost ~45lbs over the course of one month (174lb to 130lb) which she reports was primarily due to diffuse prandial-associated  abdominal pain described mostly as cramping sensation. During this time she could only tolerated clear liquids, such as Jello and broth.      Her workup in mid-December started with an ultrasound which revealed cholelithiasis and large pelvic mass, therefore, follow-up of CT abdomen/pelvis was completed on 01/06/19. Origin of her mass has been unlcear, but she was found to have a 20 cm abdominal mass and a picture of chronic severe left hydronephrosis. She had a follow-up CT guided core biopsy on 02/06/19 of abdominal mass which revealed a low grade sarcoma, which has been verified by our pathologists as well. *See clinic oncology note for uploaded report.*     In addition, she has a 3-4 cm x 2 cm lesion on the posterior right shoulder and right lateral neck region with some vascularity, bullous with crusting. She notes that this lesion had been present for eight years and endorses some pruritus and periods of resolution, denies any pain or bleeding with the lesion.       She presented to outpatient clinic here on 03/27/2019 as a referral from [facility] to discuss further treatment options. Plan made for in-patient admission for urology evaluation and possible initiation of chemotherapy.         In addition, the pt has a 3-4 cm x 2 cm lesion on the posterior R shoulder and R lateral neck region with some vascularity, bullous with crusting which the pt has not had follow-up for as of this visit. She notes that this lesion had been present for eight years and endorses some pruritus and periods of resolution, denies any pain or bleeding with the lesion.       Pt had cologuard completed for colorectal cancer screening on December 2018, which was negative for malignancy.      She presented to [facility] 4/1/19 for planned in-patient management of her L-sided hydronephrosis and initiation of AIM chemotherapy. Following better control of her pain, her appetite improved and she was able to increase her food intake  and regain weight (up to 149lbs).  Admission labs notable for microcytic anemia with low iron studies and slight hypercalcemia (10.8 corrected). Evaluated by urology on day of admission. Based on assessment of prior imaging, it was determined that, based on hydronephrosis and degree of atrophy, the function of the left kidney was not salvageable and that risk of nephrostomy tube or stent outweighed benefit. She had a PICC line placed on day of admission. Chemotherapy was initiated on 04/02/19 with Doxorubucin, Ifosfamide, and Mesnex. Symptoms during chemotherapy initiation included intermittent abdominal pain and nausea that were controlled with PRN Zofran and Oxycodone. Patient noted to develop new onset issues with fine motor movement of fingers on 04/04; out of concern for neurological toxicity, Ifosfamide was discontinued with symptom resolution. On 04/05, she developed increased urinary frequency with lower back pain with radiation around the left hip; UA ordered, but negative for infection. Pain resolved after dose of Gabapentin and applying heating pads, suspect could be secondary to radiculopathy from tumor burden. Neulasta was administered on day after discharge.     The pt had shedding of hair after first week of chemohterapy but then completely lost the rest of it. Her abdominal pain has been well controlled at home. Pt has had pain and ringing in ears, worse on the R side; she suspects that she is having an ear infection; she was prescribed a course of augmentin for this problem. . Her neuropathy has been stable on gabapentin and hand tremors have become much better/ resolved for most part.      Has been out of pazopanib for about two weeks at this point, as there was a problem with prior information given to us regarding approval of med.  This medication was approved at no cost to patient through [employer] assistance program in the past, was supposed to be from 6/25 - 12/31/18. Discussed informing us  about 1-2 weeks prior to running out of these refills in future.     Pt described some intermittent nausea/vomiting on pazopanib, which has not occurred since at least a week. She had it well controlled with taking compazine during the episodes.      Ms. Neely was living close to the [facility] and so was following up with Dr. Erwin locally.   Nausea has been well controlled with compazine and pain is mostly 4/10 after pain meds are used; currently she in on Morphine 30 mg TID.      Calcium of around 11 recently, stable. Noted to be 11 again today on our labs.      Back pain noted around mostly lower back and also some tremors of hands. Discussed that back pain was likely secondary to size of tumor and tremors of hand, which maybe from prior chemotherapy although the incidence of it is largely unknown. She is able to ambulate well without many problems usually.     Her legs have had bilateral swelling but these will apparently get much better with reclining and keeping them elevated.      We also discussed possibility of an [facility] visit to discuss a clinical trial as well.     She ahd two additional doses of doxorbicin given to her around January...received a total of 300 mg/m2 of doxorubicin.  SHe had slow progression on pazopanib,  In between pt had a lung biopsy which revealed an infecitous etiology and growing intra-abdominal mass that is known to be sarcoma.      Pt was swtiched to Gemcitabine/docetaxel due to progression of disease...pt had gemcit     Hgb of 7.8 today. Pt gets shortenss of breath with exertion, with around . Pt had taken doxycycline and this had improved. We will give her a unit of blood next Monday..She will need irion      She fell on her face three weeks ago; this may have been a syncopal event ('passed out') and this was a first event.   Pt may have had the fall after the first dose of chemotherapy w Gemcitabine.   She can walk throughout the house, without any  assistance.     LAst PET-CT was 4-5 weeks ago. We discussed edema in lower extremities, due to problems with lymphatic drainage, which has been chronic. Recent Echo in the past month was normal.       Subjective:   Morphine 60 mg BID had helped the patient out with getting much pain control; she plans to continue this as she had monitored BP while on it and these numbers were stable.    She is having an abnormal node like sensation in left lower abdomen, discussed that there wouldn't likely be any intervention for this.     Discussed trying bumex for lower extremity edema. She tolerated one unit of prBC well earlier today.     Awaiting approval of Gemcitabine/docetaxel, was pending processing in coordination with her prior cancer center in [place]...    Past Medical History:   Past Medical History:   Diagnosis Date    Cancer     Gallstones     GERD (gastroesophageal reflux disease)     Hypertension     SVT (supraventricular tachycardia)        Past Surgical HIstory:   Past Surgical History:   Procedure Laterality Date     SECTION      X3    HYSTERECTOMY      Partial    TUBAL LIGATION         Family History:   Family History   Problem Relation Age of Onset    Lung disease Mother     Pancreatic cancer Father     No Known Problems Sister     Hypertension Brother     No Known Problems Maternal Aunt     No Known Problems Maternal Uncle     No Known Problems Paternal Aunt     No Known Problems Paternal Uncle     No Known Problems Maternal Grandmother     No Known Problems Maternal Grandfather     No Known Problems Paternal Grandmother     No Known Problems Paternal Grandfather     Melanoma Daughter     No Known Problems Daughter     No Known Problems Son        Social History:  reports that she has never smoked. She has never used smokeless tobacco. She reports that she does not drink alcohol or use drugs.    Allergies:  Review of patient's allergies indicates:  No Known  Allergies    Medications:  Current Outpatient Medications   Medication Sig Dispense Refill    amLODIPine (NORVASC) 5 MG tablet Take 1 tablet (5 mg total) by mouth once daily. 30 tablet 1    citalopram (CELEXA) 20 MG tablet Take 1 tablet (20 mg total) by mouth once daily. (Patient taking differently: Take 10 mg by mouth once daily. ) 30 tablet 2    gabapentin (NEURONTIN) 300 MG capsule Take 1 capsule (300 mg total) by mouth every evening. Take one pill (300 mg at night), can take up to two a night (600 mg) if tolerated. 120 capsule 2    ondansetron (ZOFRAN-ODT) 4 MG TbDL Take 2 tablets (8 mg total) by mouth every 8 (eight) hours as needed. 60 tablet 1    oxyCODONE-acetaminophen (PERCOCET)  mg per tablet Take 1 tablet by mouth every 6 (six) hours as needed for Pain. 120 tablet 0    polyethylene glycol (GLYCOLAX) 17 gram/dose powder Mix 1 capful (17 g) with liquid and take by mouth daily as needed. 510 g 0    potassium chloride (KLOR-CON) 10 MEQ TbSR Take 20 mEq by mouth once daily.      pramipexole (MIRAPEX) 0.125 MG tablet Take 2 hours before bedtime, for RLS. Titrate to (0.25 mg, 0.5 mg daily) after tolerating one week of prior dose. Max of 0.5 mg nightly. 60 tablet 1    prochlorperazine (COMPAZINE) 5 MG tablet Take 1 tablet (5 mg total) by mouth 3 (three) times daily as needed for Nausea. 90 tablet 1    zolpidem (AMBIEN) 5 MG Tab Take 1 tablet (5 mg total) by mouth every evening. 30 tablet 0    bumetanide (BUMEX) 1 MG tablet Take 1 tablet (1 mg total) by mouth once daily. 30 tablet 1    diphenhydrAMINE-aluminum-magnesium hydroxide-simethicone-lidocaine HCl 2% Swish and spit 15 mLs every 4 (four) hours as needed. (Patient not taking: Reported on 7/6/2020) 300 mL 0    magic mouthwash diphen/antac/lidoc Swish and spit 15 mLs every 4 (four) hours as needed. (Patient not taking: Reported on 6/29/2020) 300 mL 0    morphine (MS CONTIN) 60 MG 12 hr tablet Take 1 tablet (60 mg total) by mouth 2 (two)  "times daily. 60 tablet 0     Current Facility-Administered Medications   Medication Dose Route Frequency Provider Last Rate Last Dose    sodium chloride 0.9% flush 10 mL  10 mL Intravenous PRN Fernando Silvestre MD           Review of Systems   Constitutional: Negative for appetite change, chills and fever.   HENT: Negative for sore throat.    Eyes: Negative for visual disturbance.   Respiratory: Negative for cough, chest tightness, shortness of breath and wheezing.    Cardiovascular: Negative for chest pain and leg swelling.   Gastrointestinal: Positive for abdominal pain. Negative for blood in stool, diarrhea and rectal pain.   Genitourinary: Negative for dysuria.   Musculoskeletal: Positive for back pain. Negative for gait problem.   Skin: Negative for rash.   Neurological: Positive for syncope. Negative for headaches.   Hematological: Negative for adenopathy. Does not bruise/bleed easily.       ECOG Performance Status: 1   Objective:      Vitals:   Vitals:    07/06/20 1344   BP: 139/65   Pulse: 75   Temp: 97.6 °F (36.4 °C)   TempSrc: Oral   Weight: 72.7 kg (160 lb 4.4 oz)   Height: 5' 4" (1.626 m)       Physical Exam  Constitutional:       General: She is not in acute distress.     Appearance: She is well-developed. She is not diaphoretic.   HENT:      Head: Normocephalic and atraumatic.      Mouth/Throat:      Pharynx: No oropharyngeal exudate.   Eyes:      General: No scleral icterus.     Pupils: Pupils are equal, round, and reactive to light.   Neck:      Musculoskeletal: Normal range of motion and neck supple.   Cardiovascular:      Rate and Rhythm: Normal rate and regular rhythm.      Heart sounds: Normal heart sounds. No murmur. No friction rub. No gallop.    Pulmonary:      Effort: Pulmonary effort is normal. No respiratory distress.      Breath sounds: Normal breath sounds. No wheezing or rales.   Abdominal:      Palpations: There is mass.      Tenderness: There is abdominal tenderness. There is no " guarding.      Comments: Firm, mid-abdomen ; central abdominal bruit   Musculoskeletal: Normal range of motion.      Comments: 3+ pitting edema bilatearlly   Lymphadenopathy:      Cervical: No cervical adenopathy.   Skin:     General: Skin is warm and dry.      Findings: No rash.   Neurological:      Mental Status: She is alert and oriented to person, place, and time.      Cranial Nerves: No cranial nerve deficit.            Laboratory Data:  Infusion on 07/06/2020   Component Date Value Ref Range Status    UNIT NUMBER 07/06/2020 [id number]   Final    Product Code 07/06/2020 [id number]   Final    DISPENSE STATUS 07/06/2020 TRANSFUSED   Final    CODING SYSTEM 07/06/2020 [id number]   Final    Unit Blood Type Code 07/06/2020 5100   Final    Unit Blood Type 07/06/2020 O POS   Final    Unit Expiration 07/06/2020 [id number]   Final   Clinical Support on 07/06/2020   Component Date Value Ref Range Status    SARS-CoV-2 RNA, Amplification, Qual 07/06/2020 Negative  Negative Final    Comment: This test utilizes isothermal nucleic acid amplification   technology to detect the SARS-CoV-2 RdRp nucleic acid segment.   The analytical sensitivity (limit of detection) is 125 genome   equivalents/mL.   A POSITIVE result implies infection with the SARS-CoV-2 virus;  the patient is presumed to be contagious.    A NEGATIVE result means that SARS-CoV-2 nucleic acids are not  present above the limit of detection. A NEGATIVE result should be   treated as presumptive. It does not rule out the possibility of   COVID-19 and should not be the sole basis for treatment decisions.   If COVID-19 is strongly suspected based on clinical and exposure   history, re-testing using an alternate molecular assay should be   considered.   This test is only for use under the Food and Drug   Administration s Emergency Use Authorization (EUA).   Commercial kits are provided by [employer].   Performance characteristics of the EUA have been  independently  verified by [facility] Department o                           f  Pathology and Laboratory Medicine.   _________________________________________________________________  The ID NOW COVID-19 Letter of Authorization, along with the   authorized Fact Sheet for Healthcare Providers, the authorized Fact  Sheet for Patients, and authorized labeling are available on the FDA   website:  www.fda.gov/MedicalDevices/Safety/EmergencySituations/qxh495685.htm     Lab Visit on 07/06/2020   Component Date Value Ref Range Status    WBC 07/06/2020 7.42  3.90 - 12.70 K/uL Final    RBC 07/06/2020 3.93* 4.00 - 5.40 M/uL Final    Hemoglobin 07/06/2020 8.1* 12.0 - 16.0 g/dL Final    Hematocrit 07/06/2020 28.7* 37.0 - 48.5 % Final    Mean Corpuscular Volume 07/06/2020 73* 82 - 98 fL Final    Mean Corpuscular Hemoglobin 07/06/2020 20.6* 27.0 - 31.0 pg Final    Mean Corpuscular Hemoglobin Conc 07/06/2020 28.2* 32.0 - 36.0 g/dL Final    RDW 07/06/2020 21.0* 11.5 - 14.5 % Final    Platelets 07/06/2020 478* 150 - 350 K/uL Final    MPV 07/06/2020 10.6  9.2 - 12.9 fL Final    Immature Granulocytes 07/06/2020 2.8* 0.0 - 0.5 % Final    Gran # (ANC) 07/06/2020 4.3  1.8 - 7.7 K/uL Final    Immature Grans (Abs) 07/06/2020 0.21* 0.00 - 0.04 K/uL Final    Comment: Mild elevation in immature granulocytes is non specific and   can be seen in a variety of conditions including stress response,   acute inflammation, trauma and pregnancy. Correlation with other   laboratory and clinical findings is essential.      Lymph # 07/06/2020 1.6  1.0 - 4.8 K/uL Final    Mono # 07/06/2020 0.9  0.3 - 1.0 K/uL Final    Eos # 07/06/2020 0.3  0.0 - 0.5 K/uL Final    Baso # 07/06/2020 0.09  0.00 - 0.20 K/uL Final    nRBC 07/06/2020 0  0 /100 WBC Final    Gran% 07/06/2020 58.3  38.0 - 73.0 % Final    Lymph% 07/06/2020 22.0  18.0 - 48.0 % Final    Mono% 07/06/2020 11.7  4.0 - 15.0 % Final    Eosinophil% 07/06/2020 4.0  0.0 - 8.0  % Final    Basophil% 07/06/2020 1.2  0.0 - 1.9 % Final    Differential Method 07/06/2020 Automated   Final    Sodium 07/06/2020 137  136 - 145 mmol/L Final    Potassium 07/06/2020 4.0  3.5 - 5.1 mmol/L Final    Chloride 07/06/2020 106  95 - 110 mmol/L Final    CO2 07/06/2020 24  23 - 29 mmol/L Final    Glucose 07/06/2020 80  70 - 110 mg/dL Final    BUN, Bld 07/06/2020 12  8 - 23 mg/dL Final    Creatinine 07/06/2020 0.7  0.5 - 1.4 mg/dL Final    Calcium 07/06/2020 9.9  8.7 - 10.5 mg/dL Final    Total Protein 07/06/2020 7.0  6.0 - 8.4 g/dL Final    Albumin 07/06/2020 3.1* 3.5 - 5.2 g/dL Final    Total Bilirubin 07/06/2020 0.5  0.1 - 1.0 mg/dL Final    Comment: For infants and newborns, interpretation of results should be based  on gestational age, weight and in agreement with clinical  observations.  Premature Infant recommended reference ranges:  Up to 24 hours.............<8.0 mg/dL  Up to 48 hours............<12.0 mg/dL  3-5 days..................<15.0 mg/dL  6-29 days.................<15.0 mg/dL      Alkaline Phosphatase 07/06/2020 112  55 - 135 U/L Final    AST 07/06/2020 14  10 - 40 U/L Final    ALT 07/06/2020 6* 10 - 44 U/L Final    Anion Gap 07/06/2020 7* 8 - 16 mmol/L Final    eGFR if African American 07/06/2020 >60.0  >60 mL/min/1.73 m^2 Final    eGFR if non African American 07/06/2020 >60.0  >60 mL/min/1.73 m^2 Final    Comment: Calculation used to obtain the estimated glomerular filtration  rate (eGFR) is the CKD-EPI equation.       Ferritin 07/06/2020 10* 20.0 - 300.0 ng/mL Final    Group & Rh 07/06/2020 O POS   Final    Indirect Ch[place] 07/06/2020 NEG   Final         Imaging:reviewed     Assessment:       1. Primary intra-abdominal sarcoma    2. Bilateral lower extremity edema           Plan:     Low Grade Sarcoma  - ECOG PS 1  - 20 cm mass noted in CT abdomen/pelvis; L ureter noted to be have left sided hydronephresis and noted to have some mild inguinal adneopathy  - path reviewed  at our institution also consistent with sarcoma as well  - Reviewed images and discussed with Dr. Mota previously, pt is not a surgical candidate at this time as significant amount of central vasculature (aorta, iliac arteries/veins) involved and significant amount of vascularity to mass  - PET-CT reveals a large anterior abdominal mass, low activity sarcoma without significant metastatic spread, SUV max of 4.7   - Echo with EF of 60%, normal LV systolic function and indeterminate diastolic function  - tumor causing pain in central abdomen as well as compression of L ureter and causing hydronephrosis  - Inpatient AIM C1 completed  (adrimaycin, ifosfomide, mesna) next week given symptoms above, discussed palliative intent of therapy  - went over risks/benefits/ side effects of chemotherapy and pt wishes to proceed with therapy, consent form signed and form submitted to be scanned into system  - Ifosfomide dropped on day 3 due to neurotoxicity  - PET- CT on 4/22 results reviewed, suv max decreased to 3.9 for abdominal mass and necrosis of part of mass noted  - completed two chemo tx with doxorubicin, first round with ifosofomide as well   - PET-CT on 5/20 with  Large anterior abdominal mass SUV 3 (previously 3.9), left lower lobe pulmonary lesion (prior SUV of 2.3, now 2.5)  - discussed switching to pazopanib due to ease of administration and good option in control of disease; pt has done this chemotherapy since end of June 2019  - PET-CT from 9/16 with large abd mass (now with SUV max of 3, from 2.76) and soft tissue opacity in left lower lobe (SUV of 1.7 from 2.52)  - PET-CT from  -, 12/16 with mass of SUV max 3.62 (slightly higher) and uptake of mass in LLL, noted to be less at 1.39 from 1.7   - Slow progression on pazopanib, then pt switched to gemcitabine/docetaxel; received gemcitabine D1 and then had a syncopal event   - shifted care from Jo[facility] Kennedy  - Gemciatbine/docetaxel pending  approval..     Iron deficiency anemia  - Hemoglobin of 8.1, pt with DIA   - transfused 1 U of prBC today   - ferritin of 10 on 7/6/20, discussed iron transfusion previously      BCC, R inferior neck  - lesion was biopsied, 6/10  - was supposed to follow-up but weather problems occurred around time of initial follow-up     Pain secondary to neoplasm  - continue MS Contin 60 mg BID    Hypercalcemia of malignancy  - mild, stable around recent baseline of 10.7     Severe L sided hydronephrosis   - from external compression of large sarcoma  - left kidney very atrophic and not salvageable per urology     Neuropathy, possible L sided sciatica  - pt with lower back pain around site of tumor  - continue gabapentin 300 mg BID  - prior MRI lumbar spine in 10/2019 without any cord compression      HTN  - continue norvasc 5     SVT  - unclear of rhythm  - no AFib noted while pt was admitted into hospital     IBS  - continue laxatives PRN - can use senna + miralax, while on pain meds     Discused with DR. Lund     Follow-up:   - Plan for Gemcitabine/docetaxel D1 on 7/13 and OBI Neulasta on day 8 (7/20)   - Next PET-CT in about 2 months   - see next week with cBC, CMP prior and plan for chemo after visit    Fernando Silvestre MD  Hematology and Oncology, PGY V  [facility]    STAFF NOTE:  Reviewed treatment course and plan extensively with Dr. Silvestre, agree with his documentation above

## 2020-07-06 NOTE — Clinical Note
- Plan for Gemcitabine/D1 on 7/13 and  gem/docetaxel/neulasta OBI Neulasta on day 8 (7/20) - can do VV if needed for 7/20 but will still need labs prior CBC, CMP on 7.20/20  - see next week on 7/13 with cBC, CMP prior and plan for chemo after visit  Please call to confirm these appt times with patient  FYI for dr. Velazquez.

## 2020-07-06 NOTE — Clinical Note
See back next week with CBC< CMP prior, labs peripherally Also would like to do day 1 chemo with [facility] on same day

## 2020-07-06 NOTE — NURSING
Report received from CRISTOBAL Augustin RN. Patient completed 1 unit of prbcs. Port + blood return present, flushed, hep locked and deaccessed. Verbalized understanding to call MD for any questions/concerns. Discharged to MD appt, ambulated independently.

## 2020-07-13 NOTE — PLAN OF CARE
1510 pt here for gemzar infusion D1C1, consent signed today(pt has received before in [place])  now lives with daughter, labs, hx, meds, allergies reviewed, pt with no complaints at this time, claudia arrington chair, continue to monitor

## 2020-07-13 NOTE — PROGRESS NOTES
PATIENT: Tiffany Hendricks  MRN: [id number]  DATE: 7/14/2020      Diagnosis:   1. Primary intra-abdominal sarcoma        Chief Complaint: Primary intra-abdominal sarcoma      Oncologic History:   Low Grade Sarcoma  - 20 cm mass noted in outside CT abdomen/pelvis; L ureter noted to be have left sided hydronephrosis and noted to have some mild inguinal adneopathy  - PET-CT on 3/27 reveals a large anterior abdominal mass, low activity sarcoma without significant metastatic spread, SUV max of 4.7   - Inpatient AIM C1 (4/2/19 - 4/5/19) completed  (adrimaycin, ifosfomide, mesna) next week given symptoms above, discussed palliative intent of therapy  - Ifosfomide dropped on day 3 due to neurotoxicity  - repeat PET-CT on 4/22 with large abdominal mass with SUV max of 3.9   - C2 on 5/7 - 5/10   - PET-CT on 5/20 with  Large anterior abdoinal mass SUV 8 (previously 3.9), left lower pulmonary lesion (prior SUV of 2.3, now 2.5)  - continue pazopanib      Pt is a 64 yo  female with PMhx of SVT (possible AFib), chronic IBS (describes a history of suffering from constipation as a child), HTN who presents to the hospital to discuss further options at treating recently found low grade sarcoma in her abdomen, workup for this which was started in [facility]. She was referred by a Dr. Rosa, who is a general surgeon in the [place] area.  She had not been able to get healthcare for a while as she was not enrolled in her 's plan through the  but has been able to get enrolled in Medicare since December.     She started to have abdominal bloating in mid-December 2018. Initially, she had attributed this problem to baseline IBS and was treated at that time with various combinations of Senna, Miralax, suppositories, and enema. Additionally, she lost ~45lbs over the course of one month (174lb to 130lb) which she reports was primarily due to diffuse prandial-associated abdominal pain described mostly as  cramping sensation. During this time she could only tolerated clear liquids, such as Jello and broth.      Her workup in mid-December started with an ultrasound which revealed cholelithiasis and large pelvic mass, therefore, follow-up of CT abdomen/pelvis was completed on 01/06/19. Origin of her mass has been unlcear, but she was found to have a 20 cm abdominal mass and a picture of chronic severe left hydronephrosis. She had a follow-up CT guided core biopsy on 02/06/19 of abdominal mass which revealed a low grade sarcoma, which has been verified by our pathologists as well. *See clinic oncology note for uploaded report.*     In addition, she has a 3-4 cm x 2 cm lesion on the posterior right shoulder and right lateral neck region with some vascularity, bullous with crusting. She notes that this lesion had been present for eight years and endorses some pruritus and periods of resolution, denies any pain or bleeding with the lesion.       She presented to outpatient clinic here on 03/27/2019 as a referral from [facility] to discuss further treatment options. Plan made for in-patient admission for urology evaluation and possible initiation of chemotherapy.         In addition, the pt has a 3-4 cm x 2 cm lesion on the posterior R shoulder and R lateral neck region with some vascularity, bullous with crusting which the pt has not had follow-up for as of this visit. She notes that this lesion had been present for eight years and endorses some pruritus and periods of resolution, denies any pain or bleeding with the lesion.       Pt had cologuard completed for colorectal cancer screening on December 2018, which was negative for malignancy.      She presented to [facility] 4/1/19 for planned in-patient management of her L-sided hydronephrosis and initiation of AIM chemotherapy. Following better control of her pain, her appetite improved and she was able to increase her food intake and regain weight (up to 149lbs).   Admission labs notable for microcytic anemia with low iron studies and slight hypercalcemia (10.8 corrected). Evaluated by urology on day of admission. Based on assessment of prior imaging, it was determined that, based on hydronephrosis and degree of atrophy, the function of the left kidney was not salvageable and that risk of nephrostomy tube or stent outweighed benefit. She had a PICC line placed on day of admission. Chemotherapy was initiated on 04/02/19 with Doxorubucin, Ifosfamide, and Mesnex. Symptoms during chemotherapy initiation included intermittent abdominal pain and nausea that were controlled with PRN Zofran and Oxycodone. Patient noted to develop new onset issues with fine motor movement of fingers on 04/04; out of concern for neurological toxicity, Ifosfamide was discontinued with symptom resolution. On 04/05, she developed increased urinary frequency with lower back pain with radiation around the left hip; UA ordered, but negative for infection. Pain resolved after dose of Gabapentin and applying heating pads, suspect could be secondary to radiculopathy from tumor burden. Neulasta was administered on day after discharge.     The pt had shedding of hair after first week of chemohterapy but then completely lost the rest of it. Her abdominal pain has been well controlled at home. Pt has had pain and ringing in ears, worse on the R side; she suspects that she is having an ear infection; she was prescribed a course of augmentin for this problem. . Her neuropathy has been stable on gabapentin and hand tremors have become much better/ resolved for most part.      Has been out of pazopanib for about two weeks at this point, as there was a problem with prior information given to us regarding approval of med.  This medication was approved at no cost to patient through [employer] Pt assistance program in the past, was supposed to be from 6/25 - 12/31/18. Discussed informing us about 1-2 weeks prior to running  out of these refills in future.     Pt described some intermittent nausea/vomiting on pazopanib, which has not occurred since at least a week. She had it well controlled with taking compazine during the episodes.      Ms. Neely was living close to the [facility] and so was following up with Dr. Erwin locally.   Nausea has been well controlled with compazine and pain is mostly 4/10 after pain meds are used; currently she in on Morphine 30 mg TID.      Calcium of around 11 recently, stable. Noted to be 11 again today on our labs.      Back pain noted around mostly lower back and also some tremors of hands. Discussed that back pain was likely secondary to size of tumor and tremors of hand, which maybe from prior chemotherapy although the incidence of it is largely unknown. She is able to ambulate well without many problems usually.     Her legs have had bilateral swelling but these will apparently get much better with reclining and keeping them elevated.      We also discussed possibility of an [facility] visit to discuss a clinical trial as well.      She ahd two additional doses of doxorbicin given to her around January...received a total of 300 mg/m2 of doxorubicin.  SHe had slow progression on pazopanib,  In between pt had a lung biopsy which revealed an infecitous etiology and growing intra-abdominal mass that is known to be sarcoma.      Pt was swtiched to Gemcitabine/docetaxel due to progression of disease...pt had gemcitabine on day 1 and then syncopal event.      She fell on her face three weeks ago; this may have been a syncopal event ('passed out') and this was a first event.   Pt may have had the fall after the first dose of chemotherapy w Gemcitabine.   She can walk throughout the house, without any assistance.     LAst PET-CT was 4-5 weeks ago.     Morphine 60 mg BID has now helped with pain control. T    Discussed eventual IV Iron to help with underlying DELMI.     Subjective:    Hgb improved to  9.9; she had a unit of blood last week. Pt can walk normal distances without decompensating, but she does get tired with exertion.    Bumex has not helped much with lower extremity edema    We noted toxicities of gemcitabine/docetaxel, will monitor for s/e will on tx     Past Medical History:   Past Medical History:   Diagnosis Date    Cancer     Gallstones     GERD (gastroesophageal reflux disease)     Hypertension     SVT (supraventricular tachycardia)        Past Surgical HIstory:   Past Surgical History:   Procedure Laterality Date     SECTION      X3    HYSTERECTOMY      Partial    TUBAL LIGATION         Family History:   Family History   Problem Relation Age of Onset    Lung disease Mother     Pancreatic cancer Father     No Known Problems Sister     Hypertension Brother     No Known Problems Maternal Aunt     No Known Problems Maternal Uncle     No Known Problems Paternal Aunt     No Known Problems Paternal Uncle     No Known Problems Maternal Grandmother     No Known Problems Maternal Grandfather     No Known Problems Paternal Grandmother     No Known Problems Paternal Grandfather     Melanoma Daughter     No Known Problems Daughter     No Known Problems Son        Social History:  reports that she has never smoked. She has never used smokeless tobacco. She reports that she does not drink alcohol or use drugs.    Allergies:  Review of patient's allergies indicates:  No Known Allergies    Medications:  Current Outpatient Medications   Medication Sig Dispense Refill    bumetanide (BUMEX) 1 MG tablet Take 1 tablet (1 mg total) by mouth once daily. 30 tablet 1    gabapentin (NEURONTIN) 300 MG capsule Take 1 capsule (300 mg total) by mouth every evening. Take one pill (300 mg at night), can take up to two a night (600 mg) if tolerated. 120 capsule 2    morphine (MS CONTIN) 60 MG 12 hr tablet Take 1 tablet (60 mg total) by mouth 2 (two) times daily. 60 tablet 0    ondansetron  (ZOFRAN-ODT) 4 MG TbDL Take 2 tablets (8 mg total) by mouth every 8 (eight) hours as needed. 60 tablet 1    oxyCODONE-acetaminophen (PERCOCET)  mg per tablet Take 1 tablet by mouth every 6 (six) hours as needed for Pain. 120 tablet 0    polyethylene glycol (GLYCOLAX) 17 gram/dose powder Mix 1 capful (17 g) with liquid and take by mouth daily as needed. 510 g 0    potassium chloride (KLOR-CON) 10 MEQ TbSR Take 20 mEq by mouth once daily.      pramipexole (MIRAPEX) 0.125 MG tablet Take 2 hours before bedtime, for RLS. Titrate to (0.25 mg, 0.5 mg daily) after tolerating one week of prior dose. Max of 0.5 mg nightly. 60 tablet 1    prochlorperazine (COMPAZINE) 5 MG tablet Take 1 tablet (5 mg total) by mouth 3 (three) times daily as needed for Nausea. 90 tablet 1    zolpidem (AMBIEN) 5 MG Tab Take 1 tablet (5 mg total) by mouth every evening. 30 tablet 0    amLODIPine (NORVASC) 5 MG tablet Take 1 tablet (5 mg total) by mouth once daily. 30 tablet 1    citalopram (CELEXA) 20 MG tablet Take 1 tablet (20 mg total) by mouth once daily. (Patient taking differently: Take 10 mg by mouth once daily. ) 30 tablet 2    diphenhydrAMINE-aluminum-magnesium hydroxide-simethicone-lidocaine HCl 2% Swish and spit 15 mLs every 4 (four) hours as needed. (Patient not taking: Reported on 7/6/2020) 300 mL 0    magic mouthwash diphen/antac/lidoc Swish and spit 15 mLs every 4 (four) hours as needed. (Patient not taking: Reported on 6/29/2020) 300 mL 0     Current Facility-Administered Medications   Medication Dose Route Frequency Provider Last Rate Last Dose    sodium chloride 0.9% flush 10 mL  10 mL Intravenous PRN Fernando Silvestre MD           Review of Systems   Constitutional: Negative for appetite change, chills and fever.   HENT: Negative for sore throat.    Eyes: Negative for visual disturbance.   Respiratory: Negative for cough, chest tightness, shortness of breath and wheezing.    Cardiovascular: Positive for leg  "swelling. Negative for chest pain.   Gastrointestinal: Positive for abdominal pain. Negative for blood in stool, diarrhea and rectal pain.   Genitourinary: Negative for dysuria.   Musculoskeletal: Positive for back pain. Negative for gait problem.   Skin: Negative for rash.   Neurological: Negative for headaches.   Hematological: Negative for adenopathy. Does not bruise/bleed easily.       ECOG Performance Status: 1   Objective:      Vitals:   Vitals:    07/13/20 1345   BP: (!) 142/65   BP Location: Left arm   Patient Position: Sitting   BP Method: Medium (Automatic)   Pulse: 82   Resp: 18   SpO2: 98%   Weight: 71.5 kg (157 lb 10.1 oz)   Height: 5' 4" (1.626 m)       Physical Exam  Constitutional:       General: She is not in acute distress.     Appearance: She is well-developed. She is not diaphoretic.   HENT:      Head: Normocephalic and atraumatic.      Mouth/Throat:      Pharynx: No oropharyngeal exudate.   Eyes:      General: No scleral icterus.     Pupils: Pupils are equal, round, and reactive to light.   Neck:      Musculoskeletal: Normal range of motion and neck supple.   Cardiovascular:      Rate and Rhythm: Normal rate and regular rhythm.      Heart sounds: Normal heart sounds. No murmur. No friction rub. No gallop.    Pulmonary:      Effort: Pulmonary effort is normal. No respiratory distress.      Breath sounds: Normal breath sounds. No wheezing or rales.   Abdominal:      Palpations: There is mass.      Tenderness: There is abdominal tenderness. There is no guarding.      Comments: Firm, mid-abdomen ; central abdominal bruit   Musculoskeletal: Normal range of motion.      Comments: 3+ pitting edema bilatearlly   Lymphadenopathy:      Cervical: No cervical adenopathy.   Skin:     General: Skin is warm and dry.      Findings: No rash.   Neurological:      Mental Status: She is alert and oriented to person, place, and time.      Cranial Nerves: No cranial nerve deficit.         Laboratory Data:  Lab Visit " on 07/13/2020   Component Date Value Ref Range Status    WBC 07/13/2020 7.74  3.90 - 12.70 K/uL Final    RBC 07/13/2020 4.66  4.00 - 5.40 M/uL Final    Hemoglobin 07/13/2020 9.9* 12.0 - 16.0 g/dL Final    Hematocrit 07/13/2020 35.6* 37.0 - 48.5 % Final    Mean Corpuscular Volume 07/13/2020 76* 82 - 98 fL Final    Mean Corpuscular Hemoglobin 07/13/2020 21.2* 27.0 - 31.0 pg Final    Mean Corpuscular Hemoglobin Conc 07/13/2020 27.8* 32.0 - 36.0 g/dL Final    RDW 07/13/2020 23.1* 11.5 - 14.5 % Final    Platelets 07/13/2020 377* 150 - 350 K/uL Final    MPV 07/13/2020 10.8  9.2 - 12.9 fL Final    Immature Granulocytes 07/13/2020 0.4  0.0 - 0.5 % Final    Gran # (ANC) 07/13/2020 4.4  1.8 - 7.7 K/uL Final    Immature Grans (Abs) 07/13/2020 0.03  0.00 - 0.04 K/uL Final    Comment: Mild elevation in immature granulocytes is non specific and   can be seen in a variety of conditions including stress response,   acute inflammation, trauma and pregnancy. Correlation with other   laboratory and clinical findings is essential.      Lymph # 07/13/2020 2.4  1.0 - 4.8 K/uL Final    Mono # 07/13/2020 0.6  0.3 - 1.0 K/uL Final    Eos # 07/13/2020 0.3  0.0 - 0.5 K/uL Final    Baso # 07/13/2020 0.07  0.00 - 0.20 K/uL Final    nRBC 07/13/2020 0  0 /100 WBC Final    Gran% 07/13/2020 57.3  38.0 - 73.0 % Final    Lymph% 07/13/2020 30.4  18.0 - 48.0 % Final    Mono% 07/13/2020 7.8  4.0 - 15.0 % Final    Eosinophil% 07/13/2020 3.2  0.0 - 8.0 % Final    Basophil% 07/13/2020 0.9  0.0 - 1.9 % Final    Differential Method 07/13/2020 Automated   Final    Sodium 07/13/2020 138  136 - 145 mmol/L Final    Potassium 07/13/2020 4.0  3.5 - 5.1 mmol/L Final    Chloride 07/13/2020 108  95 - 110 mmol/L Final    CO2 07/13/2020 19* 23 - 29 mmol/L Final    Glucose 07/13/2020 75  70 - 110 mg/dL Final    BUN, Bld 07/13/2020 12  8 - 23 mg/dL Final    Creatinine 07/13/2020 0.7  0.5 - 1.4 mg/dL Final    Calcium 07/13/2020 10.3  8.7 -  10.5 mg/dL Final    Total Protein 07/13/2020 7.4  6.0 - 8.4 g/dL Final    Albumin 07/13/2020 3.5  3.5 - 5.2 g/dL Final    Total Bilirubin 07/13/2020 0.7  0.1 - 1.0 mg/dL Final    Comment: For infants and newborns, interpretation of results should be based  on gestational age, weight and in agreement with clinical  observations.  Premature Infant recommended reference ranges:  Up to 24 hours.............<8.0 mg/dL  Up to 48 hours............<12.0 mg/dL  3-5 days..................<15.0 mg/dL  6-29 days.................<15.0 mg/dL      Alkaline Phosphatase 07/13/2020 114  55 - 135 U/L Final    AST 07/13/2020 16  10 - 40 U/L Final    ALT 07/13/2020 6* 10 - 44 U/L Final    Anion Gap 07/13/2020 11  8 - 16 mmol/L Final    eGFR if African American 07/13/2020 >60.0  >60 mL/min/1.73 m^2 Final    eGFR if non African American 07/13/2020 >60.0  >60 mL/min/1.73 m^2 Final    Comment: Calculation used to obtain the estimated glomerular filtration  rate (eGFR) is the CKD-EPI equation.            Imaging:   Assessment:       1. Primary intra-abdominal sarcoma           Plan:     Low Grade Sarcoma  - ECOG PS 1  - 20 cm mass noted in CT abdomen/pelvis; L ureter noted to be have left sided hydronephresis and noted to have some mild inguinal adneopathy  - path reviewed at our institution also consistent with sarcoma as well  - Reviewed images and discussed with Dr. Mota previously, pt is not a surgical candidate at this time as significant amount of central vasculature (aorta, iliac arteries/veins) involved and significant amount of vascularity to mass  - PET-CT reveals a large anterior abdominal mass, low activity sarcoma without significant metastatic spread, SUV max of 4.7   - Echo with EF of 60%, normal LV systolic function and indeterminate diastolic function  - tumor causing pain in central abdomen as well as compression of L ureter and causing hydronephrosis  - Inpatient AIM C1 completed  (adrimaycin, ifosfomide, mesna)  next week given symptoms above, discussed palliative intent of therapy  - went over risks/benefits/ side effects of chemotherapy and pt wishes to proceed with therapy, consent form signed and form submitted to be scanned into system  - Ifosfomide dropped on day 3 due to neurotoxicity  - PET- CT on 4/22 results reviewed, suv max decreased to 3.9 for abdominal mass and necrosis of part of mass noted  - completed two chemo tx with doxorubicin, first round with ifosofomide as well   - PET-CT on 5/20 with  Large anterior abdominal mass SUV 3 (previously 3.9), left lower lobe pulmonary lesion (prior SUV of 2.3, now 2.5)  - discussed switching to pazopanib due to ease of administration and good option in control of disease; pt has done this chemotherapy since end of June 2019  - PET-CT from 9/16 with large abd mass (now with SUV max of 3, from 2.76) and soft tissue opacity in left lower lobe (SUV of 1.7 from 2.52)  - PET-CT from  -, 12/16 with mass of SUV max 3.62 (slightly higher) and uptake of mass in LLL, noted to be less at 1.39 from 1.7   - Slow progression on pazopanib, then pt switched to gemcitabine/docetaxel; received gemcitabine D1 and then had a syncopal event   - shifted care from Joao Kennedy  - Gemciatbine/docetaxel C1D1 on 7/13 and C1D8 on 7/20     Iron deficiency anemia  - Hemoglobin of 8.1, pt with DIA   - transfused 1 U of prBCon 7/7   - ferritin of 10 on 7/6/20, discussed iron transfusion previously      BCC, R inferior neck  - lesion was biopsied, 6/10  - was supposed to follow-up but weather problems occurred around time of initial follow-up     Pain secondary to neoplasm  - continue MS Contin 60 mg BID     Hypercalcemia of malignancy  - stable      Severe L sided hydronephrosis - noted to be compressed previously from large sarcoma, renal function normal      Neuropathy, possible L sided sciatica  - pt with lower back pain around site of tumor  - continue gabapentin 300 mg BID  -  prior MRI lumbar spine in 10/2019 without any cord compression      HTN  - continue norvasc 5     SVT  - unclear of rhythm  - no AFib noted while pt was admitted into hospital     IBS  - continue laxatives PRN - can use senna + miralax, while on pain meds     Discused with DR. Lund      F/u:  - Next PET-CT after 2 cycles of Pap[place]/ docetaxel, in Aug   - Plan for C2D1 with gemcitabine on 8/3 with CBC, CMP prior and visit; plan for C2D8 gem/docetaxel/neulasta OBI on 8/10 with labs only CBC, CMP prior     Fernando Silvestre MD  Hematology and Oncology, PGY V  [facility]      STAFF NOTE:I have personally taken the history and examined this patient and agree with Dr. Silvestre's Note as stated above.

## 2020-07-13 NOTE — Clinical Note
- Please schedule C2D1 with gemcitabine on 8/3 with CBC, CMP prior and visit; plan for C2D8 gem/docetaxel/neulasta OBI on 8/10 with labs only CBC, CMP prior. PLease confirm appt times with patient

## 2020-07-20 NOTE — PLAN OF CARE
Pt tolerated Taxotere and Gemzar with no complications. Per nursing communication order given by oriana Walsh to administer Gemzar over 30min. VSS. Pt instructed to call MD with any problems. NAD. Pt discharged home independently.

## 2020-07-23 PROBLEM — I89.0 LYMPHEDEMA: Status: ACTIVE | Noted: 2020-01-01

## 2020-07-23 NOTE — PROGRESS NOTES
Subjective:       Patient ID: Tiffany Hendricks is a 64 y.o. female.    Chief Complaint: Establish Care      Patient is here to get established she recently moved here from [place].  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  [place].  Had the tumor is inoperable and rather large.  She is on palliative chemo.  She is now stage III she was told at stage IV she would have 1 year prognosis.   An absence of the left kidney but last diagnosis was hydronephrosis.   Lab results      Component                Value               Date                       WBC                      3.02 (L)            07/20/2020                 HGB                      8.3 (L)             07/20/2020                 HCT                      29.3 (L)            07/20/2020                 PLT                      294                 07/20/2020                 CHOL                     204 (H)             03/27/2019                 TRIG                     130                 03/27/2019                 HDL                      38 (L)              03/27/2019                 ALT                      9 (L)               07/20/2020                 AST                      16                  07/20/2020                 NA                       136                 07/20/2020                 K                        4.2                 07/20/2020                 CL                       108                 07/20/2020                 CREATININE               0.7                 07/20/2020                 BUN                      12                  07/20/2020                 CO2                      22 (L)              07/20/2020                 TSH                      1.425               03/27/2019                 INR                      1.1                 05/06/2019                 HGBA1C                   5.3                 05/06/2019            She has been anemic since childhood  and her last blood work shows she is still anemic.   .  Patient has lymphedema stockings and a hospital bed.      Allergies and Medications:   Review of patient's allergies indicates:  No Known Allergies  Current Outpatient Medications   Medication Sig Dispense Refill    amLODIPine (NORVASC) 5 MG tablet Take 1 tablet (5 mg total) by mouth once daily. 30 tablet 1    bumetanide (BUMEX) 1 MG tablet Take 1 tablet (1 mg total) by mouth once daily. 30 tablet 1    citalopram (CELEXA) 20 MG tablet Take 1 tablet (20 mg total) by mouth once daily. (Patient taking differently: Take 10 mg by mouth once daily. ) 30 tablet 2    gabapentin (NEURONTIN) 300 MG capsule Take 1 capsule (300 mg total) by mouth every evening. Take one pill (300 mg at night), can take up to two a night (600 mg) if tolerated. 120 capsule 2    ibuprofen (ADVIL,MOTRIN) 200 MG tablet Take 200 mg by mouth every 6 (six) hours as needed for Pain.      morphine (MS CONTIN) 60 MG 12 hr tablet Take 1 tablet (60 mg total) by mouth 2 (two) times daily. 60 tablet 0    ondansetron (ZOFRAN) 8 MG tablet Take 1 tablet by mouth 3 (three) times daily as needed.      oxyCODONE-acetaminophen (PERCOCET)  mg per tablet Take 1 tablet by mouth every 6 (six) hours as needed for Pain. 120 tablet 0    polyethylene glycol (GLYCOLAX) 17 gram/dose powder Mix 1 capful (17 g) with liquid and take by mouth daily as needed. 510 g 0    potassium chloride (KLOR-CON) 10 MEQ TbSR Take 20 mEq by mouth once daily.      pramipexole (MIRAPEX) 0.125 MG tablet Take 2 hours before bedtime, for RLS. Titrate to (0.25 mg, 0.5 mg daily) after tolerating one week of prior dose. Max of 0.5 mg nightly. 60 tablet 1    prochlorperazine (COMPAZINE) 5 MG tablet Take 1 tablet (5 mg total) by mouth 3 (three) times daily as needed for Nausea. 90 tablet 1    sucralfate (CARAFATE) 1 gram tablet Take 1 tablet by mouth daily as needed.      zolpidem (AMBIEN) 5 MG Tab Take 1 tablet (5 mg  total) by mouth every evening. 30 tablet 0    diphenhydrAMINE-aluminum-magnesium hydroxide-simethicone-lidocaine HCl 2% Swish and spit 15 mLs every 4 (four) hours as needed. (Patient not taking: Reported on 7/6/2020) 300 mL 0    magic mouthwash diphen/antac/lidoc Swish and spit 15 mLs every 4 (four) hours as needed. (Patient not taking: Reported on 6/29/2020) 300 mL 0    ondansetron (ZOFRAN-ODT) 4 MG TbDL Take 2 tablets (8 mg total) by mouth every 8 (eight) hours as needed. (Patient not taking: Reported on 7/23/2020) 60 tablet 1     Current Facility-Administered Medications   Medication Dose Route Frequency Provider Last Rate Last Dose    sodium chloride 0.9% flush 10 mL  10 mL Intravenous PRN Fernando Silvestre MD           Family History:   Family History   Problem Relation Age of Onset    Lung disease Mother     Pancreatic cancer Father     No Known Problems Sister     Hypertension Brother     No Known Problems Maternal Aunt     No Known Problems Maternal Uncle     No Known Problems Paternal Aunt     No Known Problems Paternal Uncle     No Known Problems Maternal Grandmother     No Known Problems Maternal Grandfather     No Known Problems Paternal Grandmother     No Known Problems Paternal Grandfather     Melanoma Daughter     No Known Problems Daughter     No Known Problems Son        Social History:   Social History     Socioeconomic History    Marital status:      Spouse name: Not on file    Number of children: Not on file    Years of education: Not on file    Highest education level: Not on file   Occupational History    Not on file   Social Needs    Financial resource strain: Not on file    Food insecurity     Worry: Not on file     Inability: Not on file    Transportation needs     Medical: Not on file     Non-medical: Not on file   Tobacco Use    Smoking status: Never Smoker    Smokeless tobacco: Never Used   Substance and Sexual Activity    Alcohol use: No      Frequency: Never    Drug use: No    Sexual activity: Never   Lifestyle    Physical activity     Days per week: Not on file     Minutes per session: Not on file    Stress: Not at all   Relationships    Social connections     Talks on phone: Not on file     Gets together: Not on file     Attends Anabaptism service: Not on file     Active member of club or organization: Not on file     Attends meetings of clubs or organizations: Not on file     Relationship status: Not on file   Other Topics Concern    Are you pregnant or think you may be? Not Asked    Breast-feeding Not Asked   Social History Narrative    Not on file       Review of Systems   Constitutional: Negative for activity change, appetite change, chills, diaphoresis, fatigue, fever and unexpected weight change.   HENT: Negative for congestion, dental problem, drooling, ear discharge, ear pain, facial swelling, hearing loss, mouth sores, nosebleeds, postnasal drip, rhinorrhea, sinus pressure, sinus pain, sneezing, sore throat, tinnitus, trouble swallowing and voice change.    Eyes: Negative for photophobia, pain, discharge, redness, itching and visual disturbance.   Respiratory: Negative for apnea, cough, choking, chest tightness, shortness of breath, wheezing and stridor.    Cardiovascular: Negative for chest pain, palpitations and leg swelling.   Gastrointestinal: Negative for abdominal distention, abdominal pain, anal bleeding, blood in stool, constipation, diarrhea, nausea, rectal pain and vomiting.   Endocrine: Negative for cold intolerance, heat intolerance, polydipsia, polyphagia and polyuria.   Genitourinary: Positive for difficulty urinating, pelvic pain and urgency. Negative for decreased urine volume, dyspareunia, dysuria, enuresis, flank pain, frequency, genital sores, hematuria, menstrual problem, vaginal bleeding, vaginal discharge and vaginal pain.        Patient was found have an absent left kidney.   Musculoskeletal: Positive for back  pain. Negative for arthralgias, gait problem, joint swelling, myalgias, neck pain and neck stiffness.   Skin: Negative for color change, pallor, rash and wound.   Allergic/Immunologic: Negative for environmental allergies, food allergies and immunocompromised state.   Neurological: Negative for dizziness, tremors, seizures, syncope, facial asymmetry, speech difficulty, weakness, light-headedness, numbness and headaches.   Hematological: Negative for adenopathy. Does not bruise/bleed easily.   Psychiatric/Behavioral: Negative for agitation, behavioral problems, confusion, decreased concentration, dysphoric mood, hallucinations, self-injury, sleep disturbance and suicidal ideas. The patient is not nervous/anxious and is not hyperactive.      patient lives at home with her daughter because of a fall.  Objective:     Vitals:    07/23/20 1037   BP: 118/64   Pulse: (!) 111   Resp: 18   Temp: 97.9 °F (36.6 °C)        Physical Exam  Vitals signs and nursing note reviewed.   Constitutional:       General: She is not in acute distress.     Appearance: Normal appearance. She is well-developed and normal weight. She is not ill-appearing, toxic-appearing or diaphoretic.   HENT:      Head: Normocephalic and atraumatic.      Right Ear: Tympanic membrane, ear canal and external ear normal. There is no impacted cerumen.      Left Ear: Tympanic membrane, ear canal and external ear normal. There is no impacted cerumen.      Nose: Nose normal. No congestion or rhinorrhea.      Mouth/Throat:      Pharynx: No oropharyngeal exudate or posterior oropharyngeal erythema.   Eyes:      General: No scleral icterus.        Right eye: No discharge.         Left eye: No discharge.      Conjunctiva/sclera: Conjunctivae normal.      Pupils: Pupils are equal, round, and reactive to light.   Neck:      Musculoskeletal: Normal range of motion and neck supple. No neck rigidity or muscular tenderness.      Thyroid: No thyromegaly.      Vascular: No  carotid bruit or JVD.      Trachea: No tracheal deviation.   Cardiovascular:      Rate and Rhythm: Normal rate and regular rhythm.      Pulses: Normal pulses.      Heart sounds: Normal heart sounds. No murmur. No friction rub. No gallop.    Pulmonary:      Effort: Pulmonary effort is normal. No respiratory distress.      Breath sounds: Normal breath sounds. No stridor. No wheezing, rhonchi or rales.   Chest:      Chest wall: No tenderness.   Abdominal:      General: Abdomen is flat. Bowel sounds are normal. There is no distension.      Palpations: Abdomen is soft. There is no mass.      Tenderness: There is no abdominal tenderness. There is no right CVA tenderness, left CVA tenderness, guarding or rebound.      Hernia: No hernia is present.      Comments: Approximately 32 weeks size tumor in the abdomen extending up to the ribcage.  It is firm and nonmobile.   Genitourinary:     Vagina: Normal.   Musculoskeletal: Normal range of motion.         General: No swelling, tenderness, deformity or signs of injury.      Right lower leg: No edema.      Left lower leg: Edema (Significant edema to the left lower extremity less so on the right.) present.   Lymphadenopathy:      Cervical: No cervical adenopathy.   Skin:     General: Skin is warm and dry.      Capillary Refill: Capillary refill takes less than 2 seconds.      Coloration: Skin is not jaundiced or pale.      Findings: No bruising, erythema, lesion or rash.   Neurological:      Mental Status: She is alert and oriented to person, place, and time.      Cranial Nerves: No cranial nerve deficit.      Sensory: No sensory deficit.      Motor: No weakness or abnormal muscle tone.      Coordination: Coordination normal.      Gait: Gait normal.      Deep Tendon Reflexes: Reflexes are normal and symmetric. Reflexes normal.   Psychiatric:         Behavior: Behavior normal.         Thought Content: Thought content normal.         Judgment: Judgment normal.         Assessment:        1. Psychophysiological insomnia    2. Lymphedema    3. Neuropathy    4. Primary intra-abdominal sarcoma        Plan:       Tiffany was seen today for establish care.    Diagnoses and all orders for this visit:    Psychophysiological insomnia  -     zolpidem (AMBIEN) 5 MG Tab; Take 1 tablet (5 mg total) by mouth every evening.    Lymphedema  -     Ambulatory referral/consult to Hospice - [facility]; Future    Neuropathy  -     zolpidem (AMBIEN) 5 MG Tab; Take 1 tablet (5 mg total) by mouth every evening.    Primary intra-abdominal sarcoma  -     zolpidem (AMBIEN) 5 MG Tab; Take 1 tablet (5 mg total) by mouth every evening.  -     Ambulatory referral/consult to [facility] - [facility]; Future         Follow up in about 3 months (around 10/23/2020) for follow up sarcoma.

## 2020-08-03 NOTE — PLAN OF CARE
1515 pt here for Gemzar infusion , labs, hx, meds, allergies reviewed, pt with no complaints at this time, reclined in chair, continue to monitor

## 2020-08-03 NOTE — PLAN OF CARE
2430 pt tolerated gemzar infusion without issue , pto to rtc next week , no distress noted upon d/c to home with daughter

## 2020-08-03 NOTE — Clinical Note
- refer to vascular disease for leg swelling (on same day of other appts but doesn't have to be immediatie, please discuss with patient about appt times)  - Plan for PET-CT on morning of appt on 8/24  - Plan for C3D1 with gemcitabine on 8/24 with CBC, CMP prior and visit; plan for C3D8 gem/docetaxel/neulasta OBI on 8/31 with labs only CBC, CMP prior     She can choose earliest slots on schedule, if available. Please inform pt of all appt times, thanks!

## 2020-08-03 NOTE — PROGRESS NOTES
PATIENT: Tiffany Hendricks  MRN: [id number]  DATE: 8/3/2020      Diagnosis: No diagnosis found.    Chief Complaint: Primary intra-abdominal sarcoma      Oncologic History:   Low Grade Sarcoma  - 20 cm mass noted in outside CT abdomen/pelvis; L ureter noted to be have left sided hydronephrosis and noted to have some mild inguinal adneopathy  - PET-CT on 3/27 reveals a large anterior abdominal mass, low activity sarcoma without significant metastatic spread, SUV max of 4.7   - Inpatient AIM C1 (4/2/19 - 4/5/19) completed  (adrimaycin, ifosfomide, mesna) next week given symptoms above, discussed palliative intent of therapy  - Ifosfomide dropped on day 3 due to neurotoxicity  - repeat PET-CT on 4/22 with large abdominal mass with SUV max of 3.9   - C2 on 5/7 - 5/10   - PET-CT on 5/20 with  Large anterior abdoinal mass SUV 8 (previously 3.9), left lower pulmonary lesion (prior SUV of 2.3, now 2.5)  - continue pazopanib      Pt is a 62 yo  female with PMhx of SVT (possible AFib), chronic IBS (describes a history of suffering from constipation as a child), HTN who presents to the hospital to discuss further options at treating 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 She was referred by a Dr. Rosa, who is a general surgeon in the [place] area.  She had not been able to get healthcare for a while as she was not enrolled in her 's plan through the  but has been able to get enrolled in Medicare since December.     She started to have abdominal bloating in mid-December 2018. Initially, she had attributed this problem to baseline IBS and was treated at that time with various combinations of Senna, Miralax, suppositories, and enema. Additionally, she lost ~45lbs over the course of one month (174lb to 130lb) which she reports was primarily due to diffuse prandial-associated abdominal pain described mostly as cramping sensation.  During this time she could only tolerated clear liquids, such as Jello and broth.      Her workup in mid-December started with an ultrasound which revealed cholelithiasis and large pelvic mass, therefore, follow-up of CT abdomen/pelvis was completed on 01/06/19. Origin of her mass has been unlcear, but she was found to have a 20 cm abdominal mass and a picture of chronic severe left hydronephrosis. She had a follow-up CT guided core biopsy on 02/06/19 of abdominal mass which revealed a low grade sarcoma, which has been verified by our pathologists as well. *See clinic oncology note for uploaded report.*     In addition, she has a 3-4 cm x 2 cm lesion on the posterior right shoulder and right lateral neck region with some vascularity, bullous with crusting. She notes that this lesion had been present for eight years and endorses some pruritus and periods of resolution, denies any pain or bleeding with the lesion.       She presented to outpatient clinic here on 03/27/2019 as a referral from [facility] to discuss further treatment options. Plan made for in-patient admission for urology evaluation and possible initiation of chemotherapy.         In addition, the pt has a 3-4 cm x 2 cm lesion on the posterior R shoulder and R lateral neck region with some vascularity, bullous with crusting which the pt has not had follow-up for as of this visit. She notes that this lesion had been present for eight years and endorses some pruritus and periods of resolution, denies any pain or bleeding with the lesion.       Pt had cologuard completed for colorectal cancer screening on December 2018, which was negative for malignancy.      She presented to [facility] 4/1/19 for planned in-patient management of her L-sided hydronephrosis and initiation of AIM chemotherapy. Following better control of her pain, her appetite improved and she was able to increase her food intake and regain weight (up to 149lbs).  Admission labs  notable for microcytic anemia with low iron studies and slight hypercalcemia (10.8 corrected). Evaluated by urology on day of admission. Based on assessment of prior imaging, it was determined that, based on hydronephrosis and degree of atrophy, the function of the left kidney was not salvageable and that risk of nephrostomy tube or stent outweighed benefit. She had a PICC line placed on day of admission. Chemotherapy was initiated on 04/02/19 with Doxorubucin, Ifosfamide, and Mesnex. Symptoms during chemotherapy initiation included intermittent abdominal pain and nausea that were controlled with PRN Zofran and Oxycodone. Patient noted to develop new onset issues with fine motor movement of fingers on 04/04; out of concern for neurological toxicity, Ifosfamide was discontinued with symptom resolution. On 04/05, she developed increased urinary frequency with lower back pain with radiation around the left hip; UA ordered, but negative for infection. Pain resolved after dose of Gabapentin and applying heating pads, suspect could be secondary to radiculopathy from tumor burden. Neulasta was administered on day after discharge.     The pt had shedding of hair after first week of chemohterapy but then completely lost the rest of it. Her abdominal pain has been well controlled at home. Pt has had pain and ringing in ears, worse on the R side; she suspects that she is having an ear infection; she was prescribed a course of augmentin for this problem. . Her neuropathy has been stable on gabapentin and hand tremors have become much better/ resolved for most part.      Has been out of pazopanib for about two weeks at this point, as there was a problem with prior information given to us regarding approval of med.  This medication was approved at no cost to patient through [employer] Pt assistance program in the past, was supposed to be from 6/25 - 12/31/18. Discussed informing us about 1-2 weeks prior to running out of these  refills in future.     Pt described some intermittent nausea/vomiting on pazopanib, which has not occurred since at least a week. She had it well controlled with taking compazine during the episodes.      Ms. Neely was living close to the [facility] and so was following up with Dr. Erwin locally.   Nausea has been well controlled with compazine and pain is mostly 4/10 after pain meds are used; currently she in on Morphine 30 mg TID.      Calcium of around 11 recently, stable. Noted to be 11 again today on our labs.      Back pain noted around mostly lower back and also some tremors of hands. Discussed that back pain was likely secondary to size of tumor and tremors of hand, which maybe from prior chemotherapy although the incidence of it is largely unknown. She is able to ambulate well without many problems usually.     Her legs have had bilateral swelling but these will apparently get much better with reclining and keeping them elevated.      We also discussed possibility of an [facility] visit to discuss a clinical trial as well.      She ahd two additional doses of doxorbicin given to her around January...received a total of 300 mg/m2 of doxorubicin.  SHe had slow progression on pazopanib,  In between pt had a lung biopsy which revealed an infecitous etiology and growing intra-abdominal mass that is known to be sarcoma.      Pt was swtiched to Gemcitabine/docetaxel due to progression of disease...pt had gemcitabine on day 1 and then syncopal event.      She fell on her face three weeks ago; this may have been a syncopal event ('passed out') and this was a first event.   Pt may have had the fall after the first dose of chemotherapy w Gemcitabine.   She can walk throughout the house, without any assistance.     LAst PET-CT was 4-5 weeks ago.      Morphine 60 mg BID has now helped with pain control. T     Discussed eventual IV Iron to help with underlying DELMI.      Hgb improved to 9.9; she had a unit of  blood last week. Pt can walk normal distances without decompensating, but she does get tired with exertion.     Bumex has not helped much with lower extremity edema    We noted toxicities of gemcitabine/docetaxel, will monitor for s/e will on tx     Subjective:    SHe ate everyday except two days when she was in pain. Pt was also in pain and in bed on THursday and Friday.         Past Medical History:   Past Medical History:   Diagnosis Date    Cancer     Gallstones     GERD (gastroesophageal reflux disease)     Hypertension     SVT (supraventricular tachycardia)        Past Surgical HIstory:   Past Surgical History:   Procedure Laterality Date     SECTION      X3    HYSTERECTOMY      Partial    TUBAL LIGATION         Family History:   Family History   Problem Relation Age of Onset    Lung disease Mother     Pancreatic cancer Father     No Known Problems Sister     Hypertension Brother     No Known Problems Maternal Aunt     No Known Problems Maternal Uncle     No Known Problems Paternal Aunt     No Known Problems Paternal Uncle     No Known Problems Maternal Grandmother     No Known Problems Maternal Grandfather     No Known Problems Paternal Grandmother     No Known Problems Paternal Grandfather     Melanoma Daughter     No Known Problems Daughter     No Known Problems Son        Social History:  reports that she has never smoked. She has never used smokeless tobacco. She reports that she does not drink alcohol or use drugs.    Allergies:  Review of patient's allergies indicates:  No Known Allergies    Medications:  Current Outpatient Medications   Medication Sig Dispense Refill    bumetanide (BUMEX) 1 MG tablet Take 1 tablet (1 mg total) by mouth once daily. 30 tablet 1    gabapentin (NEURONTIN) 300 MG capsule Take 1 capsule (300 mg total) by mouth every evening. Take one pill (300 mg at night), can take up to two a night (600 mg) if tolerated. 120 capsule 2    ibuprofen  (ADVIL,MOTRIN) 200 MG tablet Take 200 mg by mouth every 6 (six) hours as needed for Pain.      morphine (MS CONTIN) 60 MG 12 hr tablet Take 1 tablet (60 mg total) by mouth 2 (two) times daily. 60 tablet 0    ondansetron (ZOFRAN) 8 MG tablet Take 1 tablet by mouth 3 (three) times daily as needed.      oxyCODONE-acetaminophen (PERCOCET)  mg per tablet Take 1 tablet by mouth every 6 (six) hours as needed for Pain. 120 tablet 0    polyethylene glycol (GLYCOLAX) 17 gram/dose powder Mix 1 capful (17 g) with liquid and take by mouth daily as needed. 510 g 0    potassium chloride (KLOR-CON) 10 MEQ TbSR Take 20 mEq by mouth once daily.      pramipexole (MIRAPEX) 0.125 MG tablet Take 2 hours before bedtime, for RLS. Titrate to (0.25 mg, 0.5 mg daily) after tolerating one week of prior dose. Max of 0.5 mg nightly. 60 tablet 1    prochlorperazine (COMPAZINE) 5 MG tablet Take 1 tablet (5 mg total) by mouth 3 (three) times daily as needed for Nausea. 90 tablet 1    sucralfate (CARAFATE) 1 gram tablet Take 1 tablet by mouth daily as needed.      zolpidem (AMBIEN) 5 MG Tab Take 1 tablet (5 mg total) by mouth every evening. 30 tablet 0    amLODIPine (NORVASC) 5 MG tablet Take 1 tablet (5 mg total) by mouth once daily. 30 tablet 1    citalopram (CELEXA) 20 MG tablet Take 1 tablet (20 mg total) by mouth once daily. (Patient taking differently: Take 10 mg by mouth once daily. ) 30 tablet 2    diphenhydrAMINE-aluminum-magnesium hydroxide-simethicone-lidocaine HCl 2% Swish and spit 15 mLs every 4 (four) hours as needed. (Patient not taking: Reported on 7/6/2020) 300 mL 0    magic mouthwash diphen/antac/lidoc Swish and spit 15 mLs every 4 (four) hours as needed. (Patient not taking: Reported on 6/29/2020) 300 mL 0    ondansetron (ZOFRAN-ODT) 4 MG TbDL Take 2 tablets (8 mg total) by mouth every 8 (eight) hours as needed. (Patient not taking: Reported on 7/23/2020) 60 tablet 1     Current Facility-Administered  "Medications   Medication Dose Route Frequency Provider Last Rate Last Dose    sodium chloride 0.9% flush 10 mL  10 mL Intravenous PRN Fernando Silvestre MD           Review of Systems   Constitutional: Negative for appetite change, chills and fever.   HENT: Negative for sore throat.    Eyes: Negative for visual disturbance.   Respiratory: Negative for cough, chest tightness, shortness of breath and wheezing.    Cardiovascular: Positive for leg swelling. Negative for chest pain.   Gastrointestinal: Positive for abdominal pain. Negative for blood in stool, diarrhea and rectal pain.   Genitourinary: Negative for dysuria.   Musculoskeletal: Positive for back pain. Negative for gait problem.   Skin: Negative for rash.   Neurological: Negative for headaches.   Hematological: Negative for adenopathy. Does not bruise/bleed easily.       ECOG Performance Status: 1   Objective:      Vitals:   Vitals:    08/03/20 1431   BP: (!) 125/59   BP Location: Left arm   Patient Position: Sitting   BP Method: Medium (Automatic)   Pulse: 73   Resp: 20   Temp: 98 °F (36.7 °C)   TempSrc: Oral   SpO2: 99%   Weight: 70.6 kg (155 lb 10.3 oz)   Height: 5' 4" (1.626 m)       Physical Exam  Constitutional:       General: She is not in acute distress.     Appearance: She is well-developed. She is not diaphoretic.   HENT:      Head: Normocephalic and atraumatic.      Mouth/Throat:      Pharynx: No oropharyngeal exudate.   Eyes:      General: No scleral icterus.     Pupils: Pupils are equal, round, and reactive to light.   Neck:      Musculoskeletal: Normal range of motion and neck supple.   Cardiovascular:      Rate and Rhythm: Normal rate and regular rhythm.      Heart sounds: Normal heart sounds. No murmur. No friction rub. No gallop.    Pulmonary:      Effort: Pulmonary effort is normal. No respiratory distress.      Breath sounds: Normal breath sounds. No wheezing or rales.   Abdominal:      Palpations: There is mass.      Tenderness: There is " abdominal tenderness. There is no guarding.      Comments: Firm, mid-abdomen ; central abdominal bruit   Musculoskeletal: Normal range of motion.      Comments: - left leg 2+ edema and R leg has 1+ edema   Lymphadenopathy:      Cervical: No cervical adenopathy.   Skin:     General: Skin is warm and dry.      Findings: No rash.   Neurological:      Mental Status: She is alert and oriented to person, place, and time.      Cranial Nerves: No cranial nerve deficit.         Laboratory Data:  Lab Visit on 08/03/2020   Component Date Value Ref Range Status    WBC 08/03/2020 6.04  3.90 - 12.70 K/uL Final    RBC 08/03/2020 3.94* 4.00 - 5.40 M/uL Final    Hemoglobin 08/03/2020 8.6* 12.0 - 16.0 g/dL Final    Hematocrit 08/03/2020 30.8* 37.0 - 48.5 % Final    Mean Corpuscular Volume 08/03/2020 78* 82 - 98 fL Final    Mean Corpuscular Hemoglobin 08/03/2020 21.8* 27.0 - 31.0 pg Final    Mean Corpuscular Hemoglobin Conc 08/03/2020 27.9* 32.0 - 36.0 g/dL Final    RDW 08/03/2020 25.7* 11.5 - 14.5 % Final    Platelets 08/03/2020 356* 150 - 350 K/uL Final    MPV 08/03/2020 11.2  9.2 - 12.9 fL Final    Immature Granulocytes 08/03/2020 CANCELED  0.0 - 0.5 % Final    Result canceled by the ancillary.    Immature Grans (Abs) 08/03/2020 CANCELED  0.00 - 0.04 K/uL Final    Comment: Mild elevation in immature granulocytes is non specific and   can be seen in a variety of conditions including stress response,   acute inflammation, trauma and pregnancy. Correlation with other   laboratory and clinical findings is essential.    Result canceled by the ancillary.      nRBC 08/03/2020 0  0 /100 WBC Final    Gran% 08/03/2020 72.0  38.0 - 73.0 % Final    Lymph% 08/03/2020 19.0  18.0 - 48.0 % Final    Mono% 08/03/2020 5.0  4.0 - 15.0 % Final    Eosinophil% 08/03/2020 2.0  0.0 - 8.0 % Final    Basophil% 08/03/2020 2.0* 0.0 - 1.9 % Final    Platelet Estimate 08/03/2020 Increased*  Final    Aniso 08/03/2020 Slight   Final    Poik  08/03/2020 Slight   Final    Poly 08/03/2020 Occasional   Final    Ovalocytes 08/03/2020 Occasional   Final    Basophilic Stippling 08/03/2020 Occasional   Final    Toxic Granulation 08/03/2020 Present   Final    Fragmented Cells 08/03/2020 Occasional   Final    Differential Method 08/03/2020 Manual   Final    Sodium 08/03/2020 138  136 - 145 mmol/L Final    Potassium 08/03/2020 4.4  3.5 - 5.1 mmol/L Final    Chloride 08/03/2020 109  95 - 110 mmol/L Final    CO2 08/03/2020 22* 23 - 29 mmol/L Final    Glucose 08/03/2020 94  70 - 110 mg/dL Final    BUN, Bld 08/03/2020 11  8 - 23 mg/dL Final    Creatinine 08/03/2020 0.7  0.5 - 1.4 mg/dL Final    Calcium 08/03/2020 9.5  8.7 - 10.5 mg/dL Final    Total Protein 08/03/2020 6.7  6.0 - 8.4 g/dL Final    Albumin 08/03/2020 3.0* 3.5 - 5.2 g/dL Final    Total Bilirubin 08/03/2020 0.6  0.1 - 1.0 mg/dL Final    Comment: For infants and newborns, interpretation of results should be based  on gestational age, weight and in agreement with clinical  observations.  Premature Infant recommended reference ranges:  Up to 24 hours.............<8.0 mg/dL  Up to 48 hours............<12.0 mg/dL  3-5 days..................<15.0 mg/dL  6-29 days.................<15.0 mg/dL      Alkaline Phosphatase 08/03/2020 105  55 - 135 U/L Final    AST 08/03/2020 15  10 - 40 U/L Final    ALT 08/03/2020 7* 10 - 44 U/L Final    Anion Gap 08/03/2020 7* 8 - 16 mmol/L Final    eGFR if African American 08/03/2020 >60.0  >60 mL/min/1.73 m^2 Final    eGFR if non African American 08/03/2020 >60.0  >60 mL/min/1.73 m^2 Final    Comment: Calculation used to obtain the estimated glomerular filtration  rate (eGFR) is the CKD-EPI equation.            Imaging:   Assessment:       No diagnosis found.       Plan:        Low Grade Sarcoma  - ECOG PS 1  - 20 cm mass noted in CT abdomen/pelvis; L ureter noted to be have left sided hydronephresis and noted to have some mild inguinal adneopathy  - path  reviewed at our institution also consistent with sarcoma as well  - Reviewed images and discussed with Dr. Mota previously, pt is not a surgical candidate at this time as significant amount of central vasculature (aorta, iliac arteries/veins) involved and significant amount of vascularity to mass  - PET-CT reveals a large anterior abdominal mass, low activity sarcoma without significant metastatic spread, SUV max of 4.7   - Echo with EF of 60%, normal LV systolic function and indeterminate diastolic function  - tumor causing pain in central abdomen as well as compression of L ureter and causing hydronephrosis  - Inpatient AIM C1 completed  (adrimaycin, ifosfomide, mesna) next week given symptoms above, discussed palliative intent of therapy  - went over risks/benefits/ side effects of chemotherapy and pt wishes to proceed with therapy, consent form signed and form submitted to be scanned into system  - Ifosfomide dropped on day 3 due to neurotoxicity  - PET- CT on 4/22 results reviewed, suv max decreased to 3.9 for abdominal mass and necrosis of part of mass noted  - completed two chemo tx with doxorubicin, first round with ifosofomide as well   - PET-CT on 5/20 with  Large anterior abdominal mass SUV 3 (previously 3.9), left lower lobe pulmonary lesion (prior SUV of 2.3, now 2.5)  - discussed switching to pazopanib due to ease of administration and good option in control of disease; pt has done this chemotherapy since end of June 2019  - PET-CT from 9/16 with large abd mass (now with SUV max of 3, from 2.76) and soft tissue opacity in left lower lobe (SUV of 1.7 from 2.52)  - PET-CT from  -, 12/16 with mass of SUV max 3.62 (slightly higher) and uptake of mass in LLL, noted to be less at 1.39 from 1.7   - Slow progression on pazopanib, then pt switched to gemcitabine/docetaxel; received gemcitabine D1 and then had a syncopal event   - shifted care from Joao Kennedy  - Gemciatbine/docetaxel C1D1 on  7/13 and C1D8 on 7/20; C2 starting on 8/3/20    B/l lower extremity eedma  - clearly worse on L side  - U/S of left leg is negative for DVT  - refer to vascular medicine for further recs and management     Nausea  - prescribed zofran previously  - also sent in compazine to take PRN if additional n/v     Iron deficiency anemia  - Hemoglobin of 8.1, pt with DIA   - transfused 1 U of prBCon 7/7   - ferritin of 10 on 7/6/20, discussed iron transfusion eventually      BCC, R inferior neck  - lesion was biopsied, 6/10  - was supposed to follow-up but weather problems occurred around time of initial follow-up     Pain secondary to neoplasm  - continue MS Contin 60 mg BID     Hypercalcemia of malignancy  - stable      Severe L sided hydronephrosis - noted to be compressed previously from large sarcoma, renal function normal      Neuropathy, possible L sided sciatica  - pt with lower back pain around site of tumor  - continue gabapentin 300 mg BID  - prior MRI lumbar spine in 10/2019 without any cord compression      HTN  - continue norvasc 5     SVT  - unclear of rhythm  - no AFib noted while pt was admitted into hospital     IBS  - continue laxatives PRN - can use senna + miralax, while on pain meds     Discused with DR. Lund      F/u:  - refer to vascular disease for leg swelling (on same day of other appts)  - Plan for PET-CT on morning of appt on 8/24  - Plan for C3D1 with gemcitabine on 8/24 with CBC, CMP prior and visit; plan for C3D8 gem/docetaxel/neulasta OBI on 8/31 with labs only CBC, CMP prior    Fernando Silvestre MD  Hematology and Oncology, PGY VI  [facility]      STAFF NOTE:  I have personally reviewed the past notes, images, labs and other provoider's notes and taken the history and examined this patient and agree with Dr. Silvestre's Note as stated above.    Leticia Lund MD

## 2020-08-04 NOTE — TELEPHONE ENCOUNTER
Please refer to phone message on 8/19/2020    Called pt back to schedule. NA. Unable LMOM for CB to get scheduled. LM with emergency contact to have patient CB to schedule. 8/18/2020 9:46    Patient needs to speak with Dr Silvestre's office to see when her next appt is and with her family for transportation. Stated she will call back asap to schedule. 8/11/2020 13:39    Called pt to schedule NP visit. NA. Unable to LMOM, VM full. 08/10/2020 - 9:24a    Called pt to schedule NP visit. NA. Unable to LMOM.08/07/2020 -- 15:04    Called pt to schedule NP visit. NA. Unable to LMOM. 8/4/2020 8:50a         ----- Message from Audelia Rudolph sent at 8/4/2020  8:31 AM CDT -----  Hi,     Dr. Silvestre is referrring PT to [facility] for Varicose Veins and/or Leg Swelling. bilateral lower extremity edema, worse on L. Can you please assist with scheduling PT in your clinic?    Thank you,     Audelia

## 2020-08-10 NOTE — PLAN OF CARE
1405 pt tolerated gemzar/taxotere infusion without issue, OBI placed to RUE activated and blinking green prior to d/c, pt to rtc 8/24/20, no distress noted upon d/c to home with mark

## 2020-08-10 NOTE — PLAN OF CARE
1115 pt here for D8C2 taxotere/gemzar infusion, labs, hx, meds, allergies reviewed, pt with no complaints at this time, reclined in chair, continue to monitor

## 2020-08-19 NOTE — TELEPHONE ENCOUNTER
Pt stated that due to transportation issues and having chemo on mondays at main campus, she will not be able to schedule at this time. States that she thanks us for our time but will have to figure something out elsewhere     ----- Message from Justin Borrero sent at 8/19/2020 11:15 AM CDT -----  Patient called stating she was returning a missed call regarding scheduling, requesting callback [phone]

## 2020-08-31 NOTE — PROGRESS NOTES
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 norvasc 5     SVT  - unclear of rhythm  - no AFib noted while pt was admitted into hospital     IBS  - continue laxatives PRN - can use senna + miralax, while on pain meds     Discused with DR. Lund      F/u:  - Follow-up PET-CT today   - Plan for C4D1 with gemcitabine on 9/28 with CBC, CMP prior and visit in clinic; plan for C4D8 gem/docetaxel/neulasta OBI on 10/05 with LABS ONLY CBC, CMP prior      Fernando Silvestre MD  Hematology and Oncology, PGY V  [facility]    STAFF NOTE:  I have personally reviewed the past notes, images, labs and other provoider's notes and taken the history and examined this patient and agree with Dr. Silvestre's Note as stated above.    Leticia Lund MD

## 2020-08-31 NOTE — TELEPHONE ENCOUNTER
----- Message from Fernando Silvestre MD sent at 8/31/2020  1:33 PM CDT -----  - Plan for C4D1 with gemcitabine on 9/21 with CBC, CMP prior and visit in clinic; plan for C4D8 gem/docetaxel/neulasta OBI on 9/28 with LABS ONLY CBC, CMP prior

## 2020-08-31 NOTE — Clinical Note
- Plan for C4D1 with gemcitabine on 9/21 with CBC, CMP prior and visit in clinic; plan for C4D8 gem/docetaxel/neulasta OBI on 9/28 with LABS ONLY CBC, CMP prior

## 2020-08-31 NOTE — PLAN OF CARE
Pt tolerated Gemzar today. NAD. Port flushed + blood return present, flushed. Hep lock and deaccesed. AVS given to pt. Discharged home. Ambulated independently with daughter.

## 2020-08-31 NOTE — PLAN OF CARE
Problem: Adult Inpatient Plan of Care  Goal: Optimal Comfort and Wellbeing  Intervention: Provide Person-Centered Care  Flowsheets (Taken 8/31/2020 1512)  Trust Relationship/Rapport:   care explained   questions encouraged   choices provided   reassurance provided   emotional support provided   thoughts/feelings acknowledged   questions answered   empathic listening provided

## 2020-09-28 NOTE — PLAN OF CARE
Pt received Gemzar today and tolerated well, without complications. Educated patient about Gemzar (indications, side effects, possible reactions, chemotherapy precautions) and verbalized understanding.  VSS. CW port positive for blood return, saline flushed, Heparin flush instilled to dwell and de accessed prior to DC. Pt DC with no distress noted, ambulated off unit w/o event, w/ family, pleased.

## 2020-09-28 NOTE — PROGRESS NOTES
PATIENT: Tiffany Hendricks  MRN: [id number]  DATE: 9/28/2020      Diagnosis:   1. Primary intra-abdominal sarcoma        Chief Complaint: Follow-up      Oncologic History:   Low Grade Sarcoma  - 20 cm mass noted in outside CT abdomen/pelvis; L ureter noted to be have left sided hydronephrosis and noted to have some mild inguinal adneopathy  - PET-CT on 3/27 reveals a large anterior abdominal mass, low activity sarcoma without significant metastatic spread, SUV max of 4.7   - Inpatient AIM C1 (4/2/19 - 4/5/19) completed  (adrimaycin, ifosfomide, mesna) next week given symptoms above, discussed palliative intent of therapy  - Ifosfomide dropped on day 3 due to neurotoxicity  - repeat PET-CT on 4/22 with large abdominal mass with SUV max of 3.9   - C2 on 5/7 - 5/10   - PET-CT on 5/20 with  Large anterior abdoinal mass SUV 8 (previously 3.9), left lower pulmonary lesion (prior SUV of 2.3, now 2.5)  - pazopanib approx for 6 months  - Doxorubicin x2 in January - Feb 2020  - [place] [facility] [place]     [place] is a 64 yo  female with PMhx of SVT (possible AFib), chronic IBS (describes a history of suffering from constipation as a child), HTN who presents to the hospital to discuss further options at treating recently found low grade sarcoma in her abdomen, workup for this which was started in [facility]. She was referred by a Dr. Rosa, who is a general surgeon in the [place] area.  She had not been able to get healthcare for a while as she was not enrolled in her 's plan through the  but has been able to get enrolled in Medicare since December.     She started to have abdominal bloating in mid-December 2018. Initially, she had attributed this problem to baseline IBS and was treated at that time with various combinations of Senna, Miralax, suppositories, and enema. Additionally, she lost ~45lbs over the course of one month (174lb to 130lb) which she reports  was primarily due to diffuse prandial-associated abdominal pain described mostly as cramping sensation. During this time she could only tolerated clear liquids, such as Jello and broth.      Her workup in mid-December started with an ultrasound which revealed cholelithiasis and large pelvic mass, therefore, follow-up of CT abdomen/pelvis was completed on 01/06/19. Origin of her mass has been unlcear, but she was found to have a 20 cm abdominal mass and a picture of chronic severe left hydronephrosis. She had a follow-up CT guided core biopsy on 02/06/19 of abdominal mass which revealed a low grade sarcoma, which has been verified by our pathologists as well. *See clinic oncology note for uploaded report.*     In addition, she has a 3-4 cm x 2 cm lesion on the posterior right shoulder and right lateral neck region with some vascularity, bullous with crusting. She notes that this lesion had been present for eight years and endorses some pruritus and periods of resolution, denies any pain or bleeding with the lesion.       She presented to outpatient clinic here on 03/27/2019 as a referral from [facility] to discuss further treatment options. Plan made for in-patient admission for urology evaluation and possible initiation of chemotherapy.         In addition, the pt has a 3-4 cm x 2 cm lesion on the posterior R shoulder and R lateral neck region with some vascularity, bullous with crusting which the pt has not had follow-up for as of this visit. She notes that this lesion had been present for eight years and endorses some pruritus and periods of resolution, denies any pain or bleeding with the lesion.       Pt had cologuard completed for colorectal cancer screening on December 2018, which was negative for malignancy.      She presented to [facility] 4/1/19 for planned in-patient management of her L-sided hydronephrosis and initiation of AIM chemotherapy. Following better control of her pain, her appetite  improved and she was able to increase her food intake and regain weight (up to 149lbs).  Admission labs notable for microcytic anemia with low iron studies and slight hypercalcemia (10.8 corrected). Evaluated by urology on day of admission. Based on assessment of prior imaging, it was determined that, based on hydronephrosis and degree of atrophy, the function of the left kidney was not salvageable and that risk of nephrostomy tube or stent outweighed benefit. She had a PICC line placed on day of admission. Chemotherapy was initiated on 04/02/19 with Doxorubucin, Ifosfamide, and Mesnex. Symptoms during chemotherapy initiation included intermittent abdominal pain and nausea that were controlled with PRN Zofran and Oxycodone. Patient noted to develop new onset issues with fine motor movement of fingers on 04/04; out of concern for neurological toxicity, Ifosfamide was discontinued with symptom resolution. On 04/05, she developed increased urinary frequency with lower back pain with radiation around the left hip; UA ordered, but negative for infection. Pain resolved after dose of Gabapentin and applying heating pads, suspect could be secondary to radiculopathy from tumor burden. Neulasta was administered on day after discharge.     The pt had shedding of hair after first week of chemohterapy but then completely lost the rest of it. Her abdominal pain has been well controlled at home. Pt has had pain and ringing in ears, worse on the R side; she suspects that she is having an ear infection; she was prescribed a course of augmentin for this problem. . Her neuropathy has been stable on gabapentin and hand tremors have become much better/ resolved for most part.      Has been out of pazopanib for about two weeks at this point, as there was a problem with prior information given to us regarding approval of med.  This medication was approved at no cost to patient through [employer] Pt assistance program in the past, was  supposed to be from 6/25 - 12/31/18. Discussed informing us about 1-2 weeks prior to running out of these refills in future.     Pt described some intermittent nausea/vomiting on pazopanib, which has not occurred since at least a week. She had it well controlled with taking compazine during the episodes.      Ms. Neely was living close to the [facility] and so was following up with Dr. Erwin locally.   Nausea has been well controlled with compazine and pain is mostly 4/10 after pain meds are used; currently she in on Morphine 30 mg TID.        Back pain noted around mostly lower back and also some tremors of hands. Discussed that back pain was likely secondary to size of tumor and tremors of hand, which maybe from prior chemotherapy although the incidence of it is largely unknown. She is able to ambulate well without many problems usually.     Her legs have had bilateral swelling but these will apparently get much better with reclining and keeping them elevated.      We also discussed possibility of an [facility] visit to discuss a clinical trial as well.      She ahd two additional doses of doxorbicin given to her around January...received a total of 300 mg/m2 of doxorubicin.  SHe had slow progression on pazopanib,  In between pt had a lung biopsy which revealed an infecitous etiology and growing intra-abdominal mass that is known to be sarcoma.      Pt was swtiched to Gemcitabine/docetaxel due to progression of disease...pt had gemcitabine on day 1 and then syncopal event.      She fell on her face three weeks ago; this may have been a syncopal event ('passed out') and this was a first event.   Pt may have had the fall after the first dose of chemotherapy w Gemcitabine.   She can walk throughout the house, without any assistance.     LAst PET-CT was 4-5 weeks ago.      Morphine 60 mg BID has now helped with pain control. T     Discussed eventual IV Iron to help with underlying DELMI.        Subjective:    Slept on/off for 2 days in the past week, denies any fevers at home;L side scab on ear from sleeping on this ; BP was also normal at that time  She also noted diarrhea for a day and half; immodium helped her wihtin one dose ]    Her legs are less swollen currently after gem/docetaxel   She has lost 11 pounds since the last time we had seen her, more temporal waising noted     For lunch, she eats crackers, cheeze and olives; she will get a three piece of canes and is done after one piece   She is not in worse pain in the stomach but she gets full more often         Past Medical History:   Past Medical History:   Diagnosis Date    Cancer     Gallstones     GERD (gastroesophageal reflux disease)     Hypertension     SVT (supraventricular tachycardia)        Past Surgical HIstory:   Past Surgical History:   Procedure Laterality Date     SECTION      X3    HYSTERECTOMY      Partial    TUBAL LIGATION         Family History:   Family History   Problem Relation Age of Onset    Lung disease Mother     Pancreatic cancer Father     No Known Problems Sister     Hypertension Brother     No Known Problems Maternal Aunt     No Known Problems Maternal Uncle     No Known Problems Paternal Aunt     No Known Problems Paternal Uncle     No Known Problems Maternal Grandmother     No Known Problems Maternal Grandfather     No Known Problems Paternal Grandmother     No Known Problems Paternal Grandfather     Melanoma Daughter     No Known Problems Daughter     No Known Problems Son        Social History:  reports that she has never smoked. She has never used smokeless tobacco. She reports that she does not drink alcohol or use drugs.    Allergies:  Review of patient's allergies indicates:  No Known Allergies    Medications:  Current Outpatient Medications   Medication Sig Dispense Refill    bumetanide (BUMEX) 1 MG tablet Take 1 tablet (1 mg total) by mouth once daily. 30 tablet 1     diphenhydrAMINE-aluminum-magnesium hydroxide-simethicone-lidocaine HCl 2% Swish and spit 15 mLs every 4 (four) hours as needed. 300 mL 0    gabapentin (NEURONTIN) 300 MG capsule Take 1 capsule (300 mg total) by mouth every evening. Take one pill (300 mg at night), can take up to two a night (600 mg) if tolerated. 120 capsule 2    ibuprofen (ADVIL,MOTRIN) 200 MG tablet Take 200 mg by mouth every 6 (six) hours as needed for Pain.      lidocaine-prilocaine (EMLA) cream Apply topically as needed. 30 g 2    magic mouthwash diphen/antac/lidoc Swish and spit 15 mLs every 4 (four) hours as needed. 300 mL 0    morphine (MS CONTIN) 60 MG 12 hr tablet Take 1 tablet (60 mg total) by mouth 2 (two) times daily. 60 tablet 0    OLANZapine (ZYPREXA) 5 MG tablet Take 1 tablet (5 mg total) by mouth nightly. Can take up to 2 tabs (10 mg) at a time if tolerated well. Continue on days 1-4, with day 1 dose after chemotherapy. 60 tablet 2    ondansetron (ZOFRAN) 8 MG tablet Take 1 tablet by mouth 3 (three) times daily as needed.      ondansetron (ZOFRAN-ODT) 4 MG TbDL Take 2 tablets (8 mg total) by mouth every 8 (eight) hours as needed. 60 tablet 1    oxyCODONE-acetaminophen (PERCOCET)  mg per tablet Take 1 tablet by mouth every 6 (six) hours as needed for Pain. 120 tablet 0    polyethylene glycol (GLYCOLAX) 17 gram/dose powder Mix 1 capful (17 g) with liquid and take by mouth daily as needed. 510 g 0    potassium chloride (KLOR-CON) 10 MEQ TbSR Take 20 mEq by mouth once daily.      pramipexole (MIRAPEX) 0.125 MG tablet Take 2 hours before bedtime, for RLS. Titrate to (0.25 mg, 0.5 mg daily) after tolerating one week of prior dose. Max of 0.5 mg nightly. 60 tablet 1    prochlorperazine (COMPAZINE) 5 MG tablet TK 1 T PO TID PRN N      sucralfate (CARAFATE) 1 gram tablet Take 1 tablet by mouth daily as needed.      zolpidem (AMBIEN) 5 MG Tab Take 1 tablet (5 mg total) by mouth every evening. 30 tablet 0    amLODIPine  "(NORVASC) 5 MG tablet Take 1 tablet (5 mg total) by mouth once daily. 30 tablet 1    citalopram (CELEXA) 20 MG tablet Take 1 tablet (20 mg total) by mouth once daily. (Patient taking differently: Take 10 mg by mouth once daily. ) 30 tablet 2     Current Facility-Administered Medications   Medication Dose Route Frequency Provider Last Rate Last Dose    sodium chloride 0.9% flush 10 mL  10 mL Intravenous PRN Fernando Silvestre MD         Facility-Administered Medications Ordered in Other Visits   Medication Dose Route Frequency Provider Last Rate Last Dose    alteplase injection 2 mg  2 mg Intra-Catheter PRN Norman Cazares MD        heparin, porcine (PF) 100 unit/mL injection flush 500 Units  500 Units Intravenous PRN Norman Cazares MD   500 Units at 09/28/20 1642    sodium chloride 0.9% flush 10 mL  10 mL Intravenous PRN Norman Cazares MD   10 mL at 09/28/20 1642       Review of Systems   Constitutional: Negative for appetite change, chills and fever.   HENT: Negative for sore throat.    Eyes: Negative for visual disturbance.   Respiratory: Negative for cough, chest tightness, shortness of breath and wheezing.    Cardiovascular: Positive for leg swelling. Negative for chest pain.   Gastrointestinal: Negative for blood in stool, diarrhea and rectal pain.   Genitourinary: Negative for dysuria.   Musculoskeletal: Positive for back pain. Negative for gait problem.   Skin: Negative for rash.   Neurological: Negative for headaches.   Hematological: Negative for adenopathy. Does not bruise/bleed easily.       ECOG Performance Status: 1   Objective:      Vitals:   Vitals:    09/28/20 1414   BP: (!) 103/50   BP Location: Left arm   Patient Position: Sitting   BP Method: Large (Automatic)   Pulse: 92   Resp: 16   Temp: 97.9 °F (36.6 °C)   TempSrc: Oral   SpO2: 95%   Weight: 65.9 kg (145 lb 4.5 oz)   Height: 5' 4" (1.626 m)       Physical Exam  Constitutional:       General: She is not in acute distress.     " Appearance: She is well-developed. She is not diaphoretic.   HENT:      Head: Normocephalic and atraumatic.      Mouth/Throat:      Pharynx: No oropharyngeal exudate.   Eyes:      General: No scleral icterus.     Pupils: Pupils are equal, round, and reactive to light.   Neck:      Musculoskeletal: Normal range of motion and neck supple.   Cardiovascular:      Rate and Rhythm: Normal rate and regular rhythm.      Heart sounds: Normal heart sounds. No murmur. No friction rub. No gallop.    Pulmonary:      Effort: Pulmonary effort is normal. No respiratory distress.      Breath sounds: Normal breath sounds. No wheezing or rales.   Abdominal:      Palpations: There is mass.      Tenderness: There is abdominal tenderness. There is no guarding.      Comments: Firm, mid-abdomen ; central abdominal bruit   Musculoskeletal: Normal range of motion.      Comments: - left leg 2+ edema and R leg has 1+ edema   Lymphadenopathy:      Cervical: No cervical adenopathy.   Skin:     General: Skin is warm and dry.      Findings: No rash.   Neurological:      Mental Status: She is alert and oriented to person, place, and time.      Cranial Nerves: No cranial nerve deficit.         Laboratory Data:  Lab Visit on 09/28/2020   Component Date Value Ref Range Status    WBC 09/28/2020 9.77  3.90 - 12.70 K/uL Final    RBC 09/28/2020 3.77* 4.00 - 5.40 M/uL Final    Hemoglobin 09/28/2020 8.9* 12.0 - 16.0 g/dL Final    Hematocrit 09/28/2020 31.1* 37.0 - 48.5 % Final    Mean Corpuscular Volume 09/28/2020 83  82 - 98 fL Final    Mean Corpuscular Hemoglobin 09/28/2020 23.6* 27.0 - 31.0 pg Final    Mean Corpuscular Hemoglobin Conc 09/28/2020 28.6* 32.0 - 36.0 g/dL Final    RDW 09/28/2020 26.1* 11.5 - 14.5 % Final    Platelets 09/28/2020 205  150 - 350 K/uL Final    MPV 09/28/2020 SEE COMMENT  9.2 - 12.9 fL Final    Result not available.    Immature Granulocytes 09/28/2020 4.0* 0.0 - 0.5 % Final    Gran # (ANC) 09/28/2020 7.0  1.8 - 7.7  K/uL Final    Immature Grans (Abs) 09/28/2020 0.39* 0.00 - 0.04 K/uL Final    Comment: Mild elevation in immature granulocytes is non specific and   can be seen in a variety of conditions including stress response,   acute inflammation, trauma and pregnancy. Correlation with other   laboratory and clinical findings is essential.      Lymph # 09/28/2020 1.4  1.0 - 4.8 K/uL Final    Mono # 09/28/2020 0.7  0.3 - 1.0 K/uL Final    Eos # 09/28/2020 0.1  0.0 - 0.5 K/uL Final    Baso # 09/28/2020 0.08  0.00 - 0.20 K/uL Final    nRBC 09/28/2020 0  0 /100 WBC Final    Gran% 09/28/2020 72.1  38.0 - 73.0 % Final    Lymph% 09/28/2020 14.2* 18.0 - 48.0 % Final    Mono% 09/28/2020 7.6  4.0 - 15.0 % Final    Eosinophil% 09/28/2020 1.3  0.0 - 8.0 % Final    Basophil% 09/28/2020 0.8  0.0 - 1.9 % Final    Differential Method 09/28/2020 Automated   Final    Sodium 09/28/2020 136  136 - 145 mmol/L Final    Potassium 09/28/2020 3.3* 3.5 - 5.1 mmol/L Final    Chloride 09/28/2020 102  95 - 110 mmol/L Final    CO2 09/28/2020 24  23 - 29 mmol/L Final    Glucose 09/28/2020 80  70 - 110 mg/dL Final    BUN, Bld 09/28/2020 12  8 - 23 mg/dL Final    Creatinine 09/28/2020 0.7  0.5 - 1.4 mg/dL Final    Calcium 09/28/2020 9.3  8.7 - 10.5 mg/dL Final    Total Protein 09/28/2020 6.6  6.0 - 8.4 g/dL Final    Albumin 09/28/2020 3.2* 3.5 - 5.2 g/dL Final    Total Bilirubin 09/28/2020 1.0  0.1 - 1.0 mg/dL Final    Comment: For infants and newborns, interpretation of results should be based  on gestational age, weight and in agreement with clinical  observations.  Premature Infant recommended reference ranges:  Up to 24 hours.............<8.0 mg/dL  Up to 48 hours............<12.0 mg/dL  3-5 days..................<15.0 mg/dL  6-29 days.................<15.0 mg/dL      Alkaline Phosphatase 09/28/2020 97  55 - 135 U/L Final    AST 09/28/2020 10  10 - 40 U/L Final    ALT 09/28/2020 <5* 10 - 44 U/L Final    Anion Gap 09/28/2020 10   8 - 16 mmol/L Final    eGFR if African American 09/28/2020 >60.0  >60 mL/min/1.73 m^2 Final    eGFR if non African American 09/28/2020 >60.0  >60 mL/min/1.73 m^2 Final    Comment: Calculation used to obtain the estimated glomerular filtration  rate (eGFR) is the CKD-EPI equation.            Imaging:    Assessment:       1. Primary intra-abdominal sarcoma           Plan:     Low Grade Sarcoma  - ECOG PS 1  - 20 cm mass noted in CT abdomen/pelvis; L ureter noted to be have left sided hydronephresis and noted to have some mild inguinal adneopathy  - path reviewed at our institution also consistent with sarcoma as well  - Reviewed images and discussed with Dr. Mota previously, pt is not a surgical candidate at this time as significant amount of central vasculature (aorta, iliac arteries/veins) involved and significant amount of vascularity to mass  - PET-CT reveals a large anterior abdominal mass, low activity sarcoma without significant metastatic spread, SUV max of 4.7   - Echo with EF of 60%, normal LV systolic function and indeterminate diastolic function  - tumor causing pain in central abdomen as well as compression of L ureter and causing hydronephrosis  - Inpatient AIM C1 completed  (adrimaycin, ifosfomide, mesna) next week given symptoms above, discussed palliative intent of therapy  - went over risks/benefits/ side effects of chemotherapy and pt wishes to proceed with therapy, consent form signed and form submitted to be scanned into system  - Ifosfomide dropped on day 3 due to neurotoxicity  - PET- CT on 4/22 results reviewed, suv max decreased to 3.9 for abdominal mass and necrosis of part of mass noted  - completed two chemo tx with doxorubicin, first round with ifosofomide as well   - PET-CT on 5/20 with  Large anterior abdominal mass SUV 3 (previously 3.9), left lower lobe pulmonary lesion (prior SUV of 2.3, now 2.5)  - discussed switching to pazopanib due to ease of administration and good option in  control of disease; pt has done this chemotherapy since end of June 2019  - PET-CT from 9/16 with large abd mass (now with SUV max of 3, from 2.76) and soft tissue opacity in left lower lobe (SUV of 1.7 from 2.52)  - PET-CT from  -, 12/16 with mass of SUV max 3.62 (slightly higher) and uptake of mass in LLL, noted to be less at 1.39 from 1.7   - Slow progression on pazopanib, then pt switched to gemcitabine/docetaxel; received gemcitabine D1 and then had a syncopal event   - shifted care from Joao Kennedy  - Gemciatbine/docetaxel C1D1 on 7/13 and C1D8 on 7/20  - PET-CT today on 8/31/20 with stable intrabdominal sarcoma; no significant activity noted elsewhere  - Continue with C4D1 of gem/docetaxel today     Weight Loss  - discussed that if pt continuing to lose weight, she should call our office within 1-2 weeks for repeat imaging      Iron deficiency anemia  - Hemoglobin of 8.1, pt with DIA   - transfused 1 U of prBCon 7/7   - ferritin of 10 on 7/6/20, discussed iron transfusion previously  - current Hgb of 8.9, will order injectafer to be given with next round of chemotherapy       BCC, R inferior neck  - lesion was biopsied, 6/10  - was supposed to follow-up but weather problems occurred around time of initial follow-up     Pain secondary to neoplasm  - continue MS Contin 60 mg BID     Severe L sided hydronephrosis - noted to be compressed previously from large sarcoma, renal function normal      Neuropathy, possible L sided sciatica  - pt with lower back pain around site of tumor  - continue gabapentin 300 mg BID  - prior MRI lumbar spine in 10/2019 without any cord compression      HTN  - continue norvasc 5     SVT  - unclear of rhythm  - no AFib noted while pt was admitted into hospital     IBS  - continue laxatives PRN - can use senna + miralax, while on pain meds     Discused with DR. Benson      F/u:  -  Plan for C4D8 gem/docetaxel/neulasta OBI on 10/05 with LABS ONLY CBC, CMP prior   - Plan  for C5D1 with gemcitabine on 10/19 with CBC, CMP prior and visit in clinic; plan for C5D8 gem/docetaxel/neulasta OBI on 10/26 with LABS ONLY CBC, CMP prior  - Please schedule injectafer weekly x 2 with C5 of chemo once approved  - Pt asked to call back our clinic if noticing continued weight loss       Fernando Silvestre MD  Hematology and Oncology, PGY VI  [facility]      ATTENDING NOTE, ONCOLOGY INPATIENT TEAM    As above.  Patient seen and examined, chart reviewed.  Appears comfortable, in NAD.  Lungs are clear to auscultation.  Abdomen has a large (20cm) mass onvolving the rectus muscle.  Labs reviewed.    PLAN  She may proceed with cycle 4 day 1 chemotherapy today.  She will return in a week with repeat labs to see Dr. Silvestre and proceed with the cycle 4 day 8 chemotherapy.  Her multiple questions were answered to her satisfaction.      Norman Cazares MD

## 2020-09-28 NOTE — Clinical Note
-  Plan for C4D8 gem/docetaxel/neulasta OBI on 10/05 with LABS ONLY CBC, CMP prior   - Plan for C5D1 with gemcitabine on 10/19 with CBC, CMP prior and visit in clinic; plan for C5D8 gem/docetaxel/neulasta OBI on 10/26 with LABS ONLY CBC, CMP prior  - Please schedule injectafer weekly x 2 with C5 of chemo once approved

## 2020-10-05 NOTE — PLAN OF CARE
1610 patient completed and tolerated gemzar and taxotere. OBI applied to abd. Filled line and green light present. Reviewed home care and when to take off OBI tomorrow. Pt verbalized understanding. VSS, pt voiced no new complaints or concerns at this time. NAD noted. Pt d/c home.

## 2020-10-26 NOTE — PLAN OF CARE
Problem: Adult Inpatient Plan of Care  Goal: Optimal Comfort and Wellbeing  Intervention: Provide Person-Centered Care  Flowsheets (Taken 10/26/2020 4295)  Trust Relationship/Rapport:   questions encouraged   care explained   choices provided   reassurance provided   emotional support provided   thoughts/feelings acknowledged   empathic listening provided   questions answered

## 2020-10-26 NOTE — PLAN OF CARE
Patient tolerated gemzar and injectafer well today. Port + blood return present, flushed, hep locked and deaccessed. Contacted Dr. Silvestre in regards to patient stating she does not have any equipment at home such as a walker or wheelchair to assist her. She is having increased trouble walking and shuffles rather than picks up feet. Dr. Silvestre contacted  for patient to assist with home health pt/ot. Patient also to stop by MD office upon discharge to  form for handicap parking. AVS given. Discharged home, escorted in wheelchair by daughter.    Secondary hypertension

## 2020-10-27 NOTE — PROGRESS NOTES
Received referral from the clinic that the patient needed referral for home health and a walker. Called patient to discuss and see about preference of provider. There was no answer and the mailbox was full so I cannot leave message. Will continue to reach the patient.

## 2020-11-05 NOTE — TELEPHONE ENCOUNTER
"----- Message from Rina Lee sent at 11/5/2020  8:01 AM CST -----  Consult/Advisory:    Name Of Caller: Tiffany  Contact Preference?:[phone]    Does patient feel the need to be seen today? No    What is the nature of the call?:  Pt request a callback to discuss up coming appointments     Additional Notes:  "Thank you for all that you do for our patients'"    "

## 2020-11-11 NOTE — TELEPHONE ENCOUNTER
----- Message from Lucero Fortune RN sent at 11/11/2020  8:25 AM CST -----    ----- Message -----  From: Orlando Cope  Sent: 11/9/2020   7:17 AM CST  To: [facility]    Pt would like to be called back regarding rescheduling her appts set for today  Pt can be reached at [phone].    Thanks

## 2020-11-16 NOTE — PROGRESS NOTES
PATIENT: Tiffany Hendricks  MRN: [id number]  DATE: 11/16/2020      Diagnosis:   1. Primary intra-abdominal sarcoma        Chief Complaint: Primary intra-abdominal sarcoma      Oncologic History:   Low Grade Sarcoma  - 20 cm mass noted in outside CT abdomen/pelvis; L ureter noted to be have left sided hydronephrosis and noted to have some mild inguinal adneopathy  - PET-CT on 3/27 reveals a large anterior abdominal mass, low activity sarcoma without significant metastatic spread, SUV max of 4.7   - Inpatient AIM C1 (4/2/19 - 4/5/19) completed  (adrimaycin, ifosfomide, mesna) next week given symptoms above, discussed palliative intent of therapy  - Ifosfomide dropped on day 3 due to neurotoxicity  - repeat PET-CT on 4/22 with large abdominal mass with SUV max of 3.9   - C2 on 5/7 - 5/10   - PET-CT on 5/20 with  Large anterior abdoinal mass SUV 8 (previously 3.9), left lower pulmonary lesion (prior SUV of 2.3, now 2.5)  - pazopanib approx for 6 months  - Doxorubicin x2 in January - Feb 2020  - Started with taxotere and gemcitabine in 07/2020 at [facility]     Pt is a 64 yo  female with PMhx of SVT (possible AFib), chronic IBS (describes a history of suffering from constipation as a child), HTN who presents to the hospital to discuss further options at treating recently found low grade sarcoma in her abdomen, workup for this which was started in [facility]. She was referred by a Dr. Rosa, who is a general surgeon in the [place] area.  She had not been able to get healthcare for a while as she was not enrolled in her 's plan through the  but has been able to get enrolled in Medicare since December.     She started to have abdominal bloating in mid-December 2018. Initially, she had attributed this problem to baseline IBS and was treated at that time with various combinations of Senna, Miralax, suppositories, and enema. Additionally, she lost ~45lbs over the course of one month (174lb to  130lb) which she reports was primarily due to diffuse prandial-associated abdominal pain described mostly as cramping sensation. During this time she could only tolerated clear liquids, such as Jello and broth.      Her workup in mid-December started with an ultrasound which revealed cholelithiasis and large pelvic mass, therefore, follow-up of CT abdomen/pelvis was completed on 01/06/19. Origin of her mass has been unlcear, but she was found to have a 20 cm abdominal mass and a picture of chronic severe left hydronephrosis. She had a follow-up CT guided core biopsy on 02/06/19 of abdominal mass which revealed a low grade sarcoma, which has been verified by our pathologists as well. *See clinic oncology note for uploaded report.*     In addition, she has a 3-4 cm x 2 cm lesion on the posterior right shoulder and right lateral neck region with some vascularity, bullous with crusting. She notes that this lesion had been present for eight years and endorses some pruritus and periods of resolution, denies any pain or bleeding with the lesion.       She presented to outpatient clinic here on 03/27/2019 as a referral from [facility] to discuss further treatment options. Plan made for in-patient admission for urology evaluation and possible initiation of chemotherapy.         In addition, the pt has a 3-4 cm x 2 cm lesion on the posterior R shoulder and R lateral neck region with some vascularity, bullous with crusting which the pt has not had follow-up for as of this visit. She notes that this lesion had been present for eight years and endorses some pruritus and periods of resolution, denies any pain or bleeding with the lesion.       Pt had cologuard completed for colorectal cancer screening on December 2018, which was negative for malignancy.      She presented to [facility] 4/1/19 for planned in-patient management of her L-sided hydronephrosis and initiation of AIM chemotherapy. Following better control of her  pain, her appetite improved and she was able to increase her food intake and regain weight (up to 149lbs).  Admission labs notable for microcytic anemia with low iron studies and slight hypercalcemia (10.8 corrected). Evaluated by urology on day of admission. Based on assessment of prior imaging, it was determined that, based on hydronephrosis and degree of atrophy, the function of the left kidney was not salvageable and that risk of nephrostomy tube or stent outweighed benefit. She had a PICC line placed on day of admission. Chemotherapy was initiated on 04/02/19 with Doxorubucin, Ifosfamide, and Mesnex. Symptoms during chemotherapy initiation included intermittent abdominal pain and nausea that were controlled with PRN Zofran and Oxycodone. Patient noted to develop new onset issues with fine motor movement of fingers on 04/04; out of concern for neurological toxicity, Ifosfamide was discontinued with symptom resolution. On 04/05, she developed increased urinary frequency with lower back pain with radiation around the left hip; UA ordered, but negative for infection. Pain resolved after dose of Gabapentin and applying heating pads, suspect could be secondary to radiculopathy from tumor burden. Neulasta was administered on day after discharge.     The pt had shedding of hair after first week of chemohterapy but then completely lost the rest of it. Her abdominal pain has been well controlled at home. Pt has had pain and ringing in ears, worse on the R side; she suspects that she is having an ear infection; she was prescribed a course of augmentin for this problem. . Her neuropathy has been stable on gabapentin and hand tremors have become much better/ resolved for most part.      Has been out of pazopanib for about two weeks at this point, as there was a problem with prior information given to us regarding approval of med.  This medication was approved at no cost to patient through [employer] Pt assistance program in  the past, was supposed to be from 6/25 - 12/31/18. Discussed informing us about 1-2 weeks prior to running out of these refills in future.     Pt described some intermittent nausea/vomiting on pazopanib, which has not occurred since at least a week. She had it well controlled with taking compazine during the episodes.      Ms. Neely was living close to the [facility] and so was following up with Dr. Erwin locally.   Nausea has been well controlled with compazine and pain is mostly 4/10 after pain meds are used; currently she in on Morphine 30 mg TID.         Back pain noted around mostly lower back and also some tremors of hands. Discussed that back pain was likely secondary to size of tumor and tremors of hand, which maybe from prior chemotherapy although the incidence of it is largely unknown. She is able to ambulate well without many problems usually.     Her legs have had bilateral swelling but these will apparently get much better with reclining and keeping them elevated.      We also discussed possibility of an [facility] visit to discuss a clinical trial as well.      She ahd two additional doses of doxorbicin given to her around January...received a total of 300 mg/m2 of doxorubicin.  SHe had slow progression on pazopanib,  In between pt had a lung biopsy which revealed an infecitous etiology and growing intra-abdominal mass that is known to be sarcoma.      Pt was swtiched to Gemcitabine/docetaxel due to progression of disease...pt had gemcitabine on day 1 and then syncopal event.      She fell on her face three weeks ago; this may have been a syncopal event ('passed out') and this was a first event.   Pt may have had the fall after the first dose of chemotherapy w Gemcitabine.   She can walk throughout the house, without any assistance.     LAst PET-CT was 4-5 weeks ago.      Morphine 60 mg BID has now helped with pain control. T     L side scan on ear from sleeping on this     She has lost 11  pounds since the last time we had seen her, more temporal waising noted; this was after 4 cycles of gem/docetaxel       Subjective:   Finished with C5D1 of gemzar on 10/26 and did not show up for D8 with docetaxel...    Pt gained three pounds since her last visit    She is tired and fatigue, where she is tired in the bed for up to 1/2 the day. SHe is able to do her clothes and able to help prep items.     SHe fell once last time after chemo and bruised but did not hurt any skeletal structures      Past Medical History:   Past Medical History:   Diagnosis Date    Cancer     Gallstones     GERD (gastroesophageal reflux disease)     Hypertension     SVT (supraventricular tachycardia)        Past Surgical HIstory:   Past Surgical History:   Procedure Laterality Date     SECTION      X3    HYSTERECTOMY      Partial    TUBAL LIGATION         Family History:   Family History   Problem Relation Age of Onset    Lung disease Mother     Pancreatic cancer Father     No Known Problems Sister     Hypertension Brother     No Known Problems Maternal Aunt     No Known Problems Maternal Uncle     No Known Problems Paternal Aunt     No Known Problems Paternal Uncle     No Known Problems Maternal Grandmother     No Known Problems Maternal Grandfather     No Known Problems Paternal Grandmother     No Known Problems Paternal Grandfather     Melanoma Daughter     No Known Problems Daughter     No Known Problems Son        Social History:  reports that she has never smoked. She has never used smokeless tobacco. She reports that she does not drink alcohol or use drugs.    Allergies:  Review of patient's allergies indicates:  No Known Allergies    Medications:  Current Outpatient Medications   Medication Sig Dispense Refill    amLODIPine (NORVASC) 5 MG tablet Take 1 tablet (5 mg total) by mouth once daily. 30 tablet 1    bumetanide (BUMEX) 1 MG tablet Take 1 tablet (1 mg total) by mouth once daily. 30 tablet  1    citalopram (CELEXA) 20 MG tablet Take 1 tablet (20 mg total) by mouth once daily. (Patient taking differently: Take 10 mg by mouth once daily. ) 30 tablet 2    gabapentin (NEURONTIN) 300 MG capsule Take 1 capsule (300 mg total) by mouth every evening. Take one pill (300 mg at night), can take up to two a night (600 mg) if tolerated. 120 capsule 2    ibuprofen (ADVIL,MOTRIN) 200 MG tablet Take 200 mg by mouth every 6 (six) hours as needed for Pain.      OLANZapine (ZYPREXA) 5 MG tablet Take 1 tablet (5 mg total) by mouth nightly. Can take up to 2 tabs (10 mg) at a time if tolerated well. Continue on days 1-4, with day 1 dose after chemotherapy. 60 tablet 2    ondansetron (ZOFRAN) 8 MG tablet Take 1 tablet by mouth 3 (three) times daily as needed.      ondansetron (ZOFRAN-ODT) 4 MG TbDL Take 2 tablets (8 mg total) by mouth every 8 (eight) hours as needed. 60 tablet 1    oxyCODONE-acetaminophen (PERCOCET)  mg per tablet Take 1 tablet by mouth every 6 (six) hours as needed for Pain. 120 tablet 0    polyethylene glycol (GLYCOLAX) 17 gram/dose powder Mix 1 capful (17 g) with liquid and take by mouth daily as needed. 510 g 0    potassium chloride (KLOR-CON) 10 MEQ TbSR Take 20 mEq by mouth once daily.      pramipexole (MIRAPEX) 0.125 MG tablet Take 2 hours before bedtime, for RLS. Titrate to (0.25 mg, 0.5 mg daily) after tolerating one week of prior dose. Max of 0.5 mg nightly. 60 tablet 1    prochlorperazine (COMPAZINE) 5 MG tablet TK 1 T PO TID PRN N 90 tablet 0    sucralfate (CARAFATE) 1 gram tablet Take 1 tablet by mouth daily as needed.      zolpidem (AMBIEN) 5 MG Tab Take 1 tablet (5 mg total) by mouth every evening. 30 tablet 0    diphenhydrAMINE-aluminum-magnesium hydroxide-simethicone-lidocaine HCl 2% Swish and spit 15 mLs every 4 (four) hours as needed. (Patient not taking: Reported on 11/16/2020) 300 mL 0    lidocaine-prilocaine (EMLA) cream Apply topically as needed. (Patient not  "taking: Reported on 11/16/2020) 30 g 2    magic mouthwash diphen/antac/lidoc Swish and spit 15 mLs every 4 (four) hours as needed. (Patient not taking: Reported on 11/16/2020) 300 mL 0    morphine (MS CONTIN) 60 MG 12 hr tablet Take 1 tablet (60 mg total) by mouth 2 (two) times daily. 60 tablet 0     Current Facility-Administered Medications   Medication Dose Route Frequency Provider Last Rate Last Dose    sodium chloride 0.9% flush 10 mL  10 mL Intravenous PRN Fernando Silvestre MD           Review of Systems   Constitutional: Negative for appetite change, chills and fever.   HENT: Negative for sore throat.    Eyes: Negative for visual disturbance.   Respiratory: Negative for cough, chest tightness, shortness of breath and wheezing.    Cardiovascular: Positive for leg swelling. Negative for chest pain.   Gastrointestinal: Positive for abdominal pain. Negative for blood in stool, diarrhea and rectal pain.   Genitourinary: Negative for dysuria.   Musculoskeletal: Negative for gait problem.   Skin: Negative for rash.   Neurological: Negative for headaches.   Hematological: Negative for adenopathy. Does not bruise/bleed easily.       ECOG Performance Status: 1  Objective:      Vitals:   Vitals:    11/16/20 1121   BP: 128/60   Pulse: 92   Temp: 98.2 °F (36.8 °C)   TempSrc: Oral   Weight: 68.3 kg (150 lb 9.2 oz)   Height: 5' 4" (1.626 m)       Physical Exam  Constitutional:       General: She is not in acute distress.     Appearance: She is well-developed. She is not diaphoretic.   HENT:      Head: Normocephalic and atraumatic.      Mouth/Throat:      Pharynx: No oropharyngeal exudate.   Eyes:      General: No scleral icterus.     Pupils: Pupils are equal, round, and reactive to light.   Neck:      Musculoskeletal: Normal range of motion and neck supple.   Cardiovascular:      Rate and Rhythm: Normal rate and regular rhythm.      Heart sounds: Normal heart sounds. No murmur. No friction rub. No gallop.    Pulmonary:    "   Effort: Pulmonary effort is normal. No respiratory distress.      Breath sounds: Normal breath sounds. No wheezing or rales.   Abdominal:      Palpations: There is mass.      Tenderness: There is abdominal tenderness. There is no guarding.      Comments: Firm, mid-abdomen ; central abdominal bruit   Musculoskeletal: Normal range of motion.      Comments: - left leg 2+ edema and R leg has 1+ edema   Lymphadenopathy:      Cervical: No cervical adenopathy.   Skin:     General: Skin is warm and dry.      Findings: No rash.   Neurological:      Mental Status: She is alert and oriented to person, place, and time.      Cranial Nerves: No cranial nerve deficit.         Laboratory Data:  Lab Visit on 11/16/2020   Component Date Value Ref Range Status    WBC 11/16/2020 7.87  3.90 - 12.70 K/uL Final    RBC 11/16/2020 3.62* 4.00 - 5.40 M/uL Final    Hemoglobin 11/16/2020 10.0* 12.0 - 16.0 g/dL Final    Hematocrit 11/16/2020 33.8* 37.0 - 48.5 % Final    MCV 11/16/2020 93  82 - 98 fL Final    MCH 11/16/2020 27.6  27.0 - 31.0 pg Final    MCHC 11/16/2020 29.6* 32.0 - 36.0 g/dL Final    RDW 11/16/2020 24.3* 11.5 - 14.5 % Final    Platelets 11/16/2020 291  150 - 350 K/uL Final    MPV 11/16/2020 10.8  9.2 - 12.9 fL Final    Immature Granulocytes 11/16/2020 0.4  0.0 - 0.5 % Final    Gran # (ANC) 11/16/2020 4.7  1.8 - 7.7 K/uL Final    Immature Grans (Abs) 11/16/2020 0.03  0.00 - 0.04 K/uL Final    Comment: Mild elevation in immature granulocytes is non specific and   can be seen in a variety of conditions including stress response,   acute inflammation, trauma and pregnancy. Correlation with other   laboratory and clinical findings is essential.      Lymph # 11/16/2020 1.8  1.0 - 4.8 K/uL Final    Mono # 11/16/2020 0.7  0.3 - 1.0 K/uL Final    Eos # 11/16/2020 0.5  0.0 - 0.5 K/uL Final    Baso # 11/16/2020 0.13  0.00 - 0.20 K/uL Final    nRBC 11/16/2020 0  0 /100 WBC Final    Gran % 11/16/2020 59.8  38.0 - 73.0 %  Final    Lymph % 11/16/2020 23.0  18.0 - 48.0 % Final    Mono % 11/16/2020 9.3  4.0 - 15.0 % Final    Eosinophil % 11/16/2020 5.8  0.0 - 8.0 % Final    Basophil % 11/16/2020 1.7  0.0 - 1.9 % Final    Differential Method 11/16/2020 Automated   Final    Sodium 11/16/2020 136  136 - 145 mmol/L Final    Potassium 11/16/2020 4.2  3.5 - 5.1 mmol/L Final    Chloride 11/16/2020 107  95 - 110 mmol/L Final    CO2 11/16/2020 23  23 - 29 mmol/L Final    Glucose 11/16/2020 83  70 - 110 mg/dL Final    BUN 11/16/2020 14  8 - 23 mg/dL Final    Creatinine 11/16/2020 0.6  0.5 - 1.4 mg/dL Final    Calcium 11/16/2020 9.7  8.7 - 10.5 mg/dL Final    Total Protein 11/16/2020 6.7  6.0 - 8.4 g/dL Final    Albumin 11/16/2020 3.0* 3.5 - 5.2 g/dL Final    Total Bilirubin 11/16/2020 0.6  0.1 - 1.0 mg/dL Final    Comment: For infants and newborns, interpretation of results should be based  on gestational age, weight and in agreement with clinical  observations.  Premature Infant recommended reference ranges:  Up to 24 hours.............<8.0 mg/dL  Up to 48 hours............<12.0 mg/dL  3-5 days..................<15.0 mg/dL  6-29 days.................<15.0 mg/dL      Alkaline Phosphatase 11/16/2020 121  55 - 135 U/L Final    AST 11/16/2020 19  10 - 40 U/L Final    ALT 11/16/2020 8* 10 - 44 U/L Final    Anion Gap 11/16/2020 6* 8 - 16 mmol/L Final    eGFR if African American 11/16/2020 >60.0  >60 mL/min/1.73 m^2 Final    eGFR if non African American 11/16/2020 >60.0  >60 mL/min/1.73 m^2 Final    Comment: Calculation used to obtain the estimated glomerular filtration  rate (eGFR) is the CKD-EPI equation.       Group & Rh 11/16/2020 O POS   Final    Indirect [place] 11/16/2020 NEG   Final         Imaging:     Narrative & Impression        CMS MANDATED QUALITY DATA - CT RADIATION - 436     All CT scans at this facility utilize dose modulation, iterative reconstruction, and/or weight based dosing when appropriate to reduce  radiation dose to as low as reasonably achievable.     EXAMINATION:  CT CHEST ABDOMEN PELVIS W W/O CONTRAST (XPD)     CLINICAL HISTORY:  low grade sarcoma, eval; 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     COMPARISON:  PET-CT examinations dated 12/16/2019 and 08/31/2020.     FINDINGS:  An oblong shaped nodular parenchymal opacity within the left lower lobe current Sharon measures 10 x 18 mm transversely and has not changed detrimental E compared to prior studies.  A 6 mm nodular appearing opacity within the medial segment of the middle lobe remains unchanged.  No new parenchymal nodules are identified.  Mild pulmonary interstitial prominence persists.  There are dependent opacities in the lung bases likely reflecting mild atelectasis.  There are no confluent areas of airspace disease.  There are no significant effusions.  The central airways are patent.     The heart is mildly enlarged but stable.  There is no significant pericardial fluid.  Scattered calcified plaque formation is noted in the aorta and branch vessels including the coronary arteries.  There is no pathologic mediastinal or hilar lymphadenopathy.  A port type left subclavian central venous catheter has its tip positioned within the superior vena cava.     A low-density nodule is again noted in the lower pole of the left thyroid lobe.     The large lobulated heterogeneously enhancing soft tissue mass within the abdomen and extending into the pelvis has similar dimensions to the previous examinations with.  Prominent venous varicosities are observed about the margins of the mass within the abdomen and pelvis are again noted.     The liver and spleen appear normal in size and demonstrate no focal parenchymal abnormalities.  There is mild diffuse dilatation of the biliary tree having similar appearance to the most recent examination.  Multiple gallstones are observed within the gallbladder dependently.  There is no wall thickening or  pericholecystic fluid.  There is diffuse enlargement of the portal vein, splenic vein and superior mesenteric vein.     The pancreas appears atrophic.  No focal parenchymal abnormality is identified.  The adrenal glands are unremarkable.     The left kidney is markedly atrophic and there is diffuse cortical thinning of the left kidney.  There is apparent chronic hydronephrosis to the level of the proximal left ureter, similar to prior studies.  The right kidney is normal in size and position.  There is a probable cortical cyst in the mid to upper pole cortex of the right kidney.  There are nonobstructing right renal calculi, the largest measuring 11 mm in the lower pole of the right kidney     Atheromatous changes are noted in the wall of the abdominal aorta and branch vessels.  There is tortuosity of the infrarenal segment of the aorta.  The left iliac vessels are displaced by the soft tissue mass.     There is significant extrinsic mass effect upon the vena cava with the lumen of the cava difficult to defined below the level of the renal veins.     The urinary bladder is incompletely distended but appears grossly unremarkable.  The uterus is apparently surgically absent.  There are no adnexal masses identified.     There is diffuse body wall edema, predominantly within the pelvis.     There is no evidence of bowel wall thickening or obstruction.     No acute osseous abnormality is identified.     Impression:     No significant interval change in size of large heterogeneously enhancing lobulated soft tissue mass within the abdomen and pelvis.     Essentially stable size of masslike area of soft tissue density within the left lower lobe and small subcentimeter nodular density in the middle lobe.     Left-sided obstructive uropathy, unchanged.     Numerous incidental observations as above.        Electronically signed by: Miriam Aguilar IV., MD  Date:                                            11/16/2020  Time:                                            14:53             Last Resulted: 11/16/20 14:53 Order Details View Encounter Lab and Collection Details Routing Result History         Result Communications    Result Notes   Fernando Silvestre MD   11/16/2020  9:37 PM      Similar dimensions as noted previously for multiple lesions, no significant change            Patient Result Comments for CT Chest Abdomen Pelvis W W/O Contrast (XPD)    Written by Fernando Silvestre MD on 11/16/2020  9:37 PM  Similar dimensions as noted previously for multiple lesions, no significant change  External Result Report    External Result Report   Narrative & Impression       CMS MANDATED QUALITY DATA - CT RADIATION - 436     All CT scans at this facility utilize dose modulation, iterative reconstruction, and/or weight based dosing when appropriate to reduce radiation dose to as low as reasonably achievable.     EXAMINATION:  CT CHEST ABDOMEN PELVIS W W/O CONTRAST (XPD)     CLINICAL HISTORY:  low grade sarcoma, eval; 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     COMPARISON:  PET-CT examinations dated 12/16/2019 and 08/31/2020.     FINDINGS:  An oblong shaped nodular parenchymal opacity within the left lower lobe current Sharon measures 10 x 18 mm transversely and has not changed detrimental E compared to prior studies.  A 6 mm nodular appearing opacity within the medial segment of the middle lobe remains unchanged.  No new parenchymal nodules are identified.  Mild pulmonary interstitial prominence persists.  There are dependent opacities in the lung bases likely reflecting mild atelectasis.  There are no confluent areas of airspace disease.  There are no significant effusions.  The central airways are patent.     The heart is mildly enlarged but stable.  There is no significant pericardial fluid.  Scattered calcified plaque formation is noted in the aorta and branch vessels including the coronary arteries.  There is no pathologic  mediastinal or hilar lymphadenopathy.  A port type left subclavian central venous catheter has its tip positioned within the superior vena cava.     A low-density nodule is again noted in the lower pole of the left thyroid lobe.     The large lobulated heterogeneously enhancing soft tissue mass within the abdomen and extending into the pelvis has similar dimensions to the previous examinations with.  Prominent venous varicosities are observed about the margins of the mass within the abdomen and pelvis are again noted.     The liver and spleen appear normal in size and demonstrate no focal parenchymal abnormalities.  There is mild diffuse dilatation of the biliary tree having similar appearance to the most recent examination.  Multiple gallstones are observed within the gallbladder dependently.  There is no wall thickening or pericholecystic fluid.  There is diffuse enlargement of the portal vein, splenic vein and superior mesenteric vein.     The pancreas appears atrophic.  No focal parenchymal abnormality is identified.  The adrenal glands are unremarkable.     The left kidney is markedly atrophic and there is diffuse cortical thinning of the left kidney.  There is apparent chronic hydronephrosis to the level of the proximal left ureter, similar to prior studies.  The right kidney is normal in size and position.  There is a probable cortical cyst in the mid to upper pole cortex of the right kidney.  There are nonobstructing right renal calculi, the largest measuring 11 mm in the lower pole of the right kidney     Atheromatous changes are noted in the wall of the abdominal aorta and branch vessels.  There is tortuosity of the infrarenal segment of the aorta.  The left iliac vessels are displaced by the soft tissue mass.     There is significant extrinsic mass effect upon the vena cava with the lumen of the cava difficult to defined below the level of the renal veins.     The urinary bladder is incompletely distended  but appears grossly unremarkable.  The uterus is apparently surgically absent.  There are no adnexal masses identified.     There is diffuse body wall edema, predominantly within the pelvis.     There is no evidence of bowel wall thickening or obstruction.     No acute osseous abnormality is identified.     Impression:     No significant interval change in size of large heterogeneously enhancing lobulated soft tissue mass within the abdomen and pelvis.     Essentially stable size of masslike area of soft tissue density within the left lower lobe and small subcentimeter nodular density in the middle lobe.     Left-sided obstructive uropathy, unchanged.     Numerous incidental observations as above.        Electronically signed by: Miriam Aguilar IV., MD  Date:                                            11/16/2020  Time:                                           14:53            Assessment:       1. Primary intra-abdominal sarcoma           Plan:     Low Grade Sarcoma  - ECOG PS 1  - 20 cm mass noted in CT abdomen/pelvis; L ureter noted to be have left sided hydronephresis and noted to have some mild inguinal adneopathy  - path reviewed at our institution also consistent with sarcoma as well  - Reviewed images and discussed with Dr. Mota previously, pt is not a surgical candidate at this time as significant amount of central vasculature (aorta, iliac arteries/veins) involved and significant amount of vascularity to mass  - PET-CT reveals a large anterior abdominal mass, low activity sarcoma without significant metastatic spread, SUV max of 4.7   - Echo with EF of 60%, normal LV systolic function and indeterminate diastolic function  - tumor causing pain in central abdomen as well as compression of L ureter and causing hydronephrosis  - Inpatient AIM C1 completed  (adrimaycin, ifosfomide, mesna) next week given symptoms above, discussed palliative intent of therapy  - went over risks/benefits/ side effects of  chemotherapy and pt wishes to proceed with therapy, consent form signed and form submitted to be scanned into system  - Ifosfomide dropped on day 3 due to neurotoxicity  - PET- CT on 4/22 results reviewed, suv max decreased to 3.9 for abdominal mass and necrosis of part of mass noted  - completed two chemo tx with doxorubicin, first round with ifosofomide as well   - PET-CT on 5/20 with  Large anterior abdominal mass SUV 3 (previously 3.9), left lower lobe pulmonary lesion (prior SUV of 2.3, now 2.5)  - discussed switching to pazopanib due to ease of administration and good option in control of disease; pt has done this chemotherapy since end of June 2019  - PET-CT from 9/16 with large abd mass (now with SUV max of 3, from 2.76) and soft tissue opacity in left lower lobe (SUV of 1.7 from 2.52)  - PET-CT from  -, 12/16 with mass of SUV max 3.62 (slightly higher) and uptake of mass in LLL, noted to be less at 1.39 from 1.7   - Slow progression on pazopanib, then pt switched to gemcitabine/docetaxel; received gemcitabine D1 and then had a syncopal event   - shifted care from Jo[facility] Kennedy  - Gemciatbine/docetaxel C1D1 on 7/13 and C1D8 on 7/20  - PET-CTon 8/31/20 with stable intrabdominal sarcoma; no significant activity noted elsewhere  - started 5th cycle of gem/docetaxel; did not show up for D8 due to asthenia  - Recheck CT C/A/P WWO contrast and plan for potential swtich to Trabectidin        Iron deficiency anemia  - Hemoglobin of 8.1, pt with DIA   - transfused 1 U of prBCon 7/7   - ferritin of 10 on 7/6/20, discussed iron transfusion previously  - pt given injecatfer x 1 on 10/26/20   - current Hgb of 10    Weight Loss (resolved)  - gained three pounds since last clinic visit         BCC, R inferior neck  - lesion was biopsied, 6/10  - was supposed to follow-up but weather problems occurred around time of initial follow-up     Pain secondary to neoplasm  - continue MS Contin 60 mg BID     Severe L  sided hydronephrosis - noted to be compressed previously from large sarcoma, renal function normal      Neuropathy, possible L sided sciatica  - pt with lower back pain around site of tumor  - continue gabapentin 300 mg BID  - prior MRI lumbar spine in 10/2019 without any cord compression      HTN  - continue norvasc 5     SVT  - unclear of rhythm  - no AFib noted while pt was admitted into hospital     IBS  - continue laxatives PRN - can use senna + miralax, while on pain meds     Discussed with Dr. Lund       F/u:  -  plan to switch to trabecitdin next week from gem/docetaxel (chemo, labs, and appt already planned for that day)   - cancel chemo for 11/30 date       Benton Silvestre MD  Hematology and Oncology,PGY VI  [facility]\    STAFF NOTE:  I have personally reviewed the past notes, images, labs and other provoider's notes and taken the history and examined this patient and agree with  's Note as stated above.    Leticia Lund MD

## 2020-11-16 NOTE — Clinical Note
Hi Ms. Tina,  Please work on urgent authorization of Trabectidin for this patient, we are planning to administer next Monday, 111/23, thanks!  Cc'ing Dr. Lund and Deisy Ulloa.    Thanks, Xavier Silvestre MD

## 2020-11-16 NOTE — Clinical Note
-  plan to switch to trabecitdin next week from gem/docetaxel (chemo, labs, and appt already planned for that day)   - cancel chemo for 11/30 date

## 2020-11-24 NOTE — TELEPHONE ENCOUNTER
Called patient to schedule appointments (Lab,Follow up and infusion) left message to call the office. Number was provided.    .  ----- Message from Trudy Markham sent at 11/17/2020  7:36 AM CST -----  Regarding: pending chemo 11/17  Fernando Silvestre MD  [facility]         -  plan to switch to trabecitdin next week from gem/docetaxel (chemo, labs, and appt already planned for that day)       - cancel chemo for 11/30 date

## 2021-01-01 ENCOUNTER — TELEPHONE (OUTPATIENT)
Dept: HEMATOLOGY/ONCOLOGY | Facility: CLINIC | Age: 66
End: 2021-01-01

## 2021-01-01 ENCOUNTER — HOSPITAL ENCOUNTER (INPATIENT)
Facility: HOSPITAL | Age: 66
LOS: 3 days | DRG: 951 | End: 2021-03-19
Attending: INTERNAL MEDICINE | Admitting: INTERNAL MEDICINE
Payer: OTHER MISCELLANEOUS

## 2021-01-01 ENCOUNTER — LAB VISIT (OUTPATIENT)
Dept: LAB | Facility: HOSPITAL | Age: 66
End: 2021-01-01
Attending: STUDENT IN AN ORGANIZED HEALTH CARE EDUCATION/TRAINING PROGRAM
Payer: MEDICARE

## 2021-01-01 ENCOUNTER — OFFICE VISIT (OUTPATIENT)
Dept: HEMATOLOGY/ONCOLOGY | Facility: CLINIC | Age: 66
End: 2021-01-01
Payer: MEDICARE

## 2021-01-01 ENCOUNTER — DOCUMENTATION ONLY (OUTPATIENT)
Dept: HEMATOLOGY/ONCOLOGY | Facility: CLINIC | Age: 66
End: 2021-01-01

## 2021-01-01 ENCOUNTER — HOSPITAL ENCOUNTER (INPATIENT)
Facility: HOSPITAL | Age: 66
LOS: 1 days | Discharge: HOSPICE | DRG: 070 | End: 2021-03-16
Attending: EMERGENCY MEDICINE | Admitting: STUDENT IN AN ORGANIZED HEALTH CARE EDUCATION/TRAINING PROGRAM
Payer: MEDICARE

## 2021-01-01 ENCOUNTER — HOSPITAL ENCOUNTER (OUTPATIENT)
Dept: RADIOLOGY | Facility: HOSPITAL | Age: 66
Discharge: HOME | End: 2021-02-22
Attending: STUDENT IN AN ORGANIZED HEALTH CARE EDUCATION/TRAINING PROGRAM
Payer: MEDICARE

## 2021-01-01 ENCOUNTER — HOSPITAL ENCOUNTER (OUTPATIENT)
Dept: RADIOLOGY | Facility: HOSPITAL | Age: 66
Discharge: HOME | End: 2021-03-01
Attending: STUDENT IN AN ORGANIZED HEALTH CARE EDUCATION/TRAINING PROGRAM
Payer: MEDICARE

## 2021-01-01 ENCOUNTER — TELEPHONE (OUTPATIENT)
Dept: NEUROLOGY | Facility: CLINIC | Age: 66
End: 2021-01-01

## 2021-01-01 VITALS
OXYGEN SATURATION: 97 % | BODY MASS INDEX: 25.06 KG/M2 | RESPIRATION RATE: 16 BRPM | SYSTOLIC BLOOD PRESSURE: 122 MMHG | WEIGHT: 146.81 LBS | TEMPERATURE: 98 F | HEIGHT: 64 IN | HEART RATE: 79 BPM | DIASTOLIC BLOOD PRESSURE: 60 MMHG

## 2021-01-01 VITALS
DIASTOLIC BLOOD PRESSURE: 63 MMHG | HEART RATE: 80 BPM | HEIGHT: 64 IN | WEIGHT: 143.94 LBS | RESPIRATION RATE: 33 BRPM | TEMPERATURE: 99 F | OXYGEN SATURATION: 90 % | SYSTOLIC BLOOD PRESSURE: 143 MMHG | BODY MASS INDEX: 24.57 KG/M2

## 2021-01-01 VITALS
OXYGEN SATURATION: 93 % | TEMPERATURE: 101 F | SYSTOLIC BLOOD PRESSURE: 108 MMHG | HEART RATE: 89 BPM | RESPIRATION RATE: 21 BRPM | DIASTOLIC BLOOD PRESSURE: 53 MMHG

## 2021-01-01 DIAGNOSIS — I89.0 LYMPHEDEMA: ICD-10-CM

## 2021-01-01 DIAGNOSIS — R41.89 UNRESPONSIVE: ICD-10-CM

## 2021-01-01 DIAGNOSIS — F51.04 PSYCHOPHYSIOLOGICAL INSOMNIA: ICD-10-CM

## 2021-01-01 DIAGNOSIS — G93.41 ACUTE METABOLIC ENCEPHALOPATHY: ICD-10-CM

## 2021-01-01 DIAGNOSIS — C49.4 PRIMARY INTRA-ABDOMINAL SARCOMA: ICD-10-CM

## 2021-01-01 DIAGNOSIS — R35.0 INCREASED URINARY FREQUENCY: ICD-10-CM

## 2021-01-01 DIAGNOSIS — I10 ESSENTIAL HYPERTENSION: ICD-10-CM

## 2021-01-01 DIAGNOSIS — G25.0 BENIGN ESSENTIAL TREMOR: ICD-10-CM

## 2021-01-01 DIAGNOSIS — G62.9 NEUROPATHY: ICD-10-CM

## 2021-01-01 DIAGNOSIS — R55 SYNCOPE AND COLLAPSE: ICD-10-CM

## 2021-01-01 DIAGNOSIS — C49.4 PRIMARY INTRA-ABDOMINAL SARCOMA: Primary | ICD-10-CM

## 2021-01-01 DIAGNOSIS — R41.3 OTHER AMNESIA: ICD-10-CM

## 2021-01-01 DIAGNOSIS — D50.9 IRON DEFICIENCY ANEMIA, UNSPECIFIED IRON DEFICIENCY ANEMIA TYPE: ICD-10-CM

## 2021-01-01 DIAGNOSIS — R07.9 CHEST PAIN: ICD-10-CM

## 2021-01-01 DIAGNOSIS — F32.A DEPRESSION, UNSPECIFIED DEPRESSION TYPE: ICD-10-CM

## 2021-01-01 DIAGNOSIS — R41.82 ALTERED MENTAL STATUS: ICD-10-CM

## 2021-01-01 DIAGNOSIS — G93.41 ACUTE METABOLIC ENCEPHALOPATHY: Primary | ICD-10-CM

## 2021-01-01 DIAGNOSIS — R11.2 NAUSEA AND VOMITING, INTRACTABILITY OF VOMITING NOT SPECIFIED, UNSPECIFIED VOMITING TYPE: ICD-10-CM

## 2021-01-01 LAB
ALBUMIN SERPL BCP-MCNC: 3.3 G/DL (ref 3.5–5.2)
ALBUMIN SERPL BCP-MCNC: 3.6 G/DL (ref 3.5–5.2)
ALLENS TEST: ABNORMAL
ALP SERPL-CCNC: 106 U/L (ref 55–135)
ALP SERPL-CCNC: 88 U/L (ref 55–135)
ALT SERPL W/O P-5'-P-CCNC: 11 U/L (ref 10–44)
ALT SERPL W/O P-5'-P-CCNC: 9 U/L (ref 10–44)
ANION GAP SERPL CALC-SCNC: 14 MMOL/L (ref 8–16)
ANION GAP SERPL CALC-SCNC: 7 MMOL/L (ref 8–16)
APTT PPP: 37.7 SEC (ref 23.6–33.3)
AST SERPL-CCNC: 16 U/L (ref 10–40)
AST SERPL-CCNC: 18 U/L (ref 10–40)
BACTERIA #/AREA URNS HPF: NEGATIVE /HPF
BACTERIA UR CULT: NO GROWTH
BASOPHILS # BLD AUTO: 0.04 K/UL (ref 0–0.2)
BASOPHILS # BLD AUTO: 0.06 K/UL (ref 0–0.2)
BASOPHILS NFR BLD: 0.2 % (ref 0–1.9)
BASOPHILS NFR BLD: 1.1 % (ref 0–1.9)
BILIRUB SERPL-MCNC: 0.7 MG/DL (ref 0.1–1)
BILIRUB SERPL-MCNC: 3.8 MG/DL (ref 0.1–1)
BILIRUB UR QL STRIP: ABNORMAL
BUN SERPL-MCNC: 15 MG/DL (ref 8–23)
BUN SERPL-MCNC: 9 MG/DL (ref 8–23)
CALCIUM SERPL-MCNC: 9.3 MG/DL (ref 8.7–10.5)
CALCIUM SERPL-MCNC: 9.7 MG/DL (ref 8.7–10.5)
CHLORIDE SERPL-SCNC: 106 MMOL/L (ref 95–110)
CHLORIDE SERPL-SCNC: 109 MMOL/L (ref 95–110)
CLARITY UR: CLEAR
CO2 SERPL-SCNC: 17 MMOL/L (ref 23–29)
CO2 SERPL-SCNC: 21 MMOL/L (ref 23–29)
COLOR UR: YELLOW
CREAT SERPL-MCNC: 0.6 MG/DL (ref 0.5–1.4)
CREAT SERPL-MCNC: 0.6 MG/DL (ref 0.5–1.4)
DELSYS: ABNORMAL
DIFFERENTIAL METHOD: ABNORMAL
DIFFERENTIAL METHOD: ABNORMAL
EOSINOPHIL # BLD AUTO: 0 K/UL (ref 0–0.5)
EOSINOPHIL # BLD AUTO: 0.1 K/UL (ref 0–0.5)
EOSINOPHIL NFR BLD: 0.1 % (ref 0–8)
EOSINOPHIL NFR BLD: 2.5 % (ref 0–8)
ERYTHROCYTE [DISTWIDTH] IN BLOOD BY AUTOMATED COUNT: 18.5 % (ref 11.5–14.5)
ERYTHROCYTE [DISTWIDTH] IN BLOOD BY AUTOMATED COUNT: 20.2 % (ref 11.5–14.5)
EST. GFR  (AFRICAN AMERICAN): >60 ML/MIN/1.73 M^2
EST. GFR  (AFRICAN AMERICAN): >60 ML/MIN/1.73 M^2
EST. GFR  (NON AFRICAN AMERICAN): >60 ML/MIN/1.73 M^2
EST. GFR  (NON AFRICAN AMERICAN): >60 ML/MIN/1.73 M^2
GLUCOSE SERPL-MCNC: 79 MG/DL (ref 70–110)
GLUCOSE SERPL-MCNC: 82 MG/DL (ref 70–110)
GLUCOSE UR QL STRIP: NEGATIVE
HCO3 UR-SCNC: 18.4 MMOL/L (ref 24–28)
HCT VFR BLD AUTO: 34.8 % (ref 37–48.5)
HCT VFR BLD AUTO: 38.9 % (ref 37–48.5)
HGB BLD-MCNC: 10.6 G/DL (ref 12–16)
HGB BLD-MCNC: 12.3 G/DL (ref 12–16)
HGB UR QL STRIP: ABNORMAL
HYALINE CASTS #/AREA URNS LPF: 7 /LPF
IMM GRANULOCYTES # BLD AUTO: 0.01 K/UL (ref 0–0.04)
IMM GRANULOCYTES # BLD AUTO: 0.11 K/UL (ref 0–0.04)
IMM GRANULOCYTES NFR BLD AUTO: 0.2 % (ref 0–0.5)
IMM GRANULOCYTES NFR BLD AUTO: 0.6 % (ref 0–0.5)
INR PPP: 1.4
KETONES UR QL STRIP: ABNORMAL
LACTATE SERPL-SCNC: 0.9 MMOL/L (ref 0.5–1.9)
LEUKOCYTE ESTERASE UR QL STRIP: NEGATIVE
LYMPHOCYTES # BLD AUTO: 1.5 K/UL (ref 1–4.8)
LYMPHOCYTES # BLD AUTO: 1.7 K/UL (ref 1–4.8)
LYMPHOCYTES NFR BLD: 32.4 % (ref 18–48)
LYMPHOCYTES NFR BLD: 8.1 % (ref 18–48)
MAGNESIUM SERPL-MCNC: 2.1 MG/DL (ref 1.6–2.6)
MCH RBC QN AUTO: 25.2 PG (ref 27–31)
MCH RBC QN AUTO: 25.6 PG (ref 27–31)
MCHC RBC AUTO-ENTMCNC: 30.5 G/DL (ref 32–36)
MCHC RBC AUTO-ENTMCNC: 31.6 G/DL (ref 32–36)
MCV RBC AUTO: 81 FL (ref 82–98)
MCV RBC AUTO: 83 FL (ref 82–98)
MICROSCOPIC COMMENT: ABNORMAL
MONOCYTES # BLD AUTO: 0.5 K/UL (ref 0.3–1)
MONOCYTES # BLD AUTO: 1 K/UL (ref 0.3–1)
MONOCYTES NFR BLD: 5.3 % (ref 4–15)
MONOCYTES NFR BLD: 9.1 % (ref 4–15)
NEUTROPHILS # BLD AUTO: 15.6 K/UL (ref 1.8–7.7)
NEUTROPHILS # BLD AUTO: 2.9 K/UL (ref 1.8–7.7)
NEUTROPHILS NFR BLD: 54.7 % (ref 38–73)
NEUTROPHILS NFR BLD: 85.7 % (ref 38–73)
NITRITE UR QL STRIP: NEGATIVE
NRBC BLD-RTO: 0 /100 WBC
NRBC BLD-RTO: 0 /100 WBC
PCO2 BLDA: 29.5 MMHG (ref 35–45)
PH SMN: 7.4 [PH] (ref 7.35–7.45)
PH UR STRIP: 6 [PH] (ref 5–8)
PLATELET # BLD AUTO: 211 K/UL (ref 150–350)
PLATELET # BLD AUTO: 307 K/UL (ref 150–350)
PMV BLD AUTO: 10.6 FL (ref 9.2–12.9)
PMV BLD AUTO: 11.2 FL (ref 9.2–12.9)
PO2 BLDA: 193 MMHG (ref 80–100)
POC BE: -6 MMOL/L
POC PCO2 TEMP: 31.1 MMHG
POC PH TEMP: 7.39
POC PO2 TEMP: 199 MMHG
POC SATURATED O2: 100 % (ref 95–100)
POC TCO2: 19 MMOL/L (ref 23–27)
POC TEMPERATURE: ABNORMAL
POTASSIUM SERPL-SCNC: 3 MMOL/L (ref 3.5–5.1)
POTASSIUM SERPL-SCNC: 4.1 MMOL/L (ref 3.5–5.1)
PROCALCITONIN SERPL IA-MCNC: 2.43 NG/ML (ref 0–0.5)
PROT SERPL-MCNC: 7.2 G/DL (ref 6–8.4)
PROT SERPL-MCNC: 7.6 G/DL (ref 6–8.4)
PROT UR QL STRIP: ABNORMAL
PROTHROMBIN TIME: 16.3 SEC (ref 10.6–14.8)
RBC # BLD AUTO: 4.21 M/UL (ref 4–5.4)
RBC # BLD AUTO: 4.81 M/UL (ref 4–5.4)
RBC #/AREA URNS HPF: 62 /HPF (ref 0–4)
SAMPLE: ABNORMAL
SARS-COV-2 RDRP RESP QL NAA+PROBE: NEGATIVE
SITE: ABNORMAL
SODIUM SERPL-SCNC: 137 MMOL/L (ref 136–145)
SODIUM SERPL-SCNC: 137 MMOL/L (ref 136–145)
SP GR UR STRIP: 1.02 (ref 1–1.03)
SQUAMOUS #/AREA URNS HPF: 2 /HPF
TROPONIN I SERPL DL<=0.01 NG/ML-MCNC: 0.04 NG/ML
TROPONIN I SERPL DL<=0.01 NG/ML-MCNC: 0.05 NG/ML
URN SPEC COLLECT METH UR: ABNORMAL
UROBILINOGEN UR STRIP-ACNC: 1 EU/DL
WBC # BLD AUTO: 18.24 K/UL (ref 3.9–12.7)
WBC # BLD AUTO: 5.28 K/UL (ref 3.9–12.7)
WBC #/AREA URNS HPF: 1 /HPF (ref 0–5)

## 2021-01-01 PROCEDURE — 63600175 PHARM REV CODE 636 W HCPCS: Performed by: STUDENT IN AN ORGANIZED HEALTH CARE EDUCATION/TRAINING PROGRAM

## 2021-01-01 PROCEDURE — 3078F PR MOST RECENT DIASTOLIC BLOOD PRESSURE < 80 MM HG: ICD-10-PCS | Mod: CPTII,GC,S$GLB, | Performed by: STUDENT IN AN ORGANIZED HEALTH CARE EDUCATION/TRAINING PROGRAM

## 2021-01-01 PROCEDURE — 36415 COLL VENOUS BLD VENIPUNCTURE: CPT | Performed by: STUDENT IN AN ORGANIZED HEALTH CARE EDUCATION/TRAINING PROGRAM

## 2021-01-01 PROCEDURE — 99291 CRITICAL CARE FIRST HOUR: CPT

## 2021-01-01 PROCEDURE — 1101F PT FALLS ASSESS-DOCD LE1/YR: CPT | Mod: CPTII,GC,S$GLB, | Performed by: STUDENT IN AN ORGANIZED HEALTH CARE EDUCATION/TRAINING PROGRAM

## 2021-01-01 PROCEDURE — 96375 TX/PRO/DX INJ NEW DRUG ADDON: CPT

## 2021-01-01 PROCEDURE — 25000003 PHARM REV CODE 250: Performed by: EMERGENCY MEDICINE

## 2021-01-01 PROCEDURE — 99900035 HC TECH TIME PER 15 MIN (STAT)

## 2021-01-01 PROCEDURE — 3288F PR FALLS RISK ASSESSMENT DOCUMENTED: ICD-10-PCS | Mod: CPTII,GC,S$GLB, | Performed by: STUDENT IN AN ORGANIZED HEALTH CARE EDUCATION/TRAINING PROGRAM

## 2021-01-01 PROCEDURE — 99215 PR OFFICE/OUTPT VISIT, EST, LEVL V, 40-54 MIN: ICD-10-PCS | Mod: GC,S$GLB,, | Performed by: STUDENT IN AN ORGANIZED HEALTH CARE EDUCATION/TRAINING PROGRAM

## 2021-01-01 PROCEDURE — 36415 COLL VENOUS BLD VENIPUNCTURE: CPT

## 2021-01-01 PROCEDURE — 85025 COMPLETE CBC W/AUTO DIFF WBC: CPT | Performed by: EMERGENCY MEDICINE

## 2021-01-01 PROCEDURE — 12000002 HC ACUTE/MED SURGE SEMI-PRIVATE ROOM

## 2021-01-01 PROCEDURE — 1159F PR MEDICATION LIST DOCUMENTED IN MEDICAL RECORD: ICD-10-PCS | Mod: GC,S$GLB,, | Performed by: STUDENT IN AN ORGANIZED HEALTH CARE EDUCATION/TRAINING PROGRAM

## 2021-01-01 PROCEDURE — 25000003 PHARM REV CODE 250: Performed by: INTERNAL MEDICINE

## 2021-01-01 PROCEDURE — 3008F BODY MASS INDEX DOCD: CPT | Mod: CPTII,GC,S$GLB, | Performed by: STUDENT IN AN ORGANIZED HEALTH CARE EDUCATION/TRAINING PROGRAM

## 2021-01-01 PROCEDURE — 85610 PROTHROMBIN TIME: CPT | Performed by: EMERGENCY MEDICINE

## 2021-01-01 PROCEDURE — U0002 COVID-19 LAB TEST NON-CDC: HCPCS | Performed by: EMERGENCY MEDICINE

## 2021-01-01 PROCEDURE — 84484 ASSAY OF TROPONIN QUANT: CPT | Performed by: EMERGENCY MEDICINE

## 2021-01-01 PROCEDURE — 3288F FALL RISK ASSESSMENT DOCD: CPT | Mod: CPTII,GC,S$GLB, | Performed by: STUDENT IN AN ORGANIZED HEALTH CARE EDUCATION/TRAINING PROGRAM

## 2021-01-01 PROCEDURE — 93005 ELECTROCARDIOGRAM TRACING: CPT | Performed by: INTERNAL MEDICINE

## 2021-01-01 PROCEDURE — 94761 N-INVAS EAR/PLS OXIMETRY MLT: CPT

## 2021-01-01 PROCEDURE — 84145 PROCALCITONIN (PCT): CPT | Performed by: STUDENT IN AN ORGANIZED HEALTH CARE EDUCATION/TRAINING PROGRAM

## 2021-01-01 PROCEDURE — 84484 ASSAY OF TROPONIN QUANT: CPT | Mod: 91 | Performed by: STUDENT IN AN ORGANIZED HEALTH CARE EDUCATION/TRAINING PROGRAM

## 2021-01-01 PROCEDURE — 99900031 HC PATIENT EDUCATION (STAT)

## 2021-01-01 PROCEDURE — 1101F PR PT FALLS ASSESS DOC 0-1 FALLS W/OUT INJ PAST YR: ICD-10-PCS | Mod: CPTII,GC,S$GLB, | Performed by: STUDENT IN AN ORGANIZED HEALTH CARE EDUCATION/TRAINING PROGRAM

## 2021-01-01 PROCEDURE — 63600175 PHARM REV CODE 636 W HCPCS: Performed by: INTERNAL MEDICINE

## 2021-01-01 PROCEDURE — 99999 PR PBB SHADOW E&M-EST. PATIENT-LVL IV: ICD-10-PCS | Mod: PBBFAC,GC,, | Performed by: STUDENT IN AN ORGANIZED HEALTH CARE EDUCATION/TRAINING PROGRAM

## 2021-01-01 PROCEDURE — 87040 BLOOD CULTURE FOR BACTERIA: CPT | Mod: 59 | Performed by: EMERGENCY MEDICINE

## 2021-01-01 PROCEDURE — 70553 MRI BRAIN STEM W/O & W/DYE: CPT | Mod: TC,PO

## 2021-01-01 PROCEDURE — 1126F AMNT PAIN NOTED NONE PRSNT: CPT | Mod: GC,S$GLB,, | Performed by: STUDENT IN AN ORGANIZED HEALTH CARE EDUCATION/TRAINING PROGRAM

## 2021-01-01 PROCEDURE — 95819 EEG AWAKE AND ASLEEP: CPT

## 2021-01-01 PROCEDURE — 27000221 HC OXYGEN, UP TO 24 HOURS

## 2021-01-01 PROCEDURE — 63600175 PHARM REV CODE 636 W HCPCS: Performed by: EMERGENCY MEDICINE

## 2021-01-01 PROCEDURE — 99215 OFFICE O/P EST HI 40 MIN: CPT | Mod: GC,S$GLB,, | Performed by: STUDENT IN AN ORGANIZED HEALTH CARE EDUCATION/TRAINING PROGRAM

## 2021-01-01 PROCEDURE — 93010 EKG 12-LEAD: ICD-10-PCS | Mod: ,,, | Performed by: INTERNAL MEDICINE

## 2021-01-01 PROCEDURE — 36415 COLL VENOUS BLD VENIPUNCTURE: CPT | Performed by: EMERGENCY MEDICINE

## 2021-01-01 PROCEDURE — 1159F MED LIST DOCD IN RCRD: CPT | Mod: GC,S$GLB,, | Performed by: STUDENT IN AN ORGANIZED HEALTH CARE EDUCATION/TRAINING PROGRAM

## 2021-01-01 PROCEDURE — 81001 URINALYSIS AUTO W/SCOPE: CPT | Performed by: EMERGENCY MEDICINE

## 2021-01-01 PROCEDURE — 63600175 PHARM REV CODE 636 W HCPCS: Performed by: PSYCHIATRY & NEUROLOGY

## 2021-01-01 PROCEDURE — 80053 COMPREHEN METABOLIC PANEL: CPT | Performed by: EMERGENCY MEDICINE

## 2021-01-01 PROCEDURE — 3008F PR BODY MASS INDEX (BMI) DOCUMENTED: ICD-10-PCS | Mod: CPTII,GC,S$GLB, | Performed by: STUDENT IN AN ORGANIZED HEALTH CARE EDUCATION/TRAINING PROGRAM

## 2021-01-01 PROCEDURE — 70450 CT HEAD/BRAIN W/O DYE: CPT | Mod: TC,PO

## 2021-01-01 PROCEDURE — 3074F PR MOST RECENT SYSTOLIC BLOOD PRESSURE < 130 MM HG: ICD-10-PCS | Mod: CPTII,GC,S$GLB, | Performed by: STUDENT IN AN ORGANIZED HEALTH CARE EDUCATION/TRAINING PROGRAM

## 2021-01-01 PROCEDURE — 25000003 PHARM REV CODE 250: Performed by: PSYCHIATRY & NEUROLOGY

## 2021-01-01 PROCEDURE — 87086 URINE CULTURE/COLONY COUNT: CPT | Performed by: EMERGENCY MEDICINE

## 2021-01-01 PROCEDURE — A9585 GADOBUTROL INJECTION: HCPCS | Mod: PO | Performed by: STUDENT IN AN ORGANIZED HEALTH CARE EDUCATION/TRAINING PROGRAM

## 2021-01-01 PROCEDURE — 83735 ASSAY OF MAGNESIUM: CPT | Performed by: EMERGENCY MEDICINE

## 2021-01-01 PROCEDURE — 1126F PR PAIN SEVERITY QUANTIFIED, NO PAIN PRESENT: ICD-10-PCS | Mod: GC,S$GLB,, | Performed by: STUDENT IN AN ORGANIZED HEALTH CARE EDUCATION/TRAINING PROGRAM

## 2021-01-01 PROCEDURE — 3074F SYST BP LT 130 MM HG: CPT | Mod: CPTII,GC,S$GLB, | Performed by: STUDENT IN AN ORGANIZED HEALTH CARE EDUCATION/TRAINING PROGRAM

## 2021-01-01 PROCEDURE — 3078F DIAST BP <80 MM HG: CPT | Mod: CPTII,GC,S$GLB, | Performed by: STUDENT IN AN ORGANIZED HEALTH CARE EDUCATION/TRAINING PROGRAM

## 2021-01-01 PROCEDURE — 20000000 HC ICU ROOM

## 2021-01-01 PROCEDURE — 85025 COMPLETE CBC W/AUTO DIFF WBC: CPT

## 2021-01-01 PROCEDURE — 80053 COMPREHEN METABOLIC PANEL: CPT

## 2021-01-01 PROCEDURE — 99999 PR PBB SHADOW E&M-EST. PATIENT-LVL IV: CPT | Mod: PBBFAC,GC,, | Performed by: STUDENT IN AN ORGANIZED HEALTH CARE EDUCATION/TRAINING PROGRAM

## 2021-01-01 PROCEDURE — 83605 ASSAY OF LACTIC ACID: CPT | Performed by: EMERGENCY MEDICINE

## 2021-01-01 PROCEDURE — 25500020 PHARM REV CODE 255: Mod: PO | Performed by: STUDENT IN AN ORGANIZED HEALTH CARE EDUCATION/TRAINING PROGRAM

## 2021-01-01 PROCEDURE — 82803 BLOOD GASES ANY COMBINATION: CPT

## 2021-01-01 PROCEDURE — 93010 ELECTROCARDIOGRAM REPORT: CPT | Mod: ,,, | Performed by: INTERNAL MEDICINE

## 2021-01-01 PROCEDURE — 36600 WITHDRAWAL OF ARTERIAL BLOOD: CPT

## 2021-01-01 PROCEDURE — 85730 THROMBOPLASTIN TIME PARTIAL: CPT | Performed by: EMERGENCY MEDICINE

## 2021-01-01 PROCEDURE — 96365 THER/PROPH/DIAG IV INF INIT: CPT

## 2021-01-01 PROCEDURE — 96374 THER/PROPH/DIAG INJ IV PUSH: CPT | Mod: 59

## 2021-01-01 RX ORDER — MORPHINE SULFATE 4 MG/ML
4 INJECTION, SOLUTION INTRAMUSCULAR; INTRAVENOUS EVERY 4 HOURS PRN
Status: DISCONTINUED | OUTPATIENT
Start: 2021-01-01 | End: 2021-01-01 | Stop reason: HOSPADM

## 2021-01-01 RX ORDER — MAGNESIUM SULFATE HEPTAHYDRATE 40 MG/ML
2 INJECTION, SOLUTION INTRAVENOUS
Status: DISCONTINUED | OUTPATIENT
Start: 2021-01-01 | End: 2021-01-01

## 2021-01-01 RX ORDER — IBUPROFEN 200 MG
24 TABLET ORAL
Status: DISCONTINUED | OUTPATIENT
Start: 2021-01-01 | End: 2021-01-01

## 2021-01-01 RX ORDER — MORPHINE SULFATE 60 MG/1
60 TABLET, FILM COATED, EXTENDED RELEASE ORAL 2 TIMES DAILY
Qty: 60 TABLET | Refills: 0 | Status: SHIPPED | OUTPATIENT
Start: 2021-01-01 | End: 2021-01-01 | Stop reason: SDUPTHER

## 2021-01-01 RX ORDER — GADOBUTROL 604.72 MG/ML
6.5 INJECTION INTRAVENOUS
Status: COMPLETED | OUTPATIENT
Start: 2021-01-01 | End: 2021-01-01

## 2021-01-01 RX ORDER — SODIUM CHLORIDE AND POTASSIUM CHLORIDE 150; 900 MG/100ML; MG/100ML
INJECTION, SOLUTION INTRAVENOUS
Status: COMPLETED | OUTPATIENT
Start: 2021-01-01 | End: 2021-01-01

## 2021-01-01 RX ORDER — OXYCODONE AND ACETAMINOPHEN 10; 325 MG/1; MG/1
1 TABLET ORAL EVERY 6 HOURS PRN
Qty: 120 TABLET | Refills: 0 | Status: SHIPPED | OUTPATIENT
Start: 2021-01-01 | End: 2022-02-23

## 2021-01-01 RX ORDER — SODIUM CHLORIDE 9 MG/ML
INJECTION, SOLUTION INTRAVENOUS
Status: COMPLETED | OUTPATIENT
Start: 2021-01-01 | End: 2021-01-01

## 2021-01-01 RX ORDER — ACETAMINOPHEN 650 MG/1
650 SUPPOSITORY RECTAL EVERY 6 HOURS PRN
Status: DISCONTINUED | OUTPATIENT
Start: 2021-01-01 | End: 2021-01-01 | Stop reason: HOSPADM

## 2021-01-01 RX ORDER — PROPRANOLOL HYDROCHLORIDE 60 MG/1
60 CAPSULE, EXTENDED RELEASE ORAL DAILY
Qty: 30 CAPSULE | Refills: 1 | Status: SHIPPED | OUTPATIENT
Start: 2021-01-01 | End: 2021-01-01 | Stop reason: SDUPTHER

## 2021-01-01 RX ORDER — LANOLIN ALCOHOL/MO/W.PET/CERES
800 CREAM (GRAM) TOPICAL
Status: DISCONTINUED | OUTPATIENT
Start: 2021-01-01 | End: 2021-01-01

## 2021-01-01 RX ORDER — OLANZAPINE 5 MG/1
5 TABLET ORAL NIGHTLY
COMMUNITY

## 2021-01-01 RX ORDER — GABAPENTIN 300 MG/1
300 CAPSULE ORAL NIGHTLY
Qty: 120 CAPSULE | Refills: 2 | OUTPATIENT
Start: 2021-01-01 | End: 2022-02-03

## 2021-01-01 RX ORDER — CITALOPRAM 10 MG/1
10 TABLET ORAL DAILY
COMMUNITY

## 2021-01-01 RX ORDER — LORAZEPAM 2 MG/ML
2 INJECTION INTRAMUSCULAR
Status: DISCONTINUED | OUTPATIENT
Start: 2021-01-01 | End: 2021-01-01

## 2021-01-01 RX ORDER — PROMETHAZINE HYDROCHLORIDE 25 MG/ML
12.5 INJECTION, SOLUTION INTRAMUSCULAR; INTRAVENOUS EVERY 4 HOURS PRN
Status: DISCONTINUED | OUTPATIENT
Start: 2021-01-01 | End: 2021-01-01 | Stop reason: HOSPADM

## 2021-01-01 RX ORDER — LORAZEPAM 2 MG/ML
2 INJECTION INTRAMUSCULAR ONCE
Status: COMPLETED | OUTPATIENT
Start: 2021-01-01 | End: 2021-01-01

## 2021-01-01 RX ORDER — CITALOPRAM 20 MG/1
20 TABLET, FILM COATED ORAL DAILY
Qty: 30 TABLET | Refills: 2 | Status: SHIPPED | OUTPATIENT
Start: 2021-01-01 | End: 2021-05-09

## 2021-01-01 RX ORDER — MORPHINE SULFATE 2 MG/ML
2 INJECTION, SOLUTION INTRAMUSCULAR; INTRAVENOUS EVERY 4 HOURS PRN
Status: DISCONTINUED | OUTPATIENT
Start: 2021-01-01 | End: 2021-01-01

## 2021-01-01 RX ORDER — POTASSIUM CHLORIDE 20 MEQ/1
40 TABLET, EXTENDED RELEASE ORAL
Status: DISCONTINUED | OUTPATIENT
Start: 2021-01-01 | End: 2021-01-01

## 2021-01-01 RX ORDER — MEROPENEM AND SODIUM CHLORIDE 1 G/50ML
1 INJECTION, SOLUTION INTRAVENOUS
Status: DISCONTINUED | OUTPATIENT
Start: 2021-01-01 | End: 2021-01-01

## 2021-01-01 RX ORDER — MORPHINE SULFATE 60 MG/1
60 TABLET, FILM COATED, EXTENDED RELEASE ORAL 2 TIMES DAILY
Qty: 60 TABLET | Refills: 0 | Status: SHIPPED | OUTPATIENT
Start: 2021-01-01 | End: 2021-04-03

## 2021-01-01 RX ORDER — ACETAMINOPHEN 650 MG/1
650 SUPPOSITORY RECTAL EVERY 6 HOURS PRN
Status: DISCONTINUED | OUTPATIENT
Start: 2021-01-01 | End: 2021-01-01

## 2021-01-01 RX ORDER — POTASSIUM CHLORIDE 20 MEQ/1
20 TABLET, EXTENDED RELEASE ORAL
Status: DISCONTINUED | OUTPATIENT
Start: 2021-01-01 | End: 2021-01-01

## 2021-01-01 RX ORDER — PROCHLORPERAZINE EDISYLATE 5 MG/ML
5 INJECTION INTRAMUSCULAR; INTRAVENOUS EVERY 6 HOURS PRN
Status: DISCONTINUED | OUTPATIENT
Start: 2021-01-01 | End: 2021-01-01

## 2021-01-01 RX ORDER — POTASSIUM CHLORIDE 7.45 MG/ML
40 INJECTION INTRAVENOUS
Status: DISCONTINUED | OUTPATIENT
Start: 2021-01-01 | End: 2021-01-01

## 2021-01-01 RX ORDER — MEROPENEM AND SODIUM CHLORIDE 1 G/50ML
1 INJECTION, SOLUTION INTRAVENOUS ONCE
Status: COMPLETED | OUTPATIENT
Start: 2021-01-01 | End: 2021-01-01

## 2021-01-01 RX ORDER — PROPRANOLOL HYDROCHLORIDE 60 MG/1
60 CAPSULE, EXTENDED RELEASE ORAL DAILY
Status: DISCONTINUED | OUTPATIENT
Start: 2021-01-01 | End: 2021-01-01

## 2021-01-01 RX ORDER — ONDANSETRON 2 MG/ML
4 INJECTION INTRAMUSCULAR; INTRAVENOUS EVERY 4 HOURS PRN
Status: DISCONTINUED | OUTPATIENT
Start: 2021-01-01 | End: 2021-01-01 | Stop reason: HOSPADM

## 2021-01-01 RX ORDER — POTASSIUM CHLORIDE 7.45 MG/ML
20 INJECTION INTRAVENOUS
Status: DISCONTINUED | OUTPATIENT
Start: 2021-01-01 | End: 2021-01-01

## 2021-01-01 RX ORDER — PROCHLORPERAZINE MALEATE 5 MG
TABLET ORAL
Qty: 90 TABLET | Refills: 0 | Status: SHIPPED | OUTPATIENT
Start: 2021-01-01

## 2021-01-01 RX ORDER — BISACODYL 10 MG
10 SUPPOSITORY, RECTAL RECTAL DAILY PRN
Status: DISCONTINUED | OUTPATIENT
Start: 2021-01-01 | End: 2021-01-01 | Stop reason: HOSPADM

## 2021-01-01 RX ORDER — MUPIROCIN 20 MG/G
OINTMENT TOPICAL 2 TIMES DAILY
Status: DISCONTINUED | OUTPATIENT
Start: 2021-01-01 | End: 2021-01-01

## 2021-01-01 RX ORDER — CALCIUM CHLORIDE IN 0.9 % NACL 1 G/100 ML
1 INTRAVENOUS SOLUTION, PIGGYBACK (ML) INTRAVENOUS
Status: DISCONTINUED | OUTPATIENT
Start: 2021-01-01 | End: 2021-01-01

## 2021-01-01 RX ORDER — ZOLPIDEM TARTRATE 5 MG/1
5 TABLET ORAL NIGHTLY
Qty: 30 TABLET | Refills: 0 | Status: SHIPPED | OUTPATIENT
Start: 2021-01-01

## 2021-01-01 RX ORDER — MAGNESIUM SULFATE HEPTAHYDRATE 40 MG/ML
4 INJECTION, SOLUTION INTRAVENOUS
Status: DISCONTINUED | OUTPATIENT
Start: 2021-01-01 | End: 2021-01-01

## 2021-01-01 RX ORDER — GABAPENTIN 300 MG/1
300 CAPSULE ORAL NIGHTLY
Qty: 120 CAPSULE | Refills: 2 | Status: SHIPPED | OUTPATIENT
Start: 2021-01-01 | End: 2022-02-03

## 2021-01-01 RX ORDER — BUMETANIDE 1 MG/1
1 TABLET ORAL DAILY
Status: DISCONTINUED | OUTPATIENT
Start: 2021-01-01 | End: 2021-01-01

## 2021-01-01 RX ORDER — LORAZEPAM 2 MG/ML
2 INJECTION INTRAMUSCULAR
Status: DISCONTINUED | OUTPATIENT
Start: 2021-01-01 | End: 2021-01-01 | Stop reason: HOSPADM

## 2021-01-01 RX ORDER — PROPRANOLOL HYDROCHLORIDE 60 MG/1
60 CAPSULE, EXTENDED RELEASE ORAL DAILY
Qty: 30 CAPSULE | Refills: 1 | Status: SHIPPED | OUTPATIENT
Start: 2021-01-01 | End: 2021-04-27

## 2021-01-01 RX ORDER — MAGNESIUM SULFATE 1 G/100ML
1 INJECTION INTRAVENOUS
Status: DISCONTINUED | OUTPATIENT
Start: 2021-01-01 | End: 2021-01-01

## 2021-01-01 RX ORDER — CHLORHEXIDINE GLUCONATE ORAL RINSE 1.2 MG/ML
15 SOLUTION DENTAL 2 TIMES DAILY
Status: DISCONTINUED | OUTPATIENT
Start: 2021-01-01 | End: 2021-01-01

## 2021-01-01 RX ORDER — ZOLPIDEM TARTRATE 5 MG/1
5 TABLET ORAL NIGHTLY
Qty: 30 TABLET | Refills: 0 | Status: SHIPPED | OUTPATIENT
Start: 2021-01-01 | End: 2021-01-01 | Stop reason: SDUPTHER

## 2021-01-01 RX ORDER — IBUPROFEN 200 MG
16 TABLET ORAL
Status: DISCONTINUED | OUTPATIENT
Start: 2021-01-01 | End: 2021-01-01

## 2021-01-01 RX ORDER — LORAZEPAM 2 MG/ML
1 INJECTION INTRAMUSCULAR EVERY 6 HOURS PRN
Status: DISCONTINUED | OUTPATIENT
Start: 2021-01-01 | End: 2021-01-01 | Stop reason: HOSPADM

## 2021-01-01 RX ORDER — LEVETIRACETAM 5 MG/ML
500 INJECTION INTRAVASCULAR EVERY 12 HOURS
Status: DISCONTINUED | OUTPATIENT
Start: 2021-01-01 | End: 2021-01-01 | Stop reason: HOSPADM

## 2021-01-01 RX ORDER — ONDANSETRON 2 MG/ML
4 INJECTION INTRAMUSCULAR; INTRAVENOUS EVERY 8 HOURS PRN
Status: DISCONTINUED | OUTPATIENT
Start: 2021-01-01 | End: 2021-01-01

## 2021-01-01 RX ORDER — MORPHINE SULFATE 2 MG/ML
2 INJECTION, SOLUTION INTRAMUSCULAR; INTRAVENOUS
Status: DISCONTINUED | OUTPATIENT
Start: 2021-01-01 | End: 2021-01-01 | Stop reason: HOSPADM

## 2021-01-01 RX ORDER — OXYCODONE AND ACETAMINOPHEN 10; 325 MG/1; MG/1
1 TABLET ORAL EVERY 6 HOURS PRN
Qty: 120 TABLET | Refills: 0 | Status: SHIPPED | OUTPATIENT
Start: 2021-01-01 | End: 2021-01-01 | Stop reason: SDUPTHER

## 2021-01-01 RX ORDER — SODIUM CHLORIDE 0.9 % (FLUSH) 0.9 %
10 SYRINGE (ML) INJECTION
Status: DISCONTINUED | OUTPATIENT
Start: 2021-01-01 | End: 2021-01-01

## 2021-01-01 RX ORDER — SCOLOPAMINE TRANSDERMAL SYSTEM 1 MG/1
1 PATCH, EXTENDED RELEASE TRANSDERMAL
Status: DISCONTINUED | OUTPATIENT
Start: 2021-01-01 | End: 2021-01-01 | Stop reason: HOSPADM

## 2021-01-01 RX ADMIN — MEROPENEM AND SODIUM CHLORIDE 1 G: 1 INJECTION, SOLUTION INTRAVENOUS at 10:03

## 2021-01-01 RX ADMIN — MORPHINE SULFATE 8 MG/HR: 10 INJECTION INTRAVENOUS at 08:03

## 2021-01-01 RX ADMIN — MORPHINE SULFATE 1 MG/HR: 10 INJECTION INTRAVENOUS at 05:03

## 2021-01-01 RX ADMIN — MORPHINE SULFATE 8 MG/HR: 10 INJECTION INTRAVENOUS at 05:03

## 2021-01-01 RX ADMIN — SODIUM CHLORIDE AND POTASSIUM CHLORIDE: .9; .15 SOLUTION INTRAVENOUS at 11:03

## 2021-01-01 RX ADMIN — SCOPOLAMINE 1 PATCH: 1 PATCH, EXTENDED RELEASE TRANSDERMAL at 05:03

## 2021-01-01 RX ADMIN — MORPHINE SULFATE 4 MG: 4 INJECTION INTRAVENOUS at 06:03

## 2021-01-01 RX ADMIN — LORAZEPAM 4 MG/HR: 2 INJECTION INTRAMUSCULAR; INTRAVENOUS at 04:03

## 2021-01-01 RX ADMIN — LORAZEPAM 1 MG: 2 INJECTION INTRAMUSCULAR; INTRAVENOUS at 11:03

## 2021-01-01 RX ADMIN — LORAZEPAM 4 MG/HR: 2 INJECTION INTRAMUSCULAR; INTRAVENOUS at 01:03

## 2021-01-01 RX ADMIN — MORPHINE SULFATE 2 MG: 2 INJECTION, SOLUTION INTRAMUSCULAR; INTRAVENOUS at 07:03

## 2021-01-01 RX ADMIN — MORPHINE SULFATE 8 MG/HR: 10 INJECTION INTRAVENOUS at 06:03

## 2021-01-01 RX ADMIN — DEXTROSE 1300 MG: 50 INJECTION, SOLUTION INTRAVENOUS at 06:03

## 2021-01-01 RX ADMIN — MORPHINE SULFATE 2 MG: 2 INJECTION, SOLUTION INTRAMUSCULAR; INTRAVENOUS at 11:03

## 2021-01-01 RX ADMIN — SODIUM CHLORIDE: 0.9 INJECTION, SOLUTION INTRAVENOUS at 10:03

## 2021-01-01 RX ADMIN — LORAZEPAM 2 MG: 2 INJECTION INTRAMUSCULAR; INTRAVENOUS at 11:03

## 2021-01-01 RX ADMIN — LORAZEPAM 1 MG: 2 INJECTION INTRAMUSCULAR; INTRAVENOUS at 03:03

## 2021-01-01 RX ADMIN — MORPHINE SULFATE 2 MG: 2 INJECTION, SOLUTION INTRAMUSCULAR; INTRAVENOUS at 10:03

## 2021-01-01 RX ADMIN — MORPHINE SULFATE 2 MG: 2 INJECTION, SOLUTION INTRAMUSCULAR; INTRAVENOUS at 08:03

## 2021-01-01 RX ADMIN — MORPHINE SULFATE 4 MG: 4 INJECTION INTRAVENOUS at 02:03

## 2021-01-01 RX ADMIN — GADOBUTROL 6.5 ML: 604.72 INJECTION INTRAVENOUS at 10:03

## 2021-01-01 RX ADMIN — LORAZEPAM 4 MG/HR: 2 INJECTION INTRAMUSCULAR; INTRAVENOUS at 03:03

## 2021-01-01 RX ADMIN — LORAZEPAM 4 MG/HR: 2 INJECTION INTRAMUSCULAR; INTRAVENOUS at 12:03

## 2021-01-01 RX ADMIN — MORPHINE SULFATE 9 MG/HR: 10 INJECTION INTRAVENOUS at 11:03

## 2021-01-01 RX ADMIN — LEVETIRACETAM INJECTION 500 MG: 5 INJECTION INTRAVENOUS at 08:03

## 2021-01-01 RX ADMIN — SCOPOLAMINE 1 PATCH: 1 PATCH, EXTENDED RELEASE TRANSDERMAL at 12:03

## 2021-01-01 RX ADMIN — LORAZEPAM 1 MG/HR: 2 INJECTION INTRAMUSCULAR; INTRAVENOUS at 05:03

## 2021-03-15 PROBLEM — G93.41 ACUTE METABOLIC ENCEPHALOPATHY: Status: ACTIVE | Noted: 2021-01-01

## 2021-03-20 LAB
BACTERIA BLD CULT: NORMAL
BACTERIA BLD CULT: NORMAL

## 2022-04-30 NOTE — OP NOTE
Amarjit Edwards MD      Patient:   Tiffany Kaur             MRN: [id number]            FIN: [id number]               Age:   64 years     Sex:  Female     :  1955   Associated Diagnoses:   None   Author:   Grace PAYNE, Amarjit AMADOR      Surgeon: Amarjit Edwards MD    Assistant: None    Preoperative Diagnosis:  Retroperitoneal  sarcoma    Postoperative Diagnosis:  Same    Procedure: Left subclavian Mediport insertion with fluoroscopic guidance    Anesthesia: Local/MAC    Estimated Blood Loss: Less than 15 cc    Intra-operative Findings:  6 Fr PowerPort Final fluoroscopy revealed the tip of the catheter within the superior vena cava.  The port flushed and aspirated freely.    Procedure in Detail:  After informed consent was obtained patient was brought to the operating room and placed in the supine position.  Sedation was administered by anesthesia.  The upper chest and neck were prepped and draped in sterile fashion.  After infiltration with local anesthesia, the left subclavian vein was cannulated with a large-bore needle and a guidewire was placed under fluoroscopic guidance into the superior vena cava.  An incision was made below the wire insertion site and a pocket was created for the port over the pectoralis fascia.  A PowerPort was soaked in antimicrobial solution, it was assembled and flushed.  It was placed in the pocket and the catheter was tunneled to the wire insertion site without difficulty.  The catheter was cut to size using fluoroscopic guidance.  An introducer dilator was placed over the guidewire under fluoroscopic vision and the catheter was threaded through the peel-away introducer without difficulty.  Final fluoroscopy revealed the tip of the catheter in good position.  The port flushed and aspirated freely, a final heparin solution was instilled.  The port pocket was irrigated with antimicrobial solution, meticulous hemostasis was achieved, it was closed in multiple layers with  absorbable suture.  Sterile dressings were applied.  The patient tolerated the procedure well.

## 2024-09-18 NOTE — PLAN OF CARE
Problem: Occupational Therapy Goal  Goal: Occupational Therapy Goal  Outcome: Outcome(s) achieved Date Met: 04/03/19  OT evaluation completed and pt d/c'ed 4/3/2019 as pt is at/close to their functional baseline with no further acute OT needs at this time. Please re-consult if there is a change in functional status.          Former smoker, former social alcohol use  Taken care of by 2 daughters, 6 months each

## 2025-06-13 NOTE — PROGRESS NOTES
Ifosfamide infusing as ordered at this time. Chemo consent and CAR in chart; dose and BSA independently verified by 2 chemo-certified RNs per protocol.  Pt premedicated with Cinvanti, zofran and dexamethasone prior to start of chemo regimen.  Mesna infused prior to starting Ifosfamide.  IVF and pre-hydration infusing as ordered.  SARITHA PICC line flushed with normal saline prior to start of chemo infusion with good blood return noted.  Ifosfamide checked against order and patient at bedside by 2 RNs per protocol.  Pt tolerating well; no distress noted.  Will continue to monitor; chemo precautions maintained.                Yes